# Patient Record
Sex: FEMALE | Race: WHITE | NOT HISPANIC OR LATINO | Employment: FULL TIME | URBAN - METROPOLITAN AREA
[De-identification: names, ages, dates, MRNs, and addresses within clinical notes are randomized per-mention and may not be internally consistent; named-entity substitution may affect disease eponyms.]

---

## 2017-06-28 ENCOUNTER — TRANSFERRED RECORDS (OUTPATIENT)
Dept: HEALTH INFORMATION MANAGEMENT | Facility: CLINIC | Age: 27
End: 2017-06-28

## 2018-02-12 ENCOUNTER — TRANSFERRED RECORDS (OUTPATIENT)
Dept: HEALTH INFORMATION MANAGEMENT | Facility: CLINIC | Age: 28
End: 2018-02-12

## 2018-02-14 ENCOUNTER — TRANSFERRED RECORDS (OUTPATIENT)
Dept: HEALTH INFORMATION MANAGEMENT | Facility: CLINIC | Age: 28
End: 2018-02-14

## 2018-08-14 ENCOUNTER — TRANSFERRED RECORDS (OUTPATIENT)
Dept: HEALTH INFORMATION MANAGEMENT | Facility: CLINIC | Age: 28
End: 2018-08-14

## 2018-10-31 ENCOUNTER — OFFICE VISIT (OUTPATIENT)
Dept: RHEUMATOLOGY | Facility: CLINIC | Age: 28
End: 2018-10-31
Attending: NURSE PRACTITIONER
Payer: COMMERCIAL

## 2018-10-31 VITALS
SYSTOLIC BLOOD PRESSURE: 126 MMHG | TEMPERATURE: 98.3 F | DIASTOLIC BLOOD PRESSURE: 86 MMHG | OXYGEN SATURATION: 97 % | WEIGHT: 216.1 LBS | HEART RATE: 87 BPM

## 2018-10-31 DIAGNOSIS — L41.1 PITYRIASIS LICHENOIDES CHRONICA: ICD-10-CM

## 2018-10-31 DIAGNOSIS — M06.09 RHEUMATOID ARTHRITIS OF MULTIPLE SITES WITH NEGATIVE RHEUMATOID FACTOR (H): Primary | ICD-10-CM

## 2018-10-31 DIAGNOSIS — Z79.1 NSAID LONG-TERM USE: ICD-10-CM

## 2018-10-31 DIAGNOSIS — R76.8 CYCLIC CITRULLINATED PEPTIDE (CCP) ANTIBODY POSITIVE: ICD-10-CM

## 2018-10-31 DIAGNOSIS — E55.9 VITAMIN D INSUFFICIENCY: ICD-10-CM

## 2018-10-31 DIAGNOSIS — Z23 NEED FOR PNEUMOCOCCAL VACCINATION: ICD-10-CM

## 2018-10-31 DIAGNOSIS — M06.09 RHEUMATOID ARTHRITIS OF MULTIPLE SITES WITH NEGATIVE RHEUMATOID FACTOR (H): ICD-10-CM

## 2018-10-31 DIAGNOSIS — Z79.899 LONG-TERM USE OF PLAQUENIL: ICD-10-CM

## 2018-10-31 LAB
ALBUMIN SERPL-MCNC: 3.5 G/DL (ref 3.4–5)
ALBUMIN UR-MCNC: NEGATIVE MG/DL
ALP SERPL-CCNC: 77 U/L (ref 40–150)
ALT SERPL W P-5'-P-CCNC: 22 U/L (ref 0–50)
ANION GAP SERPL CALCULATED.3IONS-SCNC: 9 MMOL/L (ref 3–14)
APPEARANCE UR: CLEAR
AST SERPL W P-5'-P-CCNC: 16 U/L (ref 0–45)
BACTERIA #/AREA URNS HPF: ABNORMAL /HPF
BASOPHILS # BLD AUTO: 0 10E9/L (ref 0–0.2)
BASOPHILS NFR BLD AUTO: 0.3 %
BILIRUB SERPL-MCNC: 0.2 MG/DL (ref 0.2–1.3)
BILIRUB UR QL STRIP: NEGATIVE
BUN SERPL-MCNC: 12 MG/DL (ref 7–30)
CALCIUM SERPL-MCNC: 8.5 MG/DL (ref 8.5–10.1)
CHLORIDE SERPL-SCNC: 109 MMOL/L (ref 94–109)
CO2 SERPL-SCNC: 21 MMOL/L (ref 20–32)
COLOR UR AUTO: YELLOW
CREAT SERPL-MCNC: 0.71 MG/DL (ref 0.52–1.04)
CRP SERPL-MCNC: 6.4 MG/L (ref 0–8)
DIFFERENTIAL METHOD BLD: NORMAL
EOSINOPHIL # BLD AUTO: 0.1 10E9/L (ref 0–0.7)
EOSINOPHIL NFR BLD AUTO: 0.9 %
ERYTHROCYTE [DISTWIDTH] IN BLOOD BY AUTOMATED COUNT: 12.4 % (ref 10–15)
GFR SERPL CREATININE-BSD FRML MDRD: >90 ML/MIN/1.7M2
GLUCOSE SERPL-MCNC: 81 MG/DL (ref 70–99)
GLUCOSE UR STRIP-MCNC: NEGATIVE MG/DL
HCT VFR BLD AUTO: 41.3 % (ref 35–47)
HGB BLD-MCNC: 13.6 G/DL (ref 11.7–15.7)
HGB UR QL STRIP: ABNORMAL
IMM GRANULOCYTES # BLD: 0 10E9/L (ref 0–0.4)
IMM GRANULOCYTES NFR BLD: 0.3 %
KETONES UR STRIP-MCNC: NEGATIVE MG/DL
LEUKOCYTE ESTERASE UR QL STRIP: NEGATIVE
LYMPHOCYTES # BLD AUTO: 3.4 10E9/L (ref 0.8–5.3)
LYMPHOCYTES NFR BLD AUTO: 36.7 %
MCH RBC QN AUTO: 29.1 PG (ref 26.5–33)
MCHC RBC AUTO-ENTMCNC: 32.9 G/DL (ref 31.5–36.5)
MCV RBC AUTO: 88 FL (ref 78–100)
MONOCYTES # BLD AUTO: 0.7 10E9/L (ref 0–1.3)
MONOCYTES NFR BLD AUTO: 7.3 %
MUCOUS THREADS #/AREA URNS LPF: PRESENT /LPF
NEUTROPHILS # BLD AUTO: 5 10E9/L (ref 1.6–8.3)
NEUTROPHILS NFR BLD AUTO: 54.5 %
NITRATE UR QL: NEGATIVE
NRBC # BLD AUTO: 0 10*3/UL
NRBC BLD AUTO-RTO: 0 /100
PH UR STRIP: 5 PH (ref 5–7)
PLATELET # BLD AUTO: 311 10E9/L (ref 150–450)
POTASSIUM SERPL-SCNC: 3.9 MMOL/L (ref 3.4–5.3)
PROT SERPL-MCNC: 7.7 G/DL (ref 6.8–8.8)
RBC # BLD AUTO: 4.67 10E12/L (ref 3.8–5.2)
RBC #/AREA URNS AUTO: <1 /HPF (ref 0–2)
SODIUM SERPL-SCNC: 139 MMOL/L (ref 133–144)
SOURCE: ABNORMAL
SP GR UR STRIP: 1.01 (ref 1–1.03)
SQUAMOUS #/AREA URNS AUTO: <1 /HPF (ref 0–1)
UROBILINOGEN UR STRIP-MCNC: 0 MG/DL (ref 0–2)
WBC # BLD AUTO: 9.2 10E9/L (ref 4–11)
WBC #/AREA URNS AUTO: 1 /HPF (ref 0–5)

## 2018-10-31 PROCEDURE — 86480 TB TEST CELL IMMUN MEASURE: CPT | Performed by: NURSE PRACTITIONER

## 2018-10-31 PROCEDURE — 36415 COLL VENOUS BLD VENIPUNCTURE: CPT | Performed by: NURSE PRACTITIONER

## 2018-10-31 PROCEDURE — 82306 VITAMIN D 25 HYDROXY: CPT | Performed by: NURSE PRACTITIONER

## 2018-10-31 PROCEDURE — G0009 ADMIN PNEUMOCOCCAL VACCINE: HCPCS

## 2018-10-31 PROCEDURE — 81001 URINALYSIS AUTO W/SCOPE: CPT | Performed by: NURSE PRACTITIONER

## 2018-10-31 PROCEDURE — 80053 COMPREHEN METABOLIC PANEL: CPT | Performed by: NURSE PRACTITIONER

## 2018-10-31 PROCEDURE — 86803 HEPATITIS C AB TEST: CPT | Performed by: NURSE PRACTITIONER

## 2018-10-31 PROCEDURE — 86431 RHEUMATOID FACTOR QUANT: CPT | Performed by: NURSE PRACTITIONER

## 2018-10-31 PROCEDURE — 86140 C-REACTIVE PROTEIN: CPT | Performed by: NURSE PRACTITIONER

## 2018-10-31 PROCEDURE — 90670 PCV13 VACCINE IM: CPT | Mod: ZF | Performed by: NURSE PRACTITIONER

## 2018-10-31 PROCEDURE — G0008 ADMIN INFLUENZA VIRUS VAC: HCPCS | Mod: ZF

## 2018-10-31 PROCEDURE — 87340 HEPATITIS B SURFACE AG IA: CPT | Performed by: NURSE PRACTITIONER

## 2018-10-31 PROCEDURE — 86704 HEP B CORE ANTIBODY TOTAL: CPT | Performed by: NURSE PRACTITIONER

## 2018-10-31 PROCEDURE — 25000128 H RX IP 250 OP 636: Mod: ZF | Performed by: NURSE PRACTITIONER

## 2018-10-31 PROCEDURE — 85025 COMPLETE CBC W/AUTO DIFF WBC: CPT | Performed by: NURSE PRACTITIONER

## 2018-10-31 PROCEDURE — G0463 HOSPITAL OUTPT CLINIC VISIT: HCPCS | Mod: 25,ZF

## 2018-10-31 RX ORDER — HYDROXYCHLOROQUINE SULFATE 200 MG/1
200 TABLET, FILM COATED ORAL 2 TIMES DAILY
Qty: 60 TABLET | Refills: 3 | Status: SHIPPED | OUTPATIENT
Start: 2018-10-31 | End: 2018-12-17

## 2018-10-31 RX ORDER — HYDROXYCHLOROQUINE SULFATE 200 MG/1
TABLET, FILM COATED ORAL
Refills: 0 | COMMUNITY
Start: 2018-03-21 | End: 2018-10-31

## 2018-10-31 RX ORDER — PREDNISONE 5 MG/1
TABLET ORAL
Qty: 84 TABLET | Refills: 0 | Status: SHIPPED | OUTPATIENT
Start: 2018-10-31 | End: 2018-10-31

## 2018-10-31 RX ORDER — FOLIC ACID 1 MG/1
1 TABLET ORAL DAILY
Qty: 90 TABLET | Refills: 3 | Status: SHIPPED | OUTPATIENT
Start: 2018-10-31 | End: 2018-12-17

## 2018-10-31 RX ORDER — DROSPIRENONE AND ETHINYL ESTRADIOL TABLETS 0.02-3(28)
KIT ORAL
Refills: 4 | COMMUNITY
Start: 2018-08-30 | End: 2018-12-04

## 2018-10-31 RX ORDER — CITALOPRAM HYDROBROMIDE 10 MG/1
TABLET ORAL
Refills: 1 | COMMUNITY
Start: 2018-06-05 | End: 2018-12-04

## 2018-10-31 RX ORDER — PREDNISONE 5 MG/1
TABLET ORAL
Qty: 84 TABLET | Refills: 0 | Status: SHIPPED | OUTPATIENT
Start: 2018-10-31 | End: 2019-05-01

## 2018-10-31 RX ADMIN — PNEUMOCOCCAL 13-VALENT CONJUGATE VACCINE 0.5 ML: 2.2; 2.2; 2.2; 2.2; 2.2; 4.4; 2.2; 2.2; 2.2; 2.2; 2.2; 2.2; 2.2 INJECTION, SUSPENSION INTRAMUSCULAR at 14:27

## 2018-10-31 ASSESSMENT — PAIN SCALES - GENERAL: PAINLEVEL: MODERATE PAIN (5)

## 2018-10-31 NOTE — LETTER
Date:November 1, 2018      Patient was self referred, no letter generated. Do not send.        [facility] Health Information

## 2018-10-31 NOTE — MR AVS SNAPSHOT
After Visit Summary   10/31/2018    Rachel Fried    MRN: [id number]           Patient Information     Date Of Birth          1990        Visit Information        Provider Department      10/31/2018 1:00 PM Zurdo Wesley APRN CNP [facility] Rheumatology        Today's Diagnoses     Rheumatoid arthritis of multiple sites with negative rheumatoid factor (H)    -  1    Cyclic citrullinated peptide (CCP) antibody positive        Long-term use of Plaquenil        Pityriasis lichenoides chronica        NSAID long-term use        Need for pneumococcal vaccination          Care Instructions    Take prednisone:   15 mg once day for 14 days  10 mg once day for 14 day   5 mg once day for 14 days     METHOTREXATE   7.5MG EVERY Friday FOR 2 WEEKS, THEN GO TO 10MG EVERY Friday.       LABS IN 1MONTH    START FOLIC ACID   Patient Education    Methotrexate Sodium Oral tablet    Methotrexate Sodium Solution for injection    Methotrexate Solution for injection  Methotrexate Sodium Oral tablet  What is this medicine?  METHOTREXATE (METH oh TREX ate) is a chemotherapy drug. This medicine affects cells that are rapidly growing, such as cancer cells and cells in your mouth and stomach. It is used to treat many cancers and other medical conditions. It is used for leukemias, lymphomas, breast cancer, lung cancer, head and neck cancers, and other cancers. This medicine also works on the immune system and is commonly used to treat psoriasis and rheumatoid arthritis. If used for arthritis or psoriasis, the drug is only given once a week.  This medicine may be used for other purposes; ask your health care provider or pharmacist if you have questions.  What should I tell my health care provider before I take this medicine?  They need to know if you have any of these conditions:    bleeding or blood disorders    HIV-positive or have acquired immunodeficiency syndrome (AIDS)    if you frequently drink alcohol-containing  drinks    infection or weak immune system    kidney disease    liver disease    lung disease    stomach ulcers    ulcerative colitis    an unusual or allergic reaction to methotrexate, other medicines, foods, dyes, or preservatives    pregnant or trying to get pregnant    breast-feeding  How should I use this medicine?  Take this medicine by mouth. Swallow it with a full glass of water. Follow the directions on the prescription label. Do not take your medicine more often than directed. Finish the full course prescribed by your doctor or health care professional. Do not stop taking except on your doctor's advice.  If you take methotrexate for rheumatoid arthritis or psoriasis, the dose is given only once a week. Do not take more frequently.  Talk to your pediatrician regarding the use of this medicine in children. Special care may be needed.  Overdosage: If you think you have taken too much of this medicine contact a poison control center or emergency room at once.  NOTE: This medicine is only for you. Do not share this medicine with others.  What if I miss a dose?  If you miss a dose, talk with your doctor or health care professional. Do not take double or extra doses. If you vomit after taking a dose, call your doctor or health care professional for advice.  What may interact with this medicine?    antibiotics and other medicines for infections    aspirin and aspirin-like medicines including bismuth subsalicylate (Pepto-Bismol)    NSAIDs, medicines for pain and inflammation, like ibuprofen or naproxen    probenecid    trimetrexate    vaccines  This list may not describe all possible interactions. Give your health care provider a list of all the medicines, herbs, non-prescription drugs, or dietary supplements you use. Also tell them if you smoke, drink alcohol, or use illegal drugs. Some items may interact with your medicine.  What should I watch for while using this medicine?  Visit your doctor or health care  professional for checks on your progress. You will need to have regular blood checks. You will also need a chest X-ray before starting the medicine.  If you take the medicine for rheumatoid arthritis or psoriasis, you may not see an improvement in your condition for several weeks.  Do not drink alcohol-containing drinks while taking this medicine. Both alcohol and the medicine may cause damage to your liver.  This medicine may increase your risk of getting an infection. Stay away from people who are sick.  To protect your kidneys, drink water or other fluids as directed while you are taking this medicine.  Both men and women must use effective birth control. Use 2 reliable forms of birth control together. Do not become pregnant while taking this medicine. Women should continue to use birth control until after their first normal menstrual cycle after stopping the medicine. Call your doctor right away if you think you or your partner might be pregnant. There is a potential for serious side effects to an unborn child. Talk to your health care professional or pharmacist for more information. Do not breast-feed an infant while taking this medicine. Men should continue to use birth control for at least 3 months after stopping the medicine.  If you are going to have surgery or dental work, tell your health care professional that you are taking this medicine.  This medicine can make you more sensitive to the sun. Keep out of the sun. If you cannot avoid being in the sun, wear protective clothing and use sunscreen. Do not use sun lamps or tanning beds/booths.  What side effects may I notice from receiving this medicine?  Side effects that you should report to your doctor or health care professional as soon as possible:    bruising, pinpoint red spots on the skin, black, tarry stools, blood in the urine    changes in vision    diarrhea    difficulty breathing or a dry cough    mouth and throat ulcers    redness, blistering,  peeling or loosening of the skin, including inside the mouth    skin rash, hives, or itching    symptoms of infection like fever or chills, cough, sore throat, pain or difficulty passing urine    unusually weak or tired, fainting spells    vomiting    yellow coloring of skin or eyes  Side effects that usually do not require medical attention (report to your doctor or health care professional if they continue or are bothersome):    dizziness    drowsiness    loss of appetite    nausea  This list may not describe all possible side effects. Call your doctor for medical advice about side effects. You may report side effects to FDA at 5-929-NTH-1431.  Where should I keep my medicine?  Keep out of the reach of children.  Store at room temperature between 20 and 25 degrees C (68 and 77 degrees F). Protect from light. Throw away any unused medicine after the expiration date.  NOTE:This sheet is a summary. It may not cover all possible information. If you have questions about this medicine, talk to your doctor, pharmacist, or health care provider. Copyright  2016 [employer] Standard                Follow-ups after your visit        Additional Services     DERMATOLOGY REFERRAL       Your provider has referred you to: [facility]: Dermatology Clinic [facility] [phone]   [email or web address]     Please be aware that coverage of these services is subject to the terms and limitations of your health insurance plan.  Call member services at your health plan with any benefit or coverage questions.      Please bring the following with you to your appointment:    (1) Any X-Rays, CTs or MRIs which have been performed.  Contact the facility where they were done to arrange for  prior to your scheduled appointment.    (2) List of current medications  (3) This referral request   (4) Any documents/labs given to you for this referral                  Follow-up notes from your care team     Return for  Labs Today, Labs every 8-12 weeks, Labs every 2 months, Annual Physical Exam with Primary Care Provider.      Your next 10 appointments already scheduled     Oct 31, 2018  2:45 PM CDT   Lab with  LAB   [facility] ([facility])    [address]  1st Floor  [facility] [address]   [phone]            Nov 06, 2018 12:25 PM CST   (Arrive by 12:10 PM)   New Patient Visit with Reginald Cohen MD   [facility] D[facility]at[facility] ([facility])    [address] [place] [address]  3rd Floor  [facility] [address]   [phone]            Nov 13, 2018  7:10 AM CST   (Arrive by 6:55 AM)   RE-ESTABLISH NEW with Brandy Lara MD   [facility] ([facility])    [address] [place] [address]  4th Floor  [facility] [address]   [phone]            Dec 17, 2018  7:30 AM CST   Lab with  LAB   [facility] ([facility])    [address] [place] [address]  1st Floor  [facility] [address]   [phone]            Dec 17, 2018  8:00 AM CST   (Arrive by 7:45 AM)   Return Visit with GIL Walker CNP   [facility] Rheumatology ([facility])    [address] [place] [address]  [address]  [facility] [address]   [phone]              Future tests that were ordered for you today     Open Standing Orders        Priority Remaining Interval Expires Ordered    CBC with platelets Routine 6/6 Every 10 Weeks 10/31/2019 10/31/2018    AST Routine 6/6 Every 10 Weeks 10/31/2019 10/31/2018    ALT Routine 6/6 Every 10 Weeks 10/31/2019 10/31/2018    Albumin level Routine 6/6 Every 10 Weeks 10/31/2019 10/31/2018    Creatinine Routine 6/6 Every 10 Weeks 10/31/2019 10/31/2018    CRP inflammation Routine 6/6 Every 10 Weeks 10/31/2019 10/31/2018          Open Future Orders        Priority Expected Expires Ordered    CBC with platelets Routine  12/30/2018 10/31/2018    AST Routine  12/30/2018 10/31/2018    ALT  Routine  12/30/2018 10/31/2018    Albumin level Routine  12/30/2018 10/31/2018    Creatinine Routine  12/30/2018 10/31/2018    CRP inflammation Routine  12/30/2018 10/31/2018    Vitamin D Deficiency Routine 10/31/2018 11/30/2018 10/31/2018    UA with Microscopic ([place] and [facility]) Routine 10/31/2018 11/30/2018 10/31/2018    Hepatitis B core antibody Routine 10/31/2018 11/30/2018 10/31/2018    Hepatitis B surface antigen Routine 10/31/2018 11/30/2018 10/31/2018    Hepatitis C antibody Routine 10/31/2018 11/30/2018 10/31/2018    Quantiferon TB Gold Plus Routine 10/31/2018 11/30/2018 10/31/2018    Comprehensive metabolic panel Routine 10/31/2018 11/30/2018 10/31/2018    CBC with platelets differential Routine 10/31/2018 11/30/2018 10/31/2018    CRP inflammation Routine 10/31/2018 11/30/2018 10/31/2018    Rheumatoid factor Routine 10/31/2018 11/30/2018 10/31/2018            Who to contact     If you have questions or need follow up information about today's clinic visit or your schedule please contact [facility] RHEUMATOLOGY directly at [phone].  Normal or non-critical lab and imaging results will be communicated to you by [employer], letter or phone within 4 business days after the clinic has received the results. If you do not hear from us within 7 days, please contact the clinic through [employer] or phone. If you have a critical or abnormal lab result, we will notify you by phone as soon as possible.  Submit refill requests through [employer] or call your pharmacy and they will forward the refill request to us. Please allow 3 business days for your refill to be completed.          Additional Information About Your Visit        [employer] Information     [employer] gives you secure access to your electronic health record. If you see a primary care provider, you can also send messages to your care team and make appointments. If you have questions, please call your primary care clinic.  If you do not have a primary care  provider, please call [phone] and they will assist you.        Care EveryWhere ID     This is your Care EveryWhere ID. This could be used by other organizations to access your [place] medical records  [id number]        Your Vitals Were     Pulse Temperature Pulse Oximetry             87 98.3  F (36.8  C) (Oral) 97%          Blood Pressure from Last 3 Encounters:   10/31/18 126/86    Weight from Last 3 Encounters:   10/31/18 98 kg (216 lb 1.6 oz)              We Performed the Following     DERMATOLOGY REFERRAL          Today's Medication Changes          These changes are accurate as of 10/31/18  2:30 PM.  If you have any questions, ask your nurse or doctor.               Start taking these medicines.        Dose/Directions    folic acid 1 MG tablet   Commonly known as:  FOLVITE   Used for:  Rheumatoid arthritis of multiple sites with negative rheumatoid factor (H), Cyclic citrullinated peptide (CCP) antibody positive, Long-term use of Plaquenil   Started by:  Zurdo Wesley APRN CNP        Dose:  1 mg   Take 1 tablet (1 mg) by mouth daily   Quantity:  90 tablet   Refills:  3       methotrexate 2.5 MG tablet CHEMO   Used for:  Rheumatoid arthritis of multiple sites with negative rheumatoid factor (H), Cyclic citrullinated peptide (CCP) antibody positive, Long-term use of Plaquenil   Started by:  Zurdo Wesley APRN CNP        Dose:  10 mg   Take 4 tablets (10 mg) by mouth once a week Labs due in 1 month.   Quantity:  16 tablet   Refills:  1       predniSONE 5 MG tablet   Commonly known as:  DELTASONE   Used for:  Rheumatoid arthritis of multiple sites with negative rheumatoid factor (H), Cyclic citrullinated peptide (CCP) antibody positive   Started by:  Zurdo Wesley APRN CNP        Take 15 mg day, then 10 mg day then 5 mg day each for 14 days then stop.   Quantity:  84 tablet   Refills:  0         These medicines have changed or have updated prescriptions.        Dose/Directions     hydroxychloroquine 200 MG tablet   Commonly known as:  PLAQUENIL   This may have changed:  See the new instructions.   Used for:  Rheumatoid arthritis of multiple sites with negative rheumatoid factor (H), Cyclic citrullinated peptide (CCP) antibody positive   Changed by:  Zurdo Wesley APRN CNP        Dose:  200 mg   Take 1 tablet (200 mg) by mouth 2 times daily Get annual eye exams for hydroxychloroquine (plaquenil) monitoring and fax to [phone].   Quantity:  60 tablet   Refills:  3            Where to get your medicines      These medications were sent to [facility] [address] [address] [address] [place] [facility] [address]  [address] [facility] [facility] [address]     Phone:  [phone]     folic acid 1 MG tablet    hydroxychloroquine 200 MG tablet    methotrexate 2.5 MG tablet CHEMO    predniSONE 5 MG tablet                Primary Care Provider Fax #    Physician No Ref-Primary [phone]       No address on file        Equal Access to Services     CAROLINE PEREZ : Hadii aad ku hadasho Sodudley, waaxda luqadaha, qaybta kaalmada adeegyada, waxay ja nunez . So [facility] [phone].    ATENCIÓN: Si habla español, tiene a escobar disposición servicios gratuitos de asistencia lingüística. Llame al [phone].    We comply with applicable federal civil rights laws and [place] laws. We do not discriminate on the basis of race, color, national origin, age, disability, sex, sexual orientation, or gender identity.            Thank you!     Thank you for choosing [facility] RHEUMATOLOG[facility]  for your care. Our goal is always to provide you with excellent care. Hearing back from our patients is one way we can continue to improve our services. Please take a few minutes to complete the written survey that you may receive in the mail after your visit with us. Thank you!             Your Updated Medication List - Protect others around you: Learn how to safely  use, store and throw away your medicines at www.disposemymeds.org.          This list is accurate as of 10/31/18  2:30 PM.  Always use your most recent med list.                   Brand Name Dispense Instructions for use Diagnosis    citalopram 10 MG tablet    celeXA     TK 1 T PO D        folic acid 1 MG tablet    FOLVITE    90 tablet    Take 1 tablet (1 mg) by mouth daily    Rheumatoid arthritis of multiple sites with negative rheumatoid factor (H), Cyclic citrullinated peptide (CCP) antibody positive, Long-term use of Plaquenil       hydroxychloroquine 200 MG tablet    PLAQUENIL    60 tablet    Take 1 tablet (200 mg) by mouth 2 times daily Get annual eye exams for hydroxychloroquine (plaquenil) monitoring and fax to [phone].    Rheumatoid arthritis of multiple sites with negative rheumatoid factor (H), Cyclic citrullinated peptide (CCP) antibody positive       LORYNA 3-0.02 MG per tablet   Generic drug:  drospirenone-ethinyl estradiol      TK 1 T PO D        methotrexate 2.5 MG tablet CHEMO     16 tablet    Take 4 tablets (10 mg) by mouth once a week Labs due in 1 month.    Rheumatoid arthritis of multiple sites with negative rheumatoid factor (H), Cyclic citrullinated peptide (CCP) antibody positive, Long-term use of Plaquenil       predniSONE 5 MG tablet    DELTASONE    84 tablet    Take 15 mg day, then 10 mg day then 5 mg day each for 14 days then stop.    Rheumatoid arthritis of multiple sites with negative rheumatoid factor (H), Cyclic citrullinated peptide (CCP) antibody positive       PROVENTIL HFA IN           ZYRTEC ALLERGY 10 MG Caps   Generic drug:  cetirizine HCl

## 2018-10-31 NOTE — NURSING NOTE
Chief Complaint   Patient presents with     Consult     Rheumatoid Arthritis     /86  Pulse 87  Temp 98.3  F (36.8  C) (Oral)  Wt 98 kg (216 lb 1.6 oz)  SpO2 97%   Cielo Meyer

## 2018-10-31 NOTE — PATIENT INSTRUCTIONS
Take prednisone:   15 mg once day for 14 days  10 mg once day for 14 day   5 mg once day for 14 days     METHOTREXATE   7.5MG EVERY Friday FOR 2 WEEKS, THEN GO TO 10MG EVERY Friday.       LABS IN 1MONTH    START FOLIC ACID   Patient Education    Methotrexate Sodium Oral tablet    Methotrexate Sodium Solution for injection    Methotrexate Solution for injection  Methotrexate Sodium Oral tablet  What is this medicine?  METHOTREXATE (METH oh TREX ate) is a chemotherapy drug. This medicine affects cells that are rapidly growing, such as cancer cells and cells in your mouth and stomach. It is used to treat many cancers and other medical conditions. It is used for leukemias, lymphomas, breast cancer, lung cancer, head and neck cancers, and other cancers. This medicine also works on the immune system and is commonly used to treat psoriasis and rheumatoid arthritis. If used for arthritis or psoriasis, the drug is only given once a week.  This medicine may be used for other purposes; ask your health care provider or pharmacist if you have questions.  What should I tell my health care provider before I take this medicine?  They need to know if you have any of these conditions:    bleeding or blood disorders    HIV-positive or have acquired immunodeficiency syndrome (AIDS)    if you frequently drink alcohol-containing drinks    infection or weak immune system    kidney disease    liver disease    lung disease    stomach ulcers    ulcerative colitis    an unusual or allergic reaction to methotrexate, other medicines, foods, dyes, or preservatives    pregnant or trying to get pregnant    breast-feeding  How should I use this medicine?  Take this medicine by mouth. Swallow it with a full glass of water. Follow the directions on the prescription label. Do not take your medicine more often than directed. Finish the full course prescribed by your doctor or health care professional. Do not stop taking except on your doctor's  advice.  If you take methotrexate for rheumatoid arthritis or psoriasis, the dose is given only once a week. Do not take more frequently.  Talk to your pediatrician regarding the use of this medicine in children. Special care may be needed.  Overdosage: If you think you have taken too much of this medicine contact a poison control center or emergency room at once.  NOTE: This medicine is only for you. Do not share this medicine with others.  What if I miss a dose?  If you miss a dose, talk with your doctor or health care professional. Do not take double or extra doses. If you vomit after taking a dose, call your doctor or health care professional for advice.  What may interact with this medicine?    antibiotics and other medicines for infections    aspirin and aspirin-like medicines including bismuth subsalicylate (Pepto-Bismol)    NSAIDs, medicines for pain and inflammation, like ibuprofen or naproxen    probenecid    trimetrexate    vaccines  This list may not describe all possible interactions. Give your health care provider a list of all the medicines, herbs, non-prescription drugs, or dietary supplements you use. Also tell them if you smoke, drink alcohol, or use illegal drugs. Some items may interact with your medicine.  What should I watch for while using this medicine?  Visit your doctor or health care professional for checks on your progress. You will need to have regular blood checks. You will also need a chest X-ray before starting the medicine.  If you take the medicine for rheumatoid arthritis or psoriasis, you may not see an improvement in your condition for several weeks.  Do not drink alcohol-containing drinks while taking this medicine. Both alcohol and the medicine may cause damage to your liver.  This medicine may increase your risk of getting an infection. Stay away from people who are sick.  To protect your kidneys, drink water or other fluids as directed while you are taking this  medicine.  Both men and women must use effective birth control. Use 2 reliable forms of birth control together. Do not become pregnant while taking this medicine. Women should continue to use birth control until after their first normal menstrual cycle after stopping the medicine. Call your doctor right away if you think you or your partner might be pregnant. There is a potential for serious side effects to an unborn child. Talk to your health care professional or pharmacist for more information. Do not breast-feed an infant while taking this medicine. Men should continue to use birth control for at least 3 months after stopping the medicine.  If you are going to have surgery or dental work, tell your health care professional that you are taking this medicine.  This medicine can make you more sensitive to the sun. Keep out of the sun. If you cannot avoid being in the sun, wear protective clothing and use sunscreen. Do not use sun lamps or tanning beds/booths.  What side effects may I notice from receiving this medicine?  Side effects that you should report to your doctor or health care professional as soon as possible:    bruising, pinpoint red spots on the skin, black, tarry stools, blood in the urine    changes in vision    diarrhea    difficulty breathing or a dry cough    mouth and throat ulcers    redness, blistering, peeling or loosening of the skin, including inside the mouth    skin rash, hives, or itching    symptoms of infection like fever or chills, cough, sore throat, pain or difficulty passing urine    unusually weak or tired, fainting spells    vomiting    yellow coloring of skin or eyes  Side effects that usually do not require medical attention (report to your doctor or health care professional if they continue or are bothersome):    dizziness    drowsiness    loss of appetite    nausea  This list may not describe all possible side effects. Call your doctor for medical advice about side effects. You  may report side effects to FDA at 9-206-UXZ-8363.  Where should I keep my medicine?  Keep out of the reach of children.  Store at room temperature between 20 and 25 degrees C (68 and 77 degrees F). Protect from light. Throw away any unused medicine after the expiration date.  NOTE:This sheet is a summary. It may not cover all possible information. If you have questions about this medicine, talk to your doctor, pharmacist, or health care provider. Copyright  2016 Gold Standard

## 2018-10-31 NOTE — PROGRESS NOTES
[facility] - Rheumatology Clinic Visit    Rachel Fried  is a 27 year old here for transfer of care for RHEUMATOID ARTHRITIS (RA) involving hands, wrists, ankles, shoulders feet [ -RF and low CCP 25, -factor V -KAVITHA by IFA and -NADEEN panel -HLA B27] and pityriasis lichenoides chronica (seen [facility]). XR  no erosions hands. Moved from [place] to be with family.       Past- hydroxychloroquine (plaquenil) since 1-2016 and methotrexate 10 mg week  to 4-2018 (mild fatigue day of), naproxsyn; allergy sulfa, prednisone, advil       Hydroxychloroquine (plaquenil) BID mild diarrhea; ocular eye exam 3-2016 baseline reports recent eye exam   EKG was recently Feb 2018 normal. Stopped methotrexate  In April as wanted to take a drug holiday and was moving here, also wanted to drink some alcohol, and discuss options. No infections. Diffuse joint pains in hands, wrists, shoulders, ankles and feet and the sides of her hips. Taking advil 600mg AM and 400 mg PM, tolerating but taking since April. Pain severe 5/10 or more. Stiffness all day. Hands are sore. No loss of function or ROM. Very sore and stiff all over. Knees are sore and hard to bend or squat. Mild diarrhea with hydroxychloroquine (plaquenil)       PMH:  Injury-no  Medical-dermatitis, (bx 2-2016 GA vs LP vs cutaneous lupus--was not cutenaous lupus --focal interstitial lymphocytic inflammation, scattered lymphocytes), anxiety and depression, exercise induced asthma, scoliosis, pityriasis lichenoides chronica, chronic back pain, hypertension, seasonal allergies, acne, motion sickness, high cholesterol, chicken pox    Surgical-None       FH:  No autoimmune disorders, psoriasis, UC, crohn's, RA, PsA, gout, autoimmune thyroid.  No MS, heart disease or cancer in family  Mother-factor V leiden carrier, rosea   Father-healthy  Siblings-younger bro healthy  Children-None   GF-lupus   Uncle cancer colon    SH:  Social Alcohol 5 week. Never  Smoking. No IVDU. No Children. Right handed. Single--not sexually active, never.        [facility] personally reviewed and updated by me.    ROS:  Negative raynaud s phenomena, hairloss, sun sensitivities, keratoconjunctivitis sicca, pleurisy, inflammatory joint symptoms, significant rashes like malar, oral/nasal or ulcerations, inflammatory eye disease, inflammatory bowel disease, dactylitis, tenosynovitis, or enthespathy. Negative for  blood clots, gout, psoriasis, UC, crohn's. No temporal headache, no jaw claudication, no scalp tenderness, vision changes, carotidynia, cough. No STD or pregnancy symptoms. No Parotid swelling.   +tips finger white at times and toes   +skin disease as above   CONSTITUTIONAL: No fevers, night sweats or unintentional weight change. No acute distress, swollen glands  EYES: No vision change, diplopia, pain in eyes or red eyes   EARS, NOSE, MOUTH, THROAT: No tinnitus or hearing change, no epistaxis or nasal discharge, no oral lesions, throat clear. Normal saliva pool.  No drymouth. No thyroid enlargement.   CARDIOVASCULAR: No chest pain, palpitations, or pain with walking, no orthopnea or PND   RESPIRATORY: No dyspnea, cough, shortness of breath or wheezing. No pleurisy.   GI: No nausea, vomiting, diarrhea or constipation, no abdominal pain, or blood in stools.   : No change in urine, no dysuria or hematuria   MUSCKL: No enthesitis, plantar fascitis or heel pain. See above   INTEGUMENTARY: No concerning lesions or moles   NEURO: No loss of strength or sensation, no numbness or tingling, no tremor, no dizziness, no headache. No falls   ENDO: No polyuria or polydipsia, no temperature intolerance   HEME/LYMPH:No concerning bumps, bleeding problems, or swollen lymph nodes. No recent infections, hospitalizations or new illnesses.   ALLERGY: No environmental allergies   Otherwise 14 point ROS obtained, reviewed and found negative.     Physical exam:  Vitals: Blood pressure 126/86, pulse 87,  temperature 98.3  F (36.8  C), temperature source Oral, weight 98 kg (216 lb 1.6 oz), SpO2 97 %.  Wt Readings from Last 4 Encounters:   No data found for Wt     Constitutional: WD-WN-WG cooperative  Eyes: nl EOM, PERRLA, vision, conjunctiva, sclera, No Unilateral or bilateral external ear inflammation   ENT: nl external ears, nose, hearing, lips, teeth, gums, throat. No saddle nose   Neck: no mass or thyroid enlargement  Resp: lungs clear to auscultation, nl to palpation, nl effort  CV: RRR, no murmurs, rubs or gallops, no edema  GI: no ABD mass or tenderness, no HSM  : not tested  Lymph: no cervical or epitrochlear nodes  MS: tender in ITB and shoulders. s/t in MCPs, PIPs, wrists, MTPs, knees, ankles. All TMJ, neck, shoulder, elbow, wrist, hand, spine, hip, knee, ankle, and foot joints were examined and otherwise found normal. Weak  strength. No deformity. Full ROM. Normal prayer sign. Negative Lhermitte's sign. No periuncle erythema  Skin: no nail pitting, alopecia, rash. Skin lesions.   Neuro: nl cranial nerves, strength, sensation, DTRs.   Psych: nl judgement, orientation, memory, affect.      Labs/Imaging:  No results found for this or any previous visit.   Normal G6PD  Neg MTB QG 2016 and hepatitis panel, negative HIV    XR hands and knees-negative 2016      Impression/Plan:  1. Seropositive RHEUMATOID ARTHRITIS (RA), non-erosive highly active in diffuse joints, poor prognosis. Prior control on low dose methotrexate and hydroxychloroquine (plaquenil). Stopped methotrexate  As wanted to drink alcohol. Discussed methotrexate  Vs adalimumab (humira) risks, side-effects, possible reactions, alcohol reduction and risks with methotrexate , infection risks, pregnancy (abstince and knows needs to be off for 3-6 month before trying to concieve) seek immediate medical attention if any signs or symptoms infections. She wishes to go back to methotrexate  And monitor for side-effects, will abstain from intercourse  (will see OBGYN if does for IUD or double protection) and will limit 0-2 week not day or after.     2. Longterm hydroxychloroquine (plaquenil) use. Recent opthalmologist  Exam, she will bring in records  3. Chronic NSAID use, informed her of risks and possible side-effects to this, and including CVD/kidney disease  4. Pityriasis lichenoids chronic-referral to derm to establish care    Prednisone 15-10-5 mg each for 2 weeks then stop  Methotrexate  10mg once every FRI, will do baseline labs today and start at 7.5 mg for 2 weeks then to 10 mg  Folic acid  1 mg day  Stop NSAID use  Annual opthalmologist  Exam for hydroxychloroquine (plaquenil) use.   Pneumonia 13 vaccine, in 8 weeks will get pneumonia 23 vaccine   Establish with PCP      The patient understood the rationale for the diagnosis and treatment plan. Patient shared in the decision making. All questions were answered to best of my ability and the patient's satisfaction  Zurdo CORDERO, CNP, MSN  [facility]  Department of Rheumatology & Autoimmune Disorders    1. Immunization: Reviewed CDC guidelines with patient updated, information provided to patient based on CDC guidelines for vaccination recommendations, patient will discuss with primary provider  2. Bone Health:Educated on adequate calcium and vitamin D intake, Educated on exercise, Glucocorticoid education discussed risks, benefits & potential long term side effects from use  3. Discussed routine annual physicals, cancer screening, cardiac risk monitoring, bone health and primary care with primary care provider.   4. Educated on diagnosis, prognosis, labs, imaging, treatment options (including risks, benefits, risk of no treatment), medications (use, dose, side-effects, risks of medications including infection/cancer/bone marrow suppression, lab monitoring), infections what to do, plan of cares, goals of treatment.

## 2018-10-31 NOTE — LETTER
10/31/2018      RE: Rachel Fried  [address]  [facility] [address]       [facility] - Rheumatology Clinic Visit    Rachel Fried  is a 27 year old here for transfer of care for RHEUMATOID ARTHRITIS (RA) involving hands, wrists, ankles, shoulders feet [ -RF and low CCP 25, -factor V -KAVITHA by IFA and -NADEEN panel -HLA B27] and pityriasis lichenoides chronica (seen [facility]). XR  no erosions hands. Moved from [place] to be with family.       Past- hydroxychloroquine (plaquenil) since 1-2016 and methotrexate 10 mg week  to 4-2018 (mild fatigue day of), naproxsyn; allergy sulfa, prednisone, advil       Hydroxychloroquine (plaquenil) BID mild diarrhea; ocular eye exam 3-2016 baseline reports recent eye exam   EKG was recently Feb 2018 normal. Stopped methotrexate  In April as wanted to take a drug holiday and was moving here, also wanted to drink some alcohol, and discuss options. No infections. Diffuse joint pains in hands, wrists, shoulders, ankles and feet and the sides of her hips. Taking advil 600mg AM and 400 mg PM, tolerating but taking since April. Pain severe 5/10 or more. Stiffness all day. Hands are sore. No loss of function or ROM. Very sore and stiff all over. Knees are sore and hard to bend or squat. Mild diarrhea with hydroxychloroquine (plaquenil)       PMH:  Injury-no  Medical-dermatitis, (bx 2-2016 [place] vs LP vs cutaneous lupus--was not cutenaous lupus --focal interstitial lymphocytic inflammation, scattered lymphocytes), anxiety and depression, exercise induced asthma, scoliosis, pityriasis lichenoides chronica, chronic back pain, hypertension, seasonal allergies, acne, motion sickness, high cholesterol, chicken pox    Surgical-None       FH:  No autoimmune disorders, psoriasis, UC, crohn's, RA, PsA, gout, autoimmune thyroid.  No MS, heart disease or cancer in family  Mother-factor V leiden carrier, rosea   Father-healthy  Siblings-younger bro  healthy  Children-None   GF-lupus   Uncle cancer colon    SH:  Social Alcohol 5 week. Never Smoking. No IVDU. No Children. Right handed. Single--not sexually active, never.        [facility] personally reviewed and updated by me.    ROS:  Negative raynaud s phenomena, hairloss, sun sensitivities, keratoconjunctivitis sicca, pleurisy, inflammatory joint symptoms, significant rashes like malar, oral/nasal or ulcerations, inflammatory eye disease, inflammatory bowel disease, dactylitis, tenosynovitis, or enthespathy. Negative for  blood clots, gout, psoriasis, UC, crohn's. No temporal headache, no jaw claudication, no scalp tenderness, vision changes, carotidynia, cough. No STD or pregnancy symptoms. No Parotid swelling.   +tips finger white at times and toes   +skin disease as above   CONSTITUTIONAL: No fevers, night sweats or unintentional weight change. No acute distress, swollen glands  EYES: No vision change, diplopia, pain in eyes or red eyes   EARS, NOSE, MOUTH, THROAT: No tinnitus or hearing change, no epistaxis or nasal discharge, no oral lesions, throat clear. Normal saliva pool.  No drymouth. No thyroid enlargement.   CARDIOVASCULAR: No chest pain, palpitations, or pain with walking, no orthopnea or PND   RESPIRATORY: No dyspnea, cough, shortness of breath or wheezing. No pleurisy.   GI: No nausea, vomiting, diarrhea or constipation, no abdominal pain, or blood in stools.   : No change in urine, no dysuria or hematuria   MUSCKL: No enthesitis, plantar fascitis or heel pain. See above   INTEGUMENTARY: No concerning lesions or moles   NEURO: No loss of strength or sensation, no numbness or tingling, no tremor, no dizziness, no headache. No falls   ENDO: No polyuria or polydipsia, no temperature intolerance   HEME/LYMPH:No concerning bumps, bleeding problems, or swollen lymph nodes. No recent infections, hospitalizations or new illnesses.   ALLERGY: No environmental allergies   Otherwise 14 point ROS  obtained, reviewed and found negative.     Physical exam:  Vitals: Blood pressure 126/86, pulse 87, temperature 98.3  F (36.8  C), temperature source Oral, weight 98 kg (216 lb 1.6 oz), SpO2 97 %.  Wt Readings from Last 4 Encounters:   No data found for Wt     Constitutional: WD-WN-WG cooperative  Eyes: nl EOM, PERRLA, vision, conjunctiva, sclera, No Unilateral or bilateral external ear inflammation   ENT: nl external ears, nose, hearing, lips, teeth, gums, throat. No saddle nose   Neck: no mass or thyroid enlargement  Resp: lungs clear to auscultation, nl to palpation, nl effort  CV: RRR, no murmurs, rubs or gallops, no edema  GI: no ABD mass or tenderness, no HSM  : not tested  Lymph: no cervical or epitrochlear nodes  MS: tender in ITB and shoulders. s/t in MCPs, PIPs, wrists, MTPs, knees, ankles. All TMJ, neck, shoulder, elbow, wrist, hand, spine, hip, knee, ankle, and foot joints were examined and otherwise found normal. Weak  strength. No deformity. Full ROM. Normal prayer sign. Negative Lhermitte's sign. No periuncle erythema  Skin: no nail pitting, alopecia, rash. Skin lesions.   Neuro: nl cranial nerves, strength, sensation, DTRs.   Psych: nl judgement, orientation, memory, affect.      Labs/Imaging:  No results found for this or any previous visit.   Normal G6PD  Neg MTB QG 2016 and hepatitis panel, negative HIV    XR hands and knees-negative 2016      Impression/Plan:  1. Seropositive RHEUMATOID ARTHRITIS (RA), non-erosive highly active in diffuse joints, poor prognosis. Prior control on low dose methotrexate and hydroxychloroquine (plaquenil). Stopped methotrexate  As wanted to drink alcohol. Discussed methotrexate  Vs adalimumab (humira) risks, side-effects, possible reactions, alcohol reduction and risks with methotrexate , infection risks, pregnancy (abstince and knows needs to be off for 3-6 month before trying to concieve) seek immediate medical attention if any signs or symptoms infections.  She wishes to go back to methotrexate  And monitor for side-effects, will abstain from intercourse (will see OBGYN if does for IUD or double protection) and will limit 0-2 week not day or after.     2. Longterm hydroxychloroquine (plaquenil) use. Recent opthalmologist  Exam, she will bring in records  3. Chronic NSAID use, informed her of risks and possible side-effects to this, and including CVD/kidney disease  4. Pityriasis lichenoids chronic-referral to derm to establish care    Prednisone 15-10-5 mg each for 2 weeks then stop  Methotrexate  10mg once every FRI, will do baseline labs today and start at 7.5 mg for 2 weeks then to 10 mg  Folic acid  1 mg day  Stop NSAID use  Annual opthalmologist  Exam for hydroxychloroquine (plaquenil) use.   Pneumonia 13 vaccine, in 8 weeks will get pneumonia 23 vaccine   Establish with PCP      The patient understood the rationale for the diagnosis and treatment plan. Patient shared in the decision making. All questions were answered to best of my ability and the patient's satisfaction  Zurdo CORDERO, CNP, MSN  [facility]  Department of Rheumatology & Autoimmune Disorders    1. Immunization: Reviewed CDC guidelines with patient updated, information provided to patient based on CDC guidelines for vaccination recommendations, patient will discuss with primary provider  2. Bone Health:Educated on adequate calcium and vitamin D intake, Educated on exercise, Glucocorticoid education discussed risks, benefits & potential long term side effects from use  3. Discussed routine annual physicals, cancer screening, cardiac risk monitoring, bone health and primary care with primary care provider.   4. Educated on diagnosis, prognosis, labs, imaging, treatment options (including risks, benefits, risk of no treatment), medications (use, dose, side-effects, risks of medications including infection/cancer/bone marrow suppression, lab monitoring), infections what to do,  plan of cares, goals of treatment.               GIL Kim CNP

## 2018-11-01 LAB
DEPRECATED CALCIDIOL+CALCIFEROL SERPL-MC: 20 UG/L (ref 20–75)
HBV CORE AB SERPL QL IA: NONREACTIVE
HBV SURFACE AG SERPL QL IA: NONREACTIVE
HCV AB SERPL QL IA: NONREACTIVE
RHEUMATOID FACT SER NEPH-ACNC: <20 IU/ML (ref 0–20)

## 2018-11-01 RX ORDER — CHOLECALCIFEROL (VITAMIN D3) 50 MCG
2000 TABLET ORAL DAILY
Qty: 1 TABLET | Refills: 0 | COMMUNITY
Start: 2018-11-01

## 2018-11-02 LAB
GAMMA INTERFERON BACKGROUND BLD IA-ACNC: 0.07 IU/ML
M TB IFN-G BLD-IMP: NEGATIVE
M TB IFN-G CD4+ BCKGRND COR BLD-ACNC: 6.65 IU/ML
MITOGEN IGNF BCKGRD COR BLD-ACNC: 0.06 IU/ML
MITOGEN IGNF BCKGRD COR BLD-ACNC: 0.06 IU/ML

## 2018-11-06 ENCOUNTER — OFFICE VISIT (OUTPATIENT)
Dept: DERMATOLOGY | Facility: CLINIC | Age: 28
End: 2018-11-06
Payer: COMMERCIAL

## 2018-11-06 DIAGNOSIS — L70.0 ACNE VULGARIS: ICD-10-CM

## 2018-11-06 DIAGNOSIS — L81.1 MELASMA: ICD-10-CM

## 2018-11-06 DIAGNOSIS — L41.1 PITYRIASIS LICHENOIDES CHRONICA: Primary | ICD-10-CM

## 2018-11-06 RX ORDER — TRETINOIN 0.25 MG/G
CREAM TOPICAL
Qty: 45 G | Refills: 1 | Status: CANCELLED | OUTPATIENT
Start: 2018-11-06

## 2018-11-06 RX ORDER — TRETINOIN 0.5 MG/G
CREAM TOPICAL AT BEDTIME
Qty: 45 G | Refills: 11 | Status: SHIPPED | OUTPATIENT
Start: 2018-11-06 | End: 2023-05-16

## 2018-11-06 RX ORDER — HYDROQUINONE 40 MG/G
CREAM TOPICAL DAILY
Qty: 56.8 G | Refills: 3 | Status: SHIPPED | OUTPATIENT
Start: 2018-11-06 | End: 2023-05-16

## 2018-11-06 RX ORDER — TRIAMCINOLONE ACETONIDE 1 MG/G
OINTMENT TOPICAL 2 TIMES DAILY
Qty: 30 G | Refills: 1 | Status: SHIPPED | OUTPATIENT
Start: 2018-11-06 | End: 2023-05-16

## 2018-11-06 ASSESSMENT — PAIN SCALES - GENERAL: PAINLEVEL: NO PAIN (0)

## 2018-11-06 NOTE — PATIENT INSTRUCTIONS
Pityriasis lichenoides chronica:  - Continue methotrexate 10mg.  - We are refilling your triamcinolone 0.1% ointment. Use this on the lesions twice a day. If things are starting to respond, feel free to decrease this to once a day.    Acne:  - Benzoyl peroxide wash (get one at 4 or 5%): use on your face and upper back. Make sure to wash it off thoroughly as it can stain your towels and sheets.   - Clindamycin (antibacterial) lotion. Use this twice a day.  - Tretinoin 0.025% cream  (Vitamin A derivative). Use this at night.    Melasma:  - Make sure to use sunscreen! Sun protection is the most important thing you can do for this.  - Could consider a medication (hydroquinone) if you would like additional treatment for this.

## 2018-11-06 NOTE — PROGRESS NOTES
"[facility] Dermatology Note      Dermatology Problem List:  1. Rheumatoid arthritis  - Hydroxychloroquine 200mg  - Methotrexate 10mg    2. Pityriasis lichenoides chronica  - bx done 2/17/16, showed perivascular and interface dermatitis  - Triamcinolone 0.1% ointment BID    3. Acne vulgaris, s/p Accutane tx 10-15 years ago  - Benzoyl peroxide wash  - Tretinoin 0.05% cream nightly    4. Melasma, s/p OCPs      Encounter Date: Nov 6, 2018    CC:  Chief Complaint   Patient presents with     Derm Problem     Rachel is here to discuss her skin issues starting in 2015.          History of Present Illness:  Ms. Rachel Fried is a 27 year old female who presents as a referral for pityriasis lichenoides chronica. She states that her symptoms began in the summer 2015, at which time she started to break out in \"hives.\" These hives would present as small red bumps on her feet and hands that almost appeared as acne or ingrown hairs. The bumps began to spread up her arms and legs and would flatten/become scaly with further progression. She had a biopsy done in 2016 that showed a perivascular and interface dermatitis. She was initially diagnosed with lupus erythematosus, however later visited the [facility] where she was diagnosed with pityriasis lichenoides chronica.    She states that the bumps are mostly localized to the arms, legs, and butt and tend to spare her chest, abdomen, upper back, face, and scalp. She notices two different types of lesions: one type (mainly on her arms) that is flattened and scaly and one type (more commonly on her legs) that looks more like acne. She states that the lesions on her legs will drain pus. She experiences occasional pain and itchiness of the lesions when her skin is dry. She uses a moisturizer at night which does seem to help with the dryness. She also states that her skin seems to improve in the heat/sunlight. She restarted methotrexate one week ago for her RA and " believes that this is improving her skin as well.    She would also like to discuss concerns of acne. She was on Accutane in high school, which significantly improved her skin. Her face has more recently started to flare again, most prominently located along her jawline and her upper back. She uses micellar cleansing wipes to remove her makeup and Clearasil face wash.     She also states that she has begun to notice melasma develop after starting OCPs. She is wondering whether there is anything that she can do to lessen the appearance of this.      Past Medical History:   Patient Active Problem List   Diagnosis     Rheumatoid arthritis of multiple sites with negative rheumatoid factor (H)     Cyclic citrullinated peptide (CCP) antibody positive     Long-term use of Plaquenil     NSAID long-term use     History reviewed. No pertinent past medical history.  History reviewed. No pertinent surgical history.    Social History:  Patient works as a . She  reports that she has never smoked. She has never used smokeless tobacco.    Family History:  - Mother with Factor V Leiden mutation and rosacea  - Grandfather with SLE  - Uncle with colon cancer    Medications:  Current Outpatient Prescriptions   Medication Sig Dispense Refill     Albuterol Sulfate (PROVENTIL HFA IN)        cetirizine HCl (ZYRTEC ALLERGY) 10 MG CAPS        Cholecalciferol (VITAMIN D) 2000 units tablet Take 2,000 Units by mouth daily 1 tablet 0     citalopram (CELEXA) 10 MG tablet TK 1 T PO D  1     folic acid (FOLVITE) 1 MG tablet Take 1 tablet (1 mg) by mouth daily 90 tablet 3     hydroquinone 4 % CREA Externally apply topically daily Do not use for more than 3 months consecutively without a 3 month break. 56.8 g 3     hydroxychloroquine (PLAQUENIL) 200 MG tablet Take 1 tablet (200 mg) by mouth 2 times daily Get annual eye exams for hydroxychloroquine (plaquenil) monitoring and fax to [phone]. 60 tablet 3     LORYNA 3-0.02 MG per tablet TK 1 T  PO D  4     methotrexate 2.5 MG tablet CHEMO Take 4 tablets (10 mg) by mouth once a week Labs due in 1 month. 16 tablet 1     predniSONE (DELTASONE) 5 MG tablet Take 15 mg day, then 10 mg day then 5 mg day each for 14 days then stop. 84 tablet 0     tretinoin (RETIN-A) 0.05 % cream Apply topically At Bedtime Apply a pea sized amount to the face every other night for 2 weeks, then nightly thereafter 45 g 11     triamcinolone (KENALOG) 0.1 % ointment Apply topically 2 times daily 30 g 1     Allergies   Allergen Reactions     Sulfa Drugs Hives and Itching         Review of Systems:  -As per HPI  -Constitutional: The patient denies fatigue, fevers, chills, unintended weight loss, and night sweats.  -HEENT: Patient denies nonhealing oral sores.  -Skin: As above in HPI. No additional skin concerns.    Physical exam:  Vitals: There were no vitals taken for this visit.  GEN: This is a well developed, well-nourished female in no acute distress, in a pleasant mood.    SKIN: Total skin excluding the undergarment areas was performed. The exam included the head/face, neck, both arms, chest, back, abdomen, both legs, digits and/or nails.   - Scattered dark purple/brown flat-topped papules on the bilateral lower legs.  - Flattened, hypopigmented macules with overlying whitish scale on the palms of the hand, bilateral lower legs, and over the left lateral malleolus.  - Symmetric, tan macules with irregular borders on the bilateral malar cheeks.  - Erythematous inflammatory papules with hyperpigmented macules along the jaw and on the upper back.  - No other lesions of concern on areas examined.     Impression/Plan:  1. Pityriasis lichenoides chronica    Continue methotrexate 10mg per rheumatology, should also help improve the PLC lesions.    Restart triamcinolone 0.1% ointment BID on affected areas, can decrease to daily once lesions improve.    2. Acne vulgaris, along the jaw and upper back, on OCPs    Start benzoyl peroxide wash  (4 or 5%) on the face and upper back 1-2x/day.     Start tretinoin 0.025% cream nightly.    3. Melasma, bilateral malar cheeks    Recommended limiting sun exposure.    Consider starting hydroquinone 4% cream to improve appearance. Discussed side effects including dyspigmentation with long-term use.       Follow-up in 3 months, earlier for new or changing lesions.     Staff Involved:  I, Sabra Velasquez MS3, saw and examined the patient in the presence of Dr. Reginald Cohen.    Staff Physician:  I was present with the medical student who participated in the service and in the documentation of the note. I have verified the history and personally performed the physical exam and medical decision making. I agree with the assessment and plan of care as documented in the note.     Reginald Cohen MD  Staff Dermatologist and Dermatopathologist  , Department of Dermatology

## 2018-11-06 NOTE — MR AVS SNAPSHOT
After Visit Summary   11/6/2018    Rachel Fried    MRN: [id number]           Patient Information     Date Of Birth          1990        Visit Information        Provider Department      11/6/2018 12:25 PM Reginald Cohen MD [facility]        Today's Diagnoses     Pityriasis lichenoides chronica    -  1    Acne vulgaris        Melasma          Care Instructions    Pityriasis lichenoides chronica:  - Continue methotrexate 10mg.  - We are refilling your triamcinolone 0.1% ointment. Use this on the lesions twice a day. If things are starting to respond, feel free to decrease this to once a day.    Acne:  - Benzoyl peroxide wash (get one at 4 or 5%): use on your face and upper back. Make sure to wash it off thoroughly as it can stain your towels and sheets.   - Clindamycin (antibacterial) lotion. Use this twice a day.  - Tretinoin 0.025% cream  (Vitamin A derivative). Use this at night.    Melasma:  - Make sure to use sunscreen! Sun protection is the most important thing you can do for this.  - Could consider a medication (hydroquinone) if you would like additional treatment for this.              Follow-ups after your visit        Follow-up notes from your care team     Return in about 3 months (around 2/6/2019).      Your next 10 appointments already scheduled     Nov 13, 2018  7:10 AM CST   (Arrive by 6:55 AM)   RE-ESTABLISH NEW with Brandy Lara MD   [facility] ([facility])    [address] [place] [address]  4th Floor  [facility] [id number] [phone]            Dec 17, 2018  7:30 AM CST   Lab with  LAB   [facility] ([facility])    [address] [place] [address]  [address] [facility] [id number]   [phone]            Dec 17, 2018  8:00 AM CST   (Arrive by 7:45 AM)   Return Visit with GIL Walker [facility] ([facility])    [address] [place] [address]  [address]  [address]  [facility] [id number] [phone]            Jan 29, 2019  8:10 AM CST   (Arrive by 7:55 AM)   Return Visit with Reginald Cohen MD   [facility] ([facility])    [address]  3rd Floor  [facility] [id number] [phone]              Who to contact     Please call your clinic at [phone] to:    Ask questions about your health    Make or cancel appointments    Discuss your medicines    Learn about your test results    Speak to your doctor            Additional Information About Your Visit        [employer] Information     [employer] gives you secure access to your electronic health record. If you see a primary care provider, you can also send messages to your care team and make appointments. If you have questions, please call your primary care clinic.  If you do not have a primary care provider, please call [phone] and they will assist you.      [employer] is an electronic gateway that provides easy, online access to your medical records. With [employer], you can request a clinic appointment, read your test results, renew a prescription or communicate with your care team.     To access your existing account, please contact your [facility] or call [phone] for assistance.        Care EveryWhere ID     This is your Care EveryWhere ID. This could be used by other organizations to access your [place] medical records  [id number]         Blood Pressure from Last 3 Encounters:   10/31/18 126/86    Weight from Last 3 Encounters:   10/31/18 98 kg (216 lb 1.6 oz)              Today, you had the following     No orders found for display       Primary Care Provider Fax #    Physician No Ref-Primary [phone]       No address on file        Equal Access to Services     CAROLINE PEREZ : Ana Diaz, dasha pacheco, jhonny corbin. So [facility]  [phone].    ATENCIÓN: Si adriana rodriguez, tiene a escobar disposición servicios gratuitos de asistencia lingüística. Amanda goldman [phone].    We comply with applicable federal civil rights laws and [place] laws. We do not discriminate on the basis of race, color, national origin, age, disability, sex, sexual orientation, or gender identity.            Thank you!     Thank you for choosing [facility] DERMATOLOG[facility]  for your care. Our goal is always to provide you with excellent care. Hearing back from our patients is one way we can continue to improve our services. Please take a few minutes to complete the written survey that you may receive in the mail after your visit with us. Thank you!             Your Updated Medication List - Protect others around you: Learn how to safely use, store and throw away your medicines at www.disposemymeds.org.          This list is accurate as of 11/6/18 12:59 PM.  Always use your most recent med list.                   Brand Name Dispense Instructions for use Diagnosis    citalopram 10 MG tablet    celeXA     TK 1 T PO D        folic acid 1 MG tablet    FOLVITE    90 tablet    Take 1 tablet (1 mg) by mouth daily    Rheumatoid arthritis of multiple sites with negative rheumatoid factor (H), Cyclic citrullinated peptide (CCP) antibody positive, Long-term use of Plaquenil       hydroxychloroquine 200 MG tablet    PLAQUENIL    60 tablet    Take 1 tablet (200 mg) by mouth 2 times daily Get annual eye exams for hydroxychloroquine (plaquenil) monitoring and fax to [phone].    Rheumatoid arthritis of multiple sites with negative rheumatoid factor (H), Cyclic citrullinated peptide (CCP) antibody positive       LORYNA 3-0.02 MG per tablet   Generic drug:  drospirenone-ethinyl estradiol      TK 1 T PO D        methotrexate 2.5 MG tablet CHEMO     16 tablet    Take 4 tablets (10 mg) by mouth once a week Labs due in 1 month.    Rheumatoid arthritis of multiple sites with negative rheumatoid  factor (H), Cyclic citrullinated peptide (CCP) antibody positive, Long-term use of Plaquenil       predniSONE 5 MG tablet    DELTASONE    84 tablet    Take 15 mg day, then 10 mg day then 5 mg day each for 14 days then stop.    Rheumatoid arthritis of multiple sites with negative rheumatoid factor (H), Cyclic citrullinated peptide (CCP) antibody positive       PROVENTIL HFA IN           vitamin D 2000 units tablet     1 tablet    Take 2,000 Units by mouth daily    Vitamin D insufficiency       ZYRTEC ALLERGY 10 MG Caps   Generic drug:  cetirizine HCl

## 2018-11-06 NOTE — NURSING NOTE
Dermatology Rooming Note    Rachel Fried's goals for this visit include:   Chief Complaint   Patient presents with     Derm Problem     Rachel is here to discuss her skin issues starting in 2015.      Terra Hargrove LPN

## 2018-11-06 NOTE — LETTER
"11/6/2018       RE: Rachel Fried  [address]  [facility] [address]     Dear Colleague,    Thank you for referring your patient, Rachel Fried, to the [facility] DERMATOLOGY at [facility]. Please see a copy of my visit note below.    [facility] Dermatology Note      Dermatology Problem List:  1. Rheumatoid arthritis  - Hydroxychloroquine 200mg  - Methotrexate 10mg    2. Pityriasis lichenoides chronica  - bx done 2/17/16, showed perivascular and interface dermatitis  - Triamcinolone 0.1% ointment BID    3. Acne vulgaris, s/p Accutane tx 10-15 years ago  - Benzoyl peroxide wash  - Tretinoin 0.05% cream nightly    4. Melasma, s/p OCPs      Encounter Date: Nov 6, 2018    CC:  Chief Complaint   Patient presents with     Derm Problem     Rachel is here to discuss her skin issues starting in 2015.          History of Present Illness:  Ms. Rachel Fried is a 27 year old female who presents as a referral for pityriasis lichenoides chronica. She states that her symptoms began in the summer 2015, at which time she started to break out in \"hives.\" These hives would present as small red bumps on her feet and hands that almost appeared as acne or ingrown hairs. The bumps began to spread up her arms and legs and would flatten/become scaly with further progression. She had a biopsy done in 2016 that showed a perivascular and interface dermatitis. She was initially diagnosed with lupus erythematosus, however later visited the [facility] where she was diagnosed with pityriasis lichenoides chronica.    She states that the bumps are mostly localized to the arms, legs, and butt and tend to spare her chest, abdomen, upper back, face, and scalp. She notices two different types of lesions: one type (mainly on her arms) that is flattened and scaly and one type (more commonly on her legs) that looks more like acne. She states that the lesions on her legs will drain " pus. She experiences occasional pain and itchiness of the lesions when her skin is dry. She uses a moisturizer at night which does seem to help with the dryness. She also states that her skin seems to improve in the heat/sunlight. She restarted methotrexate one week ago for her RA and believes that this is improving her skin as well.    She would also like to discuss concerns of acne. She was on Accutane in high school, which significantly improved her skin. Her face has more recently started to flare again, most prominently located along her jawline and her upper back. She uses micellar cleansing wipes to remove her makeup and Clearasil face wash.     She also states that she has begun to notice melasma develop after starting OCPs. She is wondering whether there is anything that she can do to lessen the appearance of this.      Past Medical History:   Patient Active Problem List   Diagnosis     Rheumatoid arthritis of multiple sites with negative rheumatoid factor (H)     Cyclic citrullinated peptide (CCP) antibody positive     Long-term use of Plaquenil     NSAID long-term use     History reviewed. No pertinent past medical history.  History reviewed. No pertinent surgical history.    Social History:  Patient works as a . She  reports that she has never smoked. She has never used smokeless tobacco.    Family History:  - Mother with Factor V Leiden mutation and rosacea  - Grandfather with SLE  - Uncle with colon cancer    Medications:  Current Outpatient Prescriptions   Medication Sig Dispense Refill     Albuterol Sulfate (PROVENTIL HFA IN)        cetirizine HCl (ZYRTEC ALLERGY) 10 MG CAPS        Cholecalciferol (VITAMIN D) 2000 units tablet Take 2,000 Units by mouth daily 1 tablet 0     citalopram (CELEXA) 10 MG tablet TK 1 T PO D  1     folic acid (FOLVITE) 1 MG tablet Take 1 tablet (1 mg) by mouth daily 90 tablet 3     hydroquinone 4 % CREA Externally apply topically daily Do not use for more than 3 months  consecutively without a 3 month break. 56.8 g 3     hydroxychloroquine (PLAQUENIL) 200 MG tablet Take 1 tablet (200 mg) by mouth 2 times daily Get annual eye exams for hydroxychloroquine (plaquenil) monitoring and fax to [phone]. 60 tablet 3     LORYNA 3-0.02 MG per tablet TK 1 T PO D  4     methotrexate 2.5 MG tablet CHEMO Take 4 tablets (10 mg) by mouth once a week Labs due in 1 month. 16 tablet 1     predniSONE (DELTASONE) 5 MG tablet Take 15 mg day, then 10 mg day then 5 mg day each for 14 days then stop. 84 tablet 0     tretinoin (RETIN-A) 0.05 % cream Apply topically At Bedtime Apply a pea sized amount to the face every other night for 2 weeks, then nightly thereafter 45 g 11     triamcinolone (KENALOG) 0.1 % ointment Apply topically 2 times daily 30 g 1     Allergies   Allergen Reactions     Sulfa Drugs Hives and Itching         Review of Systems:  -As per HPI  -Constitutional: The patient denies fatigue, fevers, chills, unintended weight loss, and night sweats.  -HEENT: Patient denies nonhealing oral sores.  -Skin: As above in HPI. No additional skin concerns.    Physical exam:  Vitals: There were no vitals taken for this visit.  GEN: This is a well developed, well-nourished female in no acute distress, in a pleasant mood.    SKIN: Total skin excluding the undergarment areas was performed. The exam included the head/face, neck, both arms, chest, back, abdomen, both legs, digits and/or nails.   - Scattered dark purple/brown flat-topped papules on the bilateral lower legs.  - Flattened, hypopigmented macules with overlying whitish scale on the palms of the hand, bilateral lower legs, and over the left lateral malleolus.  - Symmetric, tan macules with irregular borders on the bilateral malar cheeks.  - Erythematous inflammatory papules with hyperpigmented macules along the jaw and on the upper back.  - No other lesions of concern on areas examined.     Impression/Plan:  1. Pityriasis lichenoides  chronica    Continue methotrexate 10mg per rheumatology, should also help improve the PLC lesions.    Restart triamcinolone 0.1% ointment BID on affected areas, can decrease to daily once lesions improve.    2. Acne vulgaris, along the jaw and upper back, on OCPs    Start benzoyl peroxide wash (4 or 5%) on the face and upper back 1-2x/day.     Start tretinoin 0.025% cream nightly.    3. Melasma, bilateral malar cheeks    Recommended limiting sun exposure.    Consider starting hydroquinone 4% cream to improve appearance. Discussed side effects including dyspigmentation with long-term use.       Follow-up in 3 months, earlier for new or changing lesions.     Staff Involved:  I, Sabra Velasquez MS3, saw and examined the patient in the presence of Dr. Reginald Cohen.    Staff Physician:  I was present with the medical student who participated in the service and in the documentation of the note. I have verified the history and personally performed the physical exam and medical decision making. I agree with the assessment and plan of care as documented in the note.     Reginald Cohen MD  Staff Dermatologist and Dermatopathologist  , Department of Dermatology

## 2018-11-14 ENCOUNTER — HEALTH MAINTENANCE LETTER (OUTPATIENT)
Age: 28
End: 2018-11-14

## 2018-11-27 ENCOUNTER — DOCUMENTATION ONLY (OUTPATIENT)
Dept: RHEUMATOLOGY | Facility: CLINIC | Age: 28
End: 2018-11-27

## 2018-11-30 ENCOUNTER — TELEPHONE (OUTPATIENT)
Dept: RHEUMATOLOGY | Facility: CLINIC | Age: 28
End: 2018-11-30

## 2018-11-30 NOTE — TELEPHONE ENCOUNTER
Received eye exam results. However, the records were illegible and need the eye provider to fill out a plaquenil toxicity form. This has been faxed to Dr. Castaneda at [facility] at [phone].  Iva Arroyo CMA  11/30/2018 10:41 AM

## 2018-12-03 ASSESSMENT — ENCOUNTER SYMPTOMS
NECK PAIN: 0
DECREASED APPETITE: 0
INCREASED ENERGY: 0
FATIGUE: 1
MUSCLE WEAKNESS: 0
MYALGIAS: 0
ARTHRALGIAS: 1
NERVOUS/ANXIOUS: 1
FEVER: 0
NIGHT SWEATS: 0
BACK PAIN: 1
DECREASED CONCENTRATION: 0
WEIGHT GAIN: 0
ALTERED TEMPERATURE REGULATION: 0
SKIN CHANGES: 0
NAIL CHANGES: 0
CHILLS: 0
HALLUCINATIONS: 0
JOINT SWELLING: 1
POOR WOUND HEALING: 0
STIFFNESS: 1
DEPRESSION: 1
POLYPHAGIA: 0
PANIC: 0
MUSCLE CRAMPS: 0
INSOMNIA: 0
WEIGHT LOSS: 0
POLYDIPSIA: 1

## 2018-12-04 ENCOUNTER — MEDICAL CORRESPONDENCE (OUTPATIENT)
Dept: HEALTH INFORMATION MANAGEMENT | Facility: CLINIC | Age: 28
End: 2018-12-04

## 2018-12-04 ENCOUNTER — OFFICE VISIT (OUTPATIENT)
Dept: INTERNAL MEDICINE | Facility: CLINIC | Age: 28
End: 2018-12-04
Payer: COMMERCIAL

## 2018-12-04 VITALS — SYSTOLIC BLOOD PRESSURE: 122 MMHG | HEART RATE: 83 BPM | WEIGHT: 209.9 LBS | DIASTOLIC BLOOD PRESSURE: 78 MMHG

## 2018-12-04 DIAGNOSIS — Z30.41 ENCOUNTER FOR SURVEILLANCE OF CONTRACEPTIVE PILLS: ICD-10-CM

## 2018-12-04 DIAGNOSIS — Z79.899 LONG-TERM USE OF PLAQUENIL: ICD-10-CM

## 2018-12-04 DIAGNOSIS — R76.8 CYCLIC CITRULLINATED PEPTIDE (CCP) ANTIBODY POSITIVE: ICD-10-CM

## 2018-12-04 DIAGNOSIS — Z23 NEED FOR PROPHYLACTIC VACCINATION AGAINST STREPTOCOCCUS PNEUMONIAE (PNEUMOCOCCUS): Primary | ICD-10-CM

## 2018-12-04 DIAGNOSIS — M06.09 RHEUMATOID ARTHRITIS OF MULTIPLE SITES WITH NEGATIVE RHEUMATOID FACTOR (H): ICD-10-CM

## 2018-12-04 DIAGNOSIS — F41.9 ANXIETY: ICD-10-CM

## 2018-12-04 DIAGNOSIS — F32.A DEPRESSION, UNSPECIFIED DEPRESSION TYPE: ICD-10-CM

## 2018-12-04 LAB
ALBUMIN SERPL-MCNC: 3.3 G/DL (ref 3.4–5)
ALT SERPL W P-5'-P-CCNC: 37 U/L (ref 0–50)
AST SERPL W P-5'-P-CCNC: 19 U/L (ref 0–45)
CREAT SERPL-MCNC: 0.68 MG/DL (ref 0.52–1.04)
CRP SERPL-MCNC: 6.2 MG/L (ref 0–8)
ERYTHROCYTE [DISTWIDTH] IN BLOOD BY AUTOMATED COUNT: 12.8 % (ref 10–15)
GFR SERPL CREATININE-BSD FRML MDRD: >90 ML/MIN/1.7M2
HCT VFR BLD AUTO: 42.2 % (ref 35–47)
HGB BLD-MCNC: 13.9 G/DL (ref 11.7–15.7)
MCH RBC QN AUTO: 29.8 PG (ref 26.5–33)
MCHC RBC AUTO-ENTMCNC: 32.9 G/DL (ref 31.5–36.5)
MCV RBC AUTO: 90 FL (ref 78–100)
PLATELET # BLD AUTO: 295 10E9/L (ref 150–450)
RBC # BLD AUTO: 4.67 10E12/L (ref 3.8–5.2)
WBC # BLD AUTO: 9.3 10E9/L (ref 4–11)

## 2018-12-04 RX ORDER — DROSPIRENONE AND ETHINYL ESTRADIOL TABLETS 0.02-3(28)
1 KIT ORAL DAILY
Qty: 28 TABLET | Refills: 11 | Status: SHIPPED | OUTPATIENT
Start: 2018-12-04 | End: 2019-12-11

## 2018-12-04 RX ORDER — CITALOPRAM HYDROBROMIDE 10 MG/1
10 TABLET ORAL DAILY
Qty: 60 TABLET | Refills: 3 | Status: SHIPPED | OUTPATIENT
Start: 2018-12-04 | End: 2020-09-11

## 2018-12-04 ASSESSMENT — ENCOUNTER SYMPTOMS
WHEEZING: 0
RECTAL PAIN: 0
WEAKNESS: 0
TASTE DISTURBANCE: 0
COUGH DISTURBING SLEEP: 0
SINUS PAIN: 0
INCREASED ENERGY: 0
STIFFNESS: 1
POOR WOUND HEALING: 0
EXTREMITY NUMBNESS: 0
NECK MASS: 0
MUSCLE WEAKNESS: 0
HOARSE VOICE: 0
LIGHT-HEADEDNESS: 0
EYE REDNESS: 0
NAUSEA: 0
WEIGHT GAIN: 0
SPUTUM PRODUCTION: 0
DECREASED APPETITE: 0
MEMORY LOSS: 0
NECK PAIN: 0
SEIZURES: 0
EXERCISE INTOLERANCE: 0
NIGHT SWEATS: 0
EYE WATERING: 0
RESPIRATORY PAIN: 0
MUSCLE CRAMPS: 0
HYPOTENSION: 0
SYNCOPE: 0
DISTURBANCES IN COORDINATION: 0
BACK PAIN: 1
HYPERTENSION: 0
EYE IRRITATION: 0
DOUBLE VISION: 0
CHILLS: 0
DIARRHEA: 0
DYSPNEA ON EXERTION: 0
DIZZINESS: 0
ABDOMINAL PAIN: 0
BLOATING: 0
JOINT SWELLING: 1
HALLUCINATIONS: 0
HEADACHES: 0
RECTAL BLEEDING: 0
ARTHRALGIAS: 1
DYSURIA: 0
ALTERED TEMPERATURE REGULATION: 0
CONSTIPATION: 0
LOSS OF CONSCIOUSNESS: 0
POLYPHAGIA: 0
POSTURAL DYSPNEA: 0
HEMOPTYSIS: 0
HOT FLASHES: 0
SINUS CONGESTION: 0
EYE PAIN: 0
DECREASED LIBIDO: 0
BREAST MASS: 0
SLEEP DISTURBANCES DUE TO BREATHING: 0
POLYDIPSIA: 1
SNORES LOUDLY: 0
SORE THROAT: 0
BRUISES/BLEEDS EASILY: 0
SWOLLEN GLANDS: 0
INSOMNIA: 0
TINGLING: 0
NERVOUS/ANXIOUS: 1
SPEECH CHANGE: 0
BOWEL INCONTINENCE: 0
TACHYCARDIA: 0
VOMITING: 0
FLANK PAIN: 0
HEARTBURN: 0
COUGH: 0
PANIC: 0
LEG SWELLING: 0
CLAUDICATION: 0
FEVER: 0
TROUBLE SWALLOWING: 0
SKIN CHANGES: 0
NAIL CHANGES: 0
HEMATURIA: 0
BREAST PAIN: 0
WEIGHT LOSS: 0
FATIGUE: 1
ORTHOPNEA: 0
LEG PAIN: 0
PARALYSIS: 0
JAUNDICE: 0
SHORTNESS OF BREATH: 0
PALPITATIONS: 0
DEPRESSION: 1
DECREASED CONCENTRATION: 0
TREMORS: 0
DIFFICULTY URINATING: 0
BLOOD IN STOOL: 0
SMELL DISTURBANCE: 0
NUMBNESS: 0
MYALGIAS: 0

## 2018-12-04 NOTE — PATIENT INSTRUCTIONS
[facility]         Internal Medicine Resident                   Continuity Clinic    Who We Are    Resident Continuity Clinic is a part of the [facility].  Resident physicians see patients independently and establish a relationship with them over the course of their three-year residency program.  As with the Primary Care Clinic, our Resident Continuity Clinic models a group practice.  If your doctor is not available, you will be able to see another resident physician.  At the end of a resident s training, patients will be transitioned to a new resident physician for ongoing care.     We treat patients with a wide array of medical needs from routine physicals, to acute illnesses, to diabetes and blood pressure management, to complex medical illness.  What is a Resident Physician?    Resident physicians hold medical degrees and are doctors. They are training to become specialists in Internal Medicine. They work under the supervision of board-certified faculty physicians.  Expectations for Your Care    We strive to provide accessible, quality care at all times.    In order to provide this care, it is best to see your primary care resident doctor consistently rather switching between providers.  In the event you do see another physician, you should schedule a follow-up visit with your usual primary care doctor.    If you are transitioning your care from another clinic, it is helpful to have your records available for your doctor to review.    We do not prescribe controlled substances, such as ADD medications or narcotic pain medications, on your first visit.  We will review your health records and concerns prior to devising a treatment plan with you in order to provide the best care.      Clinic Services     Extended clinic hours; patient  to help navigate your visit;  parking; laboratory and imaging services with evening and weekend hours    Multiple medical and  surgical specialties in one building    Complementary services, including Nutrition, Integrative Medicine, Pharmacy consultations, Mental and Behavioral Health, Sports Medicine and Physical Therapy    Thank You    We would like to thank you for choosing the [facility] Resident Continuity Clinic for your primary care. You are making a priceless contribution to the training of the next generation of health care practitioners.     Contact us at [phone] for appointments or questions.    Resident Clinic Hours are Tuesdays and Thursdays, 7:30am-5:00pm    Residents   Jorge Charles MD   (Male)   Brandy Lara MD  (Female)  Veronica Ryder MD   (Female)   Ora Butts MD   (Female)   Juan Luis Gonzalez MD  (Male)   Moi Allen MD  (Male)   Mony Borden MD    (Female)   Kael Guzman MD  (Male)   Robbi Cohen MD  (Male)    Yanni Magaña MD  (Female)   Tayo Armstrong MD  (Male)   Eleonora Hernandez MD  (Female)   Dia Cruz MD    (Female)   Melquiades Gates MD  (Male)   Juan Ramon Kyle MD  (Male)   Moi Mcrae MD (Male)   Gilbert Foster MD  (Male)   Cristel Roque MD (Female)    Tory Ghosh MD (Female)   Jazmine Pizarro MD  (Male)   Deidre Paulino MD    (Female)   Martha Lantigua MD  (Female)    Supervising Physicians   MD Ursula Rdz MD Briar Duffy, MD James Langland, MD Mary Logeais, MD Tanya Melnik, MD Charles Moldow, MD Heather Thompson Buum, MD Kathleen Watson, MD

## 2018-12-04 NOTE — PROGRESS NOTES
PRIMARY CARE CENTER         HPI:       Rachel Fried is a 27 year old female who presents for the following  Patient presents with: hospitals Care and Physical    Patient is a 26 YO F with a PMHx of rheumatoid arthritis (on methotrexate, hydroxycholorquine, and prednisone taper), pityriasis lichenoides, depression, and acne vulgaris presenting to establish care.     Patient was seen by rheumatology 10/31/18 with continuation of management with hydroxycholoroquine   Prior to her move to the [facility] in March 2018, patient was treated with methotrexate, however, discontinued use around that time. Reports worsening joint pain, stiffness, and inflammation following discontinuation. Previously endorsed pain 5/10 or greater, however, now says pain was worse on average. Pain and inflammation have improved since initiation of triple therapy as above, with pain rated 3/10 on average. Currently on 5 mg prednisone with 9 days of therapy remaining in 6 week taper. Reports some appreciable moon facies, but otherwise denies side effects. She previously attempted to managed pain with ibuprofen while off immunosuppressive medications, however, denies current use.     Depression and anxiety managed with citalopram 10 mg PO, started in 2010. 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 Endorses depressed mood for a one to two days every two months. Endorses increased anxiety with move to the [place] and new job, now resolving. Denies active depression on current presentation.    Seen by dermatology on 11/6/18 for management of pityriasis lichenoides chronica, acne vulgaris, and melasma. Management of pityriasis lichenoides chronica with methotrexate per rheumatology and topical triamcinolone. Acne managed with benzoyl peroxide wash and tretinoin 0.025%. Melasma managed with avoidance of sun exposure.    Does report recent viral upper respiratory illness  that has now resolved. Otherwise no complaints.    She works as a . Is somewhat sedentary.    Routine health maintenance   Pneumococcal 13 vaccine administered on 10/31   Pap smear negative in 2017     Problem, Medication and Allergy Lists were reviewed and are current.  Patient is a new patient to this clinic and so  I reviewed/updated the Past Medical History, the Family History and the Social History.          Review of Systems:   Review of Systems     Constitutional:  Positive for fatigue. Negative for fever, chills, weight loss, weight gain, decreased appetite, night sweats, recent stressors, height loss, post-operative complications, incisional pain, hallucinations, increased energy, hyperactivity and confused.   HENT:  Negative for ear pain, hearing loss, tinnitus, nosebleeds, trouble swallowing, hoarse voice, mouth sores, sore throat, ear discharge, tooth pain, gum tenderness, taste disturbance, smell disturbance, hearing aid, bleeding gums, dry mouth, sinus pain, sinus congestion and neck mass.    Eyes:  Negative for double vision, pain, redness, eye pain, decreased vision, eye watering, eye bulging, eye dryness, flashing lights, spots, floaters, strabismus, tunnel vision, jaundice and eye irritation.   Respiratory:   Negative for cough, hemoptysis, sputum production, shortness of breath, wheezing, sleep disturbances due to breathing, snores loudly, respiratory pain, dyspnea on exertion, cough disturbing sleep and postural dyspnea.    Cardiovascular:  Negative for chest pain, dyspnea on exertion, palpitations, orthopnea, claudication, leg swelling, fingers/toes turn blue, hypertension, hypotension, syncope, history of heart murmur, chest pain on exertion, chest pain at rest, pacemaker, few scattered varicosities, leg pain, sleep disturbances due to breathing, tachycardia, light-headedness, exercise intolerance and edema.   Gastrointestinal:  Negative for heartburn, nausea, vomiting, abdominal pain,  diarrhea, constipation, blood in stool, melena, rectal pain, bloating, hemorrhoids, bowel incontinence, jaundice, rectal bleeding, coffee ground emesis and change in stool.   Genitourinary:  Negative for bladder incontinence, dysuria, urgency, hematuria, flank pain, vaginal discharge, difficulty urinating, genital sores, dyspareunia, decreased libido, nocturia, voiding less frequently, arousal difficulty, abnormal vaginal bleeding, excessive menstruation, menstrual changes, hot flashes, vaginal dryness and postmenopausal bleeding.   Musculoskeletal:  Positive for back pain, joint swelling, arthralgias and stiffness. Negative for myalgias, muscle cramps, neck pain, bone pain, muscle weakness and fracture.   Skin:  Positive for rash, acne and Raynaud's phenomenon. Negative for nail changes, itching, poor wound healing, hair changes, skin changes, warts, poor wound healing, scarring, flaky skin, sensitivity to sunlight and skin thickening.   Neurological:  Negative for dizziness, tingling, tremors, speech change, seizures, loss of consciousness, weakness, light-headedness, numbness, headaches, disturbances in coordination, extremity numbness, memory loss, difficulty walking and paralysis.   Endo/Heme:  Negative for anemia, swollen glands and bruises/bleeds easily.   Psychiatric/Behavioral:  Positive for depression. Negative for hallucinations, memory loss, decreased concentration, mood swings and panic attacks.    Breast:  Negative for breast discharge, breast mass, breast pain and nipple retraction.   Endocrine:  Positive for polydipsia.Negative for altered temperature regulation, polyphagia, unwanted hair growth and change in facial hair.    I have personally reviewed and updated the complete ROS on the day of the visit.           Physical Exam:   /78  Pulse 83  Wt 95.2 kg (209 lb 14.4 oz)  LMP 11/13/2018 (Approximate)  Breastfeeding? No  There is no height or weight on file to calculate BMI.  Vitals were  reviewed       GENERAL APPEARANCE: healthy, alert and no distress     EYES: EOMI, PERRL     HENT: Normocephalic, no obvious oral lesions     NECK: no asymmetry, masses, or scars     RESP: lungs clear to auscultation - no rales, rhonchi or wheezes     CV: regular rate and rhythm, normal S1 S2, no S3 or S4 and no murmur, click or rub     ABDOMEN:  soft, nontender, no HSM or masses and bowel sounds normal     MS: extremities normal- no gross deformities noted, no evidence of inflammation in joints, FROM in all extremities.     SKIN: mild facial hyperpigmentation consistent with acne vulgaris     NEURO: mentation intact and speech normal     PSYCH: mentation appears normal. affect normal      Results:      Results from the last 24 hoursNo results found for this or any previous visit (from the past 24 hour(s)).  Assessment and Plan     Rachel was seen today for establish care and physical.    Diagnoses and all orders for this visit:    # Rheumatoid Arthritis  # Diffuse (bilateral hands, wrists, ankles, shoulders, feet)  Managed by rheumatology. Previously on methotrexate and chronic hydroxychloroquine with discontinuation of therapy in 2018 followed by worsening joint pain and inflammation. Now on hydroxychloroquine, methotrexate, with prednisone taper October - December 2018.   - Medications managed by [facility]  - On chronic methotrexate and hydroxychloroquine  - On prednisone taper    # Depression, unspecified depression type  # Anxiety  Patient stable on current medication regimen. Does not endorse active symptoms of depression. No recent changes in medication dosage.   -     citalopram (CELEXA) 10 MG tablet; Take 1 tablet (10 mg) by mouth daily    # Encounter for surveillance of contraceptive pills  Not currently sexually active. Will reassess need for additional method of birth control should patient become sexually active given high risk for congenital fetal deformities on current medication regimen.  -     LORYNA  3-0.02 MG tablet; Take 1 tablet by mouth daily    # Need for prophylactic vaccination against Streptococcus pneumoniae (pneumococcus)  CDC immunization guidelines reviewed. Received PCV 13 in October. Agree with prior rheumatology recommendation for pneumococcal 23 vaccine 8 weeks following prior vaccination per CDC guidelines for immunization given immunosuppressed status.   -     Pneumococcal vaccine 23 valent PPSV23  (Pneumovax) [71660]    # Pityriasis lichenoides chronica  # Acne vulgaris  # Melasma  - Managed by dermatology.  - On methotrexate per rheumatology for PLC  - On triamcinolone topical for PLC  - On benzoyl peroxide wash for treatment of acne    Options for treatment and follow-up care were reviewed with the patient. Rachel Fried engaged in the decision making process and verbalized understanding of the options discussed and agreed with the final plan.    Brandy Lara MD  PGY-1, Internal Medicine  P: 2039  Dec 4, 2018    Pt was seen and plan of care discussed with Dr. Valadez.   Pt was seen and examined with Dr. Lara.  I agree with his and her documentation as noted above.    My additional comments: None    Magy Valadez MD

## 2018-12-04 NOTE — MR AVS SNAPSHOT
After Visit Summary   12/4/2018    Rachel Fried    MRN: [id number]           Patient Information     Date Of Birth          1990        Visit Information        Provider Department      12/4/2018 7:10 AM Brandy Lara MD [facility]        Today's Diagnoses     Need for prophylactic vaccination against Streptococcus pneumoniae (pneumococcus)    -  1    Depression, unspecified depression type        Anxiety        Encounter for surveillance of contraceptive pills           Follow-ups after your visit        Your next 10 appointments already scheduled     Dec 04, 2018  8:00 AM CST   LAB with [facility] ([facility])    [address] [place] [address] [id number] [phone]           Please do not eat 10-12 hours before your appointment if you are coming in fasting for labs on lipids, cholesterol, or glucose (sugar). This does not apply to pregnant women. Water, hot tea and black coffee (with nothing added) are okay. Do not drink other fluids, diet soda or chew gum.            Dec 17, 2018  7:30 AM CST   Lab with [facility] ([facility])    [address] [place] [address] [id number] [phone]            Dec 17, 2018  8:00 AM CST   (Arrive by 7:45 AM)   Return Visit with [facility] ([facility])    [address] [place] [address]  [address] [place] [address] [id number] [phone]            Jan 29, 2019  8:10 AM CST   (Arrive by 7:55 AM)   Return Visit with Reginald Cohen MD   [facility] ([facility])    [address] [place] [address] Floor  [facility] [id number] [phone]              Who to contact     Please call your clinic at [phone] to:    Ask questions about your health    Make or cancel appointments    Discuss your medicines    Learn about your test  results    Speak to your doctor            Additional Information About Your Visit        [employer] Information     [employer] gives you secure access to your electronic health record. If you see a primary care provider, you can also send messages to your care team and make appointments. If you have questions, please call your primary care clinic.  If you do not have a primary care provider, please call [phone] and they will assist you.      [employer] is an electronic gateway that provides easy, online access to your medical records. With [employer], you can request a clinic appointment, read your test results, renew a prescription or communicate with your care team.     To access your existing account, please contact your [facility] or call [phone] for assistance.        Care EveryWhere ID     This is your Care EveryWhere ID. This could be used by other organizations to access your [place] medical records  [id number]        Your Vitals Were     Pulse Last Period Breastfeeding?             83 11/13/2018 (Approximate) No          Blood Pressure from Last 3 Encounters:   12/04/18 122/78   10/31/18 126/86    Weight from Last 3 Encounters:   12/04/18 95.2 kg (209 lb 14.4 oz)   10/31/18 98 kg (216 lb 1.6 oz)              We Performed the Following     Pneumococcal vaccine 23 valent PPSV23  (Pneumovax) [65650]          Today's Medication Changes          These changes are accurate as of 12/4/18  7:55 AM.  If you have any questions, ask your nurse or doctor.               These medicines have changed or have updated prescriptions.        Dose/Directions    citalopram 10 MG tablet   Commonly known as:  celeXA   This may have changed:  See the new instructions.   Used for:  Depression, unspecified depression type   Changed by:  Brandy Lara MD        Dose:  10 mg   Take 1 tablet (10 mg) by mouth daily   Quantity:  60 tablet   Refills:  3       LORYNA 3-0.02 MG tablet   This  may have changed:  See the new instructions.   Used for:  Depression, unspecified depression type, Anxiety   Generic drug:  drospirenone-ethinyl estradiol   Changed by:  Brandy Lara MD        Dose:  1 tablet   Take 1 tablet by mouth daily   Quantity:  28 tablet   Refills:  11            Where to get your medicines      These medications were sent to [facility] [address] [place] [address] [email or web address] [address], [place] [address]     Phone:  [phone]     citalopram 10 MG tablet    LORYNA 3-0.02 MG tablet                Primary Care Provider Fax #    Physician No Ref-Primary [phone]       No address on file        Equal Access to Services     CAROLINE PEREZ : Hadii aad ku hadasho Sokusumali, waaxda luqadaha, qaybta kaalmada adeegyada, jhonny nunez . So [facility] [phone].    ATENCIÓN: Si habla español, tiene a escobar disposición servicios gratuitos de asistencia lingüística. [facility] [phone].    We comply with applicable federal civil rights laws and [place] laws. We do not discriminate on the basis of race, color, national origin, age, disability, sex, sexual orientation, or gender identity.            Thank you!     Thank you for choosing [facility]  for your care. Our goal is always to provide you with excellent care. Hearing back from our patients is one way we can continue to improve our services. Please take a few minutes to complete the written survey that you may receive in the mail after your visit with us. Thank you!             Your Updated Medication List - Protect others around you: Learn how to safely use, store and throw away your medicines at www.disposemymeds.org.          This list is accurate as of 12/4/18  7:55 AM.  Always use your most recent med list.                   Brand Name Dispense Instructions for use Diagnosis    citalopram 10 MG tablet    celeXA    60  tablet    Take 1 tablet (10 mg) by mouth daily    Depression, unspecified depression type       folic acid 1 MG tablet    FOLVITE    90 tablet    Take 1 tablet (1 mg) by mouth daily    Rheumatoid arthritis of multiple sites with negative rheumatoid factor (H), Cyclic citrullinated peptide (CCP) antibody positive, Long-term use of Plaquenil       hydroquinone 4 % external cream    ANDREY    56.8 g    Externally apply topically daily Do not use for more than 3 months consecutively without a 3 month break.    Melasma       hydroxychloroquine 200 MG tablet    PLAQUENIL    60 tablet    Take 1 tablet (200 mg) by mouth 2 times daily Get annual eye exams for hydroxychloroquine (plaquenil) monitoring and fax to [phone].    Rheumatoid arthritis of multiple sites with negative rheumatoid factor (H), Cyclic citrullinated peptide (CCP) antibody positive       LORYNA 3-0.02 MG tablet   Generic drug:  drospirenone-ethinyl estradiol     28 tablet    Take 1 tablet by mouth daily    Depression, unspecified depression type, Anxiety       methotrexate 2.5 MG tablet     16 tablet    Take 4 tablets (10 mg) by mouth once a week Labs due in 1 month.    Rheumatoid arthritis of multiple sites with negative rheumatoid factor (H), Cyclic citrullinated peptide (CCP) antibody positive, Long-term use of Plaquenil       predniSONE 5 MG tablet    DELTASONE    84 tablet    Take 15 mg day, then 10 mg day then 5 mg day each for 14 days then stop.    Rheumatoid arthritis of multiple sites with negative rheumatoid factor (H), Cyclic citrullinated peptide (CCP) antibody positive       PROVENTIL HFA IN           tretinoin 0.05 % external cream    RETIN-A    45 g    Apply topically At Bedtime Apply a pea sized amount to the face every other night for 2 weeks, then nightly thereafter    Acne vulgaris       triamcinolone 0.1 % external ointment    KENALOG    30 g    Apply topically 2 times daily    Pityriasis lichenoides chronica       vitamin D3 2000  units tablet    CHOLECALCIFEROL    1 tablet    Take 2,000 Units by mouth daily    Vitamin D insufficiency       ZYRTEC ALLERGY 10 MG Caps   Generic drug:  cetirizine HCl

## 2018-12-05 ENCOUNTER — DOCUMENTATION ONLY (OUTPATIENT)
Dept: RHEUMATOLOGY | Facility: CLINIC | Age: 28
End: 2018-12-05

## 2018-12-05 VITALS — WEIGHT: 209.88 LBS

## 2018-12-05 NOTE — PROGRESS NOTES
The blood counts, liver, kidney and CRP inflammation labs are normal.     Zurdo CORDERO, CNP, MSN  12/5/2018  4:33 PM

## 2018-12-17 ENCOUNTER — OFFICE VISIT (OUTPATIENT)
Dept: RHEUMATOLOGY | Facility: CLINIC | Age: 28
End: 2018-12-17
Attending: NURSE PRACTITIONER
Payer: COMMERCIAL

## 2018-12-17 VITALS
SYSTOLIC BLOOD PRESSURE: 138 MMHG | OXYGEN SATURATION: 97 % | TEMPERATURE: 98.1 F | DIASTOLIC BLOOD PRESSURE: 81 MMHG | WEIGHT: 213.5 LBS | BODY MASS INDEX: 31.62 KG/M2 | HEIGHT: 69 IN | HEART RATE: 98 BPM

## 2018-12-17 DIAGNOSIS — Z79.899 LONG-TERM USE OF PLAQUENIL: ICD-10-CM

## 2018-12-17 DIAGNOSIS — R76.8 CYCLIC CITRULLINATED PEPTIDE (CCP) ANTIBODY POSITIVE: ICD-10-CM

## 2018-12-17 DIAGNOSIS — R53.82 CHRONIC FATIGUE: Primary | ICD-10-CM

## 2018-12-17 DIAGNOSIS — M06.09 RHEUMATOID ARTHRITIS OF MULTIPLE SITES WITH NEGATIVE RHEUMATOID FACTOR (H): ICD-10-CM

## 2018-12-17 LAB
ALBUMIN SERPL-MCNC: 3.3 G/DL (ref 3.4–5)
ALT SERPL W P-5'-P-CCNC: 24 U/L (ref 0–50)
AST SERPL W P-5'-P-CCNC: 18 U/L (ref 0–45)
CREAT SERPL-MCNC: 0.72 MG/DL (ref 0.52–1.04)
CRP SERPL-MCNC: 8.2 MG/L (ref 0–8)
ERYTHROCYTE [DISTWIDTH] IN BLOOD BY AUTOMATED COUNT: 12.7 % (ref 10–15)
GFR SERPL CREATININE-BSD FRML MDRD: >90 ML/MIN/1.7M2
HCT VFR BLD AUTO: 43.6 % (ref 35–47)
HGB BLD-MCNC: 14.3 G/DL (ref 11.7–15.7)
MCH RBC QN AUTO: 29.5 PG (ref 26.5–33)
MCHC RBC AUTO-ENTMCNC: 32.8 G/DL (ref 31.5–36.5)
MCV RBC AUTO: 90 FL (ref 78–100)
PLATELET # BLD AUTO: 317 10E9/L (ref 150–450)
RBC # BLD AUTO: 4.84 10E12/L (ref 3.8–5.2)
T4 FREE SERPL-MCNC: 0.94 NG/DL (ref 0.76–1.46)
TSH SERPL DL<=0.005 MIU/L-ACNC: 5.84 MU/L (ref 0.4–4)
WBC # BLD AUTO: 7.8 10E9/L (ref 4–11)

## 2018-12-17 PROCEDURE — 82565 ASSAY OF CREATININE: CPT | Performed by: NURSE PRACTITIONER

## 2018-12-17 PROCEDURE — 82040 ASSAY OF SERUM ALBUMIN: CPT | Performed by: NURSE PRACTITIONER

## 2018-12-17 PROCEDURE — 86140 C-REACTIVE PROTEIN: CPT | Performed by: NURSE PRACTITIONER

## 2018-12-17 PROCEDURE — 84443 ASSAY THYROID STIM HORMONE: CPT | Performed by: NURSE PRACTITIONER

## 2018-12-17 PROCEDURE — 36415 COLL VENOUS BLD VENIPUNCTURE: CPT | Performed by: NURSE PRACTITIONER

## 2018-12-17 PROCEDURE — G0463 HOSPITAL OUTPT CLINIC VISIT: HCPCS | Mod: ZF

## 2018-12-17 PROCEDURE — 84450 TRANSFERASE (AST) (SGOT): CPT | Performed by: NURSE PRACTITIONER

## 2018-12-17 PROCEDURE — 85027 COMPLETE CBC AUTOMATED: CPT | Performed by: NURSE PRACTITIONER

## 2018-12-17 PROCEDURE — 84460 ALANINE AMINO (ALT) (SGPT): CPT | Performed by: NURSE PRACTITIONER

## 2018-12-17 PROCEDURE — 84439 ASSAY OF FREE THYROXINE: CPT | Performed by: NURSE PRACTITIONER

## 2018-12-17 RX ORDER — HYDROXYCHLOROQUINE SULFATE 200 MG/1
200 TABLET, FILM COATED ORAL DAILY
Qty: 30 TABLET | Refills: 3 | Status: SHIPPED | OUTPATIENT
Start: 2018-12-17 | End: 2019-03-06

## 2018-12-17 RX ORDER — FOLIC ACID 1 MG/1
2 TABLET ORAL DAILY
Qty: 180 TABLET | Refills: 3 | Status: SHIPPED | OUTPATIENT
Start: 2018-12-17 | End: 2019-11-04

## 2018-12-17 ASSESSMENT — JOINT PAIN: TOTAL NUMBER OF SWOLLEN JOINTS: 0

## 2018-12-17 ASSESSMENT — DISEASE ACTIVITY SCORE (DAS28)
CRP_MG_PER_LITER: 1.76
CRP_MG_PER_LITER: REMISSION <2.6
CRP_MG_PER_LITER: 1.76

## 2018-12-17 ASSESSMENT — CLINICAL DISEASE ACTIVITY INDEX (CDAI): TOTAL_SCORE: 0

## 2018-12-17 ASSESSMENT — PAIN SCALES - GENERAL: PAINLEVEL: NO PAIN (1)

## 2018-12-17 ASSESSMENT — MIFFLIN-ST. JEOR: SCORE: 1757.81

## 2018-12-17 NOTE — PROGRESS NOTES
[facility] - Rheumatology Clinic Visit    Rachel Fried  is a 27 year old here for follow-up  RHEUMATOID ARTHRITIS (RA) involving hands, wrists, ankles, shoulders feet [ -RF and low CCP 25, -factor V -KAVITHA by IFA and -NADEEN panel -HLA B27] and pityriasis lichenoides chronica (seen [facility]-established here). XR  no erosions hands. Moved from [place] to be with family.       Past- hydroxychloroquine (plaquenil) since 1-2016 and methotrexate 10 mg week  to 4-2018 (mild fatigue day of) then restarted 10-, naproxsyn; allergy sulfa, prednisone, advil       Initial visit copy forward  10-: Hydroxychloroquine (plaquenil) BID mild diarrhea; ocular eye exam 3-2016 baseline reports recent eye exam   EKG was recently Feb 2018 normal. Stopped methotrexate  In April as wanted to take a drug holiday and was moving here, also wanted to drink some alcohol, and discuss options. No infections. Diffuse joint pains in hands, wrists, shoulders, ankles and feet and the sides of her hips. Taking advil 600mg AM and 400 mg PM, tolerating but taking since April. Pain severe 5/10 or more. Stiffness all day. Hands are sore. No loss of function or ROM. Very sore and stiff all over. Knees are sore and hard to bend or squat. Mild diarrhea with hydroxychloroquine (plaquenil)     December 17, 2018    Taking methotrexate  10 mg every 7 days SAT, folic acid  1 mg day. General fatigue persists, some mouth sores before methotrexate  And now. No hairloss. Hydroxychloroquine (plaquenil) 200 mg BID, does feel she is getting diarrhea from this and hard to keep up with her fluid intake. Cold around early Nov. Healthy now. Dramatic improvement, no RHEUMATOID ARTHRITIS (RA) flaring no longer using NSAIDs and off the prednisone burst. No EAS. No vision issues. Is exercising and doing yogo now. Able to bend her knees now and make a full fist formations. Denies any fever, chills, SOB, CANDELARIA, night sweats, or chest  pain, or cough. Reports healthy      PMH:  Injury-no  Medical-dermatitis, (bx 2-2016 GA vs LP vs cutaneous lupus--was not cutenaous lupus --focal interstitial lymphocytic inflammation, scattered lymphocytes), anxiety and depression, exercise induced asthma, scoliosis, pityriasis lichenoides chronica, chronic back pain, hypertension, seasonal allergies, acne, motion sickness, high cholesterol, chicken pox    Surgical-None       FH:  No autoimmune disorders, psoriasis, UC, crohn's, RA, PsA, gout, autoimmune thyroid.  No MS, heart disease or cancer in family  Mother-factor V leiden carrier, rosea   Father-healthy  Siblings-younger bro healthy  Children-None   GF-lupus   Uncle cancer colon    SH:  Social Alcohol 5 week. Never Smoking. No IVDU. No Children. Right handed. Single--not sexually active, never.        PMSH personally reviewed and updated by me.    ROS:  Negative raynaud s phenomena, hairloss, sun sensitivities, keratoconjunctivitis sicca, pleurisy, inflammatory joint symptoms, significant rashes like malar, oral/nasal or ulcerations, inflammatory eye disease, inflammatory bowel disease, dactylitis, tenosynovitis, or enthespathy. Negative for  blood clots, gout, psoriasis, UC, crohn's. No temporal headache, no jaw claudication, no scalp tenderness, vision changes, carotidynia, cough. No STD or pregnancy symptoms. No Parotid swelling.   +tips finger white at times and toes   +skin disease as above   CONSTITUTIONAL: No fevers, night sweats or unintentional weight change. No acute distress, swollen glands  EYES: No vision change, diplopia, pain in eyes or red eyes   EARS, NOSE, MOUTH, THROAT: No tinnitus or hearing change, no epistaxis or nasal discharge, no oral lesions, throat clear. Normal saliva pool.  No drymouth. No thyroid enlargement.   CARDIOVASCULAR: No chest pain, palpitations, or pain with walking, no orthopnea or PND   RESPIRATORY: No dyspnea, cough, shortness of breath or wheezing. No  "pleurisy.   GI: No nausea, vomiting, diarrhea or constipation, no abdominal pain, or blood in stools.   : No change in urine, no dysuria or hematuria   MUSCKL: No enthesitis, plantar fascitis or heel pain. See above   INTEGUMENTARY: No concerning lesions or moles   NEURO: No loss of strength or sensation, no numbness or tingling, no tremor, no dizziness, no headache. No falls   ENDO: No polyuria or polydipsia, no temperature intolerance   HEME/LYMPH:No concerning bumps, bleeding problems, or swollen lymph nodes. No recent infections, hospitalizations or new illnesses.   ALLERGY: No environmental allergies   Otherwise 14 point ROS obtained, reviewed and found negative.     Physical exam:  Vitals: Blood pressure 138/81, pulse 98, temperature 98.1  F (36.7  C), temperature source Oral, height 1.753 m (5' 9\"), weight 96.8 kg (213 lb 8 oz), SpO2 97 %, not currently breastfeeding.  Wt Readings from Last 4 Encounters:   12/17/18 96.8 kg (213 lb 8 oz)   12/05/18 95.2 kg (209 lb 14.1 oz)   12/04/18 95.2 kg (209 lb 14.4 oz)   10/31/18 98 kg (216 lb 1.6 oz)     Constitutional: WD-WN-WG cooperative  Eyes: nl EOM, PERRLA, vision, conjunctiva, sclera, No Unilateral or bilateral external ear inflammation   ENT: nl external ears, nose, hearing, lips, teeth, gums, throat. No saddle nose   Neck: no mass or thyroid enlargement  Resp: lungs clear to auscultation, nl to palpation, nl effort  CV: RRR, no murmurs, rubs or gallops, no edema  GI: no ABD mass or tenderness, no HSM  : not tested  Lymph: no cervical or epitrochlear nodes  MS: All TMJ, neck, shoulder, elbow, wrist, hand, spine, hip, knee, ankle, and foot joints were examined and otherwise found normal. Weak  strength. No deformity. Full ROM. Normal prayer sign. Negative Lhermitte's sign. No periuncle erythema  Skin: no nail pitting, alopecia, rash. Skin lesions.   Neuro: nl cranial nerves, strength, sensation, DTRs.   Psych: nl judgement, orientation, memory, " affect.      Labs/Imaging:  Results for orders placed or performed in visit on 12/17/18   CRP inflammation   Result Value Ref Range    CRP Inflammation 8.2 (H) 0.0 - 8.0 mg/L   Creatinine   Result Value Ref Range    Creatinine 0.72 0.52 - 1.04 mg/dL    GFR Estimate >90 >60 mL/min/1.7m2    GFR Estimate If Black >90 >60 mL/min/1.7m2   Albumin level   Result Value Ref Range    Albumin 3.3 (L) 3.4 - 5.0 g/dL   ALT   Result Value Ref Range    ALT 24 0 - 50 U/L   AST   Result Value Ref Range    AST 18 0 - 45 U/L   CBC with platelets   Result Value Ref Range    WBC 7.8 4.0 - 11.0 10e9/L    RBC Count 4.84 3.8 - 5.2 10e12/L    Hemoglobin 14.3 11.7 - 15.7 g/dL    Hematocrit 43.6 35.0 - 47.0 %    MCV 90 78 - 100 fl    MCH 29.5 26.5 - 33.0 pg    MCHC 32.8 31.5 - 36.5 g/dL    RDW 12.7 10.0 - 15.0 %    Platelet Count 317 150 - 450 10e9/L      Normal G6PD  Neg MTB QG 2016 and hepatitis panel, negative HIV    XR hands and knees-negative 2016      Impression/Plan:  1. Seropositive RHEUMATOID ARTHRITIS (RA), non-erosive low activity. Poor prognosis. Prior control on low dose methotrexate and hydroxychloroquine (plaquenil). Discussed risks with methotrexate , infection risks, pregnancy (abstince and knows needs to be off for 3-6 month before trying to concieve) seek immediate medical attention if any signs or symptoms infections. And monitor for side-effects, will abstain from intercourse (will see OBGYN discussed IUD or double protection-she discussed with PCP) and will limit 0-2 week not day or after. RHEUMATOID ARTHRITIS (RA) dramatically improved now, controlled=SHELL 298 CRP remission     High risk medications, labs today showing mild reduced ALB and mild CPR. Monitor. With occasional oral sores and fatigue will increase folic acid  2 mg day     2. Longterm hydroxychloroquine (plaquenil) use. Recent opthalmologist  We did fax form to have this filled out. Diarrhea side-effects from this will reduce by 1 tablet.   3. Fatigue, check  TSH with reflex. IF this continues, she will follow-up  PCP    4. Vitamin D deficeincy- take 2000 international unit(s) day   5. Pityriasis lichenoids chronic-per derm   6. Past Chronic NSAID use, informed her of risks and possible side-effects to this, and including CVD/kidney disease. Off now     Prednisone 15-10-5 mg each for 2 weeks then stop-completed   Methotrexate  10mg once every FRI, folic acid  2 mg day  No NSAID use  Annual opthalmologist  Exam for hydroxychloroquine (plaquenil) use.  Hydroxychloroquine (plaquenil) 200 mg once day    RTC 2 month with labs, consider x-rays      The patient understood the rationale for the diagnosis and treatment plan. Patient shared in the decision making. All questions were answered to best of my ability and the patient's satisfaction  Zurdo CORDERO, CNP, MSN  [facility]  Department of Rheumatology & Autoimmune Disorders    1. Immunization: Reviewed CDC guidelines with patient updated, information provided to patient based on CDC guidelines for vaccination recommendations, patient will discuss with primary provider  2. Bone Health:Educated on adequate calcium and vitamin D intake, Educated on exercise, Glucocorticoid education discussed risks, benefits & potential long term side effects from use  3. Discussed routine annual physicals, cancer screening, cardiac risk monitoring, bone health and primary care with primary care provider.   4. Educated on diagnosis, prognosis, labs, imaging, treatment options (including risks, benefits, risk of no treatment), medications (use, dose, side-effects, risks of medications including infection/cancer/bone marrow suppression, lab monitoring), infections what to do, plan of cares, goals of treatment.

## 2018-12-17 NOTE — RESULT ENCOUNTER NOTE
All labs reviewed and discussed with patient at office visit. Instructed to call for any further questions.       Zurdo CORDERO CNP, MSN  12/17/2018  8:25 AM

## 2018-12-17 NOTE — LETTER
12/17/2018       RE: Rachel Fried  [address]  [place] [address]     Dear Colleague,    Thank you for referring your patient, Rachel Fried, to the [facility] RHEUMATOLOGY at [facility]. Please see a copy of my visit note below.    [facility] - Rheumatology Clinic Visit    Rachel Fried  is a 27 year old here for follow-up  RHEUMATOID ARTHRITIS (RA) involving hands, wrists, ankles, shoulders feet [ -RF and low CCP 25, -factor V -KAVITHA by IFA and -NADEEN panel -HLA B27] and pityriasis lichenoides chronica (seen [facility]-established here). XR  no erosions hands. Moved from [place] to be with family.       Past- hydroxychloroquine (plaquenil) since 1-2016 and methotrexate 10 mg week  to 4-2018 (mild fatigue day of) then restarted 10-, naproxsyn; allergy sulfa, prednisone, advil       Initial visit copy forward  10-: Hydroxychloroquine (plaquenil) BID mild diarrhea; ocular eye exam 3-2016 baseline reports recent eye exam   EKG was recently Feb 2018 normal. Stopped methotrexate  In April as wanted to take a drug holiday and was moving here, also wanted to drink some alcohol, and discuss options. No infections. Diffuse joint pains in hands, wrists, shoulders, ankles and feet and the sides of her hips. Taking advil 600mg AM and 400 mg PM, tolerating but taking since April. Pain severe 5/10 or more. Stiffness all day. Hands are sore. No loss of function or ROM. Very sore and stiff all over. Knees are sore and hard to bend or squat. Mild diarrhea with hydroxychloroquine (plaquenil)     December 17, 2018    Taking methotrexate  10 mg every 7 days SAT, folic acid  1 mg day. General fatigue persists, some mouth sores before methotrexate  And now. No hairloss. Hydroxychloroquine (plaquenil) 200 mg BID, does feel she is getting diarrhea from this and hard to keep up with her fluid intake. Cold around early Nov. Healthy now.  Dramatic improvement, no RHEUMATOID ARTHRITIS (RA) flaring no longer using NSAIDs and off the prednisone burst. No EAS. No vision issues. Is exercising and doing yogo now. Able to bend her knees now and make a full fist formations. Denies any fever, chills, SOB, CANDELARIA, night sweats, or chest pain, or cough. Reports healthy      PMH:  Injury-no  Medical-dermatitis, (bx 2-2016 GA vs LP vs cutaneous lupus--was not cutenaous lupus --focal interstitial lymphocytic inflammation, scattered lymphocytes), anxiety and depression, exercise induced asthma, scoliosis, pityriasis lichenoides chronica, chronic back pain, hypertension, seasonal allergies, acne, motion sickness, high cholesterol, chicken pox    Surgical-None       FH:  No autoimmune disorders, psoriasis, UC, crohn's, RA, PsA, gout, autoimmune thyroid.  No MS, heart disease or cancer in family  Mother-factor V leiden carrier, rosea   Father-healthy  Siblings-younger bro healthy  Children-None   GF-lupus   Uncle cancer colon    SH:  Social Alcohol 5 week. Never Smoking. No IVDU. No Children. Right handed. Single--not sexually active, never.        [facility] and [facility].    ROS:  Negative raynaud s phenomena, hairloss, sun sensitivities, keratoconjunctivitis sicca, pleurisy, inflammatory joint symptoms, significant rashes like malar, oral/nasal or ulcerations, inflammatory eye disease, inflammatory bowel disease, dactylitis, tenosynovitis, or enthespathy. Negative for  blood clots, gout, psoriasis, UC, crohn's. No temporal headache, no jaw claudication, no scalp tenderness, vision changes, carotidynia, cough. No STD or pregnancy symptoms. No Parotid swelling.   +tips finger white at times and toes   +skin disease as above   CONSTITUTIONAL: No fevers, night sweats or unintentional weight change. No acute distress, swollen glands  EYES: No vision change, diplopia, pain in eyes or red eyes   EARS, NOSE, MOUTH, THROAT: No tinnitus or hearing  "change, no epistaxis or nasal discharge, no oral lesions, throat clear. Normal saliva pool.  No drymouth. No thyroid enlargement.   CARDIOVASCULAR: No chest pain, palpitations, or pain with walking, no orthopnea or PND   RESPIRATORY: No dyspnea, cough, shortness of breath or wheezing. No pleurisy.   GI: No nausea, vomiting, diarrhea or constipation, no abdominal pain, or blood in stools.   : No change in urine, no dysuria or hematuria   MUSCKL: No enthesitis, plantar fascitis or heel pain. See above   INTEGUMENTARY: No concerning lesions or moles   NEURO: No loss of strength or sensation, no numbness or tingling, no tremor, no dizziness, no headache. No falls   ENDO: No polyuria or polydipsia, no temperature intolerance   HEME/LYMPH:No concerning bumps, bleeding problems, or swollen lymph nodes. No recent infections, hospitalizations or new illnesses.   ALLERGY: No environmental allergies   Otherwise 14 point ROS obtained, reviewed and found negative.     Physical exam:  Vitals: Blood pressure 138/81, pulse 98, temperature 98.1  F (36.7  C), temperature source Oral, height 1.753 m (5' 9\"), weight 96.8 kg (213 lb 8 oz), SpO2 97 %, not currently breastfeeding.  Wt Readings from Last 4 Encounters:   12/17/18 96.8 kg (213 lb 8 oz)   12/05/18 95.2 kg (209 lb 14.1 oz)   12/04/18 95.2 kg (209 lb 14.4 oz)   10/31/18 98 kg (216 lb 1.6 oz)     Constitutional: WD-WN-WG cooperative  Eyes: nl EOM, PERRLA, vision, conjunctiva, sclera, No Unilateral or bilateral external ear inflammation   ENT: nl external ears, nose, hearing, lips, teeth, gums, throat. No saddle nose   Neck: no mass or thyroid enlargement  Resp: lungs clear to auscultation, nl to palpation, nl effort  CV: RRR, no murmurs, rubs or gallops, no edema  GI: no ABD mass or tenderness, no HSM  : not tested  Lymph: no cervical or epitrochlear nodes  MS: All TMJ, neck, shoulder, elbow, wrist, hand, spine, hip, knee, ankle, and foot joints were examined and otherwise " found normal. Weak  strength. No deformity. Full ROM. Normal prayer sign. Negative Lhermitte's sign. No periuncle erythema  Skin: no nail pitting, alopecia, rash. Skin lesions.   Neuro: nl cranial nerves, strength, sensation, DTRs.   Psych: nl judgement, orientation, memory, affect.      Labs/Imaging:  Results for orders placed or performed in visit on 12/17/18   CRP inflammation   Result Value Ref Range    CRP Inflammation 8.2 (H) 0.0 - 8.0 mg/L   Creatinine   Result Value Ref Range    Creatinine 0.72 0.52 - 1.04 mg/dL    GFR Estimate >90 >60 mL/min/1.7m2    GFR Estimate If Black >90 >60 mL/min/1.7m2   Albumin level   Result Value Ref Range    Albumin 3.3 (L) 3.4 - 5.0 g/dL   ALT   Result Value Ref Range    ALT 24 0 - 50 U/L   AST   Result Value Ref Range    AST 18 0 - 45 U/L   CBC with platelets   Result Value Ref Range    WBC 7.8 4.0 - 11.0 10e9/L    RBC Count 4.84 3.8 - 5.2 10e12/L    Hemoglobin 14.3 11.7 - 15.7 g/dL    Hematocrit 43.6 35.0 - 47.0 %    MCV 90 78 - 100 fl    MCH 29.5 26.5 - 33.0 pg    MCHC 32.8 31.5 - 36.5 g/dL    RDW 12.7 10.0 - 15.0 %    Platelet Count 317 150 - 450 10e9/L      Normal G6PD  Neg MTB QG 2016 and hepatitis panel, negative HIV    XR hands and knees-negative 2016      Impression/Plan:  1. Seropositive RHEUMATOID ARTHRITIS (RA), non-erosive low activity. Poor prognosis. Prior control on low dose methotrexate and hydroxychloroquine (plaquenil). Discussed risks with methotrexate , infection risks, pregnancy (abstince and knows needs to be off for 3-6 month before trying to concieve) seek immediate medical attention if any signs or symptoms infections. And monitor for side-effects, will abstain from intercourse (will see OBGYN discussed IUD or double protection-she discussed with PCP) and will limit 0-2 week not day or after. RHEUMATOID ARTHRITIS (RA) dramatically improved now, controlled=SHELL 298 CRP remission     High risk medications, labs today showing mild reduced ALB and mild  CPR. Monitor. With occasional oral sores and fatigue will increase folic acid  2 mg day     2. Longterm hydroxychloroquine (plaquenil) use. Recent opthalmologist  We did fax form to have this filled out. Diarrhea side-effects from this will reduce by 1 tablet.   3. Fatigue, check TSH with reflex. IF this continues, she will follow-up  PCP    4. Vitamin D deficeincy- take 2000 international unit(s) day   5. Pityriasis lichenoids chronic-per derm   6. Past Chronic NSAID use, informed her of risks and possible side-effects to this, and including CVD/kidney disease. Off now     Prednisone 15-10-5 mg each for 2 weeks then stop-completed   Methotrexate  10mg once every FRI, folic acid  2 mg day  No NSAID use  Annual opthalmologist  Exam for hydroxychloroquine (plaquenil) use.  Hydroxychloroquine (plaquenil) 200 mg once day    RTC 2 month with labs, consider x-rays      The patient understood the rationale for the diagnosis and treatment plan. Patient shared in the decision making. All questions were answered to best of my ability and the patient's satisfaction  Zurdo CORDERO, CNP, MSN  [facility]  Department of Rheumatology & Autoimmune Disorders    1. Immunization: Reviewed CDC guidelines with patient updated, information provided to patient based on CDC guidelines for vaccination recommendations, patient will discuss with primary provider  2. Bone Health:Educated on adequate calcium and vitamin D intake, Educated on exercise, Glucocorticoid education discussed risks, benefits & potential long term side effects from use  3. Discussed routine annual physicals, cancer screening, cardiac risk monitoring, bone health and primary care with primary care provider.   4. Educated on diagnosis, prognosis, labs, imaging, treatment options (including risks, benefits, risk of no treatment), medications (use, dose, side-effects, risks of medications including infection/cancer/bone marrow suppression, lab  monitoring), infections what to do, plan of cares, goals of treatment.       Again, thank you for allowing me to participate in the care of your patient.      Sincerely,    GIL Kim CNP

## 2018-12-17 NOTE — LETTER
12/17/2018      RE: Rachel Fried  [address]  [facility] [address]       [facility] - Rheumatology Clinic Visit    Rachel Fried  is a 27 year old here for follow-up  RHEUMATOID ARTHRITIS (RA) involving hands, wrists, ankles, shoulders feet [ -RF and low CCP 25, -factor V -KAVITHA by IFA and -NADEEN panel -HLA B27] and pityriasis lichenoides chronica (seen [facility]-established here). XR  no erosions hands. Moved from [place] to be with family.       Past- hydroxychloroquine (plaquenil) since 1-2016 and methotrexate 10 mg week  to 4-2018 (mild fatigue day of) then restarted 10-, naproxsyn; allergy sulfa, prednisone, advil       Initial visit copy forward  10-: Hydroxychloroquine (plaquenil) BID mild diarrhea; ocular eye exam 3-2016 baseline reports recent eye exam   EKG was recently Feb 2018 normal. Stopped methotrexate  In April as wanted to take a drug holiday and was moving here, also wanted to drink some alcohol, and discuss options. No infections. Diffuse joint pains in hands, wrists, shoulders, ankles and feet and the sides of her hips. Taking advil 600mg AM and 400 mg PM, tolerating but taking since April. Pain severe 5/10 or more. Stiffness all day. Hands are sore. No loss of function or ROM. Very sore and stiff all over. Knees are sore and hard to bend or squat. Mild diarrhea with hydroxychloroquine (plaquenil)     December 17, 2018    Taking methotrexate  10 mg every 7 days SAT, folic acid  1 mg day. General fatigue persists, some mouth sores before methotrexate  And now. No hairloss. Hydroxychloroquine (plaquenil) 200 mg BID, does feel she is getting diarrhea from this and hard to keep up with her fluid intake. Cold around early Nov. Healthy now. Dramatic improvement, no RHEUMATOID ARTHRITIS (RA) flaring no longer using NSAIDs and off the prednisone burst. No EAS. No vision issues. Is exercising and doing yogo now. Able to bend her knees  now and make a full fist formations. Denies any fever, chills, SOB, CANDELARIA, night sweats, or chest pain, or cough. Reports healthy      PMH:  Injury-no  Medical-dermatitis, (bx 2-2016 GA vs LP vs cutaneous lupus--was not cutenaous lupus --focal interstitial lymphocytic inflammation, scattered lymphocytes), anxiety and depression, exercise induced asthma, scoliosis, pityriasis lichenoides chronica, chronic back pain, hypertension, seasonal allergies, acne, motion sickness, high cholesterol, chicken pox    Surgical-None       FH:  No autoimmune disorders, psoriasis, UC, crohn's, RA, PsA, gout, autoimmune thyroid.  No MS, heart disease or cancer in family  Mother-factor V leiden carrier, rosea   Father-healthy  Siblings-younger bro healthy  Children-None   GF-lupus   Uncle cancer colon    SH:  Social Alcohol 5 week. Never Smoking. No IVDU. No Children. Right handed. Single--not sexually active, never.        PMSH personally reviewed and updated by me.    ROS:  Negative raynaud s phenomena, hairloss, sun sensitivities, keratoconjunctivitis sicca, pleurisy, inflammatory joint symptoms, significant rashes like malar, oral/nasal or ulcerations, inflammatory eye disease, inflammatory bowel disease, dactylitis, tenosynovitis, or enthespathy. Negative for  blood clots, gout, psoriasis, UC, crohn's. No temporal headache, no jaw claudication, no scalp tenderness, vision changes, carotidynia, cough. No STD or pregnancy symptoms. No Parotid swelling.   +tips finger white at times and toes   +skin disease as above   CONSTITUTIONAL: No fevers, night sweats or unintentional weight change. No acute distress, swollen glands  EYES: No vision change, diplopia, pain in eyes or red eyes   EARS, NOSE, MOUTH, THROAT: No tinnitus or hearing change, no epistaxis or nasal discharge, no oral lesions, throat clear. Normal saliva pool.  No drymouth. No thyroid enlargement.   CARDIOVASCULAR: No chest pain, palpitations, or pain with walking, no  "orthopnea or PND   RESPIRATORY: No dyspnea, cough, shortness of breath or wheezing. No pleurisy.   GI: No nausea, vomiting, diarrhea or constipation, no abdominal pain, or blood in stools.   : No change in urine, no dysuria or hematuria   MUSCKL: No enthesitis, plantar fascitis or heel pain. See above   INTEGUMENTARY: No concerning lesions or moles   NEURO: No loss of strength or sensation, no numbness or tingling, no tremor, no dizziness, no headache. No falls   ENDO: No polyuria or polydipsia, no temperature intolerance   HEME/LYMPH:No concerning bumps, bleeding problems, or swollen lymph nodes. No recent infections, hospitalizations or new illnesses.   ALLERGY: No environmental allergies   Otherwise 14 point ROS obtained, reviewed and found negative.     Physical exam:  Vitals: Blood pressure 138/81, pulse 98, temperature 98.1  F (36.7  C), temperature source Oral, height 1.753 m (5' 9\"), weight 96.8 kg (213 lb 8 oz), SpO2 97 %, not currently breastfeeding.  Wt Readings from Last 4 Encounters:   12/17/18 96.8 kg (213 lb 8 oz)   12/05/18 95.2 kg (209 lb 14.1 oz)   12/04/18 95.2 kg (209 lb 14.4 oz)   10/31/18 98 kg (216 lb 1.6 oz)     Constitutional: WD-WN-WG cooperative  Eyes: nl EOM, PERRLA, vision, conjunctiva, sclera, No Unilateral or bilateral external ear inflammation   ENT: nl external ears, nose, hearing, lips, teeth, gums, throat. No saddle nose   Neck: no mass or thyroid enlargement  Resp: lungs clear to auscultation, nl to palpation, nl effort  CV: RRR, no murmurs, rubs or gallops, no edema  GI: no ABD mass or tenderness, no HSM  : not tested  Lymph: no cervical or epitrochlear nodes  MS: All TMJ, neck, shoulder, elbow, wrist, hand, spine, hip, knee, ankle, and foot joints were examined and otherwise found normal. Weak  strength. No deformity. Full ROM. Normal prayer sign. Negative Lhermitte's sign. No periuncle erythema  Skin: no nail pitting, alopecia, rash. Skin lesions.   Neuro: nl cranial " nerves, strength, sensation, DTRs.   Psych: nl judgement, orientation, memory, affect.      Labs/Imaging:  Results for orders placed or performed in visit on 12/17/18   CRP inflammation   Result Value Ref Range    CRP Inflammation 8.2 (H) 0.0 - 8.0 mg/L   Creatinine   Result Value Ref Range    Creatinine 0.72 0.52 - 1.04 mg/dL    GFR Estimate >90 >60 mL/min/1.7m2    GFR Estimate If Black >90 >60 mL/min/1.7m2   Albumin level   Result Value Ref Range    Albumin 3.3 (L) 3.4 - 5.0 g/dL   ALT   Result Value Ref Range    ALT 24 0 - 50 U/L   AST   Result Value Ref Range    AST 18 0 - 45 U/L   CBC with platelets   Result Value Ref Range    WBC 7.8 4.0 - 11.0 10e9/L    RBC Count 4.84 3.8 - 5.2 10e12/L    Hemoglobin 14.3 11.7 - 15.7 g/dL    Hematocrit 43.6 35.0 - 47.0 %    MCV 90 78 - 100 fl    MCH 29.5 26.5 - 33.0 pg    MCHC 32.8 31.5 - 36.5 g/dL    RDW 12.7 10.0 - 15.0 %    Platelet Count 317 150 - 450 10e9/L      Normal G6PD  Neg MTB QG 2016 and hepatitis panel, negative HIV    XR hands and knees-negative 2016      Impression/Plan:  1. Seropositive RHEUMATOID ARTHRITIS (RA), non-erosive low activity. Poor prognosis. Prior control on low dose methotrexate and hydroxychloroquine (plaquenil). Discussed risks with methotrexate , infection risks, pregnancy (abstince and knows needs to be off for 3-6 month before trying to concieve) seek immediate medical attention if any signs or symptoms infections. And monitor for side-effects, will abstain from intercourse (will see OBGYN discussed IUD or double protection-she discussed with PCP) and will limit 0-2 week not day or after. RHEUMATOID ARTHRITIS (RA) dramatically improved now, controlled=SHELL 298 CRP remission     High risk medications, labs today showing mild reduced ALB and mild CPR. Monitor. With occasional oral sores and fatigue will increase folic acid  2 mg day     2. Longterm hydroxychloroquine (plaquenil) use. Recent opthalmologist  We did fax form to have this filled  out. Diarrhea side-effects from this will reduce by 1 tablet.   3. Fatigue, check TSH with reflex. IF this continues, she will follow-up  PCP    4. Vitamin D deficeincy- take 2000 international unit(s) day   5. Pityriasis lichenoids chronic-per derm   6. Past Chronic NSAID use, informed her of risks and possible side-effects to this, and including CVD/kidney disease. Off now     Prednisone 15-10-5 mg each for 2 weeks then stop-completed   Methotrexate  10mg once every FRI, folic acid  2 mg day  No NSAID use  Annual opthalmologist  Exam for hydroxychloroquine (plaquenil) use.  Hydroxychloroquine (plaquenil) 200 mg once day    RTC 2 month with labs, consider x-rays      The patient understood the rationale for the diagnosis and treatment plan. Patient shared in the decision making. All questions were answered to best of my ability and the patient's satisfaction  Zurdo CORDERO, CNP, MSN  [facility]  Department of Rheumatology & Autoimmune Disorders    1. Immunization: Reviewed CDC guidelines with patient updated, information provided to patient based on CDC guidelines for vaccination recommendations, patient will discuss with primary provider  2. Bone Health:Educated on adequate calcium and vitamin D intake, Educated on exercise, Glucocorticoid education discussed risks, benefits & potential long term side effects from use  3. Discussed routine annual physicals, cancer screening, cardiac risk monitoring, bone health and primary care with primary care provider.   4. Educated on diagnosis, prognosis, labs, imaging, treatment options (including risks, benefits, risk of no treatment), medications (use, dose, side-effects, risks of medications including infection/cancer/bone marrow suppression, lab monitoring), infections what to do, plan of cares, goals of treatment.       Zurdo Wesley, GIL CNP

## 2019-01-22 ENCOUNTER — OFFICE VISIT (OUTPATIENT)
Dept: INTERNAL MEDICINE | Facility: CLINIC | Age: 29
End: 2019-01-22
Payer: COMMERCIAL

## 2019-01-22 VITALS
DIASTOLIC BLOOD PRESSURE: 81 MMHG | HEART RATE: 73 BPM | SYSTOLIC BLOOD PRESSURE: 132 MMHG | WEIGHT: 214 LBS | BODY MASS INDEX: 31.6 KG/M2

## 2019-01-22 DIAGNOSIS — R89.9 ABNORMAL LABORATORY TEST: ICD-10-CM

## 2019-01-22 DIAGNOSIS — R53.82 CHRONIC FATIGUE: ICD-10-CM

## 2019-01-22 DIAGNOSIS — R79.89 TSH ELEVATION: ICD-10-CM

## 2019-01-22 DIAGNOSIS — R53.83 OTHER FATIGUE: ICD-10-CM

## 2019-01-22 DIAGNOSIS — F32.A DEPRESSION, UNSPECIFIED DEPRESSION TYPE: Primary | ICD-10-CM

## 2019-01-22 LAB
T3 SERPL-MCNC: 171 NG/DL (ref 60–181)
THYROPEROXIDASE AB SERPL-ACNC: 27 IU/ML

## 2019-01-22 RX ORDER — CITALOPRAM HYDROBROMIDE 20 MG/1
20 TABLET ORAL DAILY
Qty: 90 TABLET | Refills: 3 | Status: SHIPPED | OUTPATIENT
Start: 2019-01-22 | End: 2020-09-11

## 2019-01-22 ASSESSMENT — PAIN SCALES - GENERAL: PAINLEVEL: NO PAIN (0)

## 2019-01-22 ASSESSMENT — PATIENT HEALTH QUESTIONNAIRE - PHQ9: SUM OF ALL RESPONSES TO PHQ QUESTIONS 1-9: 6

## 2019-01-22 NOTE — NURSING NOTE
Chief Complaint   Patient presents with     Results     Discuss recent abnormal lab results.     Fatigue       Gwendolyn Wilson LPN at 7:26 AM on January 22, 2019

## 2019-01-22 NOTE — PROGRESS NOTES
PRIMARY CARE CENTER       SUBJECTIVE:  Rachel Fried is a 28 year old female with a PMHx of rheumatoid arthritis, pityriasis lichenoides, depression, anxiety, and acne vulgaris who presents for follow-up of elevated TSH as demonstrated on workup obtained by rheumatology for fatigue.    Reports fatigue with onset with rheumatoid flare that has persisted despite improvements in rheumatoid symptoms. She reports making lifestyle modifications including dietary changes (decreased carbohydrate intake), increased exercise, reduction in caffeine intake late in the day, improvements in sleep patterns from prior (sleeping 7-8 hours per night) without improvements. Denies difficulty falling asleep. Fatigue is constant throughout the day.  No constipation, diarrhea. No clear heat or cold intolerance. No changes in weight, hair, or skin.   No family history of thyroid disease. Otherwise, does endorse persistent back pain and occasional knee pain.     Reportedly with good mood-- depression symptoms better controlled than previously. She notes that she is unsure if fatigue is contributing to worse interest in activities or if she feels more fatigued because of lack of interest/motivation.       Medications and allergies reviewed by me today.     ROS:   14 point ROS negative for any new symptoms except for those mentioned in HPI    (+) Headache    OBJECTIVE:    There were no vitals taken for this visit.   Wt Readings from Last 1 Encounters:   12/17/18 96.8 kg (213 lb 8 oz)       GENERAL APPEARANCE: healthy, alert and no distress     EYES: EOMI     HENT: mouth without ulcers or lesions     NECK: no adenopathy, no asymmetry, masses, or scars and thyroid normal to palpation     RESP: lungs clear to auscultation - no rales, rhonchi or wheezes     CV: regular rate and rhythm, normal S1 S2     ABDOMEN:  soft, nontender     MS: FROM in all extremities     SKIN: no suspicious lesions or rashes     NEURO: mentation  intact and speech normal     PSYCH: affect normal/bright     LYMPHATICS: No cervical adenopathy     ASSESSMENT/PLAN:    Rachel was seen today for results and fatigue.    Diagnoses and all orders for this visit:    # Depression, unspecified depression type  # Anxiety  # Fatigue  Chronic depression treated with citalopram. Denies new symptoms. Mood currently well-controlled. Unclear if changes in energy level/interest associated with depressive episode. Current management with citalopram has most beneficial side effect profile (minimal effect on fatigue, weight gain, use in treatment of comorbid anxiety). Will increase dose from 10 mg to 20 mg and follow up in 2 months. Patent interested in pursuing additional counseling if available-- will refer to health psychology for further assistance.  -     citalopram (CELEXA) 20 MG tablet; Take 1 tablet (20 mg) by mouth daily  -     PSYCHOLOGY ([facility]) REFERRAL    # Abnormal laboratory test  # TSH elevation  TSH elevation without abnormality in Free T4. No anti-TPO obtained, however, unlikely to alter management at this point. Will repeat TSH in 6 months.  -     TSH with free T4 reflex; Future     Chronic conditions not changed today  # Rheumatoid Arthritis  # Diffuse (bilateral hands, wrists, ankles, shoulders, feet)  Managed by rheumatology. Previously on methotrexate and chronic hydroxychloroquine with discontinuation of therapy in 2018 followed by worsening joint pain and inflammation. Now on hydroxychloroquine, methotrexate, with prednisone taper October - December 2018.   - Medications managed by [facility] rheumatology  - On chronic methotrexate and hydroxychloroquine  - On prednisone taper    # Encounter for surveillance of contraceptive pills  -     LORYNA 3-0.02 MG tablet; Take 1 tablet by mouth daily    # Pityriasis lichenoides chronica  # Acne vulgaris  # Melasma  - Managed by dermatology   - On methotrexate per rheumatology for PLC  - On triamcinolone  topical for PLC  - On benzoyl peroxide wash for treatment of acne      Pt should return to clinic for f/u with me in 2 months if neede for follow-up of medication changes or in 6 months.       Brandy Lara MD  Jan 22, 2019    Pt was seen and plan of care discussed with Dr. Mcmullen.     Teaching Physician Note:  I was present during the visit and the patient was seen and examined by me.   I discussed the case with the resident and agree with the note as documented by the resident with the following exceptions:  None.    Evelia Mcmullen M.D.  Internal Medicine   pager [phone]

## 2019-02-20 ENCOUNTER — TELEPHONE (OUTPATIENT)
Dept: INTERNAL MEDICINE | Facility: CLINIC | Age: 29
End: 2019-02-20

## 2019-02-20 NOTE — TELEPHONE ENCOUNTER
Health Call Center    Phone Message    May a detailed message be left on voicemail: yes    Reason for Call: Form or Letter   Type or form/letter needing completion: Mental Health Professional Form  Provider: Brandy Lara MD  Date form needed: asap  Once completed: Fax form to: the patient DONT KNOW IT SHE WILL FIND IT AND SEND IT IN WITH FAX ON THE FORM COVERSHEET PLEASE CALL PATIENT IF THERES ANY CONCERNS       Action Taken: Message routed to:  [facility] PCC

## 2019-02-22 DIAGNOSIS — M06.09 RHEUMATOID ARTHRITIS OF MULTIPLE SITES WITH NEGATIVE RHEUMATOID FACTOR (H): ICD-10-CM

## 2019-02-22 DIAGNOSIS — R76.8 CYCLIC CITRULLINATED PEPTIDE (CCP) ANTIBODY POSITIVE: ICD-10-CM

## 2019-02-22 DIAGNOSIS — Z79.899 LONG-TERM USE OF PLAQUENIL: ICD-10-CM

## 2019-02-23 NOTE — TELEPHONE ENCOUNTER
methotrexate 2.5 MG tablet  Last Written Prescription Date:  12/17/18  Last Fill Quantity: 16,   # refills: 1  Last Office Visit:12/17/18  Future Office visit:  3/6/19  CBC RESULTS:   Recent Labs   Lab Test 12/17/18  0744   WBC 7.8   RBC 4.84   HGB 14.3   HCT 43.6   MCV 90   MCH 29.5   MCHC 32.8   RDW 12.7          Creatinine   Date Value Ref Range Status   12/17/2018 0.72 0.52 - 1.04 mg/dL Final   ]    Liver Function Studies -   Recent Labs   Lab Test 12/17/18  0744  10/31/18  1505   PROTTOTAL  --   --  7.7   ALBUMIN 3.3*   < > 3.5   BILITOTAL  --   --  0.2   ALKPHOS  --   --  77   AST 18   < > 16   ALT 24   < > 22    < > = values in this interval not displayed.     Lab Results   Component Value Date    ALBUMIN 3.3 12/17/2018         Routing refill request to provider for review/approval because:  Provider review required.

## 2019-02-25 NOTE — TELEPHONE ENCOUNTER
Health Call Center    Phone Message    May a detailed message be left on voicemail: yes    Reason for Call: Form or Letter   Type or form/letter needing completion: Mental Health Professional form   Provider: Dr Lara  Date form needed: asap  Once completed: Fax form to: dont know it it will be on the original fax of the forms cover sheet from 2/20/19 the patient is still waiting a response on the form she did get a confirmation that's its been sent but she hasn't gotten feedback on the status of the completions of the form please call her asap her flight leaves 2/27/19      Action Taken: Message routed to:  [facility] angus

## 2019-03-06 ENCOUNTER — OFFICE VISIT (OUTPATIENT)
Dept: RHEUMATOLOGY | Facility: CLINIC | Age: 29
End: 2019-03-06
Attending: NURSE PRACTITIONER
Payer: COMMERCIAL

## 2019-03-06 VITALS
HEIGHT: 69 IN | SYSTOLIC BLOOD PRESSURE: 130 MMHG | HEART RATE: 83 BPM | DIASTOLIC BLOOD PRESSURE: 78 MMHG | BODY MASS INDEX: 31.03 KG/M2 | OXYGEN SATURATION: 98 % | WEIGHT: 209.5 LBS

## 2019-03-06 DIAGNOSIS — Z79.899 LONG-TERM USE OF PLAQUENIL: ICD-10-CM

## 2019-03-06 DIAGNOSIS — M06.09 RHEUMATOID ARTHRITIS OF MULTIPLE SITES WITH NEGATIVE RHEUMATOID FACTOR (H): ICD-10-CM

## 2019-03-06 DIAGNOSIS — R76.8 CYCLIC CITRULLINATED PEPTIDE (CCP) ANTIBODY POSITIVE: ICD-10-CM

## 2019-03-06 DIAGNOSIS — E55.9 VITAMIN D INSUFFICIENCY: ICD-10-CM

## 2019-03-06 DIAGNOSIS — M06.09 RHEUMATOID ARTHRITIS OF MULTIPLE SITES WITH NEGATIVE RHEUMATOID FACTOR (H): Primary | ICD-10-CM

## 2019-03-06 LAB
ALBUMIN SERPL-MCNC: 3.7 G/DL (ref 3.4–5)
ALT SERPL W P-5'-P-CCNC: 23 U/L (ref 0–50)
AST SERPL W P-5'-P-CCNC: 16 U/L (ref 0–45)
CREAT SERPL-MCNC: 0.69 MG/DL (ref 0.52–1.04)
CRP SERPL-MCNC: 7.7 MG/L (ref 0–8)
DEPRECATED CALCIDIOL+CALCIFEROL SERPL-MC: 41 UG/L (ref 20–75)
ERYTHROCYTE [DISTWIDTH] IN BLOOD BY AUTOMATED COUNT: 12.4 % (ref 10–15)
GFR SERPL CREATININE-BSD FRML MDRD: >90 ML/MIN/{1.73_M2}
HCT VFR BLD AUTO: 43.1 % (ref 35–47)
HGB BLD-MCNC: 14.4 G/DL (ref 11.7–15.7)
MCH RBC QN AUTO: 29.6 PG (ref 26.5–33)
MCHC RBC AUTO-ENTMCNC: 33.4 G/DL (ref 31.5–36.5)
MCV RBC AUTO: 89 FL (ref 78–100)
PLATELET # BLD AUTO: 293 10E9/L (ref 150–450)
RBC # BLD AUTO: 4.86 10E12/L (ref 3.8–5.2)
WBC # BLD AUTO: 6.9 10E9/L (ref 4–11)

## 2019-03-06 PROCEDURE — G0463 HOSPITAL OUTPT CLINIC VISIT: HCPCS | Mod: ZF

## 2019-03-06 PROCEDURE — 82306 VITAMIN D 25 HYDROXY: CPT | Performed by: NURSE PRACTITIONER

## 2019-03-06 PROCEDURE — 36415 COLL VENOUS BLD VENIPUNCTURE: CPT | Performed by: NURSE PRACTITIONER

## 2019-03-06 PROCEDURE — 85027 COMPLETE CBC AUTOMATED: CPT | Performed by: NURSE PRACTITIONER

## 2019-03-06 PROCEDURE — 82040 ASSAY OF SERUM ALBUMIN: CPT | Performed by: NURSE PRACTITIONER

## 2019-03-06 PROCEDURE — 84450 TRANSFERASE (AST) (SGOT): CPT | Performed by: NURSE PRACTITIONER

## 2019-03-06 PROCEDURE — 82565 ASSAY OF CREATININE: CPT | Performed by: NURSE PRACTITIONER

## 2019-03-06 PROCEDURE — 86140 C-REACTIVE PROTEIN: CPT | Performed by: NURSE PRACTITIONER

## 2019-03-06 PROCEDURE — 84460 ALANINE AMINO (ALT) (SGPT): CPT | Performed by: NURSE PRACTITIONER

## 2019-03-06 RX ORDER — HYDROXYCHLOROQUINE SULFATE 200 MG/1
200 TABLET, FILM COATED ORAL DAILY
Qty: 30 TABLET | Refills: 3 | Status: SHIPPED | OUTPATIENT
Start: 2019-03-06 | End: 2019-07-08

## 2019-03-06 ASSESSMENT — DISEASE ACTIVITY SCORE (DAS28): CRP_MG_PER_LITER: 1.74

## 2019-03-06 ASSESSMENT — MIFFLIN-ST. JEOR: SCORE: 1739.67

## 2019-03-06 ASSESSMENT — PAIN SCALES - GENERAL: PAINLEVEL: MODERATE PAIN (4)

## 2019-03-06 NOTE — PROGRESS NOTES
[facility] - Rheumatology Clinic Visit    Rachel Fried  is a 27 year old here for follow-up  RHEUMATOID ARTHRITIS (RA) involving hands, wrists, ankles, shoulders feet [ -RF and low CCP 25, -factor V -KAVITHA by IFA and -NADEEN panel -HLA B27] and pityriasis lichenoides chronica (seen [facility]-established here). XR  no erosions hands. Moved from [place] to be with family.       Past- hydroxychloroquine (plaquenil) since 1-2016 and methotrexate 10 mg week  to 4-2018 (mild fatigue day of) then restarted 10-, naproxsyn; allergy sulfa, prednisone, advil       Initial visit copy forward  10-: Hydroxychloroquine (plaquenil) BID mild diarrhea; ocular eye exam 3-2016 baseline reports recent eye exam   EKG was recently Feb 2018 normal. Stopped methotrexate  In April as wanted to take a drug holiday and was moving here, also wanted to drink some alcohol, and discuss options. No infections. Diffuse joint pains in hands, wrists, shoulders, ankles and feet and the sides of her hips. Taking advil 600mg AM and 400 mg PM, tolerating but taking since April. Pain severe 5/10 or more. Stiffness all day. Hands are sore. No loss of function or ROM. Very sore and stiff all over. Knees are sore and hard to bend or squat. Mild diarrhea with hydroxychloroquine (plaquenil)     Copy forward  December 17, 2018  Taking methotrexate  10 mg every 7 days SAT, folic acid  1 mg day. General fatigue persists, some mouth sores before methotrexate  And now. No hairloss. Hydroxychloroquine (plaquenil) 200 mg BID, does feel she is getting diarrhea from this and hard to keep up with her fluid intake. Cold around early Nov. Healthy now. Dramatic improvement, no RHEUMATOID ARTHRITIS (RA) flaring no longer using NSAIDs and off the prednisone burst. No EAS. No vision issues. Is exercising and doing yogo now. Able to bend her knees now and make a full fist formations. Denies any fever, chills, SOB, CANDELARIA, night  sweats, or chest pain, or cough. Reports healthy    March 6, 2019  Taking methotrexate  10 mg every 7 days SAT, folic acid 2 mg day (resolved fatigue and improved mouth sores). Tolerating. Did go in 3 planes last week so upper back is sore. Hydroxychloroquine (plaquenil) 200 mg every day resolved diarrhea on lower dose. Dramatic improvement, no RHEUMATOID ARTHRITIS (RA) flaring no longer using NSAIDs only rarely from recent flying 200 mg and off the prednisone burst. Taking vitamin D 2000 international unit(s) day.  No EAS. No vision issues. Is exercising and doing yogo now. Able to bend her knees now and make a full fist formations. Denies any fever, chills, SOB, CANDELARIA, night sweats, or chest pain, or cough. Reports healthy. Some soreness in upper back from office and flying. No redflags.     PMH:  Injury-no  Medical-dermatitis, (bx 2-2016 GA vs LP vs cutaneous lupus--was not cutenaous lupus --focal interstitial lymphocytic inflammation, scattered lymphocytes), anxiety and depression, exercise induced asthma, scoliosis, pityriasis lichenoides chronica, chronic back pain, hypertension, seasonal allergies, acne, motion sickness, high cholesterol, chicken pox    Surgical-None       FH:  No autoimmune disorders, psoriasis, UC, crohn's, RA, PsA, gout, autoimmune thyroid.  No MS, heart disease or cancer in family  Mother-factor V leiden carrier, rosea   Father-healthy  Siblings-younger bro healthy  Children-None   GF-lupus   Uncle cancer colon    SH:  Social Alcohol occasional week. Never Smoking. No IVDU. No Children. Right handed. Single--not sexually active, never.        PMS personally reviewed and updated by me.    ROS:  Negative raynaud s phenomena, hairloss, sun sensitivities, keratoconjunctivitis sicca, pleurisy, inflammatory joint symptoms, significant rashes like malar, oral/nasal or ulcerations, inflammatory eye disease, inflammatory bowel disease, dactylitis, tenosynovitis, or enthespathy. Negative for   "blood clots, gout, psoriasis, UC, crohn's. No temporal headache, no jaw claudication, no scalp tenderness, vision changes, carotidynia, cough. No STD or pregnancy symptoms. No Parotid swelling.   +tips finger white at times and toes resolved   +skin disease as above   CONSTITUTIONAL: No fevers, night sweats or unintentional weight change. No acute distress, swollen glands  EYES: No vision change, diplopia, pain in eyes or red eyes   EARS, NOSE, MOUTH, THROAT: No tinnitus or hearing change, no epistaxis or nasal discharge, no oral lesions, throat clear. Normal saliva pool.  No drymouth. No thyroid enlargement.   CARDIOVASCULAR: No chest pain, palpitations, or pain with walking, no orthopnea or PND   RESPIRATORY: No dyspnea, cough, shortness of breath or wheezing. No pleurisy.   GI: No nausea, vomiting, diarrhea or constipation, no abdominal pain, or blood in stools.   : No change in urine, no dysuria or hematuria   MUSCKL: No enthesitis, plantar fascitis or heel pain. See above   INTEGUMENTARY: No concerning lesions or moles   NEURO: No loss of strength or sensation, no numbness or tingling, no tremor, no dizziness, no headache. No falls   ENDO: No polyuria or polydipsia, no temperature intolerance   HEME/LYMPH:No concerning bumps, bleeding problems, or swollen lymph nodes. No recent infections, hospitalizations or new illnesses.   ALLERGY: No environmental allergies   Otherwise 14 point ROS obtained, reviewed and found negative.     Physical exam:  Vitals: Blood pressure 130/78, pulse 83, height 1.753 m (5' 9\"), weight 95 kg (209 lb 8 oz), SpO2 98 %, not currently breastfeeding.  Wt Readings from Last 4 Encounters:   03/06/19 95 kg (209 lb 8 oz)   01/22/19 97.1 kg (214 lb)   12/17/18 96.8 kg (213 lb 8 oz)   12/05/18 95.2 kg (209 lb 14.1 oz)     Constitutional: WD-WN-WG cooperative  Eyes: nl EOM, PERRLA, vision, conjunctiva, sclera, No Unilateral or bilateral external ear inflammation   ENT: nl external ears, nose, " hearing, lips, teeth, gums, throat. No saddle nose   Neck: no mass or thyroid enlargement  Resp: lungs clear to auscultation, nl to palpation, nl effort  CV: RRR, no murmurs, rubs or gallops, no edema  GI: no ABD mass or tenderness, no HSM  : not tested  Lymph: no cervical or epitrochlear nodes  MS: All TMJ, neck, shoulder, elbow, wrist, hand, spine, hip, knee, ankle, and foot joints were examined and otherwise found normal. Weak  strength. No deformity. Full ROM. Normal prayer sign. Negative Lhermitte's sign. No periuncle erythema. Tender upper spine   Skin: no nail pitting, alopecia, rash. Skin lesions.   Neuro: nl cranial nerves, strength, sensation, DTRs.   Psych: nl judgement, orientation, memory, affect.      Labs/Imaging:  Results for orders placed or performed in visit on 03/06/19   CRP inflammation   Result Value Ref Range    CRP Inflammation 7.7 0.0 - 8.0 mg/L   Creatinine   Result Value Ref Range    Creatinine 0.69 0.52 - 1.04 mg/dL    GFR Estimate >90 >60 mL/min/[1.73_m2]    GFR Estimate If Black >90 >60 mL/min/[1.73_m2]   Albumin level   Result Value Ref Range    Albumin 3.7 3.4 - 5.0 g/dL   ALT   Result Value Ref Range    ALT 23 0 - 50 U/L   AST   Result Value Ref Range    AST 16 0 - 45 U/L   CBC with platelets   Result Value Ref Range    WBC 6.9 4.0 - 11.0 10e9/L    RBC Count 4.86 3.8 - 5.2 10e12/L    Hemoglobin 14.4 11.7 - 15.7 g/dL    Hematocrit 43.1 35.0 - 47.0 %    MCV 89 78 - 100 fl    MCH 29.6 26.5 - 33.0 pg    MCHC 33.4 31.5 - 36.5 g/dL    RDW 12.4 10.0 - 15.0 %    Platelet Count 293 150 - 450 10e9/L       Results for orders placed or performed in visit on 01/22/19   T3, total   Result Value Ref Range    Triiodothyronine (T3) 171 60 - 181 ng/dL   Thyroid peroxidase antibody   Result Value Ref Range    Thyroid Peroxidase Antibody 27 <35 IU/mL      Normal G6PD  Neg MTB QG 2016 and hepatitis panel, negative HIV    XR hands and knees-negative 2016      Impression/Plan:  1. Seropositive  RHEUMATOID ARTHRITIS (RA), non-erosive remission activity. Poor prognosis. Prior control on low dose methotrexate and hydroxychloroquine (plaquenil). Now controlled on methotrexate low dose and low dose hydroxychloroquine (plaquenil). Again Discussed risks with methotrexate , infection risks, pregnancy (abstince and knows needs to be off for 3-6 month before trying to concieve) seek immediate medical attention if any signs or symptoms infections. And monitor for side-effects, will abstain from intercourse (will see OBGYN discussed IUD or double protection-she discussed with PCP) and will limit 0-2 week not day or after. RHEUMATOID ARTHRITIS (RA) dramatically improved now, controlled=SHELL 28 CRP remission     High risk medications, labs today normal.      2. Longterm hydroxychloroquine (plaquenil) use. Recent opthalmologist  We did fax form to have this filled out. Diarrhea side-effects from this will reduce by 1 tablet resolved on lower dose. Labs every 8-12 week  3. Vitamin D deficeincy- take 2000 international unit(s) day. Recheck today    4. Pityriasis lichenoids chronic-per derm   5. Past Chronic NSAID use, informed her of risks and possible side-effects to this, and including CVD/kidney disease. Off now     Prednisone 15-10-5 mg each for 2 weeks then stop-completed   Methotrexate  10mg once every FRI, folic acid  2 mg day  No NSAID use  Annual opthalmologist  Exam for hydroxychloroquine (plaquenil) use.  Hydroxychloroquine (plaquenil) 200 mg once day    RTC 4 month with labs.   Discussed if she flares to consider new study with Dr. Webb if agrees, they may call her      The patient understood the rationale for the diagnosis and treatment plan. Patient shared in the decision making. All questions were answered to best of my ability and the patient's satisfaction  Zurdo Wesley APRN, CNP, MSN  [facility]  Department of Rheumatology & Autoimmune Disorders    1. Immunization: Reviewed CDC  guidelines with patient updated, information provided to patient based on CDC guidelines for vaccination recommendations, patient will discuss with primary provider  2. Bone Health:Educated on adequate calcium and vitamin D intake, Educated on exercise, Glucocorticoid education discussed risks, benefits & potential long term side effects from use  3. Discussed routine annual physicals, cancer screening, cardiac risk monitoring, bone health and primary care with primary care provider.   4. Educated on diagnosis, prognosis, labs, imaging, treatment options (including risks, benefits, risk of no treatment), medications (use, dose, side-effects, risks of medications including infection/cancer/bone marrow suppression, lab monitoring), infections what to do, plan of cares, goals of treatment.

## 2019-03-06 NOTE — RESULT ENCOUNTER NOTE
All labs reviewed and discussed with patient at office visit. Instructed to call for any further questions.       Zurdo CORDERO CNP, MSN  3/6/2019  9:44 AM

## 2019-03-06 NOTE — LETTER
3/6/2019      RE: Rachel Fried  [address]  [facility] [place] [facility] [address]       [facility] - Rheumatology Clinic Visit    Rachel Fried  is a 27 year old here for follow-up  RHEUMATOID ARTHRITIS (RA) involving hands, wrists, ankles, shoulders feet [ -RF and low CCP 25, -factor V -KAVITHA by IFA and -NADEEN panel -HLA B27] and pityriasis lichenoides chronica (seen [facility]-established here). XR  no erosions hands. Moved from [place] to be with family.       Past- hydroxychloroquine (plaquenil) since 1-2016 and methotrexate 10 mg week  to 4-2018 (mild fatigue day of) then restarted 10-, naproxsyn; allergy sulfa, prednisone, advil       Initial visit copy forward  10-: Hydroxychloroquine (plaquenil) BID mild diarrhea; ocular eye exam 3-2016 baseline reports recent eye exam   EKG was recently Feb 2018 normal. Stopped methotrexate  In April as wanted to take a drug holiday and was moving here, also wanted to drink some alcohol, and discuss options. No infections. Diffuse joint pains in hands, wrists, shoulders, ankles and feet and the sides of her hips. Taking advil 600mg AM and 400 mg PM, tolerating but taking since April. Pain severe 5/10 or more. Stiffness all day. Hands are sore. No loss of function or ROM. Very sore and stiff all over. Knees are sore and hard to bend or squat. Mild diarrhea with hydroxychloroquine (plaquenil)     Copy forward  December 17, 2018  Taking methotrexate  10 mg every 7 days SAT, folic acid  1 mg day. General fatigue persists, some mouth sores before methotrexate  And now. No hairloss. Hydroxychloroquine (plaquenil) 200 mg BID, does feel she is getting diarrhea from this and hard to keep up with her fluid intake. Cold around early Nov. Healthy now. Dramatic improvement, no RHEUMATOID ARTHRITIS (RA) flaring no longer using NSAIDs and off the prednisone burst. No EAS. No vision issues. Is exercising and doing yogo now. Able to bend  her knees now and make a full fist formations. Denies any fever, chills, SOB, CANDELARIA, night sweats, or chest pain, or cough. Reports healthy    March 6, 2019  Taking methotrexate  10 mg every 7 days SAT, folic acid 2 mg day (resolved fatigue and improved mouth sores). Tolerating. Did go in 3 planes last week so upper back is sore. Hydroxychloroquine (plaquenil) 200 mg every day resolved diarrhea on lower dose. Dramatic improvement, no RHEUMATOID ARTHRITIS (RA) flaring no longer using NSAIDs only rarely from recent flying 200 mg and off the prednisone burst. Taking vitamin D 2000 international unit(s) day.  No EAS. No vision issues. Is exercising and doing yogo now. Able to bend her knees now and make a full fist formations. Denies any fever, chills, SOB, CANDELARIA, night sweats, or chest pain, or cough. Reports healthy. Some soreness in upper back from office and flying. No redflags.     PMH:  Injury-no  Medical-dermatitis, (bx 2-2016 GA vs LP vs cutaneous lupus--was not cutenaous lupus --focal interstitial lymphocytic inflammation, scattered lymphocytes), anxiety and depression, exercise induced asthma, scoliosis, pityriasis lichenoides chronica, chronic back pain, hypertension, seasonal allergies, acne, motion sickness, high cholesterol, chicken pox    Surgical-None       FH:  No autoimmune disorders, psoriasis, UC, crohn's, RA, PsA, gout, autoimmune thyroid.  No MS, heart disease or cancer in family  Mother-factor V leiden carrier, rosea   Father-healthy  Siblings-younger bro healthy  Children-None   GF-lupus   Uncle cancer colon    SH:  Social Alcohol occasional week. Never Smoking. No IVDU. No Children. Right handed. Single--not sexually active, never.        PMSH personally reviewed and updated by me.    ROS:  Negative raynaud s phenomena, hairloss, sun sensitivities, keratoconjunctivitis sicca, pleurisy, inflammatory joint symptoms, significant rashes like malar, oral/nasal or ulcerations, inflammatory eye  "disease, inflammatory bowel disease, dactylitis, tenosynovitis, or enthespathy. Negative for  blood clots, gout, psoriasis, UC, crohn's. No temporal headache, no jaw claudication, no scalp tenderness, vision changes, carotidynia, cough. No STD or pregnancy symptoms. No Parotid swelling.   +tips finger white at times and toes resolved   +skin disease as above   CONSTITUTIONAL: No fevers, night sweats or unintentional weight change. No acute distress, swollen glands  EYES: No vision change, diplopia, pain in eyes or red eyes   EARS, NOSE, MOUTH, THROAT: No tinnitus or hearing change, no epistaxis or nasal discharge, no oral lesions, throat clear. Normal saliva pool.  No drymouth. No thyroid enlargement.   CARDIOVASCULAR: No chest pain, palpitations, or pain with walking, no orthopnea or PND   RESPIRATORY: No dyspnea, cough, shortness of breath or wheezing. No pleurisy.   GI: No nausea, vomiting, diarrhea or constipation, no abdominal pain, or blood in stools.   : No change in urine, no dysuria or hematuria   MUSCKL: No enthesitis, plantar fascitis or heel pain. See above   INTEGUMENTARY: No concerning lesions or moles   NEURO: No loss of strength or sensation, no numbness or tingling, no tremor, no dizziness, no headache. No falls   ENDO: No polyuria or polydipsia, no temperature intolerance   HEME/LYMPH:No concerning bumps, bleeding problems, or swollen lymph nodes. No recent infections, hospitalizations or new illnesses.   ALLERGY: No environmental allergies   Otherwise 14 point ROS obtained, reviewed and found negative.     Physical exam:  Vitals: Blood pressure 130/78, pulse 83, height 1.753 m (5' 9\"), weight 95 kg (209 lb 8 oz), SpO2 98 %, not currently breastfeeding.  Wt Readings from Last 4 Encounters:   03/06/19 95 kg (209 lb 8 oz)   01/22/19 97.1 kg (214 lb)   12/17/18 96.8 kg (213 lb 8 oz)   12/05/18 95.2 kg (209 lb 14.1 oz)     Constitutional: WD-WN-WG cooperative  Eyes: nl EOM, PERRLA, vision, " conjunctiva, sclera, No Unilateral or bilateral external ear inflammation   ENT: nl external ears, nose, hearing, lips, teeth, gums, throat. No saddle nose   Neck: no mass or thyroid enlargement  Resp: lungs clear to auscultation, nl to palpation, nl effort  CV: RRR, no murmurs, rubs or gallops, no edema  GI: no ABD mass or tenderness, no HSM  : not tested  Lymph: no cervical or epitrochlear nodes  MS: All TMJ, neck, shoulder, elbow, wrist, hand, spine, hip, knee, ankle, and foot joints were examined and otherwise found normal. Weak  strength. No deformity. Full ROM. Normal prayer sign. Negative Lhermitte's sign. No periuncle erythema. Tender upper spine   Skin: no nail pitting, alopecia, rash. Skin lesions.   Neuro: nl cranial nerves, strength, sensation, DTRs.   Psych: nl judgement, orientation, memory, affect.      Labs/Imaging:  Results for orders placed or performed in visit on 03/06/19   CRP inflammation   Result Value Ref Range    CRP Inflammation 7.7 0.0 - 8.0 mg/L   Creatinine   Result Value Ref Range    Creatinine 0.69 0.52 - 1.04 mg/dL    GFR Estimate >90 >60 mL/min/[1.73_m2]    GFR Estimate If Black >90 >60 mL/min/[1.73_m2]   Albumin level   Result Value Ref Range    Albumin 3.7 3.4 - 5.0 g/dL   ALT   Result Value Ref Range    ALT 23 0 - 50 U/L   AST   Result Value Ref Range    AST 16 0 - 45 U/L   CBC with platelets   Result Value Ref Range    WBC 6.9 4.0 - 11.0 10e9/L    RBC Count 4.86 3.8 - 5.2 10e12/L    Hemoglobin 14.4 11.7 - 15.7 g/dL    Hematocrit 43.1 35.0 - 47.0 %    MCV 89 78 - 100 fl    MCH 29.6 26.5 - 33.0 pg    MCHC 33.4 31.5 - 36.5 g/dL    RDW 12.4 10.0 - 15.0 %    Platelet Count 293 150 - 450 10e9/L       Results for orders placed or performed in visit on 01/22/19   T3, total   Result Value Ref Range    Triiodothyronine (T3) 171 60 - 181 ng/dL   Thyroid peroxidase antibody   Result Value Ref Range    Thyroid Peroxidase Antibody 27 <35 IU/mL      Normal G6PD  Neg MTB QG 2016 and  hepatitis panel, negative HIV    XR hands and knees-negative 2016      Impression/Plan:  1. Seropositive RHEUMATOID ARTHRITIS (RA), non-erosive remission activity. Poor prognosis. Prior control on low dose methotrexate and hydroxychloroquine (plaquenil). Now controlled on methotrexate low dose and low dose hydroxychloroquine (plaquenil). Again Discussed risks with methotrexate , infection risks, pregnancy (abstince and knows needs to be off for 3-6 month before trying to concieve) seek immediate medical attention if any signs or symptoms infections. And monitor for side-effects, will abstain from intercourse (will see OBGYN discussed IUD or double protection-she discussed with PCP) and will limit 0-2 week not day or after. RHEUMATOID ARTHRITIS (RA) dramatically improved now, controlled=SHELL 28 CRP remission     High risk medications, labs today normal.      2. Longterm hydroxychloroquine (plaquenil) use. Recent opthalmologist  We did fax form to have this filled out. Diarrhea side-effects from this will reduce by 1 tablet resolved on lower dose. Labs every 8-12 week  3. Vitamin D deficeincy- take 2000 international unit(s) day. Recheck today    4. Pityriasis lichenoids chronic-per derm   5. Past Chronic NSAID use, informed her of risks and possible side-effects to this, and including CVD/kidney disease. Off now     Prednisone 15-10-5 mg each for 2 weeks then stop-completed   Methotrexate  10mg once every FRI, folic acid  2 mg day  No NSAID use  Annual opthalmologist  Exam for hydroxychloroquine (plaquenil) use.  Hydroxychloroquine (plaquenil) 200 mg once day    RTC 4 month with labs.   Discussed if she flares to consider new study with Dr. Webb if agrees, they may call her      The patient understood the rationale for the diagnosis and treatment plan. Patient shared in the decision making. All questions were answered to best of my ability and the patient's satisfaction  Zurdo CORDERO, CNP, MSN  [facility]  [place] [facility]  Department of Rheumatology & Autoimmune Disorders    1. Immunization: Reviewed CDC guidelines with patient updated, information provided to patient based on CDC guidelines for vaccination recommendations, patient will discuss with primary provider  2. Bone Health:Educated on adequate calcium and vitamin D intake, Educated on exercise, Glucocorticoid education discussed risks, benefits & potential long term side effects from use  3. Discussed routine annual physicals, cancer screening, cardiac risk monitoring, bone health and primary care with primary care provider.   4. Educated on diagnosis, prognosis, labs, imaging, treatment options (including risks, benefits, risk of no treatment), medications (use, dose, side-effects, risks of medications including infection/cancer/bone marrow suppression, lab monitoring), infections what to do, plan of cares, goals of treatment.       Zurdo Wesley, APRN CNP

## 2019-04-08 ASSESSMENT — ANXIETY QUESTIONNAIRES
GAD7 TOTAL SCORE: 10
GAD7 TOTAL SCORE: 10
4. TROUBLE RELAXING: MORE THAN HALF THE DAYS
GAD7 TOTAL SCORE: 10
1. FEELING NERVOUS, ANXIOUS, OR ON EDGE: MORE THAN HALF THE DAYS
7. FEELING AFRAID AS IF SOMETHING AWFUL MIGHT HAPPEN: SEVERAL DAYS
6. BECOMING EASILY ANNOYED OR IRRITABLE: SEVERAL DAYS
3. WORRYING TOO MUCH ABOUT DIFFERENT THINGS: MORE THAN HALF THE DAYS
2. NOT BEING ABLE TO STOP OR CONTROL WORRYING: MORE THAN HALF THE DAYS
7. FEELING AFRAID AS IF SOMETHING AWFUL MIGHT HAPPEN: SEVERAL DAYS
5. BEING SO RESTLESS THAT IT IS HARD TO SIT STILL: NOT AT ALL

## 2019-04-08 ASSESSMENT — PATIENT HEALTH QUESTIONNAIRE - PHQ9
SUM OF ALL RESPONSES TO PHQ QUESTIONS 1-9: 9
SUM OF ALL RESPONSES TO PHQ QUESTIONS 1-9: 9
10. IF YOU CHECKED OFF ANY PROBLEMS, HOW DIFFICULT HAVE THESE PROBLEMS MADE IT FOR YOU TO DO YOUR WORK, TAKE CARE OF THINGS AT HOME, OR GET ALONG WITH OTHER PEOPLE: SOMEWHAT DIFFICULT

## 2019-04-09 ASSESSMENT — ANXIETY QUESTIONNAIRES: GAD7 TOTAL SCORE: 10

## 2019-04-09 ASSESSMENT — PATIENT HEALTH QUESTIONNAIRE - PHQ9: SUM OF ALL RESPONSES TO PHQ QUESTIONS 1-9: 9

## 2019-04-11 ENCOUNTER — OFFICE VISIT (OUTPATIENT)
Dept: PSYCHOLOGY | Facility: CLINIC | Age: 29
End: 2019-04-11
Attending: INTERNAL MEDICINE
Payer: COMMERCIAL

## 2019-04-11 DIAGNOSIS — F33.0 MILD EPISODE OF RECURRENT MAJOR DEPRESSIVE DISORDER (H): Primary | ICD-10-CM

## 2019-04-11 DIAGNOSIS — F41.9 ANXIETY: ICD-10-CM

## 2019-04-11 NOTE — PROGRESS NOTES
Health Psychology                  Clinic    Department of Medicine  Nati Pompa, PhD, LP [phone]                          Clinics and Surgery Center  [facility] Shruthi Diaz, PhD, LP [phone]                  3rd Floor  [place] Mail Code 745   Lee Reese, PhD, ABPP, LP [phone]     [address], [address] Key Cunha,  PhD, LP [phone]            [place] [address] Mayra Parker, PhD, LP [phone]     Confidential Summary of Standard Psychodiagnostic Evaluation*    Referral Source:  Brandy Lara MD    Reason for Referral:  Depression and anxiety    Sources of Information:  Information was obtained from a clinical interview with the patient, review of available medical records, and administration of psychological assessments.     History of Presenting Concerns:  Rachel Fried is a 28 year old female with past medical history significant for depression, anxiety, and rheumatoid arthritis presenting for evaluation and management of mental health symptoms.  She reported that her history of anxiety and depression started approximately 10 years ago.  She reported that anxiety tends to be something that she manages on a day-to-day basis where she has episodes of depressed mood that are interspersed with normal mood.  She stated that anxiety manifests and chest tightness, sense of adrenaline in her body, difficulty focusing, racing thoughts, and a sensation of panic.  She reported she shanice with anxiety through moderating caffeine intake and trying to take active steps to cope with problems that elicit anxiety.  She stated that depression manifests in periods of down and depressed mood that are associated with significant fatigue, disinterest in activities, and decreased engagement in self-care activities.  She reported that she is currently experiencing mild depression.  She reported she shanice with  depression through exercise, taking care of her dog, and working.  She also stated she previously had 1-2 glasses of wine to take the edge off anxiety or cope with low mood, and since starting methotrexate for rheumatoid arthritis she has largely discontinued alcohol use.  As a result of discontinuing alcohol use, she stated that she has noticed social relationships with her mother and cousin have changed.  She stated that she has difficulty being around these family members even though she has a close and positive relationship with them when they are drinking.  Rachel states that she has had trouble communicating this to both of these family members and is seeking support and guidance on how to start this conversation.  She reported that ongoing chronic pain from rheumatoid arthritis as well as feeling different and fearing as if she is a burden to family and friends are triggers for anxiety and depression at times.    Medical History:    Past Medical History:   Diagnosis Date     Allergic dermatitis      Anxiety      Depression      Pityriasis lichenoides      Rheumatoid arthritis (H)        No past surgical history on file.    Current Outpatient Medications   Medication     Albuterol Sulfate (PROVENTIL HFA IN)     cetirizine HCl (ZYRTEC ALLERGY) 10 MG CAPS     Cholecalciferol (VITAMIN D) 2000 units tablet     citalopram (CELEXA) 10 MG tablet     citalopram (CELEXA) 20 MG tablet     folic acid (FOLVITE) 1 MG tablet     hydroquinone 4 % CREA     hydroxychloroquine (PLAQUENIL) 200 MG tablet     LORYNA 3-0.02 MG tablet     methotrexate 2.5 MG tablet     predniSONE (DELTASONE) 5 MG tablet     tretinoin (RETIN-A) 0.05 % cream     triamcinolone (KENALOG) 0.1 % ointment     No current facility-administered medications for this visit.        Psychiatric History:  Rachel reported a history of anxiety and depression that originated approximately 10 years ago.  She stated that she participated in psychotherapy several years  "ago during a stressful work environment when she was living in [facility].  Prior to this, she stated she managed anxiety and depression without treatment.  She is currently taking Celexa 20 mg which appears per medical record to be well-tolerated.  She stated that family psychiatric history significant for anxiety in her mother and depression in her brother.    Substance Use History:  Rachel reported that she currently consumes a very minimal amount of alcohol due to managing her rheumatoid arthritis with methotrexate.  Currently, she stated she has 1-2 alcoholic beverages per week. Prior to this, she reported she would drink 1-2 glasses of wine or alcoholic beverages in the evening to manage stress and socially.  She reported that a family substance use history is significant for alcohol use disorder in her maternal aunt and grandfather.  She reported believing that her mother self medicates anxiety with alcohol at times.  Rachel stated that she does not use recreational drugs or tobacco products.     Social History:  Rachel reported that she grew up in [place] with her mother, father, and younger brother.  She reported that her upbringing was \"great\" and states that she has close and supportive relationships with her family.  She moved to the [facility] approximately 1 year ago from [facility].  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  She is single.  She reported having several supportive friendships in the [facility].    Psychological Assessment:  The patient completed the following battery of assessments during this psychological evaluation: World Health Organization Disability Assessment Schedule 2.0 12-item (WHODAS), Patient Health Questionnaire-9 (PHQ-9), Generalized Anxiety Disorder-7 screener (NAWAF-7), and the CAGE Questionnaire Adapted to Include Drugs (CAGE-AID).    The WHODAS measures disability " and functional impairment due to health conditions including diseases, illnesses, injuries, mental or emotional problems, and problems with alcohol or drugs. The possible range of scores is 12-60 and higher scores indicate higher levels of disability.   WHODAS 2.0 Total Score 4/8/2019   Total Score 24   Total Score MyChart 24       The PHQ-9 is an instrument for screening, diagnosing, monitoring and measuring the severity of depression. Scores of 5, 10, 15, and 20 represent cutpoints for mild, moderate, moderately severe and severe depression, respectively.   PHQ-9 SCORE 1/22/2019 4/8/2019   PHQ-9 Total Score MyChart - 9 (Mild depression)   PHQ-9 Total Score 6 9       The NAWAF-7 is an instrument for screening, diagnosing, monitoring and measuring the severity of anxiety. Scores of 5, 10, and 15 represent cutpoints for mild, moderate, and severe anxiety, respectively.  NAWAF-7 SCORE 4/8/2019   Total Score 10 (moderate anxiety)   Total Score 10       The CAGE-AID questionnaire is used to screen for alcohol or drug abuse and dependence in adults. A CAGE-AID score  > 1 is a positive screen, suggesting further discussion is needed to determine if evaluation for alcohol or substance abuse is appropriate. A score > 2 is considered clinically significant, suggesting further evaluation of alcohol or substance-related problems is indicated.  CAGE-AID Total Score 4/8/2019   Total Score 0   Total Score MyChart 0 (A total score of 2 or greater is considered clinically significant)       Mental Status Examination:  Appearance/Behavior/Orientation: Patient was on time, appropriately groomed and dressed, and demonstrated good eye contact. Alert and oriented to person, place, time, and situation. No evidence of psychomotor agitation.     Cooperation/Reliability: Patient was open and cooperative throughout the session.    Speech/Language: Speech was clear, coherent, and of normal rate, rhythm and volume.   Thought Form: Overall logical  and organized.   Thought Content: Appropriate to interview and situation.  Cognition/Memory: Not formally assessed, but no difficulties apparent upon interview.   Attention/Concentration: Good throughout interview.    Fund of knowledge: Consistent with age and level of education.    Abstract reasoning: Not assessed.   Judgment: Intact.    Mood/Affect: Mood euthymic; appropriate range of affect.    Insight/Motivation: Good, good  Suicide/Assault: Patient denies suicidal or assaultive ideation, plan, or intent.    Impression:  Rachel Fried is a 28 year old female with a pre-existing history of anxiety and depression who is experiencing difficulty navigating social situations in the context of a new medication for rheumatoid arthritis.  Symptoms of anxiety and depression are mild and moderate, respectively.  She appears to be a good candidate for cognitive behavioral interventions for management of ongoing mental health conditions.    Diagnosis:  Major depressive disorder, recurrent, mild  Anxiety, unspecified    Recommendation/Plan:  Provided recommendation for her to participate in cognitive behavioral therapy focused on strategies to navigate challenging social dynamics, manage symptoms of anxiety and depression, and improve communication with family members.  Also will discuss ways to increase satisfaction with social networks given impact of rheumatoid arthritis on social functioning.     Key Cunha, PhD,   Clinical Health Psychologist    *In accordance with the Rules of the [place] Board of Psychology, it is noted that psychological descriptions and scientific procedures underlying psychological evaluations have limitations.  Absolute predictions cannot be made based on information in this report.

## 2019-04-16 ENCOUNTER — OFFICE VISIT (OUTPATIENT)
Dept: GASTROENTEROLOGY | Facility: CLINIC | Age: 29
End: 2019-04-16
Payer: COMMERCIAL

## 2019-04-16 DIAGNOSIS — F33.0 MILD EPISODE OF RECURRENT MAJOR DEPRESSIVE DISORDER (H): Primary | ICD-10-CM

## 2019-04-16 DIAGNOSIS — F41.9 ANXIETY: ICD-10-CM

## 2019-04-16 NOTE — LETTER
4/16/2019       RE: Rachel Fried  [address]  [facility] [address]     Dear Colleague,    Thank you for referring your patient, Rachel Fried, to the [facility] GASTROENTEROLOGY AND IBD CLINIC at [facility]. Please see a copy of my visit note below.      Health Psychology                  Clinic    Department of Medicine  Nati Pompa, PhD, LP [phone]                          Clinics and Surgery Center  [facility] Shruthi Diaz, PhD, LP [phone]                  3rd Floor  [place] Mail Code 741   Lee Reese, PhD, ABPP, LP [phone]     [address] [facility],   [address] [facility],  Key Cunha,  PhD, LP [phone]            [place] [address]  [place] [address] Mayra Parker, PhD, LP [phone]    Health Psychology Follow-Up Note    SUBJECTIVE:  Rachel Fried was seen for individual psychotherapy. Reviewed emotional and physical health since our last session. The patient reported no major changes since the last session. A treatment plan was completed in collaboration with the patient in this session. Goals include the following:     Provided a rationale for cognitive behavioral interventions and psychoeducation about the course and duration of treatment.  The patient agreed with recommendations. Oriented patient to structure of therapy sessions, including beginning with a brief review of mood, health, and relevant stressors since the last session; working to set an agenda in order to prioritize how we spend time in treatment; taking an active, approach focused on addressing agenda items in session; creating an action plan together in session to facilitate the patient's ability to practice coping strategies discussed in this session in order to help the patient reach their goals in treatment as quickly as possible; and checking in about the helpfulness of that session.      She reported wanting to focus  and feelings of discomfort in social settings and how different she feels in different groups.  She reported feeling included in one group of friends and excluded in another group of friends, and we explored factors that led to feelings of inclusion and exclusion.  Provided information related to ways to increase awareness of emotional responses and effectively manage emotions at in situations.  She reported feeling it was helpful to cope with the situation by learning that she does not enjoy who presents with certain friends as interested in her and it is hard to connect in group settings.  Discussed ways to cope with feelings of disconnection through vulnerability and sharing these emotional reactions to create a stronger sense of connection.  Also encouraged her review of characteristics that she is proud of and how she approaches friendships which can help remind her of any more broad set of characteristics than the expected role she plays with her friends from college.    OBJECTIVE:  Appearance/Behavior/Orientation: Patient was on time, appropriately groomed and dressed, and demonstrated good eye contact. Alert and oriented to person, place, time, and situation. No evidence of psychomotor agitation.     Cooperation/Reliability: Patient was open and cooperative throughout the session.    Speech/Language: Speech was clear, coherent, and of normal rate, rhythm and volume.   Thought Form: Overall logical and organized.   Thought Content: Appropriate to interview and situation.  Cognition/Memory: Not formally assessed, but no difficulties apparent upon interview.   Attention/Concentration: Good throughout interview.    Fund of knowledge: Consistent with age and level of education.    Abstract reasoning: Not assessed.   Judgment: Intact.    Mood/Affect: Mood euthymic; appropriate range of affect.    Insight/Motivation: Good, good  Suicide/Assault: Patient denies suicidal or assaultive ideation, plan, or  intent.    ASSESSMENT:  Rachel Fried is a 28 year old female with a pre-existing history of anxiety and depression who is experiencing difficulty navigating social situations in the context of a new medication for rheumatoid arthritis.  Symptoms of anxiety and depression are mild and moderate, respectively.  She appears to be a good candidate for cognitive behavioral interventions for management of ongoing mental health conditions.    DIAGNOSIS:  Major depressive disorder, recurrent, mild  Anxiety, unspecified    PLAN:  RTC for continued psychotherapy.     Time in: 4:03  Time out: 4:06  Extended session due to complexity of case and length of interval.    Key Cunha, PhD,   Clinical Health Psychologist    Tx plan completed: 04/16/19  Tx plan due:  4/6/20    *no letter      Again, thank you for allowing me to participate in the care of your patient.      Sincerely,    Key Cunha, PhD

## 2019-04-16 NOTE — PROGRESS NOTES
Health Psychology                  Clinic    Department of Medicine  Nati Pompa, PhD, LP [phone]                          Clinics and Surgery Center  [facility] Shruthi Diaz, PhD, LP [phone]                  3rd Floor  [place] Mail Code 746   Lee Reese, PhD, ABPP, LP [phone]     [address] [facility], [address] Key Cunah,  PhD, LP [phone]            [place] [address] Mayra Parker, PhD, LP [phone]    Health Psychology Follow-Up Note    SUBJECTIVE:  Rachel Fried was seen for individual psychotherapy. Reviewed emotional and physical health since our last session. The patient reported no major changes since the last session. A treatment plan was completed in collaboration with the patient in this session. Goals include the following:     Provided a rationale for cognitive behavioral interventions and psychoeducation about the course and duration of treatment.  The patient agreed with recommendations. Oriented patient to structure of therapy sessions, including beginning with a brief review of mood, health, and relevant stressors since the last session; working to set an agenda in order to prioritize how we spend time in treatment; taking an active, approach focused on addressing agenda items in session; creating an action plan together in session to facilitate the patient's ability to practice coping strategies discussed in this session in order to help the patient reach their goals in treatment as quickly as possible; and checking in about the helpfulness of that session.      She reported wanting to focus and feelings of discomfort in social settings and how different she feels in different groups.  She reported feeling included in one group of friends and excluded in another group of friends, and we explored factors that led to feelings of inclusion and exclusion.  Provided information related to ways to  increase awareness of emotional responses and effectively manage emotions at in situations.  She reported feeling it was helpful to cope with the situation by learning that she does not enjoy who presents with certain friends as interested in her and it is hard to connect in group settings.  Discussed ways to cope with feelings of disconnection through vulnerability and sharing these emotional reactions to create a stronger sense of connection.  Also encouraged her review of characteristics that she is proud of and how she approaches friendships which can help remind her of any more broad set of characteristics than the expected role she plays with her friends from college.    OBJECTIVE:  Appearance/Behavior/Orientation: Patient was on time, appropriately groomed and dressed, and demonstrated good eye contact. Alert and oriented to person, place, time, and situation. No evidence of psychomotor agitation.     Cooperation/Reliability: Patient was open and cooperative throughout the session.    Speech/Language: Speech was clear, coherent, and of normal rate, rhythm and volume.   Thought Form: Overall logical and organized.   Thought Content: Appropriate to interview and situation.  Cognition/Memory: Not formally assessed, but no difficulties apparent upon interview.   Attention/Concentration: Good throughout interview.    Fund of knowledge: Consistent with age and level of education.    Abstract reasoning: Not assessed.   Judgment: Intact.    Mood/Affect: Mood euthymic; appropriate range of affect.    Insight/Motivation: Good, good  Suicide/Assault: Patient denies suicidal or assaultive ideation, plan, or intent.    ASSESSMENT:  Rachel Fried is a 28 year old female with a pre-existing history of anxiety and depression who is experiencing difficulty navigating social situations in the context of a new medication for rheumatoid arthritis.  Symptoms of anxiety and depression are mild and moderate, respectively.  She  appears to be a good candidate for cognitive behavioral interventions for management of ongoing mental health conditions.    DIAGNOSIS:  Major depressive disorder, recurrent, mild  Anxiety, unspecified    PLAN:  RTC for continued psychotherapy.     Time in: 4:03  Time out: 4:06  Extended session due to complexity of case and length of interval.    Key Cunha, PhD,   Clinical Health Psychologist    Tx plan completed: 04/16/19  Tx plan due:  4/6/20    *no letter

## 2019-04-16 NOTE — LETTER
Date:April 17, 2019      Provider requested that no letter be sent. Do not send.       [facility] Health Information

## 2019-05-02 ENCOUNTER — OFFICE VISIT (OUTPATIENT)
Dept: GASTROENTEROLOGY | Facility: CLINIC | Age: 29
End: 2019-05-02
Payer: COMMERCIAL

## 2019-05-02 DIAGNOSIS — F41.9 ANXIETY: ICD-10-CM

## 2019-05-02 DIAGNOSIS — F33.0 MILD EPISODE OF RECURRENT MAJOR DEPRESSIVE DISORDER (H): Primary | ICD-10-CM

## 2019-05-02 NOTE — LETTER
Date:May 4, 2019      Provider requested that no letter be sent. Do not send.       [facility] Health Information

## 2019-05-02 NOTE — LETTER
5/2/2019       RE: Rachel Fried  [address]  [facility] [address]     Dear Colleague,    Thank you for referring your patient, Rachel Fried, to the [facility] GASTROENTEROLOGY AND IBD CLINIC at [facility]. Please see a copy of my visit note below.      Health Psychology                  Clinic    Department of Medicine  Nati Pompa, PhD, LP [phone]                          Clinics and Surgery Center  [facility] Shruthi Diaz, PhD, LP [phone]                  3rd Floor  [place] Mail Code 741   Lee Reese, PhD, ABPP, LP [phone]     [address] [facility],   [address] [facility],  Key Cunha,  PhD, LP [phone]            [place] [address]  [place] [address] Mayra Parker, PhD, LP [phone]    Health Psychology Follow-Up Note    SUBJECTIVE:  Rachel Fried was seen for individual psychotherapy. Reviewed emotional and physical health since our last session. The patient reported improvement in mood since the last session, which she attributed to increased workload which helps her stay busy at work, meaningful social interactions, and engagement in meaningful activities.  She reported that she reached out to rheumatology about an RA flare, which she started a short-term course of prednisone to help pain.  [place] for today focused on continuing to discuss social activities and their impact on mental health.  She reported recent enjoyment in social connections and provided various examples about how she has been proactively seeking meaningful activities with friendships.  Continue discussion of cognitive interventions in the way in which self talk influences feelings and actions.  She reported awareness of previous patterns and thinking that have negatively impacted her emotions or actions and these have primarily included self-deprecating statements.  Discussed ways to increase awareness and reflect on the  impact of thinking patterns.    OBJECTIVE:  Appearance/Behavior/Orientation: Patient was on time, appropriately groomed and dressed, and demonstrated good eye contact. Alert and oriented to person, place, time, and situation. No evidence of psychomotor agitation.     Cooperation/Reliability: Patient was open and cooperative throughout the session.    Abstract reasoning: Not assessed.   Judgment: Intact.    Mood/Affect: Mood euthymic; appropriate range of affect.    Insight/Motivation: Good, good  Suicide/Assault: Patient denies suicidal or assaultive ideation, plan, or intent.    ASSESSMENT:  Rachel Fried is a 28 year old female with a pre-existing history of anxiety and depression who is experiencing difficulty navigating social situations in the context of a new medication for rheumatoid arthritis.  Symptoms of anxiety and depression are mild and moderate, respectively.  She appears to be a good candidate for cognitive behavioral interventions for management of ongoing mental health conditions.    DIAGNOSIS:  Major depressive disorder, recurrent, mild  Anxiety, unspecified    PLAN:  RTC for continued psychotherapy.     Time in: 4:08  Time out: 5:02  Extended session due to complexity of case and length of interval.    Key Cunha, PhD,   Clinical Health Psychologist    Tx plan completed: 4/16/19  Tx plan due:  4/16/20    *no letter      Again, thank you for allowing me to participate in the care of your patient.      Sincerely,    Key Cunha, PhD

## 2019-05-02 NOTE — PROGRESS NOTES
Health Psychology                  Clinic    Department of Medicine  Nati Pompa, PhD, LP [phone]                          [facility]  [facility] Shruthi Diaz, PhD, LP [phone]                  3rd Floor  [place] Mail [address]   Lee Reese, PhD, ABPP, LP [phone]     [address] [facility],   [address] [facility],  Key Cunha,  PhD, LP [phone]            [place] [address] Mayra Parker, PhD, LP [phone]    Health Psychology Follow-Up Note    SUBJECTIVE:  Rachel Fried was seen for individual psychotherapy. Reviewed emotional and physical health since our last session. The patient reported improvement in mood since the last session, which she attributed to increased workload which helps her stay busy at work, meaningful social interactions, and engagement in meaningful activities.  She reported that she reached out to rheumatology about an RA flare, which she started a short-term course of prednisone to help pain.  [place] for today focused on continuing to discuss social activities and their impact on mental health.  She reported recent enjoyment in social connections and provided various examples about how she has been proactively seeking meaningful activities with friendships.  Continue discussion of cognitive interventions in the way in which self talk influences feelings and actions.  She reported awareness of previous patterns and thinking that have negatively impacted her emotions or actions and these have primarily included self-deprecating statements.  Discussed ways to increase awareness and reflect on the impact of thinking patterns.    OBJECTIVE:  Appearance/Behavior/Orientation: Patient was on time, appropriately groomed and dressed, and demonstrated good eye contact. Alert and oriented to person, place, time, and situation. No evidence of psychomotor agitation.     Cooperation/Reliability: Patient was open  and cooperative throughout the session.    Abstract reasoning: Not assessed.   Judgment: Intact.    Mood/Affect: Mood euthymic; appropriate range of affect.    Insight/Motivation: Good, good  Suicide/Assault: Patient denies suicidal or assaultive ideation, plan, or intent.    ASSESSMENT:  Rachel Fried is a 28 year old female with a pre-existing history of anxiety and depression who is experiencing difficulty navigating social situations in the context of a new medication for rheumatoid arthritis.  Symptoms of anxiety and depression are mild and moderate, respectively.  She appears to be a good candidate for cognitive behavioral interventions for management of ongoing mental health conditions.    DIAGNOSIS:  Major depressive disorder, recurrent, mild  Anxiety, unspecified    PLAN:  RTC for continued psychotherapy.     Time in: 4:08  Time out: 5:02  Extended session due to complexity of case and length of interval.    Key Cunha, PhD,   Clinical Health Psychologist    Tx plan completed: 4/16/19  Tx plan due:  4/16/20    *no letter

## 2019-05-31 DIAGNOSIS — Z30.41 ENCOUNTER FOR SURVEILLANCE OF CONTRACEPTIVE PILLS: ICD-10-CM

## 2019-06-03 RX ORDER — DROSPIRENONE AND ETHINYL ESTRADIOL 0.02-3(28)
KIT ORAL
Qty: 84 TABLET | Refills: 7 | OUTPATIENT
Start: 2019-06-03

## 2019-06-03 NOTE — TELEPHONE ENCOUNTER
Duplicate.     LORYNA 3-0.02 MG tablet 28 tablet 11 12/4/2018  Yes   Sig - Route: Take 1 tablet by mouth daily - Oral   Sent to pharmacy as: LORYNA 3-0.02 MG tablet   Class: E-Prescribe   Order: [id number]   E-Prescribing Status: Receipt confirmed by pharmacy (12/4/2018  7:52 AM CST)     Printed original order above and faxed to pharmacy.

## 2019-06-25 ENCOUNTER — OFFICE VISIT (OUTPATIENT)
Dept: PSYCHOLOGY | Facility: CLINIC | Age: 29
End: 2019-06-25
Payer: COMMERCIAL

## 2019-06-25 DIAGNOSIS — F33.0 MILD EPISODE OF RECURRENT MAJOR DEPRESSIVE DISORDER (H): Primary | ICD-10-CM

## 2019-06-25 DIAGNOSIS — F41.9 ANXIETY: ICD-10-CM

## 2019-06-25 NOTE — PROGRESS NOTES
Health Psychology                  Clinic    Department of Medicine  Nati Pompa, PhD, LP [phone]                          Cl[facility] and Surgery Center  [facility] Shruthi Diaz, PhD, LP [phone]                  3rd Floor  [place] Mail Code 741   Lee Reese, PhD, ABPP, LP [phone]     [address] [facility],   [address] [facility],  Key Cunha,  PhD, LP [phone]            [place] [address] Mayra Parker, PhD, LP [phone]    Health Psychology Follow-Up Note    SUBJECTIVE:  Rachel Fried was seen for individual psychotherapy. Reviewed emotional and physical health since our last session. Rachel reported that she has primarily been busy with work yet has ensured time with friends to balance a demanding works schedule. Endorsed some anger regarding her family's difficulty understanding lifestyle/behaviors she is incorporating to manage RA. Plans to talk with rheumatologist about dose changes in medication as she continues to experience pain with activity; hopes conversation with her provider will help her set realistic expectations for activity. [place] focused on discussing anxiety about dating. Plans to initiate dating but is experiencing a lot of worry and fear about judgement from others in starting this as she has postponed dating until her late 20's and feels behind her peers. Discussed cognitive interventions such as recognizing worry and checking reality of fears. Encouraged consideration of things within and out of her control and frame other's negative feedback as information that helps determine capability for a relationship. Framed discomfort as a signal for an opportunity for growth. She wishes to set a goal for initiating one date per month.     OBJECTIVE:  Appearance/Behavior/Orientation: Patient was on time, appropriately groomed and dressed, and demonstrated good eye contact. Alert and oriented to person, place, time, and  "situation. No evidence of psychomotor agitation.     Cooperation/Reliability: Patient was open and cooperative throughout the session.    Abstract reasoning: Not assessed.   Judgment: Intact.    Mood/Affect: Mood euthymic; appropriate range of affect.    Insight/Motivation: Good, good    ASSESSMENT:  Rachel Fried is a 28 year old female with a pre-existing history of anxiety and depression who is experiencing difficulty navigating social situations in the context of a new medication for rheumatoid arthritis.  Symptoms of anxiety and depression are mild and moderate, respectively.  She appears to be a good candidate for cognitive behavioral interventions for management of ongoing mental health conditions.    DIAGNOSIS:  Major depressive disorder, recurrent, mild  Anxiety, unspecified    PLAN:  RTC for continued psychotherapy.     Time in: 9:06  Time out: 10\"00  Extended session due to complexity of case and length of interval.    Key Cunha, PhD,   Clinical Health Psychologist    Tx plan completed: 4/16/19  Tx plan due:  4/16/20    *no letter    "

## 2019-07-02 ENCOUNTER — TELEPHONE (OUTPATIENT)
Dept: RHEUMATOLOGY | Facility: CLINIC | Age: 29
End: 2019-07-02

## 2019-07-02 NOTE — TELEPHONE ENCOUNTER
Patient contacted and reminded of upcoming appointment.  Patient confirmed they will be attending.  Patient instructed to bring updated medications list to appointment.    Zurdo Acosta  EMT

## 2019-07-08 ENCOUNTER — OFFICE VISIT (OUTPATIENT)
Dept: RHEUMATOLOGY | Facility: CLINIC | Age: 29
End: 2019-07-08
Attending: NURSE PRACTITIONER
Payer: COMMERCIAL

## 2019-07-08 VITALS
WEIGHT: 212.2 LBS | TEMPERATURE: 97.9 F | HEART RATE: 86 BPM | SYSTOLIC BLOOD PRESSURE: 119 MMHG | DIASTOLIC BLOOD PRESSURE: 78 MMHG | OXYGEN SATURATION: 98 % | BODY MASS INDEX: 31.34 KG/M2

## 2019-07-08 DIAGNOSIS — R76.8 CYCLIC CITRULLINATED PEPTIDE (CCP) ANTIBODY POSITIVE: ICD-10-CM

## 2019-07-08 DIAGNOSIS — R79.89 TSH ELEVATION: ICD-10-CM

## 2019-07-08 DIAGNOSIS — M06.09 RHEUMATOID ARTHRITIS OF MULTIPLE SITES WITH NEGATIVE RHEUMATOID FACTOR (H): Primary | ICD-10-CM

## 2019-07-08 DIAGNOSIS — M06.09 RHEUMATOID ARTHRITIS OF MULTIPLE SITES WITH NEGATIVE RHEUMATOID FACTOR (H): ICD-10-CM

## 2019-07-08 DIAGNOSIS — Z79.899 LONG-TERM USE OF PLAQUENIL: ICD-10-CM

## 2019-07-08 DIAGNOSIS — Z79.899 LONG-TERM CURRENT USE OF HIGH RISK MEDICATION OTHER THAN ANTICOAGULANT: ICD-10-CM

## 2019-07-08 LAB
ALBUMIN SERPL-MCNC: 3.5 G/DL (ref 3.4–5)
ALT SERPL W P-5'-P-CCNC: 18 U/L (ref 0–50)
AST SERPL W P-5'-P-CCNC: 19 U/L (ref 0–45)
CREAT SERPL-MCNC: 0.7 MG/DL (ref 0.52–1.04)
CRP SERPL-MCNC: 8.2 MG/L (ref 0–8)
ERYTHROCYTE [DISTWIDTH] IN BLOOD BY AUTOMATED COUNT: 13.2 % (ref 10–15)
GFR SERPL CREATININE-BSD FRML MDRD: >90 ML/MIN/{1.73_M2}
HCT VFR BLD AUTO: 41.4 % (ref 35–47)
HGB BLD-MCNC: 13.4 G/DL (ref 11.7–15.7)
MCH RBC QN AUTO: 28.9 PG (ref 26.5–33)
MCHC RBC AUTO-ENTMCNC: 32.4 G/DL (ref 31.5–36.5)
MCV RBC AUTO: 89 FL (ref 78–100)
PLATELET # BLD AUTO: 298 10E9/L (ref 150–450)
RBC # BLD AUTO: 4.63 10E12/L (ref 3.8–5.2)
T4 FREE SERPL-MCNC: 0.85 NG/DL (ref 0.76–1.46)
TSH SERPL DL<=0.005 MIU/L-ACNC: 5 MU/L (ref 0.4–4)
WBC # BLD AUTO: 8 10E9/L (ref 4–11)

## 2019-07-08 PROCEDURE — 84450 TRANSFERASE (AST) (SGOT): CPT | Performed by: INTERNAL MEDICINE

## 2019-07-08 PROCEDURE — G0463 HOSPITAL OUTPT CLINIC VISIT: HCPCS | Mod: ZF

## 2019-07-08 PROCEDURE — 84439 ASSAY OF FREE THYROXINE: CPT | Performed by: INTERNAL MEDICINE

## 2019-07-08 PROCEDURE — 82565 ASSAY OF CREATININE: CPT | Performed by: INTERNAL MEDICINE

## 2019-07-08 PROCEDURE — 36415 COLL VENOUS BLD VENIPUNCTURE: CPT | Performed by: INTERNAL MEDICINE

## 2019-07-08 PROCEDURE — 85027 COMPLETE CBC AUTOMATED: CPT | Performed by: INTERNAL MEDICINE

## 2019-07-08 PROCEDURE — 82040 ASSAY OF SERUM ALBUMIN: CPT | Performed by: INTERNAL MEDICINE

## 2019-07-08 PROCEDURE — 86140 C-REACTIVE PROTEIN: CPT | Performed by: INTERNAL MEDICINE

## 2019-07-08 PROCEDURE — 84460 ALANINE AMINO (ALT) (SGPT): CPT | Performed by: INTERNAL MEDICINE

## 2019-07-08 RX ORDER — HYDROXYCHLOROQUINE SULFATE 200 MG/1
200 TABLET, FILM COATED ORAL DAILY
Qty: 90 TABLET | Refills: 3 | Status: SHIPPED | OUTPATIENT
Start: 2019-07-08 | End: 2020-12-09

## 2019-07-08 SDOH — HEALTH STABILITY: MENTAL HEALTH: HOW OFTEN DO YOU HAVE A DRINK CONTAINING ALCOHOL?: 2-4 TIMES A MONTH

## 2019-07-08 ASSESSMENT — JOINT PAIN
TOTAL NUMBER OF SWOLLEN JOINTS: 4
TOTAL NUMBER OF TENDER JOINTS: 4

## 2019-07-08 ASSESSMENT — PAIN SCALES - GENERAL: PAINLEVEL: NO PAIN (0)

## 2019-07-08 ASSESSMENT — DISEASE ACTIVITY SCORE (DAS28): CRP_MG_PER_LITER: 3.44

## 2019-07-08 NOTE — PROGRESS NOTES
[facility] - Rheumatology Clinic Visit    Rachel Fried  is a 28 year old here for follow-up  RHEUMATOID ARTHRITIS (RA) involving hands, wrists, ankles, shoulders feet [ -RF and low CCP 25, -factor V -KAVITHA by IFA and -NADEEN panel -HLA B27] and pityriasis lichenoides chronica (seen [facility]-established here). XR  no erosions hands. Moved from [place] to be with family.       Past- hydroxychloroquine (plaquenil) since 1-2016 (2 tablet a day caused diarrhea, ok on 1 tablet) and methotrexate 10 mg week  to 4-2018 (mild fatigue day of) then restarted 10- (higher folic acid improved side-effects), naproxsyn; allergy sulfa, prednisone, advil       Initial visit copy forward  10-: Hydroxychloroquine (plaquenil) BID mild diarrhea; ocular eye exam 3-2016 baseline reports recent eye exam   EKG was recently Feb 2018 normal. Stopped methotrexate  In April as wanted to take a drug holiday and was moving here, also wanted to drink some alcohol, and discuss options. No infections. Diffuse joint pains in hands, wrists, shoulders, ankles and feet and the sides of her hips. Taking advil 600mg AM and 400 mg PM, tolerating but taking since April. Pain severe 5/10 or more. Stiffness all day. Hands are sore. No loss of function or ROM. Very sore and stiff all over. Knees are sore and hard to bend or squat. Mild diarrhea with hydroxychloroquine (plaquenil)     July 8, 2019  Last seen March 2019.     Continues on methotrexate  10 mg every 7 days SAT, folic acid 2 mg day (mild fatigue and no other side-effects like mouth sores or hairloss). Tolerating. Hydroxychloroquine (plaquenil) 200 mg every day resolved diarrhea on lower dose.     Able to bend knees and make fist formation. Exercising and doing yoga. Dramatic improvement, some stiffness in knees no swelling the past 6 weeks. Full ROM of knees. Right hand swelling pain mostly in her fingers and PIPs joints, will use compression gloves to  help, is able to function still but discomfort is the mornings and PMs. Takes tylenol the mornings, prn. Would like to adjust her methotrexate other joints are great. Not pregnant and abstaining from intercourse and aware of the risks with methotrexate      No longer using NSAIDs only rarely. Taking vitamin D 2000 international unit(s) day.  No EAS. No vision issues. Is exercising and doing yogo now. Able to bend her knees now and make a full fist formations. Denies any fever, chills, SOB, CANDELARIA, night sweats, or chest pain, or cough. Reports healthy.      PMH:  Injury-no  Medical-dermatitis, (bx 2-2016 GA vs LP vs cutaneous lupus--was not cutenaous lupus --focal interstitial lymphocytic inflammation, scattered lymphocytes), anxiety and depression, exercise induced asthma, scoliosis, pityriasis lichenoides chronica, chronic back pain, hypertension, seasonal allergies, acne, motion sickness, high cholesterol, chicken pox    Surgical-None       FH:  No autoimmune disorders, psoriasis, UC, crohn's, RA, PsA, gout, autoimmune thyroid.  No MS, heart disease or cancer in family  Mother-factor V leiden carrier, rosea   Father-healthy  Siblings-younger bro healthy  Children-None   GF-lupus   Uncle cancer colon    SH:  Social Alcohol occasional week. Never Smoking. No IVDU. No Children. Right handed. Single--not sexually active, never.        PMSH personally reviewed and updated by me.    ROS:  Negative raynaud s phenomena, hairloss, sun sensitivities, keratoconjunctivitis sicca, pleurisy, inflammatory joint symptoms, significant rashes like malar, oral/nasal or ulcerations, inflammatory eye disease, inflammatory bowel disease, dactylitis, tenosynovitis, or enthespathy. Negative for  blood clots, gout, psoriasis, UC, crohn's. No temporal headache, no jaw claudication, no scalp tenderness, vision changes, carotidynia, cough. No STD or pregnancy symptoms. No Parotid swelling.   +tips finger white at times and toes resolved    CONSTITUTIONAL: No fevers, night sweats or unintentional weight change. No acute distress, swollen glands  EYES: No vision change, diplopia, pain in eyes or red eyes   EARS, NOSE, MOUTH, THROAT: No tinnitus or hearing change, no epistaxis or nasal discharge, no oral lesions, throat clear. Normal saliva pool.  No drymouth. No thyroid enlargement.   CARDIOVASCULAR: No chest pain, palpitations, or pain with walking, no orthopnea or PND   RESPIRATORY: No dyspnea, cough, shortness of breath or wheezing. No pleurisy.   GI: No nausea, vomiting, diarrhea or constipation, no abdominal pain, or blood in stools.   : No change in urine, no dysuria or hematuria   MUSCKL: No enthesitis, plantar fascitis or heel pain. See above   INTEGUMENTARY: No concerning lesions or moles   NEURO: No loss of strength or sensation, no numbness or tingling, no tremor, no dizziness, no headache. No falls   ENDO: No polyuria or polydipsia, no temperature intolerance   HEME/LYMPH:No concerning bumps, bleeding problems, or swollen lymph nodes. No recent infections, hospitalizations or new illnesses.   ALLERGY: No environmental allergies   Otherwise 14 point ROS obtained, reviewed and found negative.     Physical exam:  Vitals: Blood pressure 119/78, pulse 86, temperature 97.9  F (36.6  C), weight 96.3 kg (212 lb 3.2 oz), SpO2 98 %, not currently breastfeeding.  Wt Readings from Last 4 Encounters:   07/08/19 96.3 kg (212 lb 3.2 oz)   03/06/19 95 kg (209 lb 8 oz)   01/22/19 97.1 kg (214 lb)   12/17/18 96.8 kg (213 lb 8 oz)     Constitutional: WD-WN-WG cooperative  Eyes: nl EOM, PERRLA, vision, conjunctiva, sclera, No Unilateral or bilateral external ear inflammation   ENT: nl external ears, nose, hearing, lips, teeth, gums, throat. No saddle nose   Neck: no mass or thyroid enlargement  Resp: lungs clear to auscultation, nl to palpation, nl effort  CV: RRR, no murmurs, rubs or gallops, no edema  GI: no ABD mass or tenderness, no HSM  : not  tested  Lymph: no cervical or epitrochlear nodes  MS: right 2-5th trace swelling in fingers, tender in PIPs Weak  strength right.  All TMJ, neck, shoulder, elbow, wrist, hand, spine, hip, knee, ankle, and foot joints were examined and otherwise found normal. No deformity. Full ROM. Normal prayer sign. Negative Lhermitte's sign. No periuncle erythema.    Skin: no nail pitting, alopecia, rash.   Neuro: nl cranial nerves, strength, sensation, DTRs.   Psych: nl judgement, orientation, memory, affect.      Labs/Imaging:    Results for orders placed or performed in visit on 07/08/19   CRP inflammation   Result Value Ref Range    CRP Inflammation 8.2 (H) 0.0 - 8.0 mg/L   Creatinine   Result Value Ref Range    Creatinine 0.70 0.52 - 1.04 mg/dL    GFR Estimate >90 >60 mL/min/[1.73_m2]    GFR Estimate If Black >90 >60 mL/min/[1.73_m2]   Albumin level   Result Value Ref Range    Albumin 3.5 3.4 - 5.0 g/dL   ALT   Result Value Ref Range    ALT 18 0 - 50 U/L   AST   Result Value Ref Range    AST 19 0 - 45 U/L   CBC with platelets   Result Value Ref Range    WBC 8.0 4.0 - 11.0 10e9/L    RBC Count 4.63 3.8 - 5.2 10e12/L    Hemoglobin 13.4 11.7 - 15.7 g/dL    Hematocrit 41.4 35.0 - 47.0 %    MCV 89 78 - 100 fl    MCH 28.9 26.5 - 33.0 pg    MCHC 32.4 31.5 - 36.5 g/dL    RDW 13.2 10.0 - 15.0 %    Platelet Count 298 150 - 450 10e9/L      Normal G6PD  Neg MTB QG 2016 and hepatitis panel, negative HIV    XR hands and knees-negative 2016      Impression/Plan:  1. Seropositive RHEUMATOID ARTHRITIS (RA), non-erosive remission activity. Poor prognosis. Prior control on low dose methotrexate and hydroxychloroquine (plaquenil), and now controlled again on this therapy combination but low disease activity thus will adjust. Discussed risks with methotrexate , infection risks, pregnancy (abstince and knows needs to be off for 3-6 month before trying to concieve) seek immediate medical attention if any signs or symptoms infections. And  monitor for side-effects, will abstain from intercourse (will see OBGYN discussed IUD or double protection-she discussed with PCP) and will limit 0-2 week not day or after. Rheumatoid arthritis (RA) moderate=SHELL 28 with mild elevated CRP as well.     High risk medications, labs today normal.      2. Longterm hydroxychloroquine (plaquenil) use. Recent opthalmologist  no toxicity. Diarrhea side-effects from this will reduce by 1 tablet resolved on lower dose. Labs every 8-12 week  3. Vitamin D deficeincy- take 2000 international unit(s) day. 41 on 3-2019     4. Pityriasis lichenoids chronic-per derm   5. Past Chronic NSAID use, informed her of risks and possible side-effects to this, and including CVD/kidney disease. Off now     Past prednisone 15-10-5 mg each for 2 weeks then stop   Methotrexate  12.5 mg (2.5 mg increase) once every SAT, folic acid  2 mg day  No NSAID use  Annual opthalmologist  Exam for hydroxychloroquine (plaquenil) use.  Hydroxychloroquine (plaquenil) 200 mg once day    RTC 4 month with labs.   Discussed if she flares to consider new study with Dr. Webb if agrees, they may call her      The patient understood the rationale for the diagnosis and treatment plan. Patient shared in the decision making. All questions were answered to best of my ability and the patient's satisfaction  Zurdo CORDERO, CNP, MSN  [facility]  Department of Rheumatology & Autoimmune Disorders    1. Immunization: Reviewed CDC guidelines with patient updated, information provided to patient based on CDC guidelines for vaccination recommendations, patient will discuss with primary provider  2. Bone Health:Educated on adequate calcium and vitamin D intake, Educated on exercise, Glucocorticoid education discussed risks, benefits & potential long term side effects from use  3. Discussed routine annual physicals, cancer screening, cardiac risk monitoring, bone health and primary care with  primary care provider.   4. Educated on diagnosis, prognosis, labs, imaging, treatment options (including risks, benefits, risk of no treatment), medications (use, dose, side-effects, risks of medications including infection/cancer/bone marrow suppression, lab monitoring), infections what to do, plan of cares, goals of treatment.

## 2019-07-08 NOTE — LETTER
7/8/2019       RE: Rachel Fried  [address]  [facility] [address]     Dear Colleague,    Thank you for referring your patient, Rachel Fried, to the [facility] RHEUMATOLOGY at [facility]. Please see a copy of my visit note below.    [facility] - Rheumatology Clinic Visit    Rachel Fried  is a 28 year old here for follow-up  RHEUMATOID ARTHRITIS (RA) involving hands, wrists, ankles, shoulders feet [ -RF and low CCP 25, -factor V -KAVITHA by IFA and -NADEEN panel -HLA B27] and pityriasis lichenoides chronica (seen [facility]-established here). XR  no erosions hands. Moved from [place] to be with family.       Past- hydroxychloroquine (plaquenil) since 1-2016 (2 tablet a day caused diarrhea, ok on 1 tablet) and methotrexate 10 mg week  to 4-2018 (mild fatigue day of) then restarted 10- (higher folic acid improved side-effects), naproxsyn; allergy sulfa, prednisone, advil       Initial visit copy forward  10-: Hydroxychloroquine (plaquenil) BID mild diarrhea; ocular eye exam 3-2016 baseline reports recent eye exam   EKG was recently Feb 2018 normal. Stopped methotrexate  In April as wanted to take a drug holiday and was moving here, also wanted to drink some alcohol, and discuss options. No infections. Diffuse joint pains in hands, wrists, shoulders, ankles and feet and the sides of her hips. Taking advil 600mg AM and 400 mg PM, tolerating but taking since April. Pain severe 5/10 or more. Stiffness all day. Hands are sore. No loss of function or ROM. Very sore and stiff all over. Knees are sore and hard to bend or squat. Mild diarrhea with hydroxychloroquine (plaquenil)     July 8, 2019  Last seen March 2019.     Continues on methotrexate  10 mg every 7 days SAT, folic acid 2 mg day (mild fatigue and no other side-effects like mouth sores or hairloss). Tolerating. Hydroxychloroquine (plaquenil) 200 mg every day resolved  diarrhea on lower dose.     Able to bend knees and make fist formation. Exercising and doing yoga. Dramatic improvement, some stiffness in knees no swelling the past 6 weeks. Full ROM of knees. Right hand swelling pain mostly in her fingers and PIPs joints, will use compression gloves to help, is able to function still but discomfort is the mornings and PMs. Takes tylenol the mornings, prn. Would like to adjust her methotrexate other joints are great. Not pregnant and abstaining from intercourse and aware of the risks with methotrexate      No longer using NSAIDs only rarely. Taking vitamin D 2000 international unit(s) day.  No EAS. No vision issues. Is exercising and doing yogo now. Able to bend her knees now and make a full fist formations. Denies any fever, chills, SOB, CANDELARIA, night sweats, or chest pain, or cough. Reports healthy.      PMH:  Injury-no  Medical-dermatitis, (bx 2-2016 GA vs LP vs cutaneous lupus--was not cutenaous lupus --focal interstitial lymphocytic inflammation, scattered lymphocytes), anxiety and depression, exercise induced asthma, scoliosis, pityriasis lichenoides chronica, chronic back pain, hypertension, seasonal allergies, acne, motion sickness, high cholesterol, chicken pox    Surgical-None       FH:  No autoimmune disorders, psoriasis, UC, crohn's, RA, PsA, gout, autoimmune thyroid.  No MS, heart disease or cancer in family  Mother-factor V leiden carrier, rosea   Father-healthy  Siblings-younger bro healthy  Children-None   GF-lupus   Uncle cancer colon    SH:  Social Alcohol occasional week. Never Smoking. No IVDU. No Children. Right handed. Single--not sexually active, never.        PMS personally reviewed and updated by me.    ROS:  Negative raynaud s phenomena, hairloss, sun sensitivities, keratoconjunctivitis sicca, pleurisy, inflammatory joint symptoms, significant rashes like malar, oral/nasal or ulcerations, inflammatory eye disease, inflammatory bowel disease,  dactylitis, tenosynovitis, or enthespathy. Negative for  blood clots, gout, psoriasis, UC, crohn's. No temporal headache, no jaw claudication, no scalp tenderness, vision changes, carotidynia, cough. No STD or pregnancy symptoms. No Parotid swelling.   +tips finger white at times and toes resolved   CONSTITUTIONAL: No fevers, night sweats or unintentional weight change. No acute distress, swollen glands  EYES: No vision change, diplopia, pain in eyes or red eyes   EARS, NOSE, MOUTH, THROAT: No tinnitus or hearing change, no epistaxis or nasal discharge, no oral lesions, throat clear. Normal saliva pool.  No drymouth. No thyroid enlargement.   CARDIOVASCULAR: No chest pain, palpitations, or pain with walking, no orthopnea or PND   RESPIRATORY: No dyspnea, cough, shortness of breath or wheezing. No pleurisy.   GI: No nausea, vomiting, diarrhea or constipation, no abdominal pain, or blood in stools.   : No change in urine, no dysuria or hematuria   MUSCKL: No enthesitis, plantar fascitis or heel pain. See above   INTEGUMENTARY: No concerning lesions or moles   NEURO: No loss of strength or sensation, no numbness or tingling, no tremor, no dizziness, no headache. No falls   ENDO: No polyuria or polydipsia, no temperature intolerance   HEME/LYMPH:No concerning bumps, bleeding problems, or swollen lymph nodes. No recent infections, hospitalizations or new illnesses.   ALLERGY: No environmental allergies   Otherwise 14 point ROS obtained, reviewed and found negative.     Physical exam:  Vitals: Blood pressure 119/78, pulse 86, temperature 97.9  F (36.6  C), weight 96.3 kg (212 lb 3.2 oz), SpO2 98 %, not currently breastfeeding.  Wt Readings from Last 4 Encounters:   07/08/19 96.3 kg (212 lb 3.2 oz)   03/06/19 95 kg (209 lb 8 oz)   01/22/19 97.1 kg (214 lb)   12/17/18 96.8 kg (213 lb 8 oz)     Constitutional: WD-WN-WG cooperative  Eyes: nl EOM, PERRLA, vision, conjunctiva, sclera, No Unilateral or bilateral external ear  inflammation   ENT: nl external ears, nose, hearing, lips, teeth, gums, throat. No saddle nose   Neck: no mass or thyroid enlargement  Resp: lungs clear to auscultation, nl to palpation, nl effort  CV: RRR, no murmurs, rubs or gallops, no edema  GI: no ABD mass or tenderness, no HSM  : not tested  Lymph: no cervical or epitrochlear nodes  MS: right 2-5th trace swelling in fingers, tender in PIPs Weak  strength right.  All TMJ, neck, shoulder, elbow, wrist, hand, spine, hip, knee, ankle, and foot joints were examined and otherwise found normal. No deformity. Full ROM. Normal prayer sign. Negative Lhermitte's sign. No periuncle erythema.    Skin: no nail pitting, alopecia, rash.   Neuro: nl cranial nerves, strength, sensation, DTRs.   Psych: nl judgement, orientation, memory, affect.      Labs/Imaging:    Results for orders placed or performed in visit on 07/08/19   CRP inflammation   Result Value Ref Range    CRP Inflammation 8.2 (H) 0.0 - 8.0 mg/L   Creatinine   Result Value Ref Range    Creatinine 0.70 0.52 - 1.04 mg/dL    GFR Estimate >90 >60 mL/min/[1.73_m2]    GFR Estimate If Black >90 >60 mL/min/[1.73_m2]   Albumin level   Result Value Ref Range    Albumin 3.5 3.4 - 5.0 g/dL   ALT   Result Value Ref Range    ALT 18 0 - 50 U/L   AST   Result Value Ref Range    AST 19 0 - 45 U/L   CBC with platelets   Result Value Ref Range    WBC 8.0 4.0 - 11.0 10e9/L    RBC Count 4.63 3.8 - 5.2 10e12/L    Hemoglobin 13.4 11.7 - 15.7 g/dL    Hematocrit 41.4 35.0 - 47.0 %    MCV 89 78 - 100 fl    MCH 28.9 26.5 - 33.0 pg    MCHC 32.4 31.5 - 36.5 g/dL    RDW 13.2 10.0 - 15.0 %    Platelet Count 298 150 - 450 10e9/L      Normal G6PD  Neg MTB QG 2016 and hepatitis panel, negative HIV    XR hands and knees-negative 2016      Impression/Plan:  1. Seropositive RHEUMATOID ARTHRITIS (RA), non-erosive remission activity. Poor prognosis. Prior control on low dose methotrexate and hydroxychloroquine (plaquenil), and now controlled  again on this therapy combination but low disease activity thus will adjust. Discussed risks with methotrexate , infection risks, pregnancy (abstince and knows needs to be off for 3-6 month before trying to concieve) seek immediate medical attention if any signs or symptoms infections. And monitor for side-effects, will abstain from intercourse (will see OBGYN discussed IUD or double protection-she discussed with PCP) and will limit 0-2 week not day or after. Rheumatoid arthritis (RA) moderate=SHELL 28 with mild elevated CRP as well.     High risk medications, labs today normal.      2. Longterm hydroxychloroquine (plaquenil) use. Recent opthalmologist  no toxicity. Diarrhea side-effects from this will reduce by 1 tablet resolved on lower dose. Labs every 8-12 week  3. Vitamin D deficeincy- take 2000 international unit(s) day. 41 on 3-2019     4. Pityriasis lichenoids chronic-per derm   5. Past Chronic NSAID use, informed her of risks and possible side-effects to this, and including CVD/kidney disease. Off now     Past prednisone 15-10-5 mg each for 2 weeks then stop   Methotrexate  12.5 mg (2.5 mg increase) once every SAT, folic acid  2 mg day  No NSAID use  Annual opthalmologist  Exam for hydroxychloroquine (plaquenil) use.  Hydroxychloroquine (plaquenil) 200 mg once day    RTC 4 month with labs.   Discussed if she flares to consider new study with Dr. Webb if agrees, they may call her      The patient understood the rationale for the diagnosis and treatment plan. Patient shared in the decision making. All questions were answered to best of my ability and the patient's satisfaction  Zurdo Wesley APRN, CNP, MSN  [facility]  Department of Rheumatology & Autoimmune Disorders    1. Immunization: Reviewed CDC guidelines with patient updated, information provided to patient based on CDC guidelines for vaccination recommendations, patient will discuss with primary provider  2. Bone  Health:Educated on adequate calcium and vitamin D intake, Educated on exercise, Glucocorticoid education discussed risks, benefits & potential long term side effects from use  3. Discussed routine annual physicals, cancer screening, cardiac risk monitoring, bone health and primary care with primary care provider.   4. Educated on diagnosis, prognosis, labs, imaging, treatment options (including risks, benefits, risk of no treatment), medications (use, dose, side-effects, risks of medications including infection/cancer/bone marrow suppression, lab monitoring), infections what to do, plan of cares, goals of treatment.     Again, thank you for allowing me to participate in the care of your patient.      Sincerely,    GIL Kim CNP

## 2019-07-08 NOTE — RESULT ENCOUNTER NOTE
All labs reviewed and discussed with patient at office visit. Instructed to call for any further questions.       Zurdo CORDERO CNP, MSN  7/8/2019  8:28 AM

## 2019-07-08 NOTE — LETTER
7/8/2019      RE: Rachel Fried  [address]  [facility] [id number]       [facility] - Rheumatology Clinic Visit    Rachel Fried  is a 28 year old here for follow-up  RHEUMATOID ARTHRITIS (RA) involving hands, wrists, ankles, shoulders feet [ -RF and low CCP 25, -factor V -KAVITHA by IFA and -NADEEN panel -HLA B27] and pityriasis lichenoides chronica (seen [facility]-established here). XR  no erosions hands. Moved from [place] to be with family.       Past- hydroxychloroquine (plaquenil) since 1-2016 (2 tablet a day caused diarrhea, ok on 1 tablet) and methotrexate 10 mg week  to 4-2018 (mild fatigue day of) then restarted 10- (higher folic acid improved side-effects), naproxsyn; allergy sulfa, prednisone, advil       Initial visit copy forward  10-: Hydroxychloroquine (plaquenil) BID mild diarrhea; ocular eye exam 3-2016 baseline reports recent eye exam   EKG was recently Feb 2018 normal. Stopped methotrexate  In April as wanted to take a drug holiday and was moving here, also wanted to drink some alcohol, and discuss options. No infections. Diffuse joint pains in hands, wrists, shoulders, ankles and feet and the sides of her hips. Taking advil 600mg AM and 400 mg PM, tolerating but taking since April. Pain severe 5/10 or more. Stiffness all day. Hands are sore. No loss of function or ROM. Very sore and stiff all over. Knees are sore and hard to bend or squat. Mild diarrhea with hydroxychloroquine (plaquenil)     July 8, 2019  Last seen March 2019.     Continues on methotrexate  10 mg every 7 days SAT, folic acid 2 mg day (mild fatigue and no other side-effects like mouth sores or hairloss). Tolerating. Hydroxychloroquine (plaquenil) 200 mg every day resolved diarrhea on lower dose.     Able to bend knees and make fist formation. Exercising and doing yoga. Dramatic improvement, some stiffness in knees no swelling the past 6 weeks. Full ROM of knees.  Right hand swelling pain mostly in her fingers and PIPs joints, will use compression gloves to help, is able to function still but discomfort is the mornings and PMs. Takes tylenol the mornings, prn. Would like to adjust her methotrexate other joints are great. Not pregnant and abstaining from intercourse and aware of the risks with methotrexate      No longer using NSAIDs only rarely. Taking vitamin D 2000 international unit(s) day.  No EAS. No vision issues. Is exercising and doing yogo now. Able to bend her knees now and make a full fist formations. Denies any fever, chills, SOB, CANDELARIA, night sweats, or chest pain, or cough. Reports healthy.      PMH:  Injury-no  Medical-dermatitis, (bx 2-2016 GA vs LP vs cutaneous lupus--was not cutenaous lupus --focal interstitial lymphocytic inflammation, scattered lymphocytes), anxiety and depression, exercise induced asthma, scoliosis, pityriasis lichenoides chronica, chronic back pain, hypertension, seasonal allergies, acne, motion sickness, high cholesterol, chicken pox    Surgical-None       FH:  No autoimmune disorders, psoriasis, UC, crohn's, RA, PsA, gout, autoimmune thyroid.  No MS, heart disease or cancer in family  Mother-factor V leiden carrier, rosea   Father-healthy  Siblings-younger bro healthy  Children-None   GF-lupus   Uncle cancer colon    SH:  Social Alcohol occasional week. Never Smoking. No IVDU. No Children. Right handed. Single--not sexually active, never.        PMSH personally reviewed and updated by me.    ROS:  Negative raynaud s phenomena, hairloss, sun sensitivities, keratoconjunctivitis sicca, pleurisy, inflammatory joint symptoms, significant rashes like malar, oral/nasal or ulcerations, inflammatory eye disease, inflammatory bowel disease, dactylitis, tenosynovitis, or enthespathy. Negative for  blood clots, gout, psoriasis, UC, crohn's. No temporal headache, no jaw claudication, no scalp tenderness, vision changes, carotidynia, cough. No  STD or pregnancy symptoms. No Parotid swelling.   +tips finger white at times and toes resolved   CONSTITUTIONAL: No fevers, night sweats or unintentional weight change. No acute distress, swollen glands  EYES: No vision change, diplopia, pain in eyes or red eyes   EARS, NOSE, MOUTH, THROAT: No tinnitus or hearing change, no epistaxis or nasal discharge, no oral lesions, throat clear. Normal saliva pool.  No drymouth. No thyroid enlargement.   CARDIOVASCULAR: No chest pain, palpitations, or pain with walking, no orthopnea or PND   RESPIRATORY: No dyspnea, cough, shortness of breath or wheezing. No pleurisy.   GI: No nausea, vomiting, diarrhea or constipation, no abdominal pain, or blood in stools.   : No change in urine, no dysuria or hematuria   MUSCKL: No enthesitis, plantar fascitis or heel pain. See above   INTEGUMENTARY: No concerning lesions or moles   NEURO: No loss of strength or sensation, no numbness or tingling, no tremor, no dizziness, no headache. No falls   ENDO: No polyuria or polydipsia, no temperature intolerance   HEME/LYMPH:No concerning bumps, bleeding problems, or swollen lymph nodes. No recent infections, hospitalizations or new illnesses.   ALLERGY: No environmental allergies   Otherwise 14 point ROS obtained, reviewed and found negative.     Physical exam:  Vitals: Blood pressure 119/78, pulse 86, temperature 97.9  F (36.6  C), weight 96.3 kg (212 lb 3.2 oz), SpO2 98 %, not currently breastfeeding.  Wt Readings from Last 4 Encounters:   07/08/19 96.3 kg (212 lb 3.2 oz)   03/06/19 95 kg (209 lb 8 oz)   01/22/19 97.1 kg (214 lb)   12/17/18 96.8 kg (213 lb 8 oz)     Constitutional: WD-WN-WG cooperative  Eyes: nl EOM, PERRLA, vision, conjunctiva, sclera, No Unilateral or bilateral external ear inflammation   ENT: nl external ears, nose, hearing, lips, teeth, gums, throat. No saddle nose   Neck: no mass or thyroid enlargement  Resp: lungs clear to auscultation, nl to palpation, nl effort  CV:  RRR, no murmurs, rubs or gallops, no edema  GI: no ABD mass or tenderness, no HSM  : not tested  Lymph: no cervical or epitrochlear nodes  MS: right 2-5th trace swelling in fingers, tender in PIPs Weak  strength right.  All TMJ, neck, shoulder, elbow, wrist, hand, spine, hip, knee, ankle, and foot joints were examined and otherwise found normal. No deformity. Full ROM. Normal prayer sign. Negative Lhermitte's sign. No periuncle erythema.    Skin: no nail pitting, alopecia, rash.   Neuro: nl cranial nerves, strength, sensation, DTRs.   Psych: nl judgement, orientation, memory, affect.      Labs/Imaging:    Results for orders placed or performed in visit on 07/08/19   CRP inflammation   Result Value Ref Range    CRP Inflammation 8.2 (H) 0.0 - 8.0 mg/L   Creatinine   Result Value Ref Range    Creatinine 0.70 0.52 - 1.04 mg/dL    GFR Estimate >90 >60 mL/min/[1.73_m2]    GFR Estimate If Black >90 >60 mL/min/[1.73_m2]   Albumin level   Result Value Ref Range    Albumin 3.5 3.4 - 5.0 g/dL   ALT   Result Value Ref Range    ALT 18 0 - 50 U/L   AST   Result Value Ref Range    AST 19 0 - 45 U/L   CBC with platelets   Result Value Ref Range    WBC 8.0 4.0 - 11.0 10e9/L    RBC Count 4.63 3.8 - 5.2 10e12/L    Hemoglobin 13.4 11.7 - 15.7 g/dL    Hematocrit 41.4 35.0 - 47.0 %    MCV 89 78 - 100 fl    MCH 28.9 26.5 - 33.0 pg    MCHC 32.4 31.5 - 36.5 g/dL    RDW 13.2 10.0 - 15.0 %    Platelet Count 298 150 - 450 10e9/L      Normal G6PD  Neg MTB QG 2016 and hepatitis panel, negative HIV    XR hands and knees-negative 2016      Impression/Plan:  1. Seropositive RHEUMATOID ARTHRITIS (RA), non-erosive remission activity. Poor prognosis. Prior control on low dose methotrexate and hydroxychloroquine (plaquenil), and now controlled again on this therapy combination but low disease activity thus will adjust. Discussed risks with methotrexate , infection risks, pregnancy (abstince and knows needs to be off for 3-6 month before trying to  concieve) seek immediate medical attention if any signs or symptoms infections. And monitor for side-effects, will abstain from intercourse (will see OBGYN discussed IUD or double protection-she discussed with PCP) and will limit 0-2 week not day or after. Rheumatoid arthritis (RA) moderate=SHELL 28 with mild elevated CRP as well.     High risk medications, labs today normal.      2. Longterm hydroxychloroquine (plaquenil) use. Recent opthalmologist  no toxicity. Diarrhea side-effects from this will reduce by 1 tablet resolved on lower dose. Labs every 8-12 week  3. Vitamin D deficeincy- take 2000 international unit(s) day. 41 on 3-2019     4. Pityriasis lichenoids chronic-per derm   5. Past Chronic NSAID use, informed her of risks and possible side-effects to this, and including CVD/kidney disease. Off now     Past prednisone 15-10-5 mg each for 2 weeks then stop   Methotrexate  12.5 mg (2.5 mg increase) once every SAT, folic acid  2 mg day  No NSAID use  Annual opthalmologist  Exam for hydroxychloroquine (plaquenil) use.  Hydroxychloroquine (plaquenil) 200 mg once day    RTC 4 month with labs.   Discussed if she flares to consider new study with Dr. Webb if agrees, they may call her      The patient understood the rationale for the diagnosis and treatment plan. Patient shared in the decision making. All questions were answered to best of my ability and the patient's satisfaction  Zurdo CORDERO, CNP, MSN  [facility]  Department of Rheumatology & Autoimmune Disorders    1. Immunization: Reviewed CDC guidelines with patient updated, information provided to patient based on CDC guidelines for vaccination recommendations, patient will discuss with primary provider  2. Bone Health:Educated on adequate calcium and vitamin D intake, Educated on exercise, Glucocorticoid education discussed risks, benefits & potential long term side effects from use  3. Discussed routine annual physicals,  cancer screening, cardiac risk monitoring, bone health and primary care with primary care provider.   4. Educated on diagnosis, prognosis, labs, imaging, treatment options (including risks, benefits, risk of no treatment), medications (use, dose, side-effects, risks of medications including infection/cancer/bone marrow suppression, lab monitoring), infections what to do, plan of cares, goals of treatment.       Zurdo Wesley, APRN CNP

## 2019-08-13 ENCOUNTER — OFFICE VISIT (OUTPATIENT)
Dept: PSYCHOLOGY | Facility: CLINIC | Age: 29
End: 2019-08-13
Payer: COMMERCIAL

## 2019-08-13 DIAGNOSIS — F41.9 ANXIETY: ICD-10-CM

## 2019-08-13 DIAGNOSIS — F33.0 MILD EPISODE OF RECURRENT MAJOR DEPRESSIVE DISORDER (H): Primary | ICD-10-CM

## 2019-08-13 NOTE — PROGRESS NOTES
Health Psychology                  Clinic    Department of Medicine  Nati Pompa, PhD, LP [phone]                          Clinics and Surgery Center  [facility] Shruthi Diaz, PhD, LP [phone]                  3rd [facility] Mail Code 749   Lee Reese, PhD, ABPP, LP [phone]     [address] [facility], [address],  Key Cunha,  PhD, LP [phone]            [place] [address] Mayra Parker, PhD, LP [phone]    Health Psychology Follow-Up Note    SUBJECTIVE:  Rachel Fried was seen for individual psychotherapy.  She reported that since last session she has continued to expand her social connections and friendships, as well as going on one date since the last session.  However, she reported significant stress due to high demands at work.  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  Discussed what drives these unrealistic expectations, which stems back to a desire to please others and a tendency to not take care of herself.  She also reported that in the past she would overworked to avoid dealing with dating and her social life.  However, now she is more committed to having greater balance with her social life and is angry at the negative impact that work and plan this at times.  Discussed ways to  respond internally to work dynamics that have her challenge her tendency to set high expectations for herself.  Discussed ways to find compassion to her challenging work environments, and during this part of the conversation she became tearful.  Encouraged continued connection with providing herself a sense of kindness and compassion in these difficult moments at work as well as a focus on self-care.    Informed the patient about my plan for maternity leave January-March 2020. Discussed options of how to approach  psychological services during this time, which includes waiting I return from maternity leave to resume therapy, being given the name(s) of another provider to schedule with if needed while on leave, or to proactively schedule with another provider while I am gone on maternity leave.  Agreed to discuss what the patient feels would be best closer to these leave dates.    OBJECTIVE:  Appearance/Behavior/Orientation: Patient was on time, appropriately groomed and dressed, and demonstrated good eye contact. Alert and oriented to person, place, time, and situation. No evidence of psychomotor agitation.     Cooperation/Reliability: Patient was open and cooperative throughout the session.    Abstract reasoning: Not assessed.   Judgment: Intact.    Mood/Affect: Mood euthymic; appropriate range of affect.    Insight/Motivation: Good, good    ASSESSMENT:  She appears to be a good candidate for cognitive behavioral interventions for management of ongoing mental health conditions.    DIAGNOSIS:  Major depressive disorder, recurrent, mild  Anxiety, unspecified    PLAN:  RTC for continued psychotherapy.     Time in: 4:07  Time out: 5:00  Extended session due to complexity of case and length of interval.    Key Cunha, PhD, LP  Clinical Health Psychologist    Tx plan completed: 4/16/19  Tx plan due:  4/16/20    *no letter

## 2019-09-07 DIAGNOSIS — Z79.899 LONG-TERM USE OF PLAQUENIL: ICD-10-CM

## 2019-09-07 DIAGNOSIS — M06.09 RHEUMATOID ARTHRITIS OF MULTIPLE SITES WITH NEGATIVE RHEUMATOID FACTOR (H): ICD-10-CM

## 2019-09-07 DIAGNOSIS — R76.8 CYCLIC CITRULLINATED PEPTIDE (CCP) ANTIBODY POSITIVE: ICD-10-CM

## 2019-09-07 LAB
ALBUMIN SERPL-MCNC: 3.4 G/DL (ref 3.4–5)
ALT SERPL W P-5'-P-CCNC: 20 U/L (ref 0–50)
AST SERPL W P-5'-P-CCNC: 19 U/L (ref 0–45)
CREAT SERPL-MCNC: 0.59 MG/DL (ref 0.52–1.04)
CRP SERPL-MCNC: 7.9 MG/L (ref 0–8)
ERYTHROCYTE [DISTWIDTH] IN BLOOD BY AUTOMATED COUNT: 13 % (ref 10–15)
GFR SERPL CREATININE-BSD FRML MDRD: >90 ML/MIN/{1.73_M2}
HCT VFR BLD AUTO: 42 % (ref 35–47)
HGB BLD-MCNC: 13.5 G/DL (ref 11.7–15.7)
MCH RBC QN AUTO: 29.1 PG (ref 26.5–33)
MCHC RBC AUTO-ENTMCNC: 32.1 G/DL (ref 31.5–36.5)
MCV RBC AUTO: 91 FL (ref 78–100)
PLATELET # BLD AUTO: 288 10E9/L (ref 150–450)
RBC # BLD AUTO: 4.64 10E12/L (ref 3.8–5.2)
WBC # BLD AUTO: 6 10E9/L (ref 4–11)

## 2019-09-09 NOTE — RESULT ENCOUNTER NOTE
The blood counts, liver, kidney and CRP inflammation labs are normal.     Zurdo CORDERO, CNP, MSN  9/9/2019  11:52 AM

## 2019-09-17 ENCOUNTER — OFFICE VISIT (OUTPATIENT)
Dept: PSYCHOLOGY | Facility: CLINIC | Age: 29
End: 2019-09-17
Payer: COMMERCIAL

## 2019-09-17 DIAGNOSIS — F41.9 ANXIETY: ICD-10-CM

## 2019-09-17 DIAGNOSIS — F33.0 MILD EPISODE OF RECURRENT MAJOR DEPRESSIVE DISORDER (H): Primary | ICD-10-CM

## 2019-09-17 NOTE — PROGRESS NOTES
Health Psychology                  Clinic    Department of Medicine  Nati Pompa, PhD, LP [phone]                          Clinics and Surgery Center  [facility] Shruthi Diaz, PhD, LP [phone]                  3rd Floor  [place] Mail Code 74   Lee Reese, PhD, ABPP, LP [phone]     [facility],   [address] [facility],  Key Cunha,  PhD, LP [phone]            [place] [address] Mayra Parker, PhD, LP [phone]    Health Psychology Follow-Up Note    SUBJECTIVE:  Rachel Fried was seen for individual psychotherapy.  She reported that since last session she has continued to expand her social connections and friendships, and detailed how she went on several dates and had several positive connections with friends and family.  She reported higher levels of acceptance in fluctuations at work, with slower seasons at work allowing her to focus more with her personal life.  Discussed ways to foster connections while recognizing limits and boundaries, specifically feedback to friends when they are behaviors in relationships that she finds unacceptable.  Discussed how to continue fostering positive regard for herself ways to communicate that she values herself to others through setting boundaries and providing feedback with behaviors that interfere with relationships.  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  Discussed ways to recognize anger as the tip of the iceberg and her emotional reactions and understand deeper emotions such as fear of negative evaluation from others due to health issues as well as rejection.  Discussed ways to continue to recognize and process emotional reactions with responses that help her have resilience with these challenging  emotions.    OBJECTIVE:  Appearance/Behavior/Orientation: Patient was on time, appropriately groomed and dressed, and demonstrated good eye contact. Alert and oriented to person, place, time, and situation. No evidence of psychomotor agitation.     Cooperation/Reliability: Patient was open and cooperative throughout the session.    Abstract reasoning: Not assessed.   Judgment: Intact.    Mood/Affect: Mood euthymic; appropriate range of affect.    Insight/Motivation: Good, good    ASSESSMENT:  She appears to be a good candidate for cognitive behavioral interventions for management of ongoing mental health conditions.    DIAGNOSIS:  Major depressive disorder, recurrent, mild  Anxiety, unspecified    PLAN:  RTC for continued psychotherapy.     Time in: 4:06  Time out: 5:00  Extended session due to complexity of case and length of interval.    Key Cunha, PhD,   Clinical Health Psychologist    Tx plan completed: 4/16/19  Tx plan due:  4/16/20    *no letter

## 2019-10-12 DIAGNOSIS — Z79.899 LONG-TERM USE OF PLAQUENIL: ICD-10-CM

## 2019-10-12 DIAGNOSIS — M06.09 RHEUMATOID ARTHRITIS OF MULTIPLE SITES WITH NEGATIVE RHEUMATOID FACTOR (H): ICD-10-CM

## 2019-10-12 DIAGNOSIS — R76.8 CYCLIC CITRULLINATED PEPTIDE (CCP) ANTIBODY POSITIVE: ICD-10-CM

## 2019-10-13 NOTE — TELEPHONE ENCOUNTER
methotrexate 2.5 MG tablet  Last Written Prescription Date:  7/8/19  Last Fill Quantity: 20,   # refills: 2  Last Office Visit: 7/8/19  Future Office visit:  11/4/19    CBC RESULTS:   Recent Labs   Lab Test 09/07/19  1013   WBC 6.0   RBC 4.64   HGB 13.5   HCT 42.0   MCV 91   MCH 29.1   MCHC 32.1   RDW 13.0          Creatinine   Date Value Ref Range Status   09/07/2019 0.59 0.52 - 1.04 mg/dL Final   ]    Liver Function Studies -   Recent Labs   Lab Test 09/07/19  1013  10/31/18  1505   PROTTOTAL  --   --  7.7   ALBUMIN 3.4   < > 3.5   BILITOTAL  --   --  0.2   ALKPHOS  --   --  77   AST 19   < > 16   ALT 20   < > 22    < > = values in this interval not displayed.       Routing refill request to provider for review/approval because: provider review

## 2019-10-17 ENCOUNTER — OFFICE VISIT (OUTPATIENT)
Dept: PSYCHOLOGY | Facility: CLINIC | Age: 29
End: 2019-10-17
Payer: COMMERCIAL

## 2019-10-17 DIAGNOSIS — F41.9 ANXIETY: ICD-10-CM

## 2019-10-17 DIAGNOSIS — F33.0 MILD EPISODE OF RECURRENT MAJOR DEPRESSIVE DISORDER (H): Primary | ICD-10-CM

## 2019-10-17 NOTE — PROGRESS NOTES
"  Health Psychology                  Clinic    Department of Medicine  Nati Pompa, PhD, LP [phone]                          Clinics and Surgery Center  [facility] Shruthi Diaz, PhD, LP [phone]                  3rd Floor  [place] Mail Code 741   Lee Reese, PhD, ABPP, LP [phone] [address] [facility], [address],  Key Cunha,  PhD, LP [phone]            [place] [address] Mayra Parker, PhD, LP [phone]    Health Psychology Follow-Up Note    SUBJECTIVE:  Rachel Fried is a 28 year old female with PMH significant for rheumatoid arthritis, depression, and anxiety who was seen for individual psychotherapy.  Endorsed feeling as if she had multiple \"wins\" since the last session including ability to be more open about her sexual orientation with family, coworkers, and friends.  Reported feeling increasingly positive about social connections in the community as well as at work.  Feels as if the ability to have success in making progress towards feeling more connected in her social life, has helped her feel as if she can be have more space to improve health.  Reported desire today to work towards improving her relationship with food.  Discussed previous patterns of using food as a way to reward herself, relax, take a break from work, and cope with loneliness/boredom.  Discussed ways to find alternative behaviors to the afternoon craving for sugar at work.  Discussed psychological factors that influence habit change such as being aware of cues for the habit as well as the reward she gets from the behavior in order to understand ways to shift her relationship with food.  Discussed how this interfaces with her long-standing habit of holding herself to high expectations, self-criticism, and perfectionism.  Discussed ways to find a compassionate reaction to herself with food choices and speak to herself like she would speak to a " close friend when struggling with negative self talk pertaining to food choices.    OBJECTIVE:  Appearance/Behavior/Orientation: Patient was on time, appropriately groomed and dressed, and demonstrated good eye contact. Alert and oriented to person, place, time, and situation. No evidence of psychomotor agitation.     Cooperation/Reliability: Patient was open and cooperative throughout the session.    Abstract reasoning: Not assessed.   Judgment: Intact.    Mood/Affect: Mood euthymic; appropriate range of affect.    Insight/Motivation: Good, good    ASSESSMENT:  Engaged and motivated for therapy. Appears to be deriving benefit from treatment.     DIAGNOSIS:  Major depressive disorder, recurrent, mild  Anxiety, unspecified    PLAN:  RTC for continued psychotherapy.     Time in: 4:06  Time out: 5:00  Extended session due to complexity of case and length of interval.    Key Cunha, PhD, LP  Clinical Health Psychologist    Tx plan completed: 4/16/19  Tx plan due:  4/16/20    *no letter

## 2019-10-28 ENCOUNTER — TELEPHONE (OUTPATIENT)
Dept: RHEUMATOLOGY | Facility: CLINIC | Age: 29
End: 2019-10-28

## 2019-11-04 ENCOUNTER — OFFICE VISIT (OUTPATIENT)
Dept: RHEUMATOLOGY | Facility: CLINIC | Age: 29
End: 2019-11-04
Attending: NURSE PRACTITIONER
Payer: COMMERCIAL

## 2019-11-04 VITALS
SYSTOLIC BLOOD PRESSURE: 114 MMHG | HEART RATE: 75 BPM | HEIGHT: 69 IN | DIASTOLIC BLOOD PRESSURE: 71 MMHG | TEMPERATURE: 98.2 F | WEIGHT: 210.8 LBS | BODY MASS INDEX: 31.22 KG/M2 | OXYGEN SATURATION: 99 %

## 2019-11-04 DIAGNOSIS — M06.09 RHEUMATOID ARTHRITIS OF MULTIPLE SITES WITH NEGATIVE RHEUMATOID FACTOR (H): ICD-10-CM

## 2019-11-04 DIAGNOSIS — Z79.899 LONG-TERM USE OF PLAQUENIL: ICD-10-CM

## 2019-11-04 DIAGNOSIS — Z79.899 LONG-TERM CURRENT USE OF HIGH RISK MEDICATION OTHER THAN ANTICOAGULANT: Primary | ICD-10-CM

## 2019-11-04 DIAGNOSIS — R76.8 CYCLIC CITRULLINATED PEPTIDE (CCP) ANTIBODY POSITIVE: ICD-10-CM

## 2019-11-04 LAB
ALBUMIN SERPL-MCNC: 3.2 G/DL (ref 3.4–5)
ALT SERPL W P-5'-P-CCNC: 21 U/L (ref 0–50)
AST SERPL W P-5'-P-CCNC: 17 U/L (ref 0–45)
CREAT SERPL-MCNC: 0.63 MG/DL (ref 0.52–1.04)
CRP SERPL-MCNC: 6.4 MG/L (ref 0–8)
ERYTHROCYTE [DISTWIDTH] IN BLOOD BY AUTOMATED COUNT: 13.2 % (ref 10–15)
GFR SERPL CREATININE-BSD FRML MDRD: >90 ML/MIN/{1.73_M2}
HCT VFR BLD AUTO: 40.1 % (ref 35–47)
HGB BLD-MCNC: 13 G/DL (ref 11.7–15.7)
MCH RBC QN AUTO: 29.1 PG (ref 26.5–33)
MCHC RBC AUTO-ENTMCNC: 32.4 G/DL (ref 31.5–36.5)
MCV RBC AUTO: 90 FL (ref 78–100)
PLATELET # BLD AUTO: 296 10E9/L (ref 150–450)
RBC # BLD AUTO: 4.46 10E12/L (ref 3.8–5.2)
WBC # BLD AUTO: 6.8 10E9/L (ref 4–11)

## 2019-11-04 PROCEDURE — 86140 C-REACTIVE PROTEIN: CPT | Performed by: NURSE PRACTITIONER

## 2019-11-04 PROCEDURE — G0463 HOSPITAL OUTPT CLINIC VISIT: HCPCS | Mod: ZF

## 2019-11-04 PROCEDURE — 85027 COMPLETE CBC AUTOMATED: CPT | Performed by: NURSE PRACTITIONER

## 2019-11-04 PROCEDURE — 36415 COLL VENOUS BLD VENIPUNCTURE: CPT | Performed by: NURSE PRACTITIONER

## 2019-11-04 PROCEDURE — 82565 ASSAY OF CREATININE: CPT | Performed by: NURSE PRACTITIONER

## 2019-11-04 PROCEDURE — 84460 ALANINE AMINO (ALT) (SGPT): CPT | Performed by: NURSE PRACTITIONER

## 2019-11-04 PROCEDURE — 82040 ASSAY OF SERUM ALBUMIN: CPT | Performed by: NURSE PRACTITIONER

## 2019-11-04 PROCEDURE — 84450 TRANSFERASE (AST) (SGOT): CPT | Performed by: NURSE PRACTITIONER

## 2019-11-04 RX ORDER — FOLIC ACID 1 MG/1
3 TABLET ORAL DAILY
Qty: 240 TABLET | Refills: 0 | Status: SHIPPED | OUTPATIENT
Start: 2019-11-04 | End: 2020-02-03

## 2019-11-04 ASSESSMENT — DISEASE ACTIVITY SCORE (DAS28)
CRP_MG_PER_LITER: 1.68
CRP_MG_PER_LITER: REMISSION <2.6

## 2019-11-04 ASSESSMENT — CLINICAL DISEASE ACTIVITY INDEX (CDAI): TOTAL_SCORE: 0

## 2019-11-04 ASSESSMENT — MIFFLIN-ST. JEOR: SCORE: 1745.56

## 2019-11-04 ASSESSMENT — PAIN SCALES - GENERAL: PAINLEVEL: NO PAIN (0)

## 2019-11-04 ASSESSMENT — JOINT PAIN: TOTAL NUMBER OF SWOLLEN JOINTS: 0

## 2019-11-04 NOTE — PROGRESS NOTES
[facility] - Rheumatology Clinic Visit    Rachel Fried  is a 28 year old here for follow-up  RHEUMATOID ARTHRITIS (RA) involving hands, wrists, ankles, shoulders feet [ -RF and low CCP 25, -factor V -KAVITHA by IFA and -NADEEN panel -HLA B27] and pityriasis lichenoides chronica (seen [facility]-established here). XR  no erosions hands. Moved from [place] to be with family.       Past- hydroxychloroquine (plaquenil) since 1-2016 (2 tablet a day caused diarrhea, ok on 1 tablet) and methotrexate 10 mg week  to 4-2018 (mild fatigue day of) then restarted 10- (higher folic acid improved side-effects), naproxsyn; allergy sulfa, prednisone, advil       Initial visit copy forward  10-: Hydroxychloroquine (plaquenil) BID mild diarrhea; ocular eye exam 3-2016 baseline reports recent eye exam   EKG was recently Feb 2018 normal. Stopped methotrexate  In April as wanted to take a drug holiday and was moving here, also wanted to drink some alcohol, and discuss options. No infections. Diffuse joint pains in hands, wrists, shoulders, ankles and feet and the sides of her hips. Taking advil 600mg AM and 400 mg PM, tolerating but taking since April. Pain severe 5/10 or more. Stiffness all day. Hands are sore. No loss of function or ROM. Very sore and stiff all over. Knees are sore and hard to bend or squat. Mild diarrhea with hydroxychloroquine (plaquenil)     November 4, 2019  Last seen July 8, 2019- no changes except adjust methotrexate  By 2.5 mg      Continues on methotrexate  12.5 mg every 7 days THURS, folic acid 2 mg day (mild fatigue and no other side-effects like mouth sores or hairloss). Tolerating. Hydroxychloroquine (plaquenil) 200 mg every day resolved diarrhea on lower dose but misses most days so takes <3 times a week. No GI side-effects, no hairloss or oral sores. Headaches she notices for many months, like she does not wear a pony tail. She is not sure why this is, does  have poor vision, and some upper back spine issue --no neuro symptoms. Missed her methotrexate last week, but did not feel fatigue or headache improve with missing this.      Rheumatoid arthritis (RA) is good and controlled. She does not wish to change her medication doses at this time. Able to bend knees and make fist formation. Exercising and doing yoga. Dramatic improvement, mild morning stiffness in knees no swelling and is good when she walks to work. Full ROM of knees. No longer having right hand swelling or pain (it was mostly in her fingers and PIPs joints, will use compression gloves to help, is able to function still but discomfort is the mornings and PMs--this is all resolved). Takes tylenol the mornings, prn. Not pregnant and abstaining from intercourse and aware of the risks with methotrexate. Denies any fever, chills, SOB, CANDELARIA, night sweats, or chest pain, or cough. Reports healthy      No longer using NSAIDs only rarely. Taking vitamin D 2000 international unit(s) day.  No EAS. No vision issues. Is exercising and doing yogo now. Denies any fever, chills, SOB, CANDELARIA, night sweats, or chest pain, or cough. Reports healthy.      PMH:  Injury-no  Medical-dermatitis, (bx 2-2016 GA vs LP vs cutaneous lupus--was not cutenaous lupus --focal interstitial lymphocytic inflammation, scattered lymphocytes), anxiety and depression, exercise induced asthma, scoliosis, pityriasis lichenoides chronica, chronic back pain, hypertension, seasonal allergies, acne, motion sickness, high cholesterol, chicken pox, headaches before    Surgical-None     FH:  No autoimmune disorders, psoriasis, UC, crohn's, RA, PsA, gout, autoimmune thyroid.  No MS, heart disease or cancer in family  Mother-factor V leiden carrier, rosea   Father-healthy  Siblings-younger bro healthy  Children-None   GF-lupus   Uncle cancer colon    SH:  Social Alcohol occasional week. Never Smoking. No IVDU. No Children. Right handed. Single--not sexually  "active, never.        [facility] personally reviewed and updated by me.    ROS:  Negative raynaud s phenomena, hairloss, sun sensitivities, keratoconjunctivitis sicca, pleurisy, inflammatory joint symptoms, significant rashes like malar, oral/nasal or ulcerations, inflammatory eye disease, inflammatory bowel disease, dactylitis, tenosynovitis, or enthespathy. Negative for  blood clots, gout, psoriasis, UC, crohn's. No temporal headache, no jaw claudication, no scalp tenderness, vision changes, carotidynia, cough. No STD or pregnancy symptoms. No Parotid swelling.   +tips finger white at times and toes resolved   CONSTITUTIONAL: No fevers, night sweats or unintentional weight change. No acute distress, swollen glands  EYES: No vision change, diplopia, pain in eyes or red eyes   EARS, NOSE, MOUTH, THROAT: No tinnitus or hearing change, no epistaxis or nasal discharge, no oral lesions, throat clear. Normal saliva pool.  No drymouth. No thyroid enlargement.   CARDIOVASCULAR: No chest pain, palpitations, or pain with walking, no orthopnea or PND   RESPIRATORY: No dyspnea, cough, shortness of breath or wheezing. No pleurisy.   GI: No nausea, vomiting, diarrhea or constipation, no abdominal pain, or blood in stools.   : No change in urine, no dysuria or hematuria   MUSCKL: No enthesitis, plantar fascitis or heel pain. See above   INTEGUMENTARY: No concerning lesions or moles   NEURO: No loss of strength or sensation, no numbness or tingling, no tremor, no dizziness, no headache. No falls   ENDO: No polyuria or polydipsia, no temperature intolerance   HEME/LYMPH:No concerning bumps, bleeding problems, or swollen lymph nodes. No recent infections, hospitalizations or new illnesses.   ALLERGY: No environmental allergies   Otherwise 14 point ROS obtained, reviewed and found negative.     Physical exam:  Vitals: Blood pressure 114/71, pulse 75, temperature 98.2  F (36.8  C), temperature source Oral, height 1.753 m (5' 9\"), " weight 95.6 kg (210 lb 12.8 oz), SpO2 99 %, not currently breastfeeding.  Wt Readings from Last 4 Encounters:   11/04/19 95.6 kg (210 lb 12.8 oz)   07/08/19 96.3 kg (212 lb 3.2 oz)   03/06/19 95 kg (209 lb 8 oz)   01/22/19 97.1 kg (214 lb)     Constitutional: WD-WN-WG cooperative  Eyes: nl EOM, PERRLA, vision, conjunctiva, sclera, No Unilateral or bilateral external ear inflammation   ENT: nl external ears, nose, hearing, lips, teeth, gums, throat. No saddle nose   Neck: no mass or thyroid enlargement  Resp: lungs clear to auscultation, nl to palpation, nl effort  CV: RRR, no murmurs, rubs or gallops, no edema  GI: no ABD mass or tenderness, no HSM  : not tested  Lymph: no cervical or epitrochlear nodes  MS: All TMJ, neck, shoulder, elbow, wrist, hand, spine, hip, knee, ankle, and foot joints were examined and otherwise found normal. No deformity. Full ROM. Normal prayer sign. Negative Lhermitte's sign. No periuncle erythema.  Tender upper back   Skin: no nail pitting, alopecia, rash.   Neuro: nl cranial nerves, strength, sensation, DTRs.   Psych: nl judgement, orientation, memory, affect.      Labs/Imaging:    Results for orders placed or performed in visit on 11/04/19   CRP inflammation     Status: None   Result Value Ref Range    CRP Inflammation 6.4 0.0 - 8.0 mg/L   Creatinine     Status: None   Result Value Ref Range    Creatinine 0.63 0.52 - 1.04 mg/dL    GFR Estimate >90 >60 mL/min/[1.73_m2]    GFR Estimate If Black >90 >60 mL/min/[1.73_m2]   Albumin level     Status: Abnormal   Result Value Ref Range    Albumin 3.2 (L) 3.4 - 5.0 g/dL   ALT     Status: None   Result Value Ref Range    ALT 21 0 - 50 U/L   AST     Status: None   Result Value Ref Range    AST 17 0 - 45 U/L   CBC with platelets     Status: None   Result Value Ref Range    WBC 6.8 4.0 - 11.0 10e9/L    RBC Count 4.46 3.8 - 5.2 10e12/L    Hemoglobin 13.0 11.7 - 15.7 g/dL    Hematocrit 40.1 35.0 - 47.0 %    MCV 90 78 - 100 fl    MCH 29.1 26.5 -  33.0 pg    MCHC 32.4 31.5 - 36.5 g/dL    RDW 13.2 10.0 - 15.0 %    Platelet Count 296 150 - 450 10e9/L      Normal G6PD  Neg MTB QG 2016 and hepatitis panel, negative HIV    XR hands and knees-negative 2016      Impression/Plan:  1. Seropositive RHEUMATOID ARTHRITIS (RA), non-erosive remission activity. Poor prognosis. Prior control on low dose methotrexate and hydroxychloroquine (plaquenil), and now controlled again on this therapy. Discussed risks with methotrexate, infection risks, pregnancy (abstince and knows needs to be off for 3-6 month before trying to concieve) seek immediate medical attention if any signs or symptoms infections. And monitor for side-effects, will abstain from intercourse (will see OBGYN discussed IUD or double protection-she discussed with PCP) and will limit 0-2 week not day or after. Rheumatoid arthritis (RA) moderate=SHELL 28 with normal CRP as well.     High risk medications, labs today normal. Labs every 8 week     2. Longterm hydroxychloroquine (plaquenil) use. Recent opthalmologist  no toxicity. Diarrhea side-effects at BID dosing, will continue 1 tablet 4 times a week, daily if flares.   3. Headaches, upper back pain, fatigue-follow-up with PCP. She does not wish to stop methotrexate for a trial to see if this may be contributing.   4. Vitamin D deficeincy- take 2000 international unit(s) day. 41 on 3-2019     4. Pityriasis lichenoids chronic-per derm   5. Past Chronic NSAID use, informed her of risks and possible side-effects to this, and including CVD/kidney disease. Off now     Past prednisone 15-10-5 mg each for 2 weeks then stop   Methotrexate  12.5 mg once every THUR, folic acid  3 mg day-adjust   No NSAID use  Annual opthalmologist  Exam for hydroxychloroquine (plaquenil) use.  Hydroxychloroquine (plaquenil) 200 mg once day or 4 time week if doing well    RTC 3 month with labs.   Discussed if she flares to consider new study with Dr. Webb if agrees, they may call  her      The patient understood the rationale for the diagnosis and treatment plan. Patient shared in the decision making. All questions were answered to best of my ability and the patient's satisfaction  Zurdo CORDERO, CNP, MSN  [facility]  Department of Rheumatology & Autoimmune Disorders    1. Immunization: Reviewed CDC guidelines with patient updated, information provided to patient based on CDC guidelines for vaccination recommendations, patient will discuss with primary provider  2. Bone Health:Educated on adequate calcium and vitamin D intake, Educated on exercise, Glucocorticoid education discussed risks, benefits & potential long term side effects from use  3. Discussed routine annual physicals, cancer screening, cardiac risk monitoring, bone health and primary care with primary care provider.   4. Educated on diagnosis, prognosis, labs, imaging, treatment options (including risks, benefits, risk of no treatment), medications (use, dose, side-effects, risks of medications including infection/cancer/bone marrow suppression, lab monitoring), infections what to do, plan of cares, goals of treatment.

## 2019-11-04 NOTE — RESULT ENCOUNTER NOTE
The blood counts, liver, kidney and CRP inflammation labs are normal.     Zurdo CORDERO, CNP, MSN  11/4/2019  11:04 AM

## 2019-11-04 NOTE — LETTER
11/4/2019      RE: Rachel Fried  [address]  [facility] [address]       [facility] - Rheumatology Clinic Visit    Rachel Fried  is a 28 year old here for follow-up  RHEUMATOID ARTHRITIS (RA) involving hands, wrists, ankles, shoulders feet [ -RF and low CCP 25, -factor V -KAVITHA by IFA and -NADEEN panel -HLA B27] and pityriasis lichenoides chronica (seen [facility]-established here). XR  no erosions hands. Moved from [place] to be with family.       Past- hydroxychloroquine (plaquenil) since 1-2016 (2 tablet a day caused diarrhea, ok on 1 tablet) and methotrexate 10 mg week  to 4-2018 (mild fatigue day of) then restarted 10- (higher folic acid improved side-effects), naproxsyn; allergy sulfa, prednisone, advil       Initial visit copy forward  10-: Hydroxychloroquine (plaquenil) BID mild diarrhea; ocular eye exam 3-2016 baseline reports recent eye exam   EKG was recently Feb 2018 normal. Stopped methotrexate  In April as wanted to take a drug holiday and was moving here, also wanted to drink some alcohol, and discuss options. No infections. Diffuse joint pains in hands, wrists, shoulders, ankles and feet and the sides of her hips. Taking advil 600mg AM and 400 mg PM, tolerating but taking since April. Pain severe 5/10 or more. Stiffness all day. Hands are sore. No loss of function or ROM. Very sore and stiff all over. Knees are sore and hard to bend or squat. Mild diarrhea with hydroxychloroquine (plaquenil)     November 4, 2019  Last seen July 8, 2019- no changes except adjust methotrexate  By 2.5 mg      Continues on methotrexate  12.5 mg every 7 days THURS, folic acid 2 mg day (mild fatigue and no other side-effects like mouth sores or hairloss). Tolerating. Hydroxychloroquine (plaquenil) 200 mg every day resolved diarrhea on lower dose but misses most days so takes <3 times a week. No GI side-effects, no hairloss or oral sores. Headaches she  notices for many months, like she does not wear a pony tail. She is not sure why this is, does have poor vision, and some upper back spine issue --no neuro symptoms. Missed her methotrexate last week, but did not feel fatigue or headache improve with missing this.      Rheumatoid arthritis (RA) is good and controlled. She does not wish to change her medication doses at this time. Able to bend knees and make fist formation. Exercising and doing yoga. Dramatic improvement, mild morning stiffness in knees no swelling and is good when she walks to work. Full ROM of knees. No longer having right hand swelling or pain (it was mostly in her fingers and PIPs joints, will use compression gloves to help, is able to function still but discomfort is the mornings and PMs--this is all resolved). Takes tylenol the mornings, prn. Not pregnant and abstaining from intercourse and aware of the risks with methotrexate. Denies any fever, chills, SOB, CANDELARIA, night sweats, or chest pain, or cough. Reports healthy      No longer using NSAIDs only rarely. Taking vitamin D 2000 international unit(s) day.  No EAS. No vision issues. Is exercising and doing yogo now. Denies any fever, chills, SOB, CANDELARIA, night sweats, or chest pain, or cough. Reports healthy.      PMH:  Injury-no  Medical-dermatitis, (bx 2-2016 GA vs LP vs cutaneous lupus--was not cutenaous lupus --focal interstitial lymphocytic inflammation, scattered lymphocytes), anxiety and depression, exercise induced asthma, scoliosis, pityriasis lichenoides chronica, chronic back pain, hypertension, seasonal allergies, acne, motion sickness, high cholesterol, chicken pox, headaches before    Surgical-None     FH:  No autoimmune disorders, psoriasis, UC, crohn's, RA, PsA, gout, autoimmune thyroid.  No MS, heart disease or cancer in family  Mother-factor V leiden carrier, rosea   Father-healthy  Siblings-younger bro healthy  Children-None   GF-lupus   Uncle cancer colon    SH:  Social Alcohol  occasional week. Never Smoking. No IVDU. No Children. Right handed. Single--not sexually active, never.        [facility] personally reviewed and updated by me.    ROS:  Negative raynaud s phenomena, hairloss, sun sensitivities, keratoconjunctivitis sicca, pleurisy, inflammatory joint symptoms, significant rashes like malar, oral/nasal or ulcerations, inflammatory eye disease, inflammatory bowel disease, dactylitis, tenosynovitis, or enthespathy. Negative for  blood clots, gout, psoriasis, UC, crohn's. No temporal headache, no jaw claudication, no scalp tenderness, vision changes, carotidynia, cough. No STD or pregnancy symptoms. No Parotid swelling.   +tips finger white at times and toes resolved   CONSTITUTIONAL: No fevers, night sweats or unintentional weight change. No acute distress, swollen glands  EYES: No vision change, diplopia, pain in eyes or red eyes   EARS, NOSE, MOUTH, THROAT: No tinnitus or hearing change, no epistaxis or nasal discharge, no oral lesions, throat clear. Normal saliva pool.  No drymouth. No thyroid enlargement.   CARDIOVASCULAR: No chest pain, palpitations, or pain with walking, no orthopnea or PND   RESPIRATORY: No dyspnea, cough, shortness of breath or wheezing. No pleurisy.   GI: No nausea, vomiting, diarrhea or constipation, no abdominal pain, or blood in stools.   : No change in urine, no dysuria or hematuria   MUSCKL: No enthesitis, plantar fascitis or heel pain. See above   INTEGUMENTARY: No concerning lesions or moles   NEURO: No loss of strength or sensation, no numbness or tingling, no tremor, no dizziness, no headache. No falls   ENDO: No polyuria or polydipsia, no temperature intolerance   HEME/LYMPH:No concerning bumps, bleeding problems, or swollen lymph nodes. No recent infections, hospitalizations or new illnesses.   ALLERGY: No environmental allergies   Otherwise 14 point ROS obtained, reviewed and found negative.     Physical exam:  Vitals: Blood pressure 114/71,  "pulse 75, temperature 98.2  F (36.8  C), temperature source Oral, height 1.753 m (5' 9\"), weight 95.6 kg (210 lb 12.8 oz), SpO2 99 %, not currently breastfeeding.  Wt Readings from Last 4 Encounters:   11/04/19 95.6 kg (210 lb 12.8 oz)   07/08/19 96.3 kg (212 lb 3.2 oz)   03/06/19 95 kg (209 lb 8 oz)   01/22/19 97.1 kg (214 lb)     Constitutional: WD-WN-WG cooperative  Eyes: nl EOM, PERRLA, vision, conjunctiva, sclera, No Unilateral or bilateral external ear inflammation   ENT: nl external ears, nose, hearing, lips, teeth, gums, throat. No saddle nose   Neck: no mass or thyroid enlargement  Resp: lungs clear to auscultation, nl to palpation, nl effort  CV: RRR, no murmurs, rubs or gallops, no edema  GI: no ABD mass or tenderness, no HSM  : not tested  Lymph: no cervical or epitrochlear nodes  MS: All TMJ, neck, shoulder, elbow, wrist, hand, spine, hip, knee, ankle, and foot joints were examined and otherwise found normal. No deformity. Full ROM. Normal prayer sign. Negative Lhermitte's sign. No periuncle erythema.  Tender upper back   Skin: no nail pitting, alopecia, rash.   Neuro: nl cranial nerves, strength, sensation, DTRs.   Psych: nl judgement, orientation, memory, affect.      Labs/Imaging:    Results for orders placed or performed in visit on 11/04/19   CRP inflammation     Status: None   Result Value Ref Range    CRP Inflammation 6.4 0.0 - 8.0 mg/L   Creatinine     Status: None   Result Value Ref Range    Creatinine 0.63 0.52 - 1.04 mg/dL    GFR Estimate >90 >60 mL/min/[1.73_m2]    GFR Estimate If Black >90 >60 mL/min/[1.73_m2]   Albumin level     Status: Abnormal   Result Value Ref Range    Albumin 3.2 (L) 3.4 - 5.0 g/dL   ALT     Status: None   Result Value Ref Range    ALT 21 0 - 50 U/L   AST     Status: None   Result Value Ref Range    AST 17 0 - 45 U/L   CBC with platelets     Status: None   Result Value Ref Range    WBC 6.8 4.0 - 11.0 10e9/L    RBC Count 4.46 3.8 - 5.2 10e12/L    Hemoglobin 13.0 11.7 " - 15.7 g/dL    Hematocrit 40.1 35.0 - 47.0 %    MCV 90 78 - 100 fl    MCH 29.1 26.5 - 33.0 pg    MCHC 32.4 31.5 - 36.5 g/dL    RDW 13.2 10.0 - 15.0 %    Platelet Count 296 150 - 450 10e9/L      Normal G6PD  Neg MTB QG 2016 and hepatitis panel, negative HIV    XR hands and knees-negative 2016      Impression/Plan:  1. Seropositive RHEUMATOID ARTHRITIS (RA), non-erosive remission activity. Poor prognosis. Prior control on low dose methotrexate and hydroxychloroquine (plaquenil), and now controlled again on this therapy. Discussed risks with methotrexate, infection risks, pregnancy (abstince and knows needs to be off for 3-6 month before trying to concieve) seek immediate medical attention if any signs or symptoms infections. And monitor for side-effects, will abstain from intercourse (will see OBGYN discussed IUD or double protection-she discussed with PCP) and will limit 0-2 week not day or after. Rheumatoid arthritis (RA) moderate=SHELL 28 with normal CRP as well.     High risk medications, labs today normal. Labs every 8 week     2. Longterm hydroxychloroquine (plaquenil) use. Recent opthalmologist  no toxicity. Diarrhea side-effects at BID dosing, will continue 1 tablet 4 times a week, daily if flares.   3. Headaches, upper back pain, fatigue-follow-up with PCP. She does not wish to stop methotrexate for a trial to see if this may be contributing.   4. Vitamin D deficeincy- take 2000 international unit(s) day. 41 on 3-2019     4. Pityriasis lichenoids chronic-per derm   5. Past Chronic NSAID use, informed her of risks and possible side-effects to this, and including CVD/kidney disease. Off now     Past prednisone 15-10-5 mg each for 2 weeks then stop   Methotrexate  12.5 mg once every THUR, folic acid  3 mg day-adjust   No NSAID use  Annual opthalmologist  Exam for hydroxychloroquine (plaquenil) use.  Hydroxychloroquine (plaquenil) 200 mg once day or 4 time week if doing well    RTC 3 month with labs.    Discussed if she flares to consider new study with Dr. Webb if agrees, they may call her      The patient understood the rationale for the diagnosis and treatment plan. Patient shared in the decision making. All questions were answered to best of my ability and the patient's satisfaction  Zurdo CORDERO, CNP, MSN  [facility]  Department of Rheumatology & Autoimmune Disorders    1. Immunization: Reviewed CDC guidelines with patient updated, information provided to patient based on CDC guidelines for vaccination recommendations, patient will discuss with primary provider  2. Bone Health:Educated on adequate calcium and vitamin D intake, Educated on exercise, Glucocorticoid education discussed risks, benefits & potential long term side effects from use  3. Discussed routine annual physicals, cancer screening, cardiac risk monitoring, bone health and primary care with primary care provider.   4. Educated on diagnosis, prognosis, labs, imaging, treatment options (including risks, benefits, risk of no treatment), medications (use, dose, side-effects, risks of medications including infection/cancer/bone marrow suppression, lab monitoring), infections what to do, plan of cares, goals of treatment.         GIL Kim CNP

## 2019-11-04 NOTE — LETTER
11/4/2019       RE: Rachel Fried  [address]  [facility] [address]     Dear Colleague,    Thank you for referring your patient, Rachel Fried, to the [facility] RHEUMATOLOGY at [facility]. Please see a copy of my visit note below.    [facility] - Rheumatology Clinic Visit    Rachel Fried  is a 28 year old here for follow-up  RHEUMATOID ARTHRITIS (RA) involving hands, wrists, ankles, shoulders feet [ -RF and low CCP 25, -factor V -KAVITHA by IFA and -NADEEN panel -HLA B27] and pityriasis lichenoides chronica (seen [facility]-established here). XR  no erosions hands. Moved from [place] to be with family.       Past- hydroxychloroquine (plaquenil) since 1-2016 (2 tablet a day caused diarrhea, ok on 1 tablet) and methotrexate 10 mg week  to 4-2018 (mild fatigue day of) then restarted 10- (higher folic acid improved side-effects), naproxsyn; allergy sulfa, prednisone, advil       Initial visit copy forward  10-: Hydroxychloroquine (plaquenil) BID mild diarrhea; ocular eye exam 3-2016 baseline reports recent eye exam   EKG was recently Feb 2018 normal. Stopped methotrexate  In April as wanted to take a drug holiday and was moving here, also wanted to drink some alcohol, and discuss options. No infections. Diffuse joint pains in hands, wrists, shoulders, ankles and feet and the sides of her hips. Taking advil 600mg AM and 400 mg PM, tolerating but taking since April. Pain severe 5/10 or more. Stiffness all day. Hands are sore. No loss of function or ROM. Very sore and stiff all over. Knees are sore and hard to bend or squat. Mild diarrhea with hydroxychloroquine (plaquenil)     November 4, 2019  Last seen July 8, 2019- no changes except adjust methotrexate  By 2.5 mg      Continues on methotrexate  12.5 mg every 7 days THURS, folic acid 2 mg day (mild fatigue and no other side-effects like mouth sores or hairloss).  Tolerating. Hydroxychloroquine (plaquenil) 200 mg every day resolved diarrhea on lower dose but misses most days so takes <3 times a week. No GI side-effects, no hairloss or oral sores. Headaches she notices for many months, like she does not wear a pony tail. She is not sure why this is, does have poor vision, and some upper back spine issue --no neuro symptoms. Missed her methotrexate last week, but did not feel fatigue or headache improve with missing this.      Rheumatoid arthritis (RA) is good and controlled. She does not wish to change her medication doses at this time. Able to bend knees and make fist formation. Exercising and doing yoga. Dramatic improvement, mild morning stiffness in knees no swelling and is good when she walks to work. Full ROM of knees. No longer having right hand swelling or pain (it was mostly in her fingers and PIPs joints, will use compression gloves to help, is able to function still but discomfort is the mornings and PMs--this is all resolved). Takes tylenol the mornings, prn. Not pregnant and abstaining from intercourse and aware of the risks with methotrexate. Denies any fever, chills, SOB, CANDELARIA, night sweats, or chest pain, or cough. Reports healthy      No longer using NSAIDs only rarely. Taking vitamin D 2000 international unit(s) day.  No EAS. No vision issues. Is exercising and doing yogo now. Denies any fever, chills, SOB, CANDELARIA, night sweats, or chest pain, or cough. Reports healthy.      PMH:  Injury-no  Medical-dermatitis, (bx 2-2016 [place] vs LP vs cutaneous lupus--was not cutenaous lupus --focal interstitial lymphocytic inflammation, scattered lymphocytes), anxiety and depression, exercise induced asthma, scoliosis, pityriasis lichenoides chronica, chronic back pain, hypertension, seasonal allergies, acne, motion sickness, high cholesterol, chicken pox, headaches before    Surgical-None     FH:  No autoimmune disorders, psoriasis, UC, crohn's, RA, PsA, gout, autoimmune  thyroid.  No MS, heart disease or cancer in family  Mother-factor V leiden carrier, rosea   Father-healthy  Siblings-younger bro healthy  Children-None   GF-lupus   Uncle cancer colon    SH:  Social Alcohol occasional week. Never Smoking. No IVDU. No Children. Right handed. Single--not sexually active, never.        [facility] personally reviewed and updated by me.    ROS:  Negative raynaud s phenomena, hairloss, sun sensitivities, keratoconjunctivitis sicca, pleurisy, inflammatory joint symptoms, significant rashes like malar, oral/nasal or ulcerations, inflammatory eye disease, inflammatory bowel disease, dactylitis, tenosynovitis, or enthespathy. Negative for  blood clots, gout, psoriasis, UC, crohn's. No temporal headache, no jaw claudication, no scalp tenderness, vision changes, carotidynia, cough. No STD or pregnancy symptoms. No Parotid swelling.   +tips finger white at times and toes resolved   CONSTITUTIONAL: No fevers, night sweats or unintentional weight change. No acute distress, swollen glands  EYES: No vision change, diplopia, pain in eyes or red eyes   EARS, NOSE, MOUTH, THROAT: No tinnitus or hearing change, no epistaxis or nasal discharge, no oral lesions, throat clear. Normal saliva pool.  No drymouth. No thyroid enlargement.   CARDIOVASCULAR: No chest pain, palpitations, or pain with walking, no orthopnea or PND   RESPIRATORY: No dyspnea, cough, shortness of breath or wheezing. No pleurisy.   GI: No nausea, vomiting, diarrhea or constipation, no abdominal pain, or blood in stools.   : No change in urine, no dysuria or hematuria   MUSCKL: No enthesitis, plantar fascitis or heel pain. See above   INTEGUMENTARY: No concerning lesions or moles   NEURO: No loss of strength or sensation, no numbness or tingling, no tremor, no dizziness, no headache. No falls   ENDO: No polyuria or polydipsia, no temperature intolerance   HEME/LYMPH:No concerning bumps, bleeding problems, or swollen lymph nodes. No  "recent infections, hospitalizations or new illnesses.   ALLERGY: No environmental allergies   Otherwise 14 point ROS obtained, reviewed and found negative.     Physical exam:  Vitals: Blood pressure 114/71, pulse 75, temperature 98.2  F (36.8  C), temperature source Oral, height 1.753 m (5' 9\"), weight 95.6 kg (210 lb 12.8 oz), SpO2 99 %, not currently breastfeeding.  Wt Readings from Last 4 Encounters:   11/04/19 95.6 kg (210 lb 12.8 oz)   07/08/19 96.3 kg (212 lb 3.2 oz)   03/06/19 95 kg (209 lb 8 oz)   01/22/19 97.1 kg (214 lb)     Constitutional: WD-WN-WG cooperative  Eyes: nl EOM, PERRLA, vision, conjunctiva, sclera, No Unilateral or bilateral external ear inflammation   ENT: nl external ears, nose, hearing, lips, teeth, gums, throat. No saddle nose   Neck: no mass or thyroid enlargement  Resp: lungs clear to auscultation, nl to palpation, nl effort  CV: RRR, no murmurs, rubs or gallops, no edema  GI: no ABD mass or tenderness, no HSM  : not tested  Lymph: no cervical or epitrochlear nodes  MS: All TMJ, neck, shoulder, elbow, wrist, hand, spine, hip, knee, ankle, and foot joints were examined and otherwise found normal. No deformity. Full ROM. Normal prayer sign. Negative Lhermitte's sign. No periuncle erythema.  Tender upper back   Skin: no nail pitting, alopecia, rash.   Neuro: nl cranial nerves, strength, sensation, DTRs.   Psych: nl judgement, orientation, memory, affect.      Labs/Imaging:    Results for orders placed or performed in visit on 11/04/19   CRP inflammation     Status: None   Result Value Ref Range    CRP Inflammation 6.4 0.0 - 8.0 mg/L   Creatinine     Status: None   Result Value Ref Range    Creatinine 0.63 0.52 - 1.04 mg/dL    GFR Estimate >90 >60 mL/min/[1.73_m2]    GFR Estimate If Black >90 >60 mL/min/[1.73_m2]   Albumin level     Status: Abnormal   Result Value Ref Range    Albumin 3.2 (L) 3.4 - 5.0 g/dL   ALT     Status: None   Result Value Ref Range    ALT 21 0 - 50 U/L   AST     " Status: None   Result Value Ref Range    AST 17 0 - 45 U/L   CBC with platelets     Status: None   Result Value Ref Range    WBC 6.8 4.0 - 11.0 10e9/L    RBC Count 4.46 3.8 - 5.2 10e12/L    Hemoglobin 13.0 11.7 - 15.7 g/dL    Hematocrit 40.1 35.0 - 47.0 %    MCV 90 78 - 100 fl    MCH 29.1 26.5 - 33.0 pg    MCHC 32.4 31.5 - 36.5 g/dL    RDW 13.2 10.0 - 15.0 %    Platelet Count 296 150 - 450 10e9/L      Normal G6PD  Neg MTB QG 2016 and hepatitis panel, negative HIV    XR hands and knees-negative 2016      Impression/Plan:  1. Seropositive RHEUMATOID ARTHRITIS (RA), non-erosive remission activity. Poor prognosis. Prior control on low dose methotrexate and hydroxychloroquine (plaquenil), and now controlled again on this therapy. Discussed risks with methotrexate, infection risks, pregnancy (abstince and knows needs to be off for 3-6 month before trying to concieve) seek immediate medical attention if any signs or symptoms infections. And monitor for side-effects, will abstain from intercourse (will see OBGYN discussed IUD or double protection-she discussed with PCP) and will limit 0-2 week not day or after. Rheumatoid arthritis (RA) moderate=SHELL 28 with normal CRP as well.     High risk medications, labs today normal. Labs every 8 week     2. Longterm hydroxychloroquine (plaquenil) use. Recent opthalmologist  no toxicity. Diarrhea side-effects at BID dosing, will continue 1 tablet 4 times a week, daily if flares.   3. Headaches, upper back pain, fatigue-follow-up with PCP. She does not wish to stop methotrexate for a trial to see if this may be contributing.   4. Vitamin D deficeincy- take 2000 international unit(s) day. 41 on 3-2019     4. Pityriasis lichenoids chronic-per derm   5. Past Chronic NSAID use, informed her of risks and possible side-effects to this, and including CVD/kidney disease. Off now     Past prednisone 15-10-5 mg each for 2 weeks then stop   Methotrexate  12.5 mg once every THUR, folic acid   3 mg day-adjust   No NSAID use  Annual opthalmologist  Exam for hydroxychloroquine (plaquenil) use.  Hydroxychloroquine (plaquenil) 200 mg once day or 4 time week if doing well    RTC 3 month with labs.   Discussed if she flares to consider new study with Dr. Webb if agrees, they may call her      The patient understood the rationale for the diagnosis and treatment plan. Patient shared in the decision making. All questions were answered to best of my ability and the patient's satisfaction  Zurdo CORDERO, CNP, MSN  [facility]  Department of Rheumatology & Autoimmune Disorders    1. Immunization: Reviewed CDC guidelines with patient updated, information provided to patient based on CDC guidelines for vaccination recommendations, patient will discuss with primary provider  2. Bone Health:Educated on adequate calcium and vitamin D intake, Educated on exercise, Glucocorticoid education discussed risks, benefits & potential long term side effects from use  3. Discussed routine annual physicals, cancer screening, cardiac risk monitoring, bone health and primary care with primary care provider.   4. Educated on diagnosis, prognosis, labs, imaging, treatment options (including risks, benefits, risk of no treatment), medications (use, dose, side-effects, risks of medications including infection/cancer/bone marrow suppression, lab monitoring), infections what to do, plan of cares, goals of treatment.         Again, thank you for allowing me to participate in the care of your patient.      Sincerely,    GIL Kim CNP

## 2019-11-12 DIAGNOSIS — F32.A DEPRESSION, UNSPECIFIED DEPRESSION TYPE: ICD-10-CM

## 2019-11-12 DIAGNOSIS — R53.83 OTHER FATIGUE: ICD-10-CM

## 2019-11-13 RX ORDER — CITALOPRAM HYDROBROMIDE 20 MG/1
20 TABLET ORAL DAILY
Qty: 90 TABLET | Refills: 3 | OUTPATIENT
Start: 2019-11-13

## 2019-12-09 ASSESSMENT — ENCOUNTER SYMPTOMS
POLYDIPSIA: 1
HALLUCINATIONS: 0
STIFFNESS: 1
POLYPHAGIA: 0
MYALGIAS: 0
CHILLS: 0
INSOMNIA: 0
DECREASED CONCENTRATION: 0
FEVER: 0
ARTHRALGIAS: 1
ALTERED TEMPERATURE REGULATION: 0
NIGHT SWEATS: 0
JOINT SWELLING: 0
FATIGUE: 1
WEIGHT GAIN: 0
MUSCLE WEAKNESS: 0
NECK PAIN: 1
PANIC: 0
DECREASED APPETITE: 0
POOR WOUND HEALING: 0
SKIN CHANGES: 0
NAIL CHANGES: 0
WEIGHT LOSS: 0
DEPRESSION: 1
INCREASED ENERGY: 0
BACK PAIN: 1
NERVOUS/ANXIOUS: 1
MUSCLE CRAMPS: 0

## 2019-12-10 ENCOUNTER — OFFICE VISIT (OUTPATIENT)
Dept: INTERNAL MEDICINE | Facility: CLINIC | Age: 29
End: 2019-12-10
Payer: COMMERCIAL

## 2019-12-10 VITALS
BODY MASS INDEX: 31.01 KG/M2 | WEIGHT: 210 LBS | SYSTOLIC BLOOD PRESSURE: 125 MMHG | TEMPERATURE: 98.2 F | HEART RATE: 74 BPM | OXYGEN SATURATION: 97 % | DIASTOLIC BLOOD PRESSURE: 82 MMHG

## 2019-12-10 DIAGNOSIS — M54.2 NECK PAIN: ICD-10-CM

## 2019-12-10 DIAGNOSIS — M06.09 RHEUMATOID ARTHRITIS OF MULTIPLE SITES WITH NEGATIVE RHEUMATOID FACTOR (H): ICD-10-CM

## 2019-12-10 DIAGNOSIS — Z00.00 ROUTINE HISTORY AND PHYSICAL EXAMINATION OF ADULT: Primary | ICD-10-CM

## 2019-12-10 ASSESSMENT — PAIN SCALES - GENERAL: PAINLEVEL: NO PAIN (1)

## 2019-12-10 NOTE — NURSING NOTE
Chief Complaint   Patient presents with     Physical     Kimberly Nissen, EMT at 8:19 AM on 12/10/2019

## 2019-12-10 NOTE — PROGRESS NOTES
PRIMARY CARE CENTER      SUBJECTIVE:     Rachel Fried is a 28 year old female with a PMHx of rheumatoid arthritis, pityriasis lichenoides, depression, anxiety, and acne vulgaris who presents for routine physical,  evaluation of pain and completion of forms.    Endorses chronic pain/tightness beginning as a teenager with pain in the paraspinal thoracic muscles with radiation to the neck, now with increasing severity and frequency. Pain increases with standing for limited periods (2-3 hours), causes HA, and affects sleep. Relieved somewhat by regular massages and heating pad. Inconsistent with RA symptoms and has not had recent flares otherwise.      Chronic Medical Problems  Has history of rheumatoid arthritis controlled on current medication regimen of methotrexate and HCQ. No longer taking NSAIDs.    Depression/anxiety somewhat controlled on celexa, although primarily attributes improvements in depression symptoms to having a pet. Not interested in medication changes currently with upcoming job stressors.    Health care maintenance reviewed  Last pap smear normal in 2017. Immunizations up to date.     MEDICATIONS/ALLERGIES:     Medications and allergies reviewed by me today.      ROS:     Constitutional, neuro, ENT, endocrine, pulmonary, cardiac, gastrointestinal, genitourinary, musculoskeletal, integument and psychiatric systems are negative, except as otherwise noted.     OBJECTIVE:     /82   Pulse 74   Temp 98.2  F (36.8  C) (Oral)   Wt 95.3 kg (210 lb)   LMP 12/08/2019 (Exact Date)   SpO2 97%   BMI 31.01 kg/m     Wt Readings from Last 1 Encounters:   12/10/19 95.3 kg (210 lb)       GENERAL APPEARANCE: healthy, alert and no distress     HENT: NCAT     NECK: no asymmetry, no palpable masses or muscle tightness    RESP: lungs clear to auscultation - no rales, rhonchi or wheezes     CV: regular rates and rhythm, normal S1 S2     ABDOMEN:  soft, nontender, bowel sounds normal      MS: extremities normal- no gross deformities noted, no evidence of inflammation in joints     SKIN: no suspicious lesions or rashes     NEURO:  mentation intact and speech normal     PSYCH: affect appropriate, does not appear to respond to internal stimuli      ASSESSMENT/PLAN:     Rachel was seen today for physical.    Diagnoses and all orders for this visit:    Routine physical    Neck pain  Mild kyphosis on exam. Provided with neck and back exercises. Discussed potential for transition of current anxiety/depression regimen to duloxetine for additional benefit of pain control, however, will defer until psychosocial stressors are reduced.        Pt should return to clinic for f/u with me in 6 weeks.     Options for treatment and follow-up care were reviewed with the patient. Rachel Fried engaged in the decision making process and verbalized understanding of the options discussed and agreed with the final plan.    Brandy Lara MD  PGY-2, Internal Medicine  Dec 10, 2019    Pt was seen and plan of care discussed with Dr. Mcmullen.       Answers for HPI/ROS submitted by the patient on 12/9/2019   General Symptoms: Yes  Skin Symptoms: Yes  HENT Symptoms: No  EYE SYMPTOMS: No  HEART SYMPTOMS: No  LUNG SYMPTOMS: No  INTESTINAL SYMPTOMS: No  URINARY SYMPTOMS: No  GYNECOLOGIC SYMPTOMS: No  BREAST SYMPTOMS: No  SKELETAL SYMPTOMS: Yes  BLOOD SYMPTOMS: No  NERVOUS SYSTEM SYMPTOMS: No  MENTAL HEALTH SYMPTOMS: Yes  Fever: No  Loss of appetite: No  Weight loss: No  Weight gain: No  Fatigue: Yes  Night sweats: No  Chills: No  Increased stress: No  Excessive hunger: No  Excessive thirst: Yes  Feeling hot or cold when others believe the temperature is normal: No  Loss of height: No  Post-operative complications: No  Surgical site pain: No  Hallucinations: No  Change in or Loss of Energy: No  Hyperactivity: No  Confusion: No  Changes in hair: No  Changes in moles/birth marks: No  Itching: No  Rashes: Yes  Changes in  nails: No  Acne: No  Hair in places you don't want it: No  Change in facial hair: No  Warts: No  Non-healing sores: No  Scarring: No  Flaking of skin: Yes  Color changes of hands/feet in cold : No  Sun sensitivity: No  Skin thickening: No  Back pain: Yes  Muscle aches: No  Neck pain: Yes  Swollen joints: No  Joint pain: Yes  Bone pain: No  Muscle cramps: No  Muscle weakness: No  Joint stiffness: Yes  Bone fracture: No  Nervous or Anxious: Yes  Depression: Yes  Trouble sleeping: No  Trouble thinking or concentrating: No  Mood changes: No  Panic attacks: No

## 2019-12-10 NOTE — PATIENT INSTRUCTIONS
Patient Education     Shoulder Exercises    To start, sit in a chair with your feet flat on the floor. Your weight should be slightly forward so that you re balanced evenly on your buttocks. Relax your shoulders and keep your head level. Avoid arching your back or rounding your shoulders. Using a chair with arms may help you keep your balance.    Raise your arms, elbows bent, to shoulder height.    Slowly move your forearms together. Hold for 5 seconds.    Return to starting position. Repeat 5 times.  Date Last Reviewed: 3/1/2018    9232-2264 [employer]. [address] [place] [address]. All rights reserved. This information is not intended as a substitute for professional medical care. Always follow your healthcare professional's instructions.    Patient Education     Exercises: Neck Isometrics  To start, sit in a chair with your feet flat on the floor. Your weight should be slightly forward so that you re balanced evenly on your buttocks. Relax your shoulders and keep your head level. Using a chair with arms may help you keep your balance.       1. Press your palm against your forehead. Resist with your neck muscles. Hold for 10 seconds. Relax. Repeat 5 times.  2. Do the exercise again, pressing on the side of your head. Repeat 5 times. Switch sides.  3. Do the exercise again, pressing on the back of your head. Repeat 5 times.  For your safety, check with your healthcare provider before starting an exercise program.   Date Last Reviewed: 11/1/2017 2000-2018 [employer]. [address] [place] [address]. All rights reserved. This information is not intended as a substitute for professional medical care. Always follow your healthcare professional's instructions.    Patient Education     Neck Exercises: Active Neck Rotation    To start, lie on your back, knees bent and feet flat on the floor. Keep your ears, shoulders, and hips aligned, but don t press your lower back  to the floor. Rest your hands on your pelvis. Breathe deeply and relax.  Here are the steps for the active neck rotation:    Use your neck muscles to turn your head to one side until you feel a stretch in the muscles.    Hold for 5 seconds. Then turn to the other side.    Repeat 5 times on each side.  Note: Keep your shoulders on the floor. Don t lift or tuck your chin as you turn your head.  Date Last Reviewed: 11/1/2017 2000-2018 [employer]. [address] [place] [address]. All rights reserved. This information is not intended as a substitute for professional medical care. Always follow your healthcare professional's instructions.    Patient Education     Shoulder and Upper Back Stretch  To start, stand tall with your ears, shoulders, and hips in line. Your feet should be slightly apart, positioned just under your hips. Focus your eyes directly in front of you.  this position for a few seconds before starting your exercise. This helps increase your awareness of proper posture.          Reach overhead and slightly back with both arms. Keep your shoulders and neck aligned and your elbows behind your shoulders:    With your palms facing the ceiling, turn your fingers inward.    Take a deep breath. Breathe out, and lower your elbows toward your buttocks. Hold for 5 seconds, then return to starting position.    Repeat 3 times.  Date Last Reviewed: 11/1/2017 2000-2018 [employer]. [address] [place] [address]. All rights reserved. This information is not intended as a substitute for professional medical care. Always follow your healthcare professional's instructions.    Patient Education     Shoulder Squeeze Exercise    To start, sit in a chair with your feet flat on the floor. Shift your weight slightly forward to avoid rounding your back. Relax. Keep your ears, shoulders, and hips aligned:    Raise your arms to shoulder height, elbows bent and palms  forward.    Move your arms back, squeezing your shoulder blades together.    Hold for 10 seconds. Return to starting position.     Repeat 5 times.   For your safety, check with your healthcare provider before starting an exercise program.   Date Last Reviewed: 11/1/2017 2000-2018 The "[employer] [address] [place] [address]. All rights reserved. This information is not intended as a substitute for professional medical care. Always follow your healthcare professional's instructions.

## 2019-12-11 ENCOUNTER — MYC MEDICAL ADVICE (OUTPATIENT)
Dept: INTERNAL MEDICINE | Facility: CLINIC | Age: 29
End: 2019-12-11

## 2019-12-11 DIAGNOSIS — Z30.41 ENCOUNTER FOR SURVEILLANCE OF CONTRACEPTIVE PILLS: ICD-10-CM

## 2019-12-11 RX ORDER — DROSPIRENONE AND ETHINYL ESTRADIOL TABLETS 0.02-3(28)
1 KIT ORAL DAILY
Qty: 84 TABLET | Refills: 11 | Status: SHIPPED | OUTPATIENT
Start: 2019-12-11 | End: 2019-12-11

## 2019-12-11 RX ORDER — DROSPIRENONE AND ETHINYL ESTRADIOL TABLETS 0.02-3(28)
1 KIT ORAL DAILY
Qty: 84 TABLET | Refills: 11 | Status: SHIPPED | OUTPATIENT
Start: 2019-12-11 | End: 2021-03-03

## 2019-12-11 NOTE — TELEPHONE ENCOUNTER
Last Clinic Visit: 12/10/2019  [facility]    Note not yet completed/signed  One time refill sent at this time.

## 2020-01-14 ENCOUNTER — OFFICE VISIT (OUTPATIENT)
Dept: INTERNAL MEDICINE | Facility: CLINIC | Age: 30
End: 2020-01-14
Payer: COMMERCIAL

## 2020-01-14 VITALS
WEIGHT: 208.4 LBS | OXYGEN SATURATION: 99 % | BODY MASS INDEX: 30.78 KG/M2 | SYSTOLIC BLOOD PRESSURE: 114 MMHG | HEART RATE: 85 BPM | DIASTOLIC BLOOD PRESSURE: 76 MMHG

## 2020-01-14 DIAGNOSIS — M54.2 NECK PAIN: ICD-10-CM

## 2020-01-14 DIAGNOSIS — Z00.00 ROUTINE HISTORY AND PHYSICAL EXAMINATION OF ADULT: Primary | ICD-10-CM

## 2020-01-14 DIAGNOSIS — G62.9 PERIPHERAL POLYNEUROPATHY: ICD-10-CM

## 2020-01-14 RX ORDER — DULOXETIN HYDROCHLORIDE 30 MG/1
30 CAPSULE, DELAYED RELEASE ORAL 2 TIMES DAILY
Status: CANCELLED | OUTPATIENT
Start: 2020-01-14

## 2020-01-14 RX ORDER — GABAPENTIN 100 MG/1
300 CAPSULE ORAL 3 TIMES DAILY
Qty: 270 CAPSULE | Refills: 0 | Status: SHIPPED | OUTPATIENT
Start: 2020-01-14 | End: 2023-05-16

## 2020-01-14 ASSESSMENT — PAIN SCALES - GENERAL: PAINLEVEL: MILD PAIN (2)

## 2020-01-14 NOTE — PATIENT INSTRUCTIONS
[facility] Medication Refill Request Information:  * Please contact your pharmacy regarding ANY request for medication refills.  ** [facility] Prescription Fax = [phone]  * Please allow 3 business days for routine medication refills.  * Please allow 5 business days for controlled substance medication refills.     [facility] Test Result notification information:  *You will be notified with in 7-10 days of your appointment day regarding the results of your test.  If you are on MyChart you will be notified as soon as the provider has reviewed the results and signed off on them.    [facility]: [phone]          [facility]         Internal Medicine Resident                   Continuity Clinic    Who We Are    Resident Continuity Clinic is a part of the [facility] Primary Care [facility].  Resident physicians see patients independently and establish a relationship with them over the course of their three-year residency program.  As with the Primary Care Clinic, our Resident Continuity Clinic models a group practice.  If your doctor is not available, you will be able to see another resident physician.  At the end of a resident s training, patients will be transitioned to a new resident physician for ongoing care.     We treat patients with a wide array of medical needs from routine physicals, to acute illnesses, to diabetes and blood pressure management, to complex medical illness.  What is a Resident Physician?    Resident physicians hold medical degrees and are doctors. They are training to become specialists in Internal Medicine. They work under the supervision of board-certified faculty physicians.  Expectations for Your Care    We strive to provide accessible, quality care at all times.    In order to provide this care, it is best to see your primary care resident doctor consistently rather switching between providers.  In the event you do see another physician, you should schedule  a follow-up visit with your usual primary care doctor.    If you are transitioning your care from another clinic, it is helpful to have your records available for your doctor to review.    We do not prescribe controlled substances, such as ADD medications or narcotic pain medications, on your first visit.  We will review your health records and concerns prior to devising a treatment plan with you in order to provide the best care.      Clinic Services     Extended clinic hours; patient  to help navigate your visit;  parking; laboratory and imaging services with evening and weekend hours    Multiple medical and surgical specialties in one building    Complementary services, including Nutrition, Integrative Medicine, Pharmacy consultations, Mental and Behavioral Health, Sports Medicine and Physical Therapy    Thank You    We would like to thank you for choosing the [facility] Resident Continuity Clinic for your primary care. You are making a priceless contribution to the training of the next generation of health care practitioners.     Contact us at [phone] for appointments or questions.    Resident Clinic Hours are Tuesdays and Thursdays, 7:30am-5:00pm    Residents   Rogelio Parkinson MD  (Male)   Jorge Charles MD   (Male)   Brandy Lara MD  (Female)  Veronica Ryder MD   (Female)   Ora Butts MD   (Female)    Mony Borden MD    (Female)   Kael Guzman MD  (Male)   Robbi Cohen MD  (Male)    Yanni Magaña MD  (Female)   Eleonora Hernandez MD  (Female)   Dia Cruz MD    (Female)   Melquiades Gates MD  (Male)   Juan Ramon Kyle MD  (Male)   Elaido Fam MD  (Male)   EL Monique MD   (Male)   Gilbert Foster MD  (Male)    Tory Ghosh MD (Female)   Milton Lopez MD  (Male)   Lenin Serrano MD  (Male)   Jazmine Pizarro MD  (Male)   Deidre Paulino MD    (Female)   Austin Quinn MD  (Female)     Supervising Physicians   MD Dragan Rdz,  MD Ursula Webb, MD Roberto Donahue, MD Jesse Young, MD Lanette Merrill, MD Hannah Mcdowell, MD Antoni Barreto, MD Bell Street MD

## 2020-01-14 NOTE — NURSING NOTE
Chief Complaint   Patient presents with     Physical     Pt is here for an annual physical      INGE Lucia at 8:12 AM sign on 1/14/2020

## 2020-01-14 NOTE — PROGRESS NOTES
"                     PRIMARY CARE CENTER      SUBJECTIVE:     Rachel Fried is a 29 year old female with a PMHx rheumatoid arthritis, pityriasis lichenoides, depression, anxiety, and acne vulgaris who presents for reevaluation of chronic neck pain, and routine physical    Neck pain discussed on prior visit and described as chronic with onset more than a decade ago that has worsened over the past several years and is exacerbated by standing for prolonged periods. It has particularly become more noticeable since weaning high dose NSAIDs following improvements in RA symptoms. She endorses paraesthesias 1-2 times monthly in the bilateral pinky fingers, but does not have any clear association with the pain and seems to come on when seated or in a plane rather than while standing. She denies any known trauma or injury. She has never had advanced imaging of the neck. Discussed conservative management with home PT, however, was not able to complete exercises as prescribed 2/2 increased workload at her job.    In terms of mental health, she says she is \"in a good place.\"     Health care maintenance reviewed:  Pap 2017, will return for pap/pelvic  Immunizations up to date  Health risk behaviors none  Blood pressure normal  BMI 31    Patient Active Problem List   Diagnosis     Rheumatoid arthritis of multiple sites with negative rheumatoid factor (H)     Cyclic citrullinated peptide (CCP) antibody positive     Long-term use of Plaquenil     NSAID long-term use     No past surgical history on file.  Family History   Problem Relation Age of Onset     Lung Cancer Paternal Grandfather      Colon Cancer Maternal Uncle      Social History     Tobacco Use     Smoking status: Never Smoker     Smokeless tobacco: Never Used   Substance Use Topics     Alcohol use: Yes     Alcohol/week: 1.0 standard drinks     Types: 1 Glasses of wine per week     Frequency: 2-4 times a month     Drug use: No        MEDICATIONS/ALLERGIES:     Medications " and allergies reviewed by me today.      ROS:     Constitutional, neuro, ENT, endocrine, pulmonary, cardiac, gastrointestinal, genitourinary, musculoskeletal, integument and psychiatric systems are negative, except as otherwise noted.     OBJECTIVE:     /76 (BP Location: Right arm, Patient Position: Sitting, Cuff Size: Adult Regular)   Pulse 85   Wt 94.5 kg (208 lb 6.4 oz)   LMP 12/09/2019 (Approximate)   SpO2 99%   Breastfeeding No   BMI 30.78 kg/m     Wt Readings from Last 1 Encounters:   01/14/20 94.5 kg (208 lb 6.4 oz)       GENERAL APPEARANCE: healthy, alert and no distress     EYES: EOMI     HENT: NCAT     NECK: no asymmetry, masses, or scars     RESP: lungs clear to auscultation - no rales, rhonchi or wheezes     CV: regular rates and rhythm, normal S1 S2, no S3 or S4 and no murmur, click or rub     ABDOMEN:  soft, nontender, no HSM or masses and bowel sounds normal     MS: extremities normal- no gross deformities noted, no evidence of inflammation in joints, FROM in all extremities.     SKIN: no suspicious lesions or rashes     NEURO: Normal strength and tone in bilateral upper extremities, spurling's negative, sensory exam normal to temperature and pinprick in bilateral UEs, mentation intact and speech normal     PSYCH: mentation appears normal. and affect normal/bright      ASSESSMENT/PLAN:     Rachel was seen today for physical.    Diagnoses and all orders for this visit:    Neck pain  Peripheral polyneuropathy, suspected radiculopathy  Suspect some element of weakness with exaggerated anterior flexion/cervical kyphosis, however, with bilateral paraesthesias there is some concern for spinal nerve compression (though neuro exam intact and spurling negative). No known trauma, but cannot rule out compression fracture in setting of chronic steroid use. Per rheumatology, unlikely associated with RA and symptoms/labs less suggestive of RA (no active joint disease, CRP normal). Will obtain MRI rather  than initial screen with plain films with concern for nerve involvement and plan for PT referral following completion of MRI. Start gabapentin for sx management. Patient may ramp dose from starting dose 100 mg TID to max 300 mg TID if lower dose ineffective.  -     gabapentin (NEURONTIN) 100 MG capsule; Take 3 capsules (300 mg) by mouth 3 times daily  -     MRI Cervical Spine w/o Contrast; Future  -     MRI Thoracic Spine w/o contrast; Future  -     Plan for PT referral following completion of MRI   -     Follow up for reevaluation of neck pain  -     Will consider eval of vitamin B12, folate     Routine health maintenance  PAP-- last pap in 2017.  - Follow up for PAP    Chronic conditions not changed today  Rheumatoid Arthritis  Diffuse (bilateral hands, wrists, ankles, shoulders, feet)  Managed by rheumatology. Previously on methotrexate and chronic hydroxychloroquine with discontinuation of therapy in 2018 followed by worsening joint pain and inflammation. Now on hydroxychloroquine, methotrexate, prednisone.   - Medications managed by [facility] rheumatology  - On chronic methotrexate and hydroxychloroquine  - On prednisone taper    Encounter for surveillance of contraceptive pills  -     LORYNA 3-0.02 MG tablet; Take 1 tablet by mouth daily    Pityriasis lichenoides chronica  Acne vulgaris  Melasma  - Managed by dermatology   - On methotrexate per rheumatology for PLC  - On triamcinolone topical for PLC  - On benzoyl peroxide wash for treatment of acne    Pt should return to clinic for f/u with me in 2 months for reevaluation of neck pain and pap/pelvic.     Options for treatment and follow-up care were reviewed with the patient. Rachel Fried engaged in the decision making process and verbalized understanding of the options discussed and agreed with the final plan.    Brandy Lara MD  PGY-2, Internal Medicine  Jan 14, 2020    Pt was seen and plan of care discussed with Dr. Mcmullen.     Teaching  Physician Note:  I was present during the visit and the patient was seen and examined by me.   I discussed the case with the resident and agree with the note as documented by the resident with the following exceptions:  None.    Evelia Mcmullen M.D.  Internal Medicine   pager [phone]

## 2020-01-16 ENCOUNTER — TELEPHONE (OUTPATIENT)
Dept: INTERNAL MEDICINE | Facility: CLINIC | Age: 30
End: 2020-01-16

## 2020-01-16 ENCOUNTER — MYC MEDICAL ADVICE (OUTPATIENT)
Dept: INTERNAL MEDICINE | Facility: CLINIC | Age: 30
End: 2020-01-16

## 2020-01-16 DIAGNOSIS — F41.9 ANXIETY: ICD-10-CM

## 2020-01-16 DIAGNOSIS — G62.9 PERIPHERAL POLYNEUROPATHY: ICD-10-CM

## 2020-01-16 DIAGNOSIS — M54.2 NECK PAIN: ICD-10-CM

## 2020-01-16 DIAGNOSIS — F32.A DEPRESSION, UNSPECIFIED DEPRESSION TYPE: Primary | ICD-10-CM

## 2020-01-16 NOTE — TELEPHONE ENCOUNTER
Evelia Mcmullen MD [place], Jeanne K; Brandy Lara MD; Mattie Hannah, RN             There is no urgency for the scans.  Can wait 6 weeks unless symptoms progress.  Please make sure she follows up with rheumatology, scheduled currently for 2/3/19.  They can decide whether or not to proceed with scans at that time.  Mattie, please let her know.  Thank you.  SB    Previous Messages      ----- Message -----   From: Randi Raymond   Sent: 1/16/2020   8:19 AM CST   To: Evelia Mcmullen MD, *   Subject: ACTION needed Pre-authorization has been den*     Doctor,     I was unable to get the MRI Cervical Spine w/o Contrast, CPT 49480 &  MRI Thoracic Spine w/o contrast,CPT  39898 scheduled on 1/17/2020 at 3:45 PM.     The reason insurance gave that these are being denied is no 6 weeks of provider directed conservative treatment or symptoms of significant motor weakness, malignancy, infection, cauda equina syndrome.     They are offering an opportunity to do a peer to peer. You need to schedule this peer to peer in advance by calling [phone]. Option 4. They will then ask what insurance, say Blue [employer], then they will ask what [facility]. Then choose option 1 for fully insured. You will need the case # [id number] and the patient's name and date of birth. This peer to peer needs to be completed prior to the patient's appointment on 1/17/20 at 3:45 pm.     If you choose not to complete the peer to peer please notify the patient and cancel the appointments for the MRI's. If you do the peer to peer and are able to get this approved, please give me the authorization number and effective dates.     Thank You,   Randi Raymond   Pre-authorizations for MRI's & CT's             Pt called and plan of care left on pt's phone Left message and clinic numbers  for pt to call back for questions and or concerns.  Mattie Hannah RN 11:02 AM on 1/16/2020.  Pt called back and will cancel scans  and be sure she will be at her appt 2/3/20. Clinic numbers given for questions or concerns.  Mattie Hannah RN 11:19 AM on 1/16/2020.

## 2020-01-18 RX ORDER — DULOXETIN HYDROCHLORIDE 60 MG/1
60 CAPSULE, DELAYED RELEASE ORAL DAILY
Qty: 30 CAPSULE | Refills: 2 | Status: SHIPPED | OUTPATIENT
Start: 2020-01-24 | End: 2020-04-07

## 2020-01-18 RX ORDER — DULOXETIN HYDROCHLORIDE 30 MG/1
30 CAPSULE, DELAYED RELEASE ORAL DAILY
Qty: 7 CAPSULE | Refills: 0 | Status: SHIPPED | OUTPATIENT
Start: 2020-01-18 | End: 2020-09-11

## 2020-01-18 NOTE — TELEPHONE ENCOUNTER
Contacted patient to discuss risks/benefits of previously discussed treatment options (addition of gabapentin vs transition from citalopram to duloxetine). She elects to transition to duloxetine rather than start trial of gabapentin. Order placed to pharmacy of choice.    Brandy Lara MD  Internal Medicine Resident

## 2020-01-27 ENCOUNTER — TELEPHONE (OUTPATIENT)
Dept: RHEUMATOLOGY | Facility: CLINIC | Age: 30
End: 2020-01-27

## 2020-02-03 ENCOUNTER — OFFICE VISIT (OUTPATIENT)
Dept: RHEUMATOLOGY | Facility: CLINIC | Age: 30
End: 2020-02-03
Attending: NURSE PRACTITIONER
Payer: COMMERCIAL

## 2020-02-03 VITALS
SYSTOLIC BLOOD PRESSURE: 127 MMHG | DIASTOLIC BLOOD PRESSURE: 87 MMHG | OXYGEN SATURATION: 98 % | TEMPERATURE: 98 F | HEART RATE: 86 BPM | WEIGHT: 206.5 LBS | BODY MASS INDEX: 30.49 KG/M2

## 2020-02-03 DIAGNOSIS — Z79.899 LONG-TERM CURRENT USE OF HIGH RISK MEDICATION OTHER THAN ANTICOAGULANT: ICD-10-CM

## 2020-02-03 DIAGNOSIS — R76.8 CYCLIC CITRULLINATED PEPTIDE (CCP) ANTIBODY POSITIVE: ICD-10-CM

## 2020-02-03 DIAGNOSIS — Z79.899 LONG-TERM USE OF PLAQUENIL: ICD-10-CM

## 2020-02-03 DIAGNOSIS — M06.09 RHEUMATOID ARTHRITIS OF MULTIPLE SITES WITH NEGATIVE RHEUMATOID FACTOR (H): Primary | ICD-10-CM

## 2020-02-03 DIAGNOSIS — M06.09 RHEUMATOID ARTHRITIS OF MULTIPLE SITES WITH NEGATIVE RHEUMATOID FACTOR (H): ICD-10-CM

## 2020-02-03 LAB
ALBUMIN SERPL-MCNC: 3.5 G/DL (ref 3.4–5)
ALT SERPL W P-5'-P-CCNC: 24 U/L (ref 0–50)
AST SERPL W P-5'-P-CCNC: 16 U/L (ref 0–45)
CREAT SERPL-MCNC: 0.69 MG/DL (ref 0.52–1.04)
CRP SERPL-MCNC: 6.1 MG/L (ref 0–8)
ERYTHROCYTE [DISTWIDTH] IN BLOOD BY AUTOMATED COUNT: 12.6 % (ref 10–15)
GFR SERPL CREATININE-BSD FRML MDRD: >90 ML/MIN/{1.73_M2}
HCT VFR BLD AUTO: 42.2 % (ref 35–47)
HGB BLD-MCNC: 13.7 G/DL (ref 11.7–15.7)
MCH RBC QN AUTO: 29 PG (ref 26.5–33)
MCHC RBC AUTO-ENTMCNC: 32.5 G/DL (ref 31.5–36.5)
MCV RBC AUTO: 89 FL (ref 78–100)
PLATELET # BLD AUTO: 297 10E9/L (ref 150–450)
RBC # BLD AUTO: 4.72 10E12/L (ref 3.8–5.2)
WBC # BLD AUTO: 6.2 10E9/L (ref 4–11)

## 2020-02-03 PROCEDURE — 84450 TRANSFERASE (AST) (SGOT): CPT | Performed by: NURSE PRACTITIONER

## 2020-02-03 PROCEDURE — 84460 ALANINE AMINO (ALT) (SGPT): CPT | Performed by: NURSE PRACTITIONER

## 2020-02-03 PROCEDURE — G0463 HOSPITAL OUTPT CLINIC VISIT: HCPCS | Mod: ZF

## 2020-02-03 PROCEDURE — 85027 COMPLETE CBC AUTOMATED: CPT | Performed by: NURSE PRACTITIONER

## 2020-02-03 PROCEDURE — 82040 ASSAY OF SERUM ALBUMIN: CPT | Performed by: NURSE PRACTITIONER

## 2020-02-03 PROCEDURE — 86140 C-REACTIVE PROTEIN: CPT | Performed by: NURSE PRACTITIONER

## 2020-02-03 PROCEDURE — 36415 COLL VENOUS BLD VENIPUNCTURE: CPT | Performed by: NURSE PRACTITIONER

## 2020-02-03 PROCEDURE — 82565 ASSAY OF CREATININE: CPT | Performed by: NURSE PRACTITIONER

## 2020-02-03 RX ORDER — FOLIC ACID 1 MG/1
2 TABLET ORAL DAILY
Qty: 180 TABLET | Refills: 1 | Status: SHIPPED | OUTPATIENT
Start: 2020-02-03 | End: 2020-11-03

## 2020-02-03 ASSESSMENT — JOINT PAIN
TOTAL NUMBER OF TENDER JOINTS: 0
TOTAL NUMBER OF SWOLLEN JOINTS: 0

## 2020-02-03 ASSESSMENT — DISEASE ACTIVITY SCORE (DAS28): CRP_MG_PER_LITER: 1.95

## 2020-02-03 ASSESSMENT — PAIN SCALES - GENERAL: PAINLEVEL: MILD PAIN (2)

## 2020-02-03 NOTE — NURSING NOTE
Chief Complaint   Patient presents with     RECHECK     3 mos f/u for RA     /87 (BP Location: Left arm, Patient Position: Sitting, Cuff Size: Adult Regular)   Pulse 86   Temp 98  F (36.7  C)   Wt 93.7 kg (206 lb 8 oz)   SpO2 98%   BMI 30.49 kg/m        Zurdo Acosta, EMT

## 2020-02-03 NOTE — PROGRESS NOTES
[facility] - Rheumatology Clinic Visit    Rachel Fried  is a 28 year old here for follow-up  RHEUMATOID ARTHRITIS (RA) involving hands, wrists, ankles, shoulders feet [ -RF and low CCP 25, -factor V -KAVITHA by IFA and -NADEEN panel -HLA B27] and pityriasis lichenoides chronica (seen [facility]-established here). XR  no erosions hands. Moved from [place] to be with family.       Past- hydroxychloroquine (plaquenil) since 1-2016 (2 tablet a day caused diarrhea, ok on 1 tablet) and methotrexate 10 mg week  to 4-2018 (mild fatigue day of) then restarted 10- (higher folic acid improved side-effects), naproxsyn; allergy sulfa, prednisone, advil       Initial visit copy forward  10-: Hydroxychloroquine (plaquenil) BID mild diarrhea; ocular eye exam 3-2016 baseline reports recent eye exam   EKG was recently Feb 2018 normal. Stopped methotrexate  In April as wanted to take a drug holiday and was moving here, also wanted to drink some alcohol, and discuss options. No infections. Diffuse joint pains in hands, wrists, shoulders, ankles and feet and the sides of her hips. Taking advil 600mg AM and 400 mg PM, tolerating but taking since April. Pain severe 5/10 or more. Stiffness all day. Hands are sore. No loss of function or ROM. Very sore and stiff all over. Knees are sore and hard to bend or squat. Mild diarrhea with hydroxychloroquine (plaquenil)     February 3, 2020  July 8, 2019- no changes except adjust methotrexate up by 2.5 mg      Continues on methotrexate  12.5 mg every 7 days THURS, folic acid 2 mg day. Tolerating. Hydroxychloroquine (plaquenil) 200 mg every day -improved diarrhea on lower dose tolerable misses most days so takes <3 times a week. No GI side-effects, no hairloss or oral sores.     Rheumatoid arthritis (RA) is good and controlled. Function is good. Able to bend knees and make fist formation. Exercising and doing yoga. Dramatic improvement. No longer having  right hand swelling or pain (it was mostly in her fingers and PIPs joints, will use compression gloves to help, is able to function still but discomfort is the mornings and PMs--this is all resolved) and no knee issues. Takes tylenol prn rare. Not pregnant and abstaining from intercourse and aware of the risks with methotrexate. Denies any fever, chills, SOB, CANDELARIA, night sweats, or chest pain, or cough. Reports healthy      Off celexa now not longer hungry all the time, lost some weight, less pain and more energy was on this for 10 years and now on cymbalta. Upper neck is good now     No longer using NSAIDs. Taking vitamin D 2000 international unit(s) day.  No EAS. No vision issues. Is exercising and doing yogo now. Denies any fever, chills, SOB, CANDELARIA, night sweats, or chest pain, or cough. Reports healthy.      PMH:  Injury-no  Medical-dermatitis, (bx 2-2016 GA vs LP vs cutaneous lupus--was not cutenaous lupus --focal interstitial lymphocytic inflammation, scattered lymphocytes), anxiety and depression, exercise induced asthma, scoliosis, pityriasis lichenoides chronica, chronic back pain, hypertension, seasonal allergies, acne, motion sickness, high cholesterol, chicken pox, headaches before    Surgical-None     FH:  No autoimmune disorders, psoriasis, UC, crohn's, RA, PsA, gout, autoimmune thyroid.  No MS, heart disease or cancer in family  Mother-factor V leiden carrier, rosea   Father-healthy  Siblings-younger bro healthy  Children-None   GF-lupus   Uncle cancer colon    SH:  Social Alcohol occasional week. Never Smoking. No IVDU. No Children. Right handed. Single--not sexually active, never.        PMSH personally reviewed and updated by me.    ROS:  Negative raynaud s phenomena, hairloss, sun sensitivities, keratoconjunctivitis sicca, pleurisy, inflammatory joint symptoms, significant rashes like malar, oral/nasal or ulcerations, inflammatory eye disease, inflammatory bowel disease, dactylitis,  tenosynovitis, or enthespathy. Negative for  blood clots, gout, psoriasis, UC, crohn's. No temporal headache, no jaw claudication, no scalp tenderness, vision changes, carotidynia, cough. No STD or pregnancy symptoms. No Parotid swelling.   +tips finger white at times and toes resolved   CONSTITUTIONAL: No fevers, night sweats or unintentional weight change. No acute distress, swollen glands  EYES: No vision change, diplopia, pain in eyes or red eyes   EARS, NOSE, MOUTH, THROAT: No tinnitus or hearing change, no epistaxis or nasal discharge, no oral lesions, throat clear. Normal saliva pool.  No drymouth. No thyroid enlargement.   CARDIOVASCULAR: No chest pain, palpitations, or pain with walking, no orthopnea or PND   RESPIRATORY: No dyspnea, cough, shortness of breath or wheezing. No pleurisy.   GI: No nausea, vomiting, diarrhea or constipation, no abdominal pain, or blood in stools.   : No change in urine, no dysuria or hematuria   MUSCKL: No enthesitis, plantar fascitis or heel pain. See above   INTEGUMENTARY: No concerning lesions or moles   NEURO: No loss of strength or sensation, no numbness or tingling, no tremor, no dizziness, no headache. No falls   ENDO: No polyuria or polydipsia, no temperature intolerance   HEME/LYMPH:No concerning bumps, bleeding problems, or swollen lymph nodes. No recent infections, hospitalizations or new illnesses.   ALLERGY: No environmental allergies   Otherwise 14 point ROS obtained, reviewed and found negative.     Physical exam:  Vitals: Blood pressure 127/87, pulse 86, temperature 98  F (36.7  C), weight 93.7 kg (206 lb 8 oz), SpO2 98 %, not currently breastfeeding.  Wt Readings from Last 4 Encounters:   02/03/20 93.7 kg (206 lb 8 oz)   01/14/20 94.5 kg (208 lb 6.4 oz)   12/10/19 95.3 kg (210 lb)   11/04/19 95.6 kg (210 lb 12.8 oz)     Constitutional: WD-WN-WG cooperative  Eyes: nl EOM, PERRLA, vision, conjunctiva, sclera, No Unilateral or bilateral external ear inflammation    ENT: nl external ears, nose, hearing, lips, teeth, gums, throat. No saddle nose   Neck: no mass or thyroid enlargement  Resp: lungs clear to auscultation, nl to palpation, nl effort  CV: RRR, no murmurs, rubs or gallops, no edema  GI: no ABD mass or tenderness, no HSM  : not tested  Lymph: no cervical or epitrochlear nodes  MS: All TMJ, neck, shoulder, elbow, wrist, hand, spine, hip, knee, ankle, and foot joints were examined and otherwise found normal. No deformity. Full ROM. Normal prayer sign. Negative Lhermitte's sign. No periuncle erythema.    Skin: no nail pitting, alopecia, rash.   Neuro: nl cranial nerves, strength, sensation, DTRs.   Psych: nl judgement, orientation, memory, affect.      Labs/Imaging:  Normal G6PD  Neg MTB QG 2016 and hepatitis panel, negative HIV    XR hands and knees-negative 2016   Results for orders placed or performed in visit on 02/03/20   CRP inflammation     Status: None   Result Value Ref Range    CRP Inflammation 6.1 0.0 - 8.0 mg/L   Creatinine     Status: None   Result Value Ref Range    Creatinine 0.69 0.52 - 1.04 mg/dL    GFR Estimate >90 >60 mL/min/[1.73_m2]    GFR Estimate If Black >90 >60 mL/min/[1.73_m2]   Albumin level     Status: None   Result Value Ref Range    Albumin 3.5 3.4 - 5.0 g/dL   ALT     Status: None   Result Value Ref Range    ALT 24 0 - 50 U/L   AST     Status: None   Result Value Ref Range    AST 16 0 - 45 U/L   CBC with platelets     Status: None   Result Value Ref Range    WBC 6.2 4.0 - 11.0 10e9/L    RBC Count 4.72 3.8 - 5.2 10e12/L    Hemoglobin 13.7 11.7 - 15.7 g/dL    Hematocrit 42.2 35.0 - 47.0 %    MCV 89 78 - 100 fl    MCH 29.0 26.5 - 33.0 pg    MCHC 32.5 31.5 - 36.5 g/dL    RDW 12.6 10.0 - 15.0 %    Platelet Count 297 150 - 450 10e9/L          Component      Latest Ref Rng & Units 9/7/2019 11/4/2019   WBC      4.0 - 11.0 10e9/L 6.0 6.8   RBC Count      3.8 - 5.2 10e12/L 4.64 4.46   Hemoglobin      11.7 - 15.7 g/dL 13.5 13.0   Hematocrit       35.0 - 47.0 % 42.0 40.1   MCV      78 - 100 fl 91 90   MCH      26.5 - 33.0 pg 29.1 29.1   MCHC      31.5 - 36.5 g/dL 32.1 32.4   RDW      10.0 - 15.0 % 13.0 13.2   Platelet Count      150 - 450 10e9/L 288 296   Creatinine      0.52 - 1.04 mg/dL 0.59 0.63   GFR Estimate      >60 mL/min/1.73:m2 >90 >90   GFR Estimate If Black      >60 mL/min/1.73:m2 >90 >90   CRP Inflammation      0.0 - 8.0 mg/L 7.9 6.4   Albumin      3.4 - 5.0 g/dL 3.4 3.2 (L)   ALT      0 - 50 U/L 20 21   AST      0 - 45 U/L 19 17       Impression/Plan:  1. Seropositive rheumatoid arthritis (RA) non-erosive remission activity. Poor prognosis. Rheumatoid arthritis (RA) remission on low dose methotrexate and hydroxychloroquine (plaquenil). Discussed risks with methotrexate, infection risks, pregnancy (abstince and knows needs to be off for 3-6 month before trying to concieve) seek immediate medical attention if any signs or symptoms infections. And monitor for side-effects, will abstain from intercourse (will see OBGYN discussed IUD or double protection-she discussed with PCP) and will limit 0-2 week not day or after. Rheumatoid arthritis (RA) remission SHELL 28 with normal CRP as well.     High risk medications, labs today normal. Labs every 12 week     2. Longterm hydroxychloroquine (plaquenil) use. Recent opthalmologist  no toxicity. Diarrhea side-effects at BID dosing, will continue 1 tablet 4 times a week, daily if flares.   3. Vitamin D deficeincy- take 2000 international unit(s) day. 41 on 3-2019     4. Pityriasis lichenoids chronic-per derm   5. Past Chronic NSAID use, informed her of risks and possible side-effects to this, and including CVD/kidney disease. Off now     Past prednisone 15-10-5 mg each for 2 weeks then stop   Methotrexate  12.5 mg once every THUR, folic acid  3 mg day-adjust   No NSAID use  Annual opthalmologist  Exam for hydroxychloroquine (plaquenil) use.  Hydroxychloroquine (plaquenil) 200 mg once day or 4 time week if  doing well    RTC 4 month     The patient understood the rationale for the diagnosis and treatment plan. Patient shared in the decision making. All questions were answered to best of my ability and the patient's satisfaction  Zurdo CORDERO, AVILA, MSN  [facility]  Department of Rheumatology & Autoimmune Disorders    1. Immunization: Reviewed CDC guidelines with patient updated, information provided to patient based on CDC guidelines for vaccination recommendations, patient will discuss with primary provider  2. Bone Health:Educated on adequate calcium and vitamin D intake, Educated on exercise, Glucocorticoid education discussed risks, benefits & potential long term side effects from use  3. Discussed routine annual physicals, cancer screening, cardiac risk monitoring, bone health and primary care with primary care provider.   4. Educated on diagnosis, prognosis, labs, imaging, treatment options (including risks, benefits, risk of no treatment), medications (use, dose, side-effects, risks of medications including infection/cancer/bone marrow suppression, lab monitoring), infections what to do, plan of cares, goals of treatment.

## 2020-02-03 NOTE — LETTER
2/3/2020      RE: Rachel Fried  [address]  [facility] [address]       [facility] - Rheumatology Clinic Visit    Rachel Fried  is a 28 year old here for follow-up  RHEUMATOID ARTHRITIS (RA) involving hands, wrists, ankles, shoulders feet [ -RF and low CCP 25, -factor V -KAVITHA by IFA and -NADEEN panel -HLA B27] and pityriasis lichenoides chronica (seen [facility]-established here). XR  no erosions hands. Moved from [place] to be with family.       Past- hydroxychloroquine (plaquenil) since 1-2016 (2 tablet a day caused diarrhea, ok on 1 tablet) and methotrexate 10 mg week  to 4-2018 (mild fatigue day of) then restarted 10- (higher folic acid improved side-effects), naproxsyn; allergy sulfa, prednisone, advil       Initial visit copy forward  10-: Hydroxychloroquine (plaquenil) BID mild diarrhea; ocular eye exam 3-2016 baseline reports recent eye exam   EKG was recently Feb 2018 normal. Stopped methotrexate  In April as wanted to take a drug holiday and was moving here, also wanted to drink some alcohol, and discuss options. No infections. Diffuse joint pains in hands, wrists, shoulders, ankles and feet and the sides of her hips. Taking advil 600mg AM and 400 mg PM, tolerating but taking since April. Pain severe 5/10 or more. Stiffness all day. Hands are sore. No loss of function or ROM. Very sore and stiff all over. Knees are sore and hard to bend or squat. Mild diarrhea with hydroxychloroquine (plaquenil)     February 3, 2020  July 8, 2019- no changes except adjust methotrexate up by 2.5 mg      Continues on methotrexate  12.5 mg every 7 days THURS, folic acid 2 mg day. Tolerating. Hydroxychloroquine (plaquenil) 200 mg every day -improved diarrhea on lower dose tolerable misses most days so takes <3 times a week. No GI side-effects, no hairloss or oral sores.     Rheumatoid arthritis (RA) is good and controlled. Function is good. Able to bend knees  and make fist formation. Exercising and doing yoga. Dramatic improvement. No longer having right hand swelling or pain (it was mostly in her fingers and PIPs joints, will use compression gloves to help, is able to function still but discomfort is the mornings and PMs--this is all resolved) and no knee issues. Takes tylenol prn rare. Not pregnant and abstaining from intercourse and aware of the risks with methotrexate. Denies any fever, chills, SOB, CANDELARIA, night sweats, or chest pain, or cough. Reports healthy      Off celexa now not longer hungry all the time, lost some weight, less pain and more energy was on this for 10 years and now on cymbalta. Upper neck is good now     No longer using NSAIDs. Taking vitamin D 2000 international unit(s) day.  No EAS. No vision issues. Is exercising and doing yogo now. Denies any fever, chills, SOB, CANDELARIA, night sweats, or chest pain, or cough. Reports healthy.      PMH:  Injury-no  Medical-dermatitis, (bx 2-2016 [place] vs LP vs cutaneous lupus--was not cutenaous lupus --focal interstitial lymphocytic inflammation, scattered lymphocytes), anxiety and depression, exercise induced asthma, scoliosis, pityriasis lichenoides chronica, chronic back pain, hypertension, seasonal allergies, acne, motion sickness, high cholesterol, chicken pox, headaches before    Surgical-None     FH:  No autoimmune disorders, psoriasis, UC, crohn's, RA, PsA, gout, autoimmune thyroid.  No MS, heart disease or cancer in family  Mother-factor V leiden carrier, rosea   Father-healthy  Siblings-younger bro healthy  Children-None   GF-lupus   Uncle cancer colon    SH:  Social Alcohol occasional week. Never Smoking. No IVDU. No Children. Right handed. Single--not sexually active, never.        PMSH personally reviewed and updated by me.    ROS:  Negative raynaud s phenomena, hairloss, sun sensitivities, keratoconjunctivitis sicca, pleurisy, inflammatory joint symptoms, significant rashes like malar, oral/nasal  or ulcerations, inflammatory eye disease, inflammatory bowel disease, dactylitis, tenosynovitis, or enthespathy. Negative for  blood clots, gout, psoriasis, UC, crohn's. No temporal headache, no jaw claudication, no scalp tenderness, vision changes, carotidynia, cough. No STD or pregnancy symptoms. No Parotid swelling.   +tips finger white at times and toes resolved   CONSTITUTIONAL: No fevers, night sweats or unintentional weight change. No acute distress, swollen glands  EYES: No vision change, diplopia, pain in eyes or red eyes   EARS, NOSE, MOUTH, THROAT: No tinnitus or hearing change, no epistaxis or nasal discharge, no oral lesions, throat clear. Normal saliva pool.  No drymouth. No thyroid enlargement.   CARDIOVASCULAR: No chest pain, palpitations, or pain with walking, no orthopnea or PND   RESPIRATORY: No dyspnea, cough, shortness of breath or wheezing. No pleurisy.   GI: No nausea, vomiting, diarrhea or constipation, no abdominal pain, or blood in stools.   : No change in urine, no dysuria or hematuria   MUSCKL: No enthesitis, plantar fascitis or heel pain. See above   INTEGUMENTARY: No concerning lesions or moles   NEURO: No loss of strength or sensation, no numbness or tingling, no tremor, no dizziness, no headache. No falls   ENDO: No polyuria or polydipsia, no temperature intolerance   HEME/LYMPH:No concerning bumps, bleeding problems, or swollen lymph nodes. No recent infections, hospitalizations or new illnesses.   ALLERGY: No environmental allergies   Otherwise 14 point ROS obtained, reviewed and found negative.     Physical exam:  Vitals: Blood pressure 127/87, pulse 86, temperature 98  F (36.7  C), weight 93.7 kg (206 lb 8 oz), SpO2 98 %, not currently breastfeeding.  Wt Readings from Last 4 Encounters:   02/03/20 93.7 kg (206 lb 8 oz)   01/14/20 94.5 kg (208 lb 6.4 oz)   12/10/19 95.3 kg (210 lb)   11/04/19 95.6 kg (210 lb 12.8 oz)     Constitutional: WD-WN-WG cooperative  Eyes: nl EOM, PERRLA,  vision, conjunctiva, sclera, No Unilateral or bilateral external ear inflammation   ENT: nl external ears, nose, hearing, lips, teeth, gums, throat. No saddle nose   Neck: no mass or thyroid enlargement  Resp: lungs clear to auscultation, nl to palpation, nl effort  CV: RRR, no murmurs, rubs or gallops, no edema  GI: no ABD mass or tenderness, no HSM  : not tested  Lymph: no cervical or epitrochlear nodes  MS: All TMJ, neck, shoulder, elbow, wrist, hand, spine, hip, knee, ankle, and foot joints were examined and otherwise found normal. No deformity. Full ROM. Normal prayer sign. Negative Lhermitte's sign. No periuncle erythema.    Skin: no nail pitting, alopecia, rash.   Neuro: nl cranial nerves, strength, sensation, DTRs.   Psych: nl judgement, orientation, memory, affect.      Labs/Imaging:  Normal G6PD  Neg MTB QG 2016 and hepatitis panel, negative HIV    XR hands and knees-negative 2016   Results for orders placed or performed in visit on 02/03/20   CRP inflammation     Status: None   Result Value Ref Range    CRP Inflammation 6.1 0.0 - 8.0 mg/L   Creatinine     Status: None   Result Value Ref Range    Creatinine 0.69 0.52 - 1.04 mg/dL    GFR Estimate >90 >60 mL/min/[1.73_m2]    GFR Estimate If Black >90 >60 mL/min/[1.73_m2]   Albumin level     Status: None   Result Value Ref Range    Albumin 3.5 3.4 - 5.0 g/dL   ALT     Status: None   Result Value Ref Range    ALT 24 0 - 50 U/L   AST     Status: None   Result Value Ref Range    AST 16 0 - 45 U/L   CBC with platelets     Status: None   Result Value Ref Range    WBC 6.2 4.0 - 11.0 10e9/L    RBC Count 4.72 3.8 - 5.2 10e12/L    Hemoglobin 13.7 11.7 - 15.7 g/dL    Hematocrit 42.2 35.0 - 47.0 %    MCV 89 78 - 100 fl    MCH 29.0 26.5 - 33.0 pg    MCHC 32.5 31.5 - 36.5 g/dL    RDW 12.6 10.0 - 15.0 %    Platelet Count 297 150 - 450 10e9/L          Component      Latest Ref Rng & Units 9/7/2019 11/4/2019   WBC      4.0 - 11.0 10e9/L 6.0 6.8   RBC Count      3.8 - 5.2  10e12/L 4.64 4.46   Hemoglobin      11.7 - 15.7 g/dL 13.5 13.0   Hematocrit      35.0 - 47.0 % 42.0 40.1   MCV      78 - 100 fl 91 90   MCH      26.5 - 33.0 pg 29.1 29.1   MCHC      31.5 - 36.5 g/dL 32.1 32.4   RDW      10.0 - 15.0 % 13.0 13.2   Platelet Count      150 - 450 10e9/L 288 296   Creatinine      0.52 - 1.04 mg/dL 0.59 0.63   GFR Estimate      >60 mL/min/1.73:m2 >90 >90   GFR Estimate If Black      >60 mL/min/1.73:m2 >90 >90   CRP Inflammation      0.0 - 8.0 mg/L 7.9 6.4   Albumin      3.4 - 5.0 g/dL 3.4 3.2 (L)   ALT      0 - 50 U/L 20 21   AST      0 - 45 U/L 19 17       Impression/Plan:  1. Seropositive rheumatoid arthritis (RA) non-erosive remission activity. Poor prognosis. Rheumatoid arthritis (RA) remission on low dose methotrexate and hydroxychloroquine (plaquenil). Discussed risks with methotrexate, infection risks, pregnancy (abstince and knows needs to be off for 3-6 month before trying to concieve) seek immediate medical attention if any signs or symptoms infections. And monitor for side-effects, will abstain from intercourse (will see OBGYN discussed IUD or double protection-she discussed with PCP) and will limit 0-2 week not day or after. Rheumatoid arthritis (RA) remission SHELL 28 with normal CRP as well.     High risk medications, labs today normal. Labs every 12 week     2. Longterm hydroxychloroquine (plaquenil) use. Recent opthalmologist  no toxicity. Diarrhea side-effects at BID dosing, will continue 1 tablet 4 times a week, daily if flares.   3. Vitamin D deficeincy- take 2000 international unit(s) day. 41 on 3-2019     4. Pityriasis lichenoids chronic-per derm   5. Past Chronic NSAID use, informed her of risks and possible side-effects to this, and including CVD/kidney disease. Off now     Past prednisone 15-10-5 mg each for 2 weeks then stop   Methotrexate  12.5 mg once every THUR, folic acid  3 mg day-adjust   No NSAID use  Annual opthalmologist  Exam for hydroxychloroquine  (plaquenil) use.  Hydroxychloroquine (plaquenil) 200 mg once day or 4 time week if doing well    RTC 4 month     The patient understood the rationale for the diagnosis and treatment plan. Patient shared in the decision making. All questions were answered to best of my ability and the patient's satisfaction  Zurdo CORDERO, CNP, MSN  [facility]  Department of Rheumatology & Autoimmune Disorders    1. Immunization: Reviewed CDC guidelines with patient updated, information provided to patient based on CDC guidelines for vaccination recommendations, patient will discuss with primary provider  2. Bone Health:Educated on adequate calcium and vitamin D intake, Educated on exercise, Glucocorticoid education discussed risks, benefits & potential long term side effects from use  3. Discussed routine annual physicals, cancer screening, cardiac risk monitoring, bone health and primary care with primary care provider.   4. Educated on diagnosis, prognosis, labs, imaging, treatment options (including risks, benefits, risk of no treatment), medications (use, dose, side-effects, risks of medications including infection/cancer/bone marrow suppression, lab monitoring), infections what to do, plan of cares, goals of treatment.       GIL Kim CNP

## 2020-02-03 NOTE — LETTER
2/3/2020       RE: Rachel Fried  [address]  [facility] [address]     Dear Colleague,    Thank you for referring your patient, Rachel Fried, to the [facility] RHEUMATOLOGY at [facility] Please see a copy of my visit note below.    [facility] - Rheumatology Clinic Visit    Rachel Fried  is a 28 year old here for follow-up  RHEUMATOID ARTHRITIS (RA) involving hands, wrists, ankles, shoulders feet [ -RF and low CCP 25, -factor V -KAVITHA by IFA and -NADEEN panel -HLA B27] and pityriasis lichenoides chronica (seen [facility]-established here). XR  no erosions hands. Moved from [place] to be with family.       Past- hydroxychloroquine (plaquenil) since 1-2016 (2 tablet a day caused diarrhea, ok on 1 tablet) and methotrexate 10 mg week  to 4-2018 (mild fatigue day of) then restarted 10- (higher folic acid improved side-effects), naproxsyn; allergy sulfa, prednisone, advil       Initial visit copy forward  10-: Hydroxychloroquine (plaquenil) BID mild diarrhea; ocular eye exam 3-2016 baseline reports recent eye exam   EKG was recently Feb 2018 normal. Stopped methotrexate  In April as wanted to take a drug holiday and was moving here, also wanted to drink some alcohol, and discuss options. No infections. Diffuse joint pains in hands, wrists, shoulders, ankles and feet and the sides of her hips. Taking advil 600mg AM and 400 mg PM, tolerating but taking since April. Pain severe 5/10 or more. Stiffness all day. Hands are sore. No loss of function or ROM. Very sore and stiff all over. Knees are sore and hard to bend or squat. Mild diarrhea with hydroxychloroquine (plaquenil)     February 3, 2020  July 8, 2019- no changes except adjust methotrexate up by 2.5 mg      Continues on methotrexate  12.5 mg every 7 days THURS, folic acid 2 mg day. Tolerating. Hydroxychloroquine (plaquenil) 200 mg every day -improved diarrhea on  lower dose tolerable misses most days so takes <3 times a week. No GI side-effects, no hairloss or oral sores.     Rheumatoid arthritis (RA) is good and controlled. Function is good. Able to bend knees and make fist formation. Exercising and doing yoga. Dramatic improvement. No longer having right hand swelling or pain (it was mostly in her fingers and PIPs joints, will use compression gloves to help, is able to function still but discomfort is the mornings and PMs--this is all resolved) and no knee issues. Takes tylenol prn rare. Not pregnant and abstaining from intercourse and aware of the risks with methotrexate. Denies any fever, chills, SOB, CANDELARIA, night sweats, or chest pain, or cough. Reports healthy      Off celexa now not longer hungry all the time, lost some weight, less pain and more energy was on this for 10 years and now on cymbalta. Upper neck is good now     No longer using NSAIDs. Taking vitamin D 2000 international unit(s) day.  No EAS. No vision issues. Is exercising and doing yogo now. Denies any fever, chills, SOB, CANDELARIA, night sweats, or chest pain, or cough. Reports healthy.      PMH:  Injury-no  Medical-dermatitis, (bx 2-2016 GA vs LP vs cutaneous lupus--was not cutenaous lupus --focal interstitial lymphocytic inflammation, scattered lymphocytes), anxiety and depression, exercise induced asthma, scoliosis, pityriasis lichenoides chronica, chronic back pain, hypertension, seasonal allergies, acne, motion sickness, high cholesterol, chicken pox, headaches before    Surgical-None     FH:  No autoimmune disorders, psoriasis, UC, crohn's, RA, PsA, gout, autoimmune thyroid.  No MS, heart disease or cancer in family  Mother-factor V leiden carrier, rosea   Father-healthy  Siblings-younger bro healthy  Children-None   GF-lupus   Uncle cancer colon    SH:  Social Alcohol occasional week. Never Smoking. No IVDU. No Children. Right handed. Single--not sexually active, never.        PMSH personally  reviewed and updated by me.    ROS:  Negative raynaud s phenomena, hairloss, sun sensitivities, keratoconjunctivitis sicca, pleurisy, inflammatory joint symptoms, significant rashes like malar, oral/nasal or ulcerations, inflammatory eye disease, inflammatory bowel disease, dactylitis, tenosynovitis, or enthespathy. Negative for  blood clots, gout, psoriasis, UC, crohn's. No temporal headache, no jaw claudication, no scalp tenderness, vision changes, carotidynia, cough. No STD or pregnancy symptoms. No Parotid swelling.   +tips finger white at times and toes resolved   CONSTITUTIONAL: No fevers, night sweats or unintentional weight change. No acute distress, swollen glands  EYES: No vision change, diplopia, pain in eyes or red eyes   EARS, NOSE, MOUTH, THROAT: No tinnitus or hearing change, no epistaxis or nasal discharge, no oral lesions, throat clear. Normal saliva pool.  No drymouth. No thyroid enlargement.   CARDIOVASCULAR: No chest pain, palpitations, or pain with walking, no orthopnea or PND   RESPIRATORY: No dyspnea, cough, shortness of breath or wheezing. No pleurisy.   GI: No nausea, vomiting, diarrhea or constipation, no abdominal pain, or blood in stools.   : No change in urine, no dysuria or hematuria   MUSCKL: No enthesitis, plantar fascitis or heel pain. See above   INTEGUMENTARY: No concerning lesions or moles   NEURO: No loss of strength or sensation, no numbness or tingling, no tremor, no dizziness, no headache. No falls   ENDO: No polyuria or polydipsia, no temperature intolerance   HEME/LYMPH:No concerning bumps, bleeding problems, or swollen lymph nodes. No recent infections, hospitalizations or new illnesses.   ALLERGY: No environmental allergies   Otherwise 14 point ROS obtained, reviewed and found negative.     Physical exam:  Vitals: Blood pressure 127/87, pulse 86, temperature 98  F (36.7  C), weight 93.7 kg (206 lb 8 oz), SpO2 98 %, not currently breastfeeding.  Wt Readings from Last 4  Encounters:   02/03/20 93.7 kg (206 lb 8 oz)   01/14/20 94.5 kg (208 lb 6.4 oz)   12/10/19 95.3 kg (210 lb)   11/04/19 95.6 kg (210 lb 12.8 oz)     Constitutional: WD-WN-WG cooperative  Eyes: nl EOM, PERRLA, vision, conjunctiva, sclera, No Unilateral or bilateral external ear inflammation   ENT: nl external ears, nose, hearing, lips, teeth, gums, throat. No saddle nose   Neck: no mass or thyroid enlargement  Resp: lungs clear to auscultation, nl to palpation, nl effort  CV: RRR, no murmurs, rubs or gallops, no edema  GI: no ABD mass or tenderness, no HSM  : not tested  Lymph: no cervical or epitrochlear nodes  MS: All TMJ, neck, shoulder, elbow, wrist, hand, spine, hip, knee, ankle, and foot joints were examined and otherwise found normal. No deformity. Full ROM. Normal prayer sign. Negative Lhermitte's sign. No periuncle erythema.    Skin: no nail pitting, alopecia, rash.   Neuro: nl cranial nerves, strength, sensation, DTRs.   Psych: nl judgement, orientation, memory, affect.      Labs/Imaging:  Normal G6PD  Neg MTB QG 2016 and hepatitis panel, negative HIV    XR hands and knees-negative 2016   Results for orders placed or performed in visit on 02/03/20   CRP inflammation     Status: None   Result Value Ref Range    CRP Inflammation 6.1 0.0 - 8.0 mg/L   Creatinine     Status: None   Result Value Ref Range    Creatinine 0.69 0.52 - 1.04 mg/dL    GFR Estimate >90 >60 mL/min/[1.73_m2]    GFR Estimate If Black >90 >60 mL/min/[1.73_m2]   Albumin level     Status: None   Result Value Ref Range    Albumin 3.5 3.4 - 5.0 g/dL   ALT     Status: None   Result Value Ref Range    ALT 24 0 - 50 U/L   AST     Status: None   Result Value Ref Range    AST 16 0 - 45 U/L   CBC with platelets     Status: None   Result Value Ref Range    WBC 6.2 4.0 - 11.0 10e9/L    RBC Count 4.72 3.8 - 5.2 10e12/L    Hemoglobin 13.7 11.7 - 15.7 g/dL    Hematocrit 42.2 35.0 - 47.0 %    MCV 89 78 - 100 fl    MCH 29.0 26.5 - 33.0 pg    MCHC 32.5 31.5  - 36.5 g/dL    RDW 12.6 10.0 - 15.0 %    Platelet Count 297 150 - 450 10e9/L          Component      Latest Ref Rng & Units 9/7/2019 11/4/2019   WBC      4.0 - 11.0 10e9/L 6.0 6.8   RBC Count      3.8 - 5.2 10e12/L 4.64 4.46   Hemoglobin      11.7 - 15.7 g/dL 13.5 13.0   Hematocrit      35.0 - 47.0 % 42.0 40.1   MCV      78 - 100 fl 91 90   MCH      26.5 - 33.0 pg 29.1 29.1   MCHC      31.5 - 36.5 g/dL 32.1 32.4   RDW      10.0 - 15.0 % 13.0 13.2   Platelet Count      150 - 450 10e9/L 288 296   Creatinine      0.52 - 1.04 mg/dL 0.59 0.63   GFR Estimate      >60 mL/min/1.73:m2 >90 >90   GFR Estimate If Black      >60 mL/min/1.73:m2 >90 >90   CRP Inflammation      0.0 - 8.0 mg/L 7.9 6.4   Albumin      3.4 - 5.0 g/dL 3.4 3.2 (L)   ALT      0 - 50 U/L 20 21   AST      0 - 45 U/L 19 17       Impression/Plan:  1. Seropositive rheumatoid arthritis (RA) non-erosive remission activity. Poor prognosis. Rheumatoid arthritis (RA) remission on low dose methotrexate and hydroxychloroquine (plaquenil). Discussed risks with methotrexate, infection risks, pregnancy (abstince and knows needs to be off for 3-6 month before trying to concieve) seek immediate medical attention if any signs or symptoms infections. And monitor for side-effects, will abstain from intercourse (will see OBGYN discussed IUD or double protection-she discussed with PCP) and will limit 0-2 week not day or after. Rheumatoid arthritis (RA) remission SHELL 28 with normal CRP as well.     High risk medications, labs today normal. Labs every 12 week     2. Longterm hydroxychloroquine (plaquenil) use. Recent opthalmologist  no toxicity. Diarrhea side-effects at BID dosing, will continue 1 tablet 4 times a week, daily if flares.   3. Vitamin D deficeincy- take 2000 international unit(s) day. 41 on 3-2019     4. Pityriasis lichenoids chronic-per derm   5. Past Chronic NSAID use, informed her of risks and possible side-effects to this, and including CVD/kidney  disease. Off now     Past prednisone 15-10-5 mg each for 2 weeks then stop   Methotrexate  12.5 mg once every THUR, folic acid  3 mg day-adjust   No NSAID use  Annual opthalmologist  Exam for hydroxychloroquine (plaquenil) use.  Hydroxychloroquine (plaquenil) 200 mg once day or 4 time week if doing well    RTC 4 month     The patient understood the rationale for the diagnosis and treatment plan. Patient shared in the decision making. All questions were answered to best of my ability and the patient's satisfaction  Zurdo CORDERO, CNP, MSN  [facility]  Department of Rheumatology & Autoimmune Disorders    1. Immunization: Reviewed CDC guidelines with patient updated, information provided to patient based on CDC guidelines for vaccination recommendations, patient will discuss with primary provider  2. Bone Health:Educated on adequate calcium and vitamin D intake, Educated on exercise, Glucocorticoid education discussed risks, benefits & potential long term side effects from use  3. Discussed routine annual physicals, cancer screening, cardiac risk monitoring, bone health and primary care with primary care provider.   4. Educated on diagnosis, prognosis, labs, imaging, treatment options (including risks, benefits, risk of no treatment), medications (use, dose, side-effects, risks of medications including infection/cancer/bone marrow suppression, lab monitoring), infections what to do, plan of cares, goals of treatment.

## 2020-02-03 NOTE — RESULT ENCOUNTER NOTE
The blood counts, liver, kidney and CRP inflammation labs are normal.     Zurdo CORDERO, CNP, MSN  2/3/2020  8:16 AM

## 2020-02-12 DIAGNOSIS — L41.1 PITYRIASIS LICHENOIDES CHRONICA: ICD-10-CM

## 2020-02-13 RX ORDER — TRIAMCINOLONE ACETONIDE 1 MG/G
OINTMENT TOPICAL
Qty: 30 G | Refills: 1 | OUTPATIENT
Start: 2020-02-13

## 2020-02-28 ENCOUNTER — MYC MEDICAL ADVICE (OUTPATIENT)
Dept: INTERNAL MEDICINE | Facility: CLINIC | Age: 30
End: 2020-02-28

## 2020-02-28 DIAGNOSIS — J45.909 ASTHMA, UNSPECIFIED ASTHMA SEVERITY, UNSPECIFIED WHETHER COMPLICATED, UNSPECIFIED WHETHER PERSISTENT: Primary | ICD-10-CM

## 2020-03-10 RX ORDER — ALBUTEROL SULFATE 90 UG/1
2 AEROSOL, METERED RESPIRATORY (INHALATION) PRN
Qty: 1 INHALER | Refills: 1 | Status: SHIPPED | OUTPATIENT
Start: 2020-03-10 | End: 2020-03-10

## 2020-03-10 RX ORDER — ALBUTEROL SULFATE 90 UG/1
2 AEROSOL, METERED RESPIRATORY (INHALATION) PRN
Qty: 1 INHALER | Refills: 1 | Status: SHIPPED | OUTPATIENT
Start: 2020-03-10 | End: 2022-08-20

## 2020-04-03 DIAGNOSIS — M06.09 RHEUMATOID ARTHRITIS OF MULTIPLE SITES WITH NEGATIVE RHEUMATOID FACTOR (H): ICD-10-CM

## 2020-04-03 DIAGNOSIS — M54.2 NECK PAIN: ICD-10-CM

## 2020-04-03 DIAGNOSIS — R76.8 CYCLIC CITRULLINATED PEPTIDE (CCP) ANTIBODY POSITIVE: ICD-10-CM

## 2020-04-03 DIAGNOSIS — F41.9 ANXIETY: ICD-10-CM

## 2020-04-03 DIAGNOSIS — F32.A DEPRESSION, UNSPECIFIED DEPRESSION TYPE: ICD-10-CM

## 2020-04-03 DIAGNOSIS — Z79.899 LONG-TERM USE OF PLAQUENIL: ICD-10-CM

## 2020-04-03 DIAGNOSIS — G62.9 PERIPHERAL POLYNEUROPATHY: ICD-10-CM

## 2020-04-03 NOTE — TELEPHONE ENCOUNTER
methotrexate 2.5 MG tablet    Last Written Prescription Date:  2/3/2020  Last Fill Quantity: 20,   # refills: 2  Last Office Visit: 2/3/2020  Future Office visit:  6/1/2020    CBC RESULTS:   Recent Labs   Lab Test 02/03/20  0744   WBC 6.2   RBC 4.72   HGB 13.7   HCT 42.2   MCV 89   MCH 29.0   MCHC 32.5   RDW 12.6          Creatinine   Date Value Ref Range Status   02/03/2020 0.69 0.52 - 1.04 mg/dL Final   ]    Liver Function Studies -   Recent Labs   Lab Test 02/03/20  0744  10/31/18  1505   PROTTOTAL  --   --  7.7   ALBUMIN 3.5   < > 3.5   BILITOTAL  --   --  0.2   ALKPHOS  --   --  77   AST 16   < > 16   ALT 24   < > 22    < > = values in this interval not displayed.       Routing refill request to provider for review/approval because:  Drug not on the [facility] or [facility] refill protocol or controlled substance    Viri Krause RN  Central Triage Red Flags/Med Refills

## 2020-04-07 NOTE — TELEPHONE ENCOUNTER
DULoxetine (CYMBALTA) 60 MG capsule       Last Written Prescription Date:  1/24/20 - 4/23/20  Last Fill Quantity: 30,   # refills: 2  Last Office Visit : 1/14/20  Future Office visit: none    90 day pended.    Routing to RN because:  Overdue PHQ9 on 4/8/19 = 9 and see Mychart encounter 1/18/20    RX sent to pt's pharmacy. Left message for pt to call clinic.   Mattie Hannah RN 7:31 AM on 4/8/2020.

## 2020-04-08 RX ORDER — DULOXETIN HYDROCHLORIDE 60 MG/1
60 CAPSULE, DELAYED RELEASE ORAL DAILY
Qty: 30 CAPSULE | Refills: 2 | Status: SHIPPED | OUTPATIENT
Start: 2020-04-08 | End: 2020-08-03

## 2020-04-17 ENCOUNTER — VIRTUAL VISIT (OUTPATIENT)
Dept: PSYCHOLOGY | Facility: CLINIC | Age: 30
End: 2020-04-17
Payer: COMMERCIAL

## 2020-04-17 DIAGNOSIS — F33.0 MILD EPISODE OF RECURRENT MAJOR DEPRESSIVE DISORDER (H): Primary | ICD-10-CM

## 2020-04-17 DIAGNOSIS — F41.9 ANXIETY: ICD-10-CM

## 2020-04-17 NOTE — PROGRESS NOTES
Health Psychology                      Department of Medicine                     [facility] Mail [address] [place] [address]              Jessica Ram, Ph.D., L.P [phone]  Yashira العلي, Ph.D.  [phone]  Shruthi Diaz, Ph.D.,  L.P. [phone]  Nati Pompa, Ph.D., L.P. [phone]  Mekhi May, Ph.D. [phone]  Key Cunha, Ph.D., L.P. [phone]  Lee Reese, Ph.D., A.B.P.P., L.P. [phone]         Mayra Parker, Ph.D., L.P. [phone]     [facility]  3rd Floor  [address]   [address]    Telemental health (video) visit due to mitigation of COVID-19 pandemic, confirmed with patient.    Reason that telemedicine is appropriate: COVID-19 pandemic mitigation requiring social distancing.  Mode of transmission: Secure real time interactive audio and visual telecommunication system via [email or web address]  Location of originating and distant sites:    Originating site (patient location): patient's home; In event of emergency, patient verbally consented to having emergency response sent to their location    Distant site (provider site): home office of provider    Health Psychology Follow-Up Note     SUBJECTIVE:  Rachel Fried is a 29 year old female with PMH significant for rheumatoid arthritis, depression, and anxiety who was seen for individual psychotherapy. Reviewed functioning since last session.  She reported that mood and health were well managed until the pandemic began. Switched from citalopram to duloxetine for management of depression and pain and reported improvement in pain as a result. Also has lost weight in med shift as she found previous med led to increased hunger. Was also swimming regularly which helped pain.  Session focused on managing increasing frustration, stress, and anxiety in the context of  coronavirus pandemic.  Discussed ways to navigate family dynamics since stated home orders have resulted in many family dynamic role changes and she feels as if she and her mom do more work than other family members, particularly with her father.  Discussed cognitive behavioral strategies including assertive communication about how to voice concerns and feelings directly to family as well as rethinking should statements pertaining to other people's work in completing demands at home.     OBJECTIVE:  Appearance/Behavior/Orientation: Alert and oriented to person, place, time, and situation.    Cooperation/Reliability: Patient was open and cooperative throughout the session.    Abstract reasoning: Not assessed.   Judgment: Intact.    Mood/Affect: Mood euthymic; appropriate range of affect.    Insight/Motivation: Good, good     ASSESSMENT:  Engaged and motivated for therapy. Appears to be deriving benefit from treatment.      DIAGNOSIS:  Major depressive disorder, recurrent, mild  Anxiety, unspecified     PLAN:  RTC for continued psychotherapy.      Time in: 10:01  Time out: 11:00  Extended session due to complexity of case and length of interval.     Key Cunha, PhD, LP  Clinical Health Psychologist     Tx plan completed:             4/16/19  Tx plan due:                        4/16/20    *no letter

## 2020-05-29 DIAGNOSIS — M06.09 RHEUMATOID ARTHRITIS OF MULTIPLE SITES WITH NEGATIVE RHEUMATOID FACTOR (H): ICD-10-CM

## 2020-05-29 DIAGNOSIS — R76.8 CYCLIC CITRULLINATED PEPTIDE (CCP) ANTIBODY POSITIVE: ICD-10-CM

## 2020-05-29 DIAGNOSIS — Z79.899 LONG-TERM USE OF PLAQUENIL: ICD-10-CM

## 2020-05-29 DIAGNOSIS — Z79.899 LONG-TERM CURRENT USE OF HIGH RISK MEDICATION OTHER THAN ANTICOAGULANT: ICD-10-CM

## 2020-05-29 LAB
ALBUMIN SERPL-MCNC: 3.4 G/DL (ref 3.4–5)
ALT SERPL W P-5'-P-CCNC: 25 U/L (ref 0–50)
AST SERPL W P-5'-P-CCNC: 17 U/L (ref 0–45)
CREAT SERPL-MCNC: 0.64 MG/DL (ref 0.52–1.04)
CRP SERPL-MCNC: 7.8 MG/L (ref 0–8)
ERYTHROCYTE [DISTWIDTH] IN BLOOD BY AUTOMATED COUNT: 12.5 % (ref 10–15)
GFR SERPL CREATININE-BSD FRML MDRD: >90 ML/MIN/{1.73_M2}
HCT VFR BLD AUTO: 42.1 % (ref 35–47)
HGB BLD-MCNC: 14.1 G/DL (ref 11.7–15.7)
MCH RBC QN AUTO: 29.9 PG (ref 26.5–33)
MCHC RBC AUTO-ENTMCNC: 33.5 G/DL (ref 31.5–36.5)
MCV RBC AUTO: 89 FL (ref 78–100)
PLATELET # BLD AUTO: 339 10E9/L (ref 150–450)
RBC # BLD AUTO: 4.72 10E12/L (ref 3.8–5.2)
WBC # BLD AUTO: 6.8 10E9/L (ref 4–11)

## 2020-05-29 NOTE — RESULT ENCOUNTER NOTE
The blood counts, liver, kidney and CRP inflammation labs are normal.     Zurdo CORDERO, CNP, MSN  5/29/2020  10:49 AM

## 2020-06-03 ENCOUNTER — VIRTUAL VISIT (OUTPATIENT)
Dept: RHEUMATOLOGY | Facility: CLINIC | Age: 30
End: 2020-06-03
Attending: NURSE PRACTITIONER
Payer: COMMERCIAL

## 2020-06-03 VITALS — BODY MASS INDEX: 29.09 KG/M2 | WEIGHT: 197 LBS

## 2020-06-03 DIAGNOSIS — Z79.899 LONG-TERM CURRENT USE OF HIGH RISK MEDICATION OTHER THAN ANTICOAGULANT: ICD-10-CM

## 2020-06-03 DIAGNOSIS — M05.79 RHEUMATOID ARTHRITIS, SEROPOSITIVE, MULTIPLE SITES (H): Primary | ICD-10-CM

## 2020-06-03 DIAGNOSIS — Z79.899 LONG-TERM USE OF PLAQUENIL: ICD-10-CM

## 2020-06-03 ASSESSMENT — PAIN SCALES - GENERAL: PAINLEVEL: MILD PAIN (2)

## 2020-06-03 NOTE — LETTER
"6/3/2020       RE: Rachel Fried  [address]  [facility] [address]     Dear Colleague,    Thank you for referring your patient, Rachel Fried, to the [facility] RHEUMATOLOGY at [facility]. Please see a copy of my visit note below.    Rachel Fried is a 29 year old female who is being evaluated via a billable video visit.      The patient has been notified of following:     \"This video visit will be conducted via a call between you and your physician/provider. We have found that certain health care needs can be provided without the need for an in-person physical exam.  This service lets us provide the care you need with a video conversation.  If a prescription is necessary we can send it directly to your pharmacy.  If lab work is needed we can place an order for that and you can then stop by our lab to have the test done at a later time.    Video visits are billed at different rates depending on your insurance coverage.  Please reach out to your insurance provider with any questions.    If during the course of the call the physician/provider feels a video visit is not appropriate, you will not be charged for this service.\"    Patient has given verbal consent for Video visit? Yes    How would you like to obtain your AVS? [facility]    Patient would like the video invitation sent by: [phone]    Will anyone else be joining your video visit? No        Video-Visit Details    Type of service:  Video Visit    Video Start Time: 1035 AM  Video End Time: 1047 AM    Originating Location (pt. Location): Home    Distant Location (provider location):  [facility] RHEUMATOLOGY     Platform used for Video Visit: GIL Mcdonald [facility] - Rheumatology Clinic Visit    Rachel Fried  is a 29 year old here for follow-up  RHEUMATOID ARTHRITIS (RA) involving hands, wrists, ankles, shoulders feet [ -RF and low CCP 25, -factor V -KAVITHA " by IFA and -NADEEN panel -HLA B27] and pityriasis lichenoides chronica (seen [facility]-established here). XR  no erosions hands. Moved from [place] to be with family.       Past- hydroxychloroquine (plaquenil) since 1-2016 (2 tablet a day caused diarrhea, ok on 1 tablet) and methotrexate 10 mg week  to 4-2018 (mild fatigue day of) then restarted 10- (higher folic acid improved side-effects), naproxsyn; allergy sulfa, prednisone, advil       Initial visit copy forward  10-: Hydroxychloroquine (plaquenil) BID mild diarrhea; ocular eye exam 3-2016 baseline reports recent eye exam   EKG was recently Feb 2018 normal. Stopped methotrexate  In April as wanted to take a drug holiday and was moving here, also wanted to drink some alcohol, and discuss options. No infections. Diffuse joint pains in hands, wrists, shoulders, ankles and feet and the sides of her hips. Taking advil 600mg AM and 400 mg PM, tolerating but taking since April. Pain severe 5/10 or more. Stiffness all day. Hands are sore. No loss of function or ROM. Very sore and stiff all over. Knees are sore and hard to bend or squat. Mild diarrhea with hydroxychloroquine (plaquenil)     Kayleigh 3, 2020  July 8, 2019- adjust methotrexate up by 2.5 mg      Continues on methotrexate  12.5 mg every 7 days THURS, folic acid 2 mg day. Tolerating. Hydroxychloroquine (plaquenil) 200 mg every day  misses most days so takes <3 times a week. No GI side-effects, no hairloss or oral sores.     Rheumatoid arthritis (RA) is good and controlled. No flares. No changes in joints. No EAS. Function is good. Able to bend knees and make fist formation.  Not pregnant and abstaining from intercourse and aware of the risks with methotrexate. Denies any fever, chills, SOB, CANDELARIA, night sweats, or chest pain, or cough. Reports healthy. Working from home.      Off celexa -lost 12 lbs. 197 lb     No longer using NSAIDs. Taking vitamin D 2000 international unit(s) day.  No  EAS. No vision issues. Is exercising and doing yogo now.     PMH:  Injury-no  Medical-dermatitis, (bx 2-2016 GA vs LP vs cutaneous lupus--was not cutenaous lupus --focal interstitial lymphocytic inflammation, scattered lymphocytes), anxiety and depression, exercise induced asthma, scoliosis, pityriasis lichenoides chronica, chronic back pain, hypertension, seasonal allergies, acne, motion sickness, high cholesterol, chicken pox, headaches before    Surgical-None     FH:  No autoimmune disorders, psoriasis, UC, crohn's, RA, PsA, gout, autoimmune thyroid.  No MS, heart disease or cancer in family  Mother-factor V leiden carrier, rosea   Father-healthy  Siblings-younger bro healthy  Children-None   GF-lupus   Uncle cancer colon    SH:  Social Alcohol occasional week. Never Smoking. No IVDU. No Children. Right handed. Single--not sexually active, never.        [facility] personally reviewed and updated by me.    ROS:  Negative raynaud s phenomena, hairloss, sun sensitivities, keratoconjunctivitis sicca, pleurisy, inflammatory joint symptoms, significant rashes like malar, oral/nasal or ulcerations, inflammatory eye disease, inflammatory bowel disease, dactylitis, tenosynovitis, or enthespathy. Negative for  blood clots, gout, psoriasis, UC, crohn's. No temporal headache, no jaw claudication, no scalp tenderness, vision changes, carotidynia, cough. No STD or pregnancy symptoms. No Parotid swelling.   +tips finger white at times and toes resolved   CONSTITUTIONAL: No fevers, night sweats or unintentional weight change. No acute distress, swollen glands  EYES: No vision change, diplopia, pain in eyes or red eyes   EARS, NOSE, MOUTH, THROAT: No tinnitus or hearing change, no epistaxis or nasal discharge, no oral lesions, throat clear. Normal saliva pool.  No drymouth. No thyroid enlargement.   CARDIOVASCULAR: No chest pain, palpitations, or pain with walking, no orthopnea or PND   RESPIRATORY: No dyspnea, cough, shortness  of breath or wheezing. No pleurisy.   GI: No nausea, vomiting, diarrhea or constipation, no abdominal pain, or blood in stools.   : No change in urine, no dysuria or hematuria   MUSCKL: No enthesitis, plantar fascitis or heel pain. See above   INTEGUMENTARY: No concerning lesions or moles   NEURO: No loss of strength or sensation, no numbness or tingling, no tremor, no dizziness, no headache. No falls   ENDO: No polyuria or polydipsia, no temperature intolerance   HEME/LYMPH:No concerning bumps, bleeding problems, or swollen lymph nodes. No recent infections, hospitalizations or new illnesses.   ALLERGY: No environmental allergies   Otherwise 14 point ROS obtained, reviewed and found negative.     Assessment via video:    Constitutional: WD-WN-WG cooperative  Eyes: nl EOM, PERRLA, vision, conjunctiva, sclera  ENT: nl external ears, nose, hearing, lips, teeth, gums, throat  Neck: no mass or thyroid enlargement  RESP: No cough, no audible wheezing, able to talk in full sentences  Skin: no nail pitting, alopecia, rash  Neuro: nl cranial nerves, strength, sensation  Psych: nl judgement, orientation, memory, affect.  PSYCH: Alert and oriented times 3; coherent speech, normal rate and volume, able to articulate logical thoughts, able   to abstract reason, no tangential thoughts, no hallucinations or delusions  His affect is normal and pleasant  MSK: All other TMJ, neck, shoulder, elbow, wrist, hand, knee, ankle, and foot joints found normal. No deformity or nodule. Full ROM.Normal prayer sign. Negative Lhermitte's sign. No periuncle erythema  Remainder of exam unable to be completed due to video visits    Due to efforts to reduce the spread of COVID-19 in the clinic, state, nation, telephone or video visits are encouraged currently. Patient understands that diagnose and advice is limited by the inability to exam him/her/them face-to-face.    Labs/Imaging:  Normal G6PD  Neg MTB QG 2016 and hepatitis panel, negative HIV     XR hands and knees-negative 2016   Component      Latest Ref Rng & Units 5/29/2020   WBC      4.0 - 11.0 10e9/L 6.8   RBC Count      3.8 - 5.2 10e12/L 4.72   Hemoglobin      11.7 - 15.7 g/dL 14.1   Hematocrit      35.0 - 47.0 % 42.1   MCV      78 - 100 fl 89   MCH      26.5 - 33.0 pg 29.9   MCHC      31.5 - 36.5 g/dL 33.5   RDW      10.0 - 15.0 % 12.5   Platelet Count      150 - 450 10e9/L 339   Creatinine      0.52 - 1.04 mg/dL 0.64   GFR Estimate      >60 mL/min/1.73:m2 >90   GFR Estimate If Black      >60 mL/min/1.73:m2 >90   CRP Inflammation      0.0 - 8.0 mg/L 7.8   Albumin      3.4 - 5.0 g/dL 3.4   ALT      0 - 50 U/L 25   AST      0 - 45 U/L 17       Impression/Plan:  1. Seropositive rheumatoid arthritis (RA) non-erosive remission activity. Poor prognosis. Rheumatoid arthritis (RA) remission on low dose methotrexate and hydroxychloroquine (plaquenil). Discussed risks with methotrexate, infection risks, pregnancy (abstince and knows needs to be off for 3-6 month before trying to concieve) seek immediate medical attention if any signs or symptoms infections. And monitor for side-effects, will abstain from intercourse (will see OBGYN discussed IUD or double protection-she discussed with PCP) and will limit 0-2 week not day or after. Rheumatoid arthritis (RA) remission. Discussed Covid-19 (coronavirus) risks, follow CDC guideline.   High risk medications, labs today normal. Labs every 12 week     2. Longterm hydroxychloroquine (plaquenil) use. Annual ophthalmologist. Diarrhea side-effects at BID dosing, will continue 1 tablet 4 times a week, daily if flares.   3. Vitamin D deficeincy- take 2000 international unit(s) day. 41 on 3-2019     4. Pityriasis lichenoids chronic-per derm   5. Past Chronic NSAID use, informed her of risks and possible side-effects to this, and including CVD/kidney disease. Off now     Past prednisone 15-10-5 mg each for 2 weeks then stop   Methotrexate  12.5 mg once every THUR, folic  acid  3 mg day  No NSAID use  Annual opthalmologist  Exam for hydroxychloroquine (plaquenil) use.  Hydroxychloroquine (plaquenil) 200 mg once day or 4 time week if doing well    RTC 6 month     The patient understood the rationale for the diagnosis and treatment plan. Patient shared in the decision making. All questions were answered to best of my ability and the patient's satisfaction  Zurdo CORDERO, CNP, MSN  [facility]  Department of Rheumatology & Autoimmune Disorders    1. Immunization: Reviewed CDC guidelines with patient updated, information provided to patient based on CDC guidelines for vaccination recommendations, patient will discuss with primary provider  2. Bone Health:Educated on adequate calcium and vitamin D intake, Educated on exercise, Glucocorticoid education discussed risks, benefits & potential long term side effects from use  3. Discussed routine annual physicals, cancer screening, cardiac risk monitoring, bone health and primary care with primary care provider.   4. Educated on diagnosis, prognosis, labs, imaging, treatment options (including risks, benefits, risk of no treatment), medications (use, dose, side-effects, risks of medications including infection/cancer/bone marrow suppression, lab monitoring), infections what to do, plan of cares, goals of treatment.       Again, thank you for allowing me to participate in the care of your patient.      Sincerely,    Zurdo Wesley, GIL CNP

## 2020-06-03 NOTE — PROGRESS NOTES
"Rachel Fried is a 29 year old female who is being evaluated via a billable video visit.      The patient has been notified of following:     \"This video visit will be conducted via a call between you and your physician/provider. We have found that certain health care needs can be provided without the need for an in-person physical exam.  This service lets us provide the care you need with a video conversation.  If a prescription is necessary we can send it directly to your pharmacy.  If lab work is needed we can place an order for that and you can then stop by our lab to have the test done at a later time.    Video visits are billed at different rates depending on your insurance coverage.  Please reach out to your insurance provider with any questions.    If during the course of the call the physician/provider feels a video visit is not appropriate, you will not be charged for this service.\"    Patient has given verbal consent for Video visit? Yes    How would you like to obtain your AVS? [facility]    Patient would like the video invitation sent by: [phone]    Will anyone else be joining your video visit? No        Video-Visit Details    Type of service:  Video Visit    Video Start Time: 1035 AM  Video End Time: 1047 AM    Originating Location (pt. Location): Home    Distant Location (provider location):  [facility] RHEUMATOLOGY     Platform used for Video Visit: GIL Mcdonald CNP        "

## 2020-06-03 NOTE — PROGRESS NOTES
[facility] - Rheumatology Clinic Visit    Rachel Fried  is a 29 year old here for follow-up  RHEUMATOID ARTHRITIS (RA) involving hands, wrists, ankles, shoulders feet [ -RF and low CCP 25, -factor V -KAVITHA by IFA and -NADEEN panel -HLA B27] and pityriasis lichenoides chronica (seen [facility]-established here). XR  no erosions hands. Moved from [place] to be with family.       Past- hydroxychloroquine (plaquenil) since 1-2016 (2 tablet a day caused diarrhea, ok on 1 tablet) and methotrexate 10 mg week  to 4-2018 (mild fatigue day of) then restarted 10- (higher folic acid improved side-effects), naproxsyn; allergy sulfa, prednisone, advil       Initial visit copy forward  10-: Hydroxychloroquine (plaquenil) BID mild diarrhea; ocular eye exam 3-2016 baseline reports recent eye exam   EKG was recently Feb 2018 normal. Stopped methotrexate  In April as wanted to take a drug holiday and was moving here, also wanted to drink some alcohol, and discuss options. No infections. Diffuse joint pains in hands, wrists, shoulders, ankles and feet and the sides of her hips. Taking advil 600mg AM and 400 mg PM, tolerating but taking since April. Pain severe 5/10 or more. Stiffness all day. Hands are sore. No loss of function or ROM. Very sore and stiff all over. Knees are sore and hard to bend or squat. Mild diarrhea with hydroxychloroquine (plaquenil)     Kayleigh 3, 2020  July 8, 2019- adjust methotrexate up by 2.5 mg      Continues on methotrexate  12.5 mg every 7 days THURS, folic acid 2 mg day. Tolerating. Hydroxychloroquine (plaquenil) 200 mg every day  misses most days so takes <3 times a week. No GI side-effects, no hairloss or oral sores.     Rheumatoid arthritis (RA) is good and controlled. No flares. No changes in joints. No EAS. Function is good. Able to bend knees and make fist formation.  Not pregnant and abstaining from intercourse and aware of the risks with methotrexate.  Denies any fever, chills, SOB, CANDELARIA, night sweats, or chest pain, or cough. Reports healthy. Working from home.      Off celexa -lost 12 lbs. 197 lb     No longer using NSAIDs. Taking vitamin D 2000 international unit(s) day.  No EAS. No vision issues. Is exercising and doing yogo now.     PMH:  Injury-no  Medical-dermatitis, (bx 2-2016 [place] vs LP vs cutaneous lupus--was not cutenaous lupus --focal interstitial lymphocytic inflammation, scattered lymphocytes), anxiety and depression, exercise induced asthma, scoliosis, pityriasis lichenoides chronica, chronic back pain, hypertension, seasonal allergies, acne, motion sickness, high cholesterol, chicken pox, headaches before    Surgical-None     FH:  No autoimmune disorders, psoriasis, UC, crohn's, RA, PsA, gout, autoimmune thyroid.  No MS, heart disease or cancer in family  Mother-factor V leiden carrier, rosea   Father-healthy  Siblings-younger bro healthy  Children-None   GF-lupus   Uncle cancer colon    SH:  Social Alcohol occasional week. Never Smoking. No IVDU. No Children. Right handed. Single--not sexually active, never.        [facility] personally reviewed and updated by me.    ROS:  Negative raynaud s phenomena, hairloss, sun sensitivities, keratoconjunctivitis sicca, pleurisy, inflammatory joint symptoms, significant rashes like malar, oral/nasal or ulcerations, inflammatory eye disease, inflammatory bowel disease, dactylitis, tenosynovitis, or enthespathy. Negative for  blood clots, gout, psoriasis, UC, crohn's. No temporal headache, no jaw claudication, no scalp tenderness, vision changes, carotidynia, cough. No STD or pregnancy symptoms. No Parotid swelling.   +tips finger white at times and toes resolved   CONSTITUTIONAL: No fevers, night sweats or unintentional weight change. No acute distress, swollen glands  EYES: No vision change, diplopia, pain in eyes or red eyes   EARS, NOSE, MOUTH, THROAT: No tinnitus or hearing change, no epistaxis or nasal  discharge, no oral lesions, throat clear. Normal saliva pool.  No drymouth. No thyroid enlargement.   CARDIOVASCULAR: No chest pain, palpitations, or pain with walking, no orthopnea or PND   RESPIRATORY: No dyspnea, cough, shortness of breath or wheezing. No pleurisy.   GI: No nausea, vomiting, diarrhea or constipation, no abdominal pain, or blood in stools.   : No change in urine, no dysuria or hematuria   MUSCKL: No enthesitis, plantar fascitis or heel pain. See above   INTEGUMENTARY: No concerning lesions or moles   NEURO: No loss of strength or sensation, no numbness or tingling, no tremor, no dizziness, no headache. No falls   ENDO: No polyuria or polydipsia, no temperature intolerance   HEME/LYMPH:No concerning bumps, bleeding problems, or swollen lymph nodes. No recent infections, hospitalizations or new illnesses.   ALLERGY: No environmental allergies   Otherwise 14 point ROS obtained, reviewed and found negative.     Assessment via video:    Constitutional: WD-WN-WG cooperative  Eyes: nl EOM, PERRLA, vision, conjunctiva, sclera  ENT: nl external ears, nose, hearing, lips, teeth, gums, throat  Neck: no mass or thyroid enlargement  RESP: No cough, no audible wheezing, able to talk in full sentences  Skin: no nail pitting, alopecia, rash  Neuro: nl cranial nerves, strength, sensation  Psych: nl judgement, orientation, memory, affect.  PSYCH: Alert and oriented times 3; coherent speech, normal rate and volume, able to articulate logical thoughts, able   to abstract reason, no tangential thoughts, no hallucinations or delusions  His affect is normal and pleasant  MSK: All other TMJ, neck, shoulder, elbow, wrist, hand, knee, ankle, and foot joints found normal. No deformity or nodule. Full ROM.Normal prayer sign. Negative Lhermitte's sign. No periuncle erythema  Remainder of exam unable to be completed due to video visits    Due to efforts to reduce the spread of COVID-19 in the clinic, state, nation, telephone  or video visits are encouraged currently. Patient understands that diagnose and advice is limited by the inability to exam him/her/them face-to-face.    Labs/Imaging:  Normal G6PD  Neg MTB QG 2016 and hepatitis panel, negative HIV    XR hands and knees-negative 2016   Component      Latest Ref Rng & Units 5/29/2020   WBC      4.0 - 11.0 10e9/L 6.8   RBC Count      3.8 - 5.2 10e12/L 4.72   Hemoglobin      11.7 - 15.7 g/dL 14.1   Hematocrit      35.0 - 47.0 % 42.1   MCV      78 - 100 fl 89   MCH      26.5 - 33.0 pg 29.9   MCHC      31.5 - 36.5 g/dL 33.5   RDW      10.0 - 15.0 % 12.5   Platelet Count      150 - 450 10e9/L 339   Creatinine      0.52 - 1.04 mg/dL 0.64   GFR Estimate      >60 mL/min/1.73:m2 >90   GFR Estimate If Black      >60 mL/min/1.73:m2 >90   CRP Inflammation      0.0 - 8.0 mg/L 7.8   Albumin      3.4 - 5.0 g/dL 3.4   ALT      0 - 50 U/L 25   AST      0 - 45 U/L 17       Impression/Plan:  1. Seropositive rheumatoid arthritis (RA) non-erosive remission activity. Poor prognosis. Rheumatoid arthritis (RA) remission on low dose methotrexate and hydroxychloroquine (plaquenil). Discussed risks with methotrexate, infection risks, pregnancy (abstince and knows needs to be off for 3-6 month before trying to concieve) seek immediate medical attention if any signs or symptoms infections. And monitor for side-effects, will abstain from intercourse (will see OBGYN discussed IUD or double protection-she discussed with PCP) and will limit 0-2 week not day or after. Rheumatoid arthritis (RA) remission. Discussed Covid-19 (coronavirus) risks, follow CDC guideline.   High risk medications, labs today normal. Labs every 12 week     2. Longterm hydroxychloroquine (plaquenil) use. Annual ophthalmologist. Diarrhea side-effects at BID dosing, will continue 1 tablet 4 times a week, daily if flares.   3. Vitamin D deficeincy- take 2000 international unit(s) day. 41 on 3-2019     4. Pityriasis lichenoids chronic-per derm   5.  Past Chronic NSAID use, informed her of risks and possible side-effects to this, and including CVD/kidney disease. Off now     Past prednisone 15-10-5 mg each for 2 weeks then stop   Methotrexate  12.5 mg once every THUR, folic acid  3 mg day  No NSAID use  Annual opthalmologist  Exam for hydroxychloroquine (plaquenil) use.  Hydroxychloroquine (plaquenil) 200 mg once day or 4 time week if doing well    RTC 6 month     The patient understood the rationale for the diagnosis and treatment plan. Patient shared in the decision making. All questions were answered to best of my ability and the patient's satisfaction  Zurdo CORDERO, CNP, MSN  [facility]  Department of Rheumatology & Autoimmune Disorders    1. Immunization: Reviewed CDC guidelines with patient updated, information provided to patient based on CDC guidelines for vaccination recommendations, patient will discuss with primary provider  2. Bone Health:Educated on adequate calcium and vitamin D intake, Educated on exercise, Glucocorticoid education discussed risks, benefits & potential long term side effects from use  3. Discussed routine annual physicals, cancer screening, cardiac risk monitoring, bone health and primary care with primary care provider.   4. Educated on diagnosis, prognosis, labs, imaging, treatment options (including risks, benefits, risk of no treatment), medications (use, dose, side-effects, risks of medications including infection/cancer/bone marrow suppression, lab monitoring), infections what to do, plan of cares, goals of treatment.

## 2020-06-25 ENCOUNTER — VIRTUAL VISIT (OUTPATIENT)
Dept: PSYCHOLOGY | Facility: CLINIC | Age: 30
End: 2020-06-25
Payer: COMMERCIAL

## 2020-06-25 DIAGNOSIS — F41.9 ANXIETY: ICD-10-CM

## 2020-06-25 DIAGNOSIS — F33.0 MILD EPISODE OF RECURRENT MAJOR DEPRESSIVE DISORDER (H): Primary | ICD-10-CM

## 2020-06-25 NOTE — PROGRESS NOTES
"    Health Psychology                      Department of Medicine                     [facility] Mail Code [address] [place] [address]              Jessica Ram, Ph.D., L.P [phone]  Yashira العلي, Ph.D.  [phone]  Shruthi Diaz, Ph.D.,  L.P. [phone]  Nati Pompa, Ph.D., L.P. [phone]  Mekhi May, Ph.D. [phone]  Key Cunha, Ph.D., L.P. [phone]  Lee Reese, Ph.D., A.B.P.P., L.P. [phone]         Mayra Parker, Ph.D., L.P. [phone]     [facility] [place]  3rd Floor  [address]   [address]    Telemental health (video) visit due to mitigation of COVID-19 pandemic, confirmed with patient.    Reason that telemedicine is appropriate: COVID-19 pandemic mitigation requiring social distancing.  Mode of transmission: Secure real time interactive audio and visual telecommunication system via [email or web address]  Location of originating and distant sites:    Originating site (patient location): patient's home; In event of emergency, patient verbally consented to having emergency response sent to their location    Distant site (provider site): home office of provider    Health Psychology Follow-Up Note     SUBJECTIVE:  Rachel Fried is a 29 year old female with PMH significant for rheumatoid arthritis, depression, and anxiety who was seen for individual psychotherapy. Reviewed functioning since last session.  She reported that mood has had ups and owns since the last session, with \"low grade\" depression given social isolation. Fears impact of social distancing on social connections, especially given recent prioritization towards personal life/relationships. Continues to experience feelings of rejection when friends cancel plans or do not reciprocate outreach. Response is to shut down and as as result her sense of worthlessness and " isolation are heightened. Discussed how to build a positive relationship with herself given her tendency to criticize and be hard on herself. Reviewed tool of self compassion to improve ability to care for self in times of emotional pain rather than get caught in critical self talk. Encouraged practice of this skill - provided instructions as a guide.      OBJECTIVE:  Appearance/Behavior/Orientation: Alert and oriented to person, place, time, and situation.    Cooperation/Reliability: Patient was open and cooperative throughout the session.    Abstract reasoning: Not assessed.   Judgment: Intact.    Mood/Affect: Mood dysphoric; affect mood congruent  Insight/Motivation: Good, good     ASSESSMENT:  Engaged and motivated for therapy. Appears to be deriving benefit from treatment.      DIAGNOSIS:  Major depressive disorder, recurrent, mild  Anxiety, unspecified     PLAN:  RTC for continued psychotherapy.      Time in: 9:08  Time out: 10:04  Extended session due to complexity of case and length of interval.     Key Cunha, PhD,   Clinical Health Psychologist     Tx plan completed:             4/16/19  Tx plan due:                        4/16/20    *no letter

## 2020-07-29 ENCOUNTER — TELEPHONE (OUTPATIENT)
Dept: INTERNAL MEDICINE | Facility: CLINIC | Age: 30
End: 2020-07-29

## 2020-07-29 DIAGNOSIS — M54.2 NECK PAIN: ICD-10-CM

## 2020-07-29 DIAGNOSIS — G62.9 PERIPHERAL POLYNEUROPATHY: ICD-10-CM

## 2020-07-29 DIAGNOSIS — F32.A DEPRESSION, UNSPECIFIED DEPRESSION TYPE: ICD-10-CM

## 2020-07-29 DIAGNOSIS — F41.9 ANXIETY: ICD-10-CM

## 2020-07-30 DIAGNOSIS — Z79.899 LONG-TERM USE OF PLAQUENIL: ICD-10-CM

## 2020-07-30 DIAGNOSIS — R76.8 CYCLIC CITRULLINATED PEPTIDE (CCP) ANTIBODY POSITIVE: ICD-10-CM

## 2020-07-30 DIAGNOSIS — M06.09 RHEUMATOID ARTHRITIS OF MULTIPLE SITES WITH NEGATIVE RHEUMATOID FACTOR (H): ICD-10-CM

## 2020-07-31 NOTE — TELEPHONE ENCOUNTER
Monitoring labs required every 8-12 weeks for medication refills.  methotrexate 2.5 MG tablet       Last Written Prescription Date:  4/3/20  Last Fill Quantity: 60,   # refills: 0  Last Office Visit: 6/3/20, recommended 6 month follow up  Future Office visit:  None scheduled    CBC RESULTS:   Recent Labs   Lab Test 05/29/20  1005   WBC 6.8   RBC 4.72   HGB 14.1   HCT 42.1   MCV 89   MCH 29.9   MCHC 33.5   RDW 12.5          Creatinine   Date Value Ref Range Status   05/29/2020 0.64 0.52 - 1.04 mg/dL Final   ]    Liver Function Studies -   Recent Labs   Lab Test 05/29/20  1005  10/31/18  1505   PROTTOTAL  --   --  7.7   ALBUMIN 3.4   < > 3.5   BILITOTAL  --   --  0.2   ALKPHOS  --   --  77   AST 17   < > 16   ALT 25   < > 22    < > = values in this interval not displayed.       Routing refill request to provider for review/approval because:  Drug not on the G, P or [facility] refill protocol

## 2020-08-03 DIAGNOSIS — F41.9 ANXIETY: ICD-10-CM

## 2020-08-03 DIAGNOSIS — F32.A DEPRESSION, UNSPECIFIED DEPRESSION TYPE: ICD-10-CM

## 2020-08-03 DIAGNOSIS — G62.9 PERIPHERAL POLYNEUROPATHY: ICD-10-CM

## 2020-08-03 DIAGNOSIS — M54.2 NECK PAIN: ICD-10-CM

## 2020-08-03 RX ORDER — DULOXETIN HYDROCHLORIDE 60 MG/1
60 CAPSULE, DELAYED RELEASE ORAL DAILY
Qty: 30 CAPSULE | Refills: 0 | Status: SHIPPED | OUTPATIENT
Start: 2020-08-03 | End: 2020-09-11

## 2020-08-03 RX ORDER — DULOXETIN HYDROCHLORIDE 60 MG/1
60 CAPSULE, DELAYED RELEASE ORAL DAILY
Qty: 30 CAPSULE | Refills: 0 | Status: SHIPPED | OUTPATIENT
Start: 2020-08-03 | End: 2020-08-03

## 2020-08-03 RX ORDER — DULOXETIN HYDROCHLORIDE 60 MG/1
CAPSULE, DELAYED RELEASE ORAL
Qty: 30 CAPSULE | Refills: 0 | OUTPATIENT
Start: 2020-08-03

## 2020-08-03 NOTE — TELEPHONE ENCOUNTER
DULOXETINE DR 60MG CAPSULES      Last Written Prescription Date:  4/8/20  Last Fill Quantity: 30,   # refills: 2  Last Office Visit : 1/14/20  Future Office visit:  None    Routing refill request to provider for review because:  Last PHQ9=9 on 4/8/2019.30 day charlie sent.  Appt due( requested RTc 2 months @ last visit).  Scheduling has been notified to contact the pt for appointment.  Overridden by Mattie Hannah RN on Apr 8, 2020 7:30 AM    Drug-Drug    1. DULOXETINE / SELECTIVE SEROTONIN REUPTAKE INHIBITORS [Level: Major]    Other Orders:  citalopram (CELEXA) 10 MG tablet

## 2020-08-03 NOTE — TELEPHONE ENCOUNTER
Pharmacy was called to confirm the need for the RX for Duloxetine to be resent.  Resend needed and sent.      Kathleen M Doege RN

## 2020-08-03 NOTE — TELEPHONE ENCOUNTER
[facility] Call Center    Phone Message    May a detailed message be left on voicemail: yes     Reason for Call: Other: Per call from Pharmacy they accidentally deleted the RX and they need a NEW RX send to [facility] as above.      Action Taken: Message routed to:  [facility] Primary Care    Travel Screening: Not Applicable

## 2020-09-01 ENCOUNTER — VIRTUAL VISIT (OUTPATIENT)
Dept: PSYCHOLOGY | Facility: CLINIC | Age: 30
End: 2020-09-01
Payer: COMMERCIAL

## 2020-09-01 DIAGNOSIS — F33.0 MILD EPISODE OF RECURRENT MAJOR DEPRESSIVE DISORDER (H): Primary | ICD-10-CM

## 2020-09-01 DIAGNOSIS — F41.9 ANXIETY: ICD-10-CM

## 2020-09-01 NOTE — PROGRESS NOTES
"The patient has been notified of the following:      \"We have found that certain health care needs can be provided without the need for a face to face visit.  This service lets us provide the care you need with a phone conversation.       I will have full access to your [facility] medical record during this entire phone call.   I will be taking notes for your medical record.      Since this is like an office visit, we will bill your insurance company for this service.       There are potential benefits and risks of telephone visits (e.g. limits to patient confidentiality) that differ from in-person visits.?  Confidentiality still applies for telephone services, and nobody will record the visit.  It is important to be in a quiet, private space that is free of distractions (including cell phone or other devices) during the visit.??      If during the course of the call I believe a telephone visit is not appropriate, you will not be charged for this service\"     Consent has been obtained for this service by care team member: Yes     [facility] Psychology                      Department of Medicine                     [facility] Mail Code [address] [place] [address]              Jessica Ram, Ph.D., L.P [phone]  Yashira العلي, Ph.D.  [phone]  Shruthi Diaz, Ph.D.,  L.P. [phone]  Nati Pompa, Ph.D., L.P. [phone]  Mekhi May, Ph.D. [phone]  Key Cunha, Ph.D., L.P. [phone]  Lee Reese, Ph.D., A.B.P.P., L.P. [phone]         Mayra Parker, Ph.D., L.P. [phone]     [facility]  3rd Floor  [address]   [address]    Telemental health visit due to mitigation of COVID-19 pandemic, confirmed with patient.    Reason that telemedicine is appropriate: COVID-19 pandemic mitigation requiring social distancing.  Mode of " "transmission: Telephone  Location of originating and distant sites:    Originating site (patient location): patient's home; In event of emergency, patient verbally consented to having emergency response sent to their location    Distant site (provider site): home office of provider    Health Psychology Follow-Up Note     SUBJECTIVE:  Rachel Fried is a 29 year old female with PMH significant for rheumatoid arthritis, depression, and anxiety who was seen for individual psychotherapy. Reviewed functioning since last session.  She reported significant stress with work and interpersonal relationships.  Regarding work, she is experiencing increased demands at work and being asked to complete tasks that do not fit her interest or skill set.  She also reported disconnection from her mother, which she attributes to her mother's mental health problems and problematic alcohol use exacerbated that by the stress of recent events.  And she holds different viewpoints regarding the pandemic and racial injustice as her family, she feels disconnected from the social support network.  This is exacerbated by her stress at work, and feels more alone.  Has been hurt by several things her mother is said to her while drinking.  Discussed ways in which she can process emotions associated with these stressors.  Focused on ways to acknowledge sadness in the disconnection in her relationship with her mother and recognize the ways in which that relationship was not serving her well.  She acknowledged elements of the relationship that would be beneficial to let go of.  Discussed ways in which to learn how to provide support to self through self compassion exercises.  She reported some hesitance and practicing this skill, stating that she often feels like her emotional reactions are \"too much\" at times and has a problem validating them because she feels like they are unjustified.  Discussed benefit in which she can recognize a broader " perspective but also recognize her emotional needs and learn how to meet them.     OBJECTIVE:  Appearance/Behavior/Orientation: Alert and oriented to person, place, time, and situation.    Cooperation/Reliability: Patient was open and cooperative throughout the session.    Abstract reasoning: Not assessed.   Judgment: Intact.    Mood/Affect: Mood dysphoric; affect mood congruent  Insight/Motivation: Good, good     ASSESSMENT:  Engaged and motivated for therapy. Appears to be deriving benefit from treatment.      DIAGNOSIS:  Major depressive disorder, recurrent, mild  Anxiety, unspecified     PLAN:  RTC for continued psychotherapy.  She will reach out to set of the next appointment as she is not sure when she will be in town versus out of town.     Time in: 4:04  Time out: 5:00  Extended session due to complexity of case and length of interval.     Key Cunha, PhD, LP  Clinical Health Psychologist     Tx plan completed:             4/16/19  Tx plan due:                        4/16/20    *no letter

## 2020-09-07 DIAGNOSIS — G62.9 PERIPHERAL POLYNEUROPATHY: ICD-10-CM

## 2020-09-07 DIAGNOSIS — F32.A DEPRESSION, UNSPECIFIED DEPRESSION TYPE: ICD-10-CM

## 2020-09-07 DIAGNOSIS — M54.2 NECK PAIN: ICD-10-CM

## 2020-09-07 DIAGNOSIS — F41.9 ANXIETY: ICD-10-CM

## 2020-09-11 RX ORDER — DULOXETIN HYDROCHLORIDE 60 MG/1
CAPSULE, DELAYED RELEASE ORAL
Qty: 90 CAPSULE | Refills: 3 | Status: SHIPPED | OUTPATIENT
Start: 2020-09-11 | End: 2021-08-26

## 2020-09-11 NOTE — TELEPHONE ENCOUNTER
"   DULOXETINE DR 60MG CAPSULES : Take 1 capsule (60 mg) by mouth daily Return appt due [phone]   Last Written Prescription Date:  8/3/20  Last Fill Quantity: 30,   # refills: 0  Last Office Visit : 1/14/20  Future Office visit:  None    Routing refill request to provider for review/approval because:  PHQ 4/8/2019=9. Medication prescribed 1/18/20 Dr Jane Cunha, Phd /Psychology:4/17/20\"Switched from citalopram to duloxetine for management of depression and pain and reported improvement in pain as a result.\"        "

## 2020-11-02 DIAGNOSIS — R76.8 CYCLIC CITRULLINATED PEPTIDE (CCP) ANTIBODY POSITIVE: ICD-10-CM

## 2020-11-02 DIAGNOSIS — Z79.899 LONG-TERM USE OF PLAQUENIL: ICD-10-CM

## 2020-11-02 DIAGNOSIS — M06.09 RHEUMATOID ARTHRITIS OF MULTIPLE SITES WITH NEGATIVE RHEUMATOID FACTOR (H): ICD-10-CM

## 2020-11-03 RX ORDER — FOLIC ACID 1 MG/1
2 TABLET ORAL DAILY
Qty: 180 TABLET | Refills: 1 | Status: SHIPPED | OUTPATIENT
Start: 2020-11-03 | End: 2021-02-23

## 2020-11-05 ENCOUNTER — VIRTUAL VISIT (OUTPATIENT)
Dept: PSYCHOLOGY | Facility: CLINIC | Age: 30
End: 2020-11-05
Payer: COMMERCIAL

## 2020-11-05 DIAGNOSIS — F41.9 ANXIETY: ICD-10-CM

## 2020-11-05 DIAGNOSIS — F33.0 MILD EPISODE OF RECURRENT MAJOR DEPRESSIVE DISORDER (H): Primary | ICD-10-CM

## 2020-11-05 PROCEDURE — 90837 PSYTX W PT 60 MINUTES: CPT | Mod: 95 | Performed by: PSYCHOLOGIST

## 2020-11-05 NOTE — PROGRESS NOTES
"The patient has been notified of the following:      \"We have found that certain health care needs can be provided without the need for a face to face visit.  This service lets us provide the care you need with a phone conversation.       I will have full access to your [facility] medical record during this entire phone call.   I will be taking notes for your medical record.      Since this is like an office visit, we will bill your insurance company for this service.       There are potential benefits and risks of telephone visits (e.g. limits to patient confidentiality) that differ from in-person visits.?  Confidentiality still applies for telephone services, and nobody will record the visit.  It is important to be in a quiet, private space that is free of distractions (including cell phone or other devices) during the visit.??      If during the course of the call I believe a telephone visit is not appropriate, you will not be charged for this service\"     Consent has been obtained for this service by care team member: [facility] Psychology                      Department of Medicine                     [facility] Mail Code 409     [address] [place], [place] [address]              Jessica Ram, Ph.D., L.P [phone]  Shruthi Diaz, Ph.D.,  L.P. [phone]  Nati Pompa, Ph.D., L.P. [phone]  Mekhi May, Ph.D., L.P [phone]  Key Cunha, Ph.D., L.P. [phone]  Lee Reese, Ph.D., A.B.P.P., L.P. [phone]         Mayra Parker, Ph.D., L.P. [phone]     [facility] [place], 3rd Floor  [address] [address]    Telemental health visit due to mitigation of COVID-19 pandemic, confirmed with patient.    Reason that telemedicine is appropriate: COVID-19 pandemic mitigation requiring social distancing.  Mode of transmission: Telephone  Location of originating and distant " sites:    Originating site (patient location): patient's home; In event of emergency, patient verbally consented to having emergency response sent to their location    Distant site (provider site): home office of provider    Health Psychology Follow-Up Note     SUBJECTIVE:  Rachel Fried is a 29 year old female with PMH significant for rheumatoid arthritis, depression, and anxiety who was seen for individual psychotherapy. Reviewed functioning since last session.  She noted high anxiety in the context of the election.  She has been coping through obtaining strong support from her brother and several friends as well as engaging in several enjoyable activities.  [place] today focused on ways to cope with differing political beliefs amongst family members as she will be spending several weeks with her parents over the holidays and hopes to find a way to navigate their are differing beliefs.  She discussed feeling hurt by her parents' support of a candidate that does not support important human rights that are meaningful to her.  Discussed past relationship dynamics in her family and how these have changed as she has grown more aware of her beliefs and values.  Discussed ways to seek to understand in conversation rather than change others' opinons.  Discussed ways to increase awareness of own action patterns when she feels hurt and let go of responsibility for her family members feelings in order to focus on being authentic to herself.  She endorsed this would be a helpful perspective to take when visiting family.     OBJECTIVE:  Appearance/Behavior/Orientation: Alert and oriented to person, place, time, and situation.    Cooperation/Reliability: Patient was open and cooperative throughout the session.    Abstract reasoning: Not assessed.   Judgment: Intact.    Mood/Affect: Mood described as anxious; affect mood congruent  Insight/Motivation: Good, good     ASSESSMENT:  Engaged and motivated for therapy. Appears to be  deriving benefit from treatment.      DIAGNOSIS:  Major depressive disorder, recurrent, mild  Anxiety, unspecified     PLAN:  RTC for continued psychotherapy.  She will reach out to set of the next appointment.     Time in: 9:02a  Time out: 10:04a  Extended session due to complexity of case and length of interval.     Key Cunha, PhD,   Clinical Health Psychologist     Tx plan completed:             4/16/19  Tx plan due:                        4/16/20    *no letter

## 2020-11-11 ENCOUNTER — MYC REFILL (OUTPATIENT)
Dept: RHEUMATOLOGY | Facility: CLINIC | Age: 30
End: 2020-11-11

## 2020-11-11 DIAGNOSIS — M06.09 RHEUMATOID ARTHRITIS OF MULTIPLE SITES WITH NEGATIVE RHEUMATOID FACTOR (H): ICD-10-CM

## 2020-11-11 DIAGNOSIS — R76.8 CYCLIC CITRULLINATED PEPTIDE (CCP) ANTIBODY POSITIVE: ICD-10-CM

## 2020-11-11 DIAGNOSIS — Z79.899 LONG-TERM USE OF PLAQUENIL: ICD-10-CM

## 2020-11-13 NOTE — TELEPHONE ENCOUNTER
Monitoring labs required every 8-12 weeks for medication refills.  methotrexate 2.5 MG tablet      Last Written Prescription Date:  8/7/20  Last Fill Quantity: 60,   # refills: 0  Last Office Visit: 6/3/20 recommended 6 month follow up  Future Office visit:  None scheduled    CBC RESULTS:   Recent Labs   Lab Test 05/29/20  1005   WBC 6.8   RBC 4.72   HGB 14.1   HCT 42.1   MCV 89   MCH 29.9   MCHC 33.5   RDW 12.5          Creatinine   Date Value Ref Range Status   05/29/2020 0.64 0.52 - 1.04 mg/dL Final   ]    Liver Function Studies -   Recent Labs   Lab Test 05/29/20  1005 10/31/18  1505 10/31/18  1505   PROTTOTAL  --   --  7.7   ALBUMIN 3.4   < > 3.5   BILITOTAL  --   --  0.2   ALKPHOS  --   --  77   AST 17   < > 16   ALT 25   < > 22    < > = values in this interval not displayed.       Routing refill request to provider for review/approval because:  Drug not on the G, P or [facility] refill protocol   Overdue for labs  Nothing more recent in care everywhere nor media

## 2020-11-17 DIAGNOSIS — Z79.899 LONG-TERM CURRENT USE OF HIGH RISK MEDICATION OTHER THAN ANTICOAGULANT: ICD-10-CM

## 2020-11-17 DIAGNOSIS — Z79.899 LONG-TERM USE OF PLAQUENIL: ICD-10-CM

## 2020-11-17 DIAGNOSIS — R76.8 CYCLIC CITRULLINATED PEPTIDE (CCP) ANTIBODY POSITIVE: ICD-10-CM

## 2020-11-17 DIAGNOSIS — M06.09 RHEUMATOID ARTHRITIS OF MULTIPLE SITES WITH NEGATIVE RHEUMATOID FACTOR (H): ICD-10-CM

## 2020-11-17 LAB
ALBUMIN SERPL-MCNC: 3.4 G/DL (ref 3.4–5)
ALT SERPL W P-5'-P-CCNC: 16 U/L (ref 0–50)
AST SERPL W P-5'-P-CCNC: 10 U/L (ref 0–45)
CREAT SERPL-MCNC: 0.66 MG/DL (ref 0.52–1.04)
CRP SERPL-MCNC: 9 MG/L (ref 0–8)
ERYTHROCYTE [DISTWIDTH] IN BLOOD BY AUTOMATED COUNT: 12.9 % (ref 10–15)
GFR SERPL CREATININE-BSD FRML MDRD: >90 ML/MIN/{1.73_M2}
HCT VFR BLD AUTO: 42.9 % (ref 35–47)
HGB BLD-MCNC: 13.8 G/DL (ref 11.7–15.7)
MCH RBC QN AUTO: 29.2 PG (ref 26.5–33)
MCHC RBC AUTO-ENTMCNC: 32.2 G/DL (ref 31.5–36.5)
MCV RBC AUTO: 91 FL (ref 78–100)
PLATELET # BLD AUTO: 389 10E9/L (ref 150–450)
RBC # BLD AUTO: 4.72 10E12/L (ref 3.8–5.2)
WBC # BLD AUTO: 9.6 10E9/L (ref 4–11)

## 2020-11-17 PROCEDURE — 84450 TRANSFERASE (AST) (SGOT): CPT | Performed by: PATHOLOGY

## 2020-11-17 PROCEDURE — 86140 C-REACTIVE PROTEIN: CPT | Performed by: PATHOLOGY

## 2020-11-17 PROCEDURE — 82565 ASSAY OF CREATININE: CPT | Performed by: PATHOLOGY

## 2020-11-17 PROCEDURE — 36415 COLL VENOUS BLD VENIPUNCTURE: CPT | Performed by: PATHOLOGY

## 2020-11-17 PROCEDURE — 82040 ASSAY OF SERUM ALBUMIN: CPT | Performed by: PATHOLOGY

## 2020-11-17 PROCEDURE — 84460 ALANINE AMINO (ALT) (SGPT): CPT | Performed by: PATHOLOGY

## 2020-11-17 PROCEDURE — 85027 COMPLETE CBC AUTOMATED: CPT | Performed by: PATHOLOGY

## 2020-11-18 NOTE — RESULT ENCOUNTER NOTE
Blood counts, liver and kidney tests are normal or stable. Mild Elevated CRP but no changes are needed.

## 2020-12-09 DIAGNOSIS — R76.8 CYCLIC CITRULLINATED PEPTIDE (CCP) ANTIBODY POSITIVE: ICD-10-CM

## 2020-12-09 DIAGNOSIS — Z79.899 LONG-TERM USE OF PLAQUENIL: ICD-10-CM

## 2020-12-09 DIAGNOSIS — M06.09 RHEUMATOID ARTHRITIS OF MULTIPLE SITES WITH NEGATIVE RHEUMATOID FACTOR (H): ICD-10-CM

## 2020-12-09 NOTE — LETTER
Dear Rachel Fried,     Regular eye exams are required while taking hydroxychloroquine (Plaquenil). Eye exams should be completed by an eye specialist who is experienced in monitoring for hydroxychloroquine toxicity (a rare effect of the drug that can damage your eyes and vision).  These may be yearly or as determined by your eye specialist.     Although vision problems and loss of sight while taking hydroxychloroquine are very rare, notify your doctor immediately if you notice changes in your vision. The goal of screening is to detect toxicity before your vision is significantly or noticeably impacted. Failing to get proper screening exams puts you at risk of vision changes which may or may not be reversible.    Per the American Academy of Ophthalmology recommendations (2016), screening tests performed may include a 10-2 visual field test, spectral-domain optical coherence tomography (SD OCT), or other screening tests as determined by the eye specialist, including a multifocal electroretinogram (mfERG) or fundus auto-fluorescence (FAF).    We received a refill request from your pharmacy. A copy of your current eye exam was not found in your medical record. Your hydroxychloroquine prescription has been refilled with a limited supply pending confirmation you have had an eye exam to test for hydroxychloroquine toxicity.      We encourage you to bring this letter to your eye exam to discuss the exams that will be performed during your visit. Please request your eye clinic fax or mail a copy of your eye exam report to our clinic indicating that testing was completed for hydroxychloroquine toxicity screening. The exam notes must specifically comment on the potential for hydroxychloroquine toxicity and outline recommended follow-up.    If you have questions about hydroxychloroquine or the information in this letter, please call the clinic at [phone] or talk to your provider at your next office  visit.                        1    Eye Specialist Letter  Dear Eye Specialist,  To ensure safe use of Plaquenil (hydroxychloroquine), we are requesting your assistance in providing the following information. Please return this form to our clinic via mail or fax, or incorporate this information into your visit summary. Your note must indicate whether or not there is evidence of toxicity from Plaquenil use. For questions regarding this form, please call [phone].  Patient Name:   :             Date of Exam:    The following exams were completed during this visit in accordance with the American Academy of Ophthalmology recommendations (2016):  ? 10-2 automated visual field  ? Spectral-domain optical coherence tomography (SD OCT)  ? Multifocal electroretinogram (mfERG)  ? Fundus autofluorescence (FAF)  ? Other (please specify)  Please select from the following:  ? The findings from the above exams are not suggestive of toxicity from Plaquenil (hydroxychloroquine).  ? The findings from the above exams may suggest toxicity from Plaquenil (hydroxychloroquine).  Directly contact the clinic and fax this form.  Please provide additional guidance on whether or not the medication may be continued from your perspective:  Date of next recommended Plaquenil (hydroxychloroquine) screening eye exam:   ? 5 years  ? 1 year  ? 6 months  Other (please specify):   Eye Specialist Name (print):                                                                Date:  Eye Specialist Signature:

## 2020-12-12 NOTE — TELEPHONE ENCOUNTER
hydroxychloroquine (PLAQUENIL) 200 MG tablet  Last Written Prescription Date:  7/8/19  Last Fill Quantity: 90,   # refills: 3  Last Office Visit : 6/3/20  Future Office visit:  12/16/20    Eye exam:  Not  Found. letter sent     methotrexate 2.5 MG tablet   Last Written Prescription Date:  11/16/20  Last Fill Quantity: 20,   # refills: 0      CBC RESULTS:   Recent Labs   Lab Test 11/17/20  1423   WBC 9.6   RBC 4.72   HGB 13.8   HCT 42.9   MCV 91   MCH 29.2   MCHC 32.2   RDW 12.9          Creatinine   Date Value Ref Range Status   11/17/2020 0.66 0.52 - 1.04 mg/dL Final   ]    Liver Function Studies -   Recent Labs   Lab Test 11/17/20  1423 10/31/18  1505 10/31/18  1505   PROTTOTAL  --   --  7.7   ALBUMIN 3.4   < > 3.5   BILITOTAL  --   --  0.2   ALKPHOS  --   --  77   AST 10   < > 16   ALT 16   < > 22    < > = values in this interval not displayed.       Routing refill request to provider for review/approval because: eye exam not found.  Methotrexate x4 wks, should have enough to cover to 12/16/20.  rf ?

## 2020-12-14 RX ORDER — HYDROXYCHLOROQUINE SULFATE 200 MG/1
200 TABLET, FILM COATED ORAL DAILY
Qty: 90 TABLET | Refills: 0 | Status: SHIPPED | OUTPATIENT
Start: 2020-12-14 | End: 2021-04-28

## 2020-12-14 NOTE — TELEPHONE ENCOUNTER
Labs CBC, liver, kidney and inflammatory labs normal Nov CRP 9  Need opthalmologist records --nursing send her MyChart and follow-up  On this

## 2020-12-16 ENCOUNTER — VIRTUAL VISIT (OUTPATIENT)
Dept: RHEUMATOLOGY | Facility: CLINIC | Age: 30
End: 2020-12-16
Attending: NURSE PRACTITIONER
Payer: COMMERCIAL

## 2020-12-16 DIAGNOSIS — M05.79 RHEUMATOID ARTHRITIS, SEROPOSITIVE, MULTIPLE SITES (H): Primary | ICD-10-CM

## 2020-12-16 DIAGNOSIS — Z79.899 LONG-TERM CURRENT USE OF HIGH RISK MEDICATION OTHER THAN ANTICOAGULANT: ICD-10-CM

## 2020-12-16 DIAGNOSIS — Z79.899 LONG-TERM USE OF PLAQUENIL: ICD-10-CM

## 2020-12-16 PROCEDURE — 99214 OFFICE O/P EST MOD 30 MIN: CPT | Mod: 95 | Performed by: NURSE PRACTITIONER

## 2020-12-16 NOTE — LETTER
Rachel Fried  [address]  [place] [address]    Dear Rachel Fried,     Regular eye exams are required while taking hydroxychloroquine (Plaquenil). Eye exams should be completed by an eye specialist who is experienced in monitoring for hydroxychloroquine toxicity (a rare effect of the drug that can damage your eyes and vision).  These may be yearly or as determined by your eye specialist.     Although vision problems and loss of sight while taking hydroxychloroquine are very rare, notify your doctor immediately if you notice changes in your vision. The goal of screening is to detect toxicity before your vision is significantly or noticeably impacted. Failing to get proper screening exams puts you at risk of vision changes which may or may not be reversible.    Per the American Academy of Ophthalmology recommendations (2016), screening tests performed may include a 10-2 visual field test, spectral-domain optical coherence tomography (SD OCT), or other screening tests as determined by the eye specialist, including a multifocal electroretinogram (mfERG) or fundus auto-fluorescence (FAF).    We received a refill request from your pharmacy. A copy of your current eye exam was not found in your medical record. Your hydroxychloroquine prescription has been refilled with a limited supply pending confirmation you have had an eye exam to test for hydroxychloroquine toxicity.      We encourage you to bring this letter to your eye exam to discuss the exams that will be performed during your visit. Please request your eye clinic fax or mail a copy of your eye exam report to our clinic indicating that testing was completed for hydroxychloroquine toxicity screening. The exam notes must specifically comment on the potential for hydroxychloroquine toxicity and outline recommended follow-up.    If you have questions about hydroxychloroquine or the information in this letter, please call the clinic at  [phone] or talk to your provider at your next office visit.                        2    Eye Specialist Letter  Dear Eye Specialist,  To ensure safe use of Plaquenil (hydroxychloroquine), we are requesting your assistance in providing the following information. Please return this form to our clinic via mail or fax, or incorporate this information into your visit summary. Your note must indicate whether or not there is evidence of toxicity from Plaquenil use. For questions regarding this form, please call [phone].  Patient Name:   :             Date of Exam:    The following exams were completed during this visit in accordance with the American Academy of Ophthalmology recommendations (2016):  ? 10-2 automated visual field  ? Spectral-domain optical coherence tomography (SD OCT)  ? Multifocal electroretinogram (mfERG)  ? Fundus autofluorescence (FAF)  ? Other (please specify)  Please select from the following:  ? The findings from the above exams are not suggestive of toxicity from Plaquenil (hydroxychloroquine).  ? The findings from the above exams may suggest toxicity from Plaquenil (hydroxychloroquine).  Directly contact the clinic and fax this form.  Please provide additional guidance on whether or not the medication may be continued from your perspective:  Date of next recommended Plaquenil (hydroxychloroquine) screening eye exam:   ? 5 years  ? 1 year  ? 6 months  Other (please specify):   Eye Specialist Name (print):                                                                Date:  Eye Specialist Signature:

## 2020-12-16 NOTE — PROGRESS NOTES
[facility] - Rheumatology Clinic Visit    Rachel Fried  is a 30 year old here for follow-up  RHEUMATOID ARTHRITIS (RA) involving hands, wrists, ankles, shoulders feet [ -RF and low CCP 25, -factor V -KAVITHA by IFA and -NADEEN panel -HLA B27] and pityriasis lichenoides chronica (seen [facility]-established here). XR  no erosions hands. Moved from [place] to be with family.       Past- hydroxychloroquine (plaquenil) since 1-2016 (2 tablet a day caused diarrhea, ok on 1 tablet) and methotrexate 10 mg week  to 4-2018 (mild fatigue day of) then restarted 10- (higher folic acid improved side-effects), naproxsyn; allergy sulfa, prednisone, advil       Initial visit copy forward  10-: Hydroxychloroquine (plaquenil) BID mild diarrhea; ocular eye exam 3-2016 baseline reports recent eye exam   EKG was recently Feb 2018 normal. Stopped methotrexate  In April as wanted to take a drug holiday and was moving here, also wanted to drink some alcohol, and discuss options. No infections. Diffuse joint pains in hands, wrists, shoulders, ankles and feet and the sides of her hips. Taking advil 600mg AM and 400 mg PM, tolerating but taking since April. Pain severe 5/10 or more. Stiffness all day. Hands are sore. No loss of function or ROM. Very sore and stiff all over. Knees are sore and hard to bend or squat. Mild diarrhea with hydroxychloroquine (plaquenil)     December 16, 2020  Denies any fever, chills, SOB, CANDELARIA, night sweats, or chest pain, high fever, cough, travel in last 14 days or exposure to covid-19 (coronavirus). Reports healthy. Follows CDC guidelines. Works from home.     Rheumatoid arthritis (RA) stiff with pain in in hands and wrists, stiff in mornings for few hours. Not able to do her massage or swim, does walk and not doing normal routine which is causing this as well. Sleep is poor now and hip pain. Upper neck discomfort from stress.     Continues on methotrexate  12.5 mg  every 7 days THURS, folic acid 2 mg day. Tolerating. Hydroxychloroquine (plaquenil) 200 mg every day  misses most days so takes <3 times a week. No GI side-effects, no hairloss or oral sores from methotrexate moreso from hydroxychloroquine (plaquenil).  Off celexa -lost 12 lbs. 197 lb last visit but this not now due to Covid-19 (coronavirus).   No longer using NSAIDs. Taking vitamin D 2000 international unit(s) day.  No EAS. No vision issues. Is exercising and doing yogo now. Goes to [facility] eye did last 3-2019 but was due this past Spring. Willing to come here if can do hydroxychloroquine (plaquenil) toxicity with glasses and contacts in 1 visit. Otherwise, will continue with hers.     PMH:  Injury-no  Medical-dermatitis, (bx 2-2016 GA vs LP vs cutaneous lupus--was not cutenaous lupus --focal interstitial lymphocytic inflammation, scattered lymphocytes), anxiety and depression, exercise induced asthma, scoliosis, pityriasis lichenoides chronica, chronic back pain, hypertension, seasonal allergies, acne, motion sickness, high cholesterol, chicken pox, headaches before    Surgical-None     FH:  No autoimmune disorders, psoriasis, UC, crohn's, RA, PsA, gout, autoimmune thyroid.  No MS, heart disease or cancer in family  Mother-factor V leiden carrier, rosea   Father-healthy  Siblings-younger bro healthy  Children-None   GF-lupus   Uncle cancer colon    SH:  Social Alcohol occasional week. Never Smoking. No IVDU. No Children. Right handed. Single--not sexually active, never.        PMS personally reviewed and updated by me.    ROS:  Negative raynaud s phenomena, hairloss, sun sensitivities, keratoconjunctivitis sicca, pleurisy, inflammatory joint symptoms, significant rashes like malar, oral/nasal or ulcerations, inflammatory eye disease, inflammatory bowel disease, dactylitis, tenosynovitis, or enthespathy. Negative for  blood clots, gout, psoriasis, UC, crohn's. No temporal headache, no jaw claudication, no  scalp tenderness, vision changes, carotidynia, cough. No STD or pregnancy symptoms. No Parotid swelling.   +tips finger white at times and toes resolved   CONSTITUTIONAL: No fevers, night sweats or unintentional weight change. No acute distress, swollen glands  EYES: No vision change, diplopia, pain in eyes or red eyes   EARS, NOSE, MOUTH, THROAT: No tinnitus or hearing change, no epistaxis or nasal discharge, no oral lesions, throat clear. Normal saliva pool.  No drymouth. No thyroid enlargement.   CARDIOVASCULAR: No chest pain, palpitations, or pain with walking, no orthopnea or PND   RESPIRATORY: No dyspnea, cough, shortness of breath or wheezing. No pleurisy.   GI: No nausea, vomiting, diarrhea or constipation, no abdominal pain, or blood in stools.   : No change in urine, no dysuria or hematuria   MUSCKL: No enthesitis, plantar fascitis or heel pain. See above   INTEGUMENTARY: No concerning lesions or moles   NEURO: No loss of strength or sensation, no numbness or tingling, no tremor, no dizziness, no headache. No falls   ENDO: No polyuria or polydipsia, no temperature intolerance   HEME/LYMPH:No concerning bumps, bleeding problems, or swollen lymph nodes. No recent infections, hospitalizations or new illnesses.   ALLERGY: No environmental allergies   Otherwise 14 point ROS obtained, reviewed and found negative.     Assessment via video:    Constitutional: WD-WN-WG cooperative  Eyes: nl EOM, PERRLA, vision, conjunctiva, sclera  ENT: nl external ears, nose, hearing, lips, teeth, gums, throat  Neck: no mass or thyroid enlargement  RESP: No cough, no audible wheezing, able to talk in full sentences  Skin: no nail pitting, alopecia, rash  Neuro: nl cranial nerves, strength, sensation  Psych: nl judgement, orientation, memory, affect.  PSYCH: Alert and oriented times 3; coherent speech, normal rate and volume, able to articulate logical thoughts, able   to abstract reason, no tangential thoughts, no hallucinations  or delusions  His affect is normal and pleasant  MSK: right 2-4 MCP swelling. All other TMJ, neck, shoulder, elbow, wrist, hands joints found normal. No deformity or nodule. Full ROM.Normal prayer sign. Negative Lhermitte's sign. No periuncle erythema  Remainder of exam unable to be completed due to video visits    Due to efforts to reduce the spread of COVID-19 in the clinic, state, nation, telephone or video visits are encouraged currently. Patient understands that diagnose and advice is limited by the inability to exam him/her/them face-to-face.    Labs/Imaging:  Normal G6PD  Neg MTB QG 2016 and hepatitis panel, negative HIV    XR hands and knees-negative 2016   Component      Latest Ref Rng & Units 11/17/2020   WBC      4.0 - 11.0 10e9/L 9.6   RBC Count      3.8 - 5.2 10e12/L 4.72   Hemoglobin      11.7 - 15.7 g/dL 13.8   Hematocrit      35.0 - 47.0 % 42.9   MCV      78 - 100 fl 91   MCH      26.5 - 33.0 pg 29.2   MCHC      31.5 - 36.5 g/dL 32.2   RDW      10.0 - 15.0 % 12.9   Platelet Count      150 - 450 10e9/L 389   Creatinine      0.52 - 1.04 mg/dL 0.66   GFR Estimate      >60 mL/min/1.73:m2 >90   GFR Estimate If Black      >60 mL/min/1.73:m2 >90   CRP Inflammation      0.0 - 8.0 mg/L 9.0 (H)   Albumin      3.4 - 5.0 g/dL 3.4   ALT      0 - 50 U/L 16   AST      0 - 45 U/L 10     Impression/Plan:  1. Seropositive rheumatoid arthritis (RA) non-erosive moderate activity. Poor prognosis. Will adjust by 2.5 mg of methotrexate      Rsks with methotrexate, infection risks, pregnancy (abstince and knows needs to be off for 3-6 month before trying to concieve) seek immediate medical attention if any signs or symptoms infections. And monitor for side-effects, will abstain from intercourse (will see OBGYN discussed IUD or double protection-she discussed with PCP) and will limit 0-2 week not day or after.     High risk medications, labs CBC, liver, kidney normal. Labs every 12 week     2. Longterm hydroxychloroquine  (plaquenil) use. Annual ophthalmologist. Diarrhea side-effects at BID dosing, will continue 1 tablet 4 times a week, daily if flares.   3. Vitamin D deficeincy- take 2000 international unit(s) day. 41 on 3-2019     4. Pityriasis lichenoids chronic-per derm   5. Past Chronic NSAID use, informed her of risks and possible side-effects to this, and including CVD/kidney disease. Off now     Past prednisone 15-10-5 mg each for 2 weeks then stop   Methotrexate  15 mg once every THUR, folic acid  3 mg day  No NSAID use  Annual opthalmologist  Exam for hydroxychloroquine (plaquenil) use. Will refer here if can do glasses with contact and hydroxychloroquine (plaquenil) toxicity or she will need her  Hydroxychloroquine (plaquenil) 200 mg once day or 4 time week if doing well    RTC 4 mth Rheum MD, and 8 mth me Tarik in 6 weeks with update      The patient understood the rationale for the diagnosis and treatment plan. Patient shared in the decision making. All questions were answered to best of my ability and the patient's satisfaction  Zurdo Wesley APRN, CNP, MSN  [facility]  Department of Rheumatology & Autoimmune Disorders    1. Immunization: Reviewed CDC guidelines with patient updated, information provided to patient based on CDC guidelines for vaccination recommendations, patient will discuss with primary provider  2. Bone Health:Educated on adequate calcium and vitamin D intake, Educated on exercise, Glucocorticoid education discussed risks, benefits & potential long term side effects from use  3. Discussed routine annual physicals, cancer screening, cardiac risk monitoring, bone health and primary care with primary care provider.   4. Educated on diagnosis, prognosis, labs, imaging, treatment options (including risks, benefits, risk of no treatment), medications (use, dose, side-effects, risks of medications including infection/cancer/bone marrow suppression, lab monitoring), infections what to  "do, plan of cares, goals of treatment.           Rachel Fried is a 30 year old female who is being evaluated via a billable video visit.      The patient has been notified of following:     \"This video visit will be conducted via a call between you and your physician/provider. We have found that certain health care needs can be provided without the need for an in-person physical exam.  This service lets us provide the care you need with a video conversation.  If a prescription is necessary we can send it directly to your pharmacy.  If lab work is needed we can place an order for that and you can then stop by our lab to have the test done at a later time.    Video visits are billed at different rates depending on your insurance coverage.  Please reach out to your insurance provider with any questions.    If during the course of the call the physician/provider feels a video visit is not appropriate, you will not be charged for this service.\"    Patient has given verbal consent for Video visit? Yes  How would you like to obtain your AVS? MyChart    Will anyone else be joining your video visit? No        Video-Visit Details    Type of service:  Video Visit    Video Start Time: 1022 PM  Video End Time: 1047 PM    Originating Location (pt. Location): Home    Distant Location (provider location):  [facility] [place]     Platform used for Video Visit: GIL Mcdonald CNP        "

## 2020-12-16 NOTE — LETTER
12/16/2020       RE: Rachel Fried  [address]  [facility] [address]     Dear Colleague,    Thank you for referring your patient, Rachel Fried, to the [facility] RHEUMATOLOGY CLINIC [place] at [facility]. Please see a copy of my visit note below.    [facility] - Rheumatology Clinic Visit    Rachel Fried  is a 30 year old here for follow-up  RHEUMATOID ARTHRITIS (RA) involving hands, wrists, ankles, shoulders feet [ -RF and low CCP 25, -factor V -KAVITHA by IFA and -NADEEN panel -HLA B27] and pityriasis lichenoides chronica (seen [facility]-established here). XR  no erosions hands. Moved from [place] to be with family.       Past- hydroxychloroquine (plaquenil) since 1-2016 (2 tablet a day caused diarrhea, ok on 1 tablet) and methotrexate 10 mg week  to 4-2018 (mild fatigue day of) then restarted 10- (higher folic acid improved side-effects), naproxsyn; allergy sulfa, prednisone, advil       Initial visit copy forward  10-: Hydroxychloroquine (plaquenil) BID mild diarrhea; ocular eye exam 3-2016 baseline reports recent eye exam   EKG was recently Feb 2018 normal. Stopped methotrexate  In April as wanted to take a drug holiday and was moving here, also wanted to drink some alcohol, and discuss options. No infections. Diffuse joint pains in hands, wrists, shoulders, ankles and feet and the sides of her hips. Taking advil 600mg AM and 400 mg PM, tolerating but taking since April. Pain severe 5/10 or more. Stiffness all day. Hands are sore. No loss of function or ROM. Very sore and stiff all over. Knees are sore and hard to bend or squat. Mild diarrhea with hydroxychloroquine (plaquenil)     December 16, 2020  Denies any fever, chills, SOB, CANDELARIA, night sweats, or chest pain, high fever, cough, travel in last 14 days or exposure to covid-19 (coronavirus). Reports healthy. Follows CDC guidelines. Works from  home.     Rheumatoid arthritis (RA) stiff with pain in in hands and wrists, stiff in mornings for few hours. Not able to do her massage or swim, does walk and not doing normal routine which is causing this as well. Sleep is poor now and hip pain. Upper neck discomfort from stress.     Continues on methotrexate  12.5 mg every 7 days THURS, folic acid 2 mg day. Tolerating. Hydroxychloroquine (plaquenil) 200 mg every day  misses most days so takes <3 times a week. No GI side-effects, no hairloss or oral sores from methotrexate moreso from hydroxychloroquine (plaquenil).  Off celexa -lost 12 lbs. 197 lb last visit but this not now due to Covid-19 (coronavirus).   No longer using NSAIDs. Taking vitamin D 2000 international unit(s) day.  No EAS. No vision issues. Is exercising and doing yogo now. Goes to [facility] did last 3-2019 but was due this past Spring. Willing to come here if can do hydroxychloroquine (plaquenil) toxicity with glasses and contacts in 1 visit. Otherwise, will continue with hers.     PMH:  Injury-no  Medical-dermatitis, (bx 2-2016 GA vs LP vs cutaneous lupus--was not cutenaous lupus --focal interstitial lymphocytic inflammation, scattered lymphocytes), anxiety and depression, exercise induced asthma, scoliosis, pityriasis lichenoides chronica, chronic back pain, hypertension, seasonal allergies, acne, motion sickness, high cholesterol, chicken pox, headaches before    Surgical-None     FH:  No autoimmune disorders, psoriasis, UC, crohn's, RA, PsA, gout, autoimmune thyroid.  No MS, heart disease or cancer in family  Mother-factor V leiden carrier, rosea   Father-healthy  Siblings-younger bro healthy  Children-None   GF-lupus   Uncle cancer colon    SH:  Social Alcohol occasional week. Never Smoking. No IVDU. No Children. Right handed. Single--not sexually active, never.        [facility] personally reviewed and updated by me.    ROS:  Negative raynaud s phenomena, hairloss, sun sensitivities,  keratoconjunctivitis sicca, pleurisy, inflammatory joint symptoms, significant rashes like malar, oral/nasal or ulcerations, inflammatory eye disease, inflammatory bowel disease, dactylitis, tenosynovitis, or enthespathy. Negative for  blood clots, gout, psoriasis, UC, crohn's. No temporal headache, no jaw claudication, no scalp tenderness, vision changes, carotidynia, cough. No STD or pregnancy symptoms. No Parotid swelling.   +tips finger white at times and toes resolved   CONSTITUTIONAL: No fevers, night sweats or unintentional weight change. No acute distress, swollen glands  EYES: No vision change, diplopia, pain in eyes or red eyes   EARS, NOSE, MOUTH, THROAT: No tinnitus or hearing change, no epistaxis or nasal discharge, no oral lesions, throat clear. Normal saliva pool.  No drymouth. No thyroid enlargement.   CARDIOVASCULAR: No chest pain, palpitations, or pain with walking, no orthopnea or PND   RESPIRATORY: No dyspnea, cough, shortness of breath or wheezing. No pleurisy.   GI: No nausea, vomiting, diarrhea or constipation, no abdominal pain, or blood in stools.   : No change in urine, no dysuria or hematuria   MUSCKL: No enthesitis, plantar fascitis or heel pain. See above   INTEGUMENTARY: No concerning lesions or moles   NEURO: No loss of strength or sensation, no numbness or tingling, no tremor, no dizziness, no headache. No falls   ENDO: No polyuria or polydipsia, no temperature intolerance   HEME/LYMPH:No concerning bumps, bleeding problems, or swollen lymph nodes. No recent infections, hospitalizations or new illnesses.   ALLERGY: No environmental allergies   Otherwise 14 point ROS obtained, reviewed and found negative.     Assessment via video:    Constitutional: WD-WN-WG cooperative  Eyes: nl EOM, PERRLA, vision, conjunctiva, sclera  ENT: nl external ears, nose, hearing, lips, teeth, gums, throat  Neck: no mass or thyroid enlargement  RESP: No cough, no audible wheezing, able to talk in full  sentences  Skin: no nail pitting, alopecia, rash  Neuro: nl cranial nerves, strength, sensation  Psych: nl judgement, orientation, memory, affect.  PSYCH: Alert and oriented times 3; coherent speech, normal rate and volume, able to articulate logical thoughts, able   to abstract reason, no tangential thoughts, no hallucinations or delusions  His affect is normal and pleasant  MSK: right 2-4 MCP swelling. All other TMJ, neck, shoulder, elbow, wrist, hands joints found normal. No deformity or nodule. Full ROM.Normal prayer sign. Negative Lhermitte's sign. No periuncle erythema  Remainder of exam unable to be completed due to video visits    Due to efforts to reduce the spread of COVID-19 in the clinic, state, nation, telephone or video visits are encouraged currently. Patient understands that diagnose and advice is limited by the inability to exam him/her/them face-to-face.    Labs/Imaging:  Normal G6PD  Neg MTB QG 2016 and hepatitis panel, negative HIV    XR hands and knees-negative 2016   Component      Latest Ref Rng & Units 11/17/2020   WBC      4.0 - 11.0 10e9/L 9.6   RBC Count      3.8 - 5.2 10e12/L 4.72   Hemoglobin      11.7 - 15.7 g/dL 13.8   Hematocrit      35.0 - 47.0 % 42.9   MCV      78 - 100 fl 91   MCH      26.5 - 33.0 pg 29.2   MCHC      31.5 - 36.5 g/dL 32.2   RDW      10.0 - 15.0 % 12.9   Platelet Count      150 - 450 10e9/L 389   Creatinine      0.52 - 1.04 mg/dL 0.66   GFR Estimate      >60 mL/min/1.73:m2 >90   GFR Estimate If Black      >60 mL/min/1.73:m2 >90   CRP Inflammation      0.0 - 8.0 mg/L 9.0 (H)   Albumin      3.4 - 5.0 g/dL 3.4   ALT      0 - 50 U/L 16   AST      0 - 45 U/L 10     Impression/Plan:  1. Seropositive rheumatoid arthritis (RA) non-erosive moderate activity. Poor prognosis. Will adjust by 2.5 mg of methotrexate      Rsks with methotrexate, infection risks, pregnancy (abstince and knows needs to be off for 3-6 month before trying to concieve) seek immediate medical attention  if any signs or symptoms infections. And monitor for side-effects, will abstain from intercourse (will see OBGYN discussed IUD or double protection-she discussed with PCP) and will limit 0-2 week not day or after.     High risk medications, labs CBC, liver, kidney normal. Labs every 12 week     2. Longterm hydroxychloroquine (plaquenil) use. Annual ophthalmologist. Diarrhea side-effects at BID dosing, will continue 1 tablet 4 times a week, daily if flares.   3. Vitamin D deficeincy- take 2000 international unit(s) day. 41 on 3-2019     4. Pityriasis lichenoids chronic-per derm   5. Past Chronic NSAID use, informed her of risks and possible side-effects to this, and including CVD/kidney disease. Off now     Past prednisone 15-10-5 mg each for 2 weeks then stop   Methotrexate  15 mg once every THUR, folic acid  3 mg day  No NSAID use  Annual opthalmologist  Exam for hydroxychloroquine (plaquenil) use. Will refer here if can do glasses with contact and hydroxychloroquine (plaquenil) toxicity or she will need her  Hydroxychloroquine (plaquenil) 200 mg once day or 4 time week if doing well    RTC 4 mth Rheum MD, and 8 mth me MyCmaximo in 6 weeks with update      The patient understood the rationale for the diagnosis and treatment plan. Patient shared in the decision making. All questions were answered to best of my ability and the patient's satisfaction  Zurdo CORDERO, CNP, MSN  [facility]  Department of Rheumatology & Autoimmune Disorders    1. Immunization: Reviewed CDC guidelines with patient updated, information provided to patient based on CDC guidelines for vaccination recommendations, patient will discuss with primary provider  2. Bone Health:Educated on adequate calcium and vitamin D intake, Educated on exercise, Glucocorticoid education discussed risks, benefits & potential long term side effects from use  3. Discussed routine annual physicals, cancer screening, cardiac risk monitoring,  "bone health and primary care with primary care provider.   4. Educated on diagnosis, prognosis, labs, imaging, treatment options (including risks, benefits, risk of no treatment), medications (use, dose, side-effects, risks of medications including infection/cancer/bone marrow suppression, lab monitoring), infections what to do, plan of cares, goals of treatment.           Rachel Fried is a 30 year old female who is being evaluated via a billable video visit.      The patient has been notified of following:     \"This video visit will be conducted via a call between you and your physician/provider. We have found that certain health care needs can be provided without the need for an in-person physical exam.  This service lets us provide the care you need with a video conversation.  If a prescription is necessary we can send it directly to your pharmacy.  If lab work is needed we can place an order for that and you can then stop by our lab to have the test done at a later time.    Video visits are billed at different rates depending on your insurance coverage.  Please reach out to your insurance provider with any questions.    If during the course of the call the physician/provider feels a video visit is not appropriate, you will not be charged for this service.\"    Patient has given verbal consent for Video visit? Yes  How would you like to obtain your AVS? MyChart    Will anyone else be joining your video visit? No        Video-Visit Details    Type of service:  Video Visit    Video Start Time: 1022 PM  Video End Time: 1047 PM    Originating Location (pt. Location): Home    Distant Location (provider location):  [facility] RHEUMATOLOGY CLINIC [place]     Platform used for Video Visit: GIL Mcdonald CNP        "

## 2020-12-22 ENCOUNTER — TELEPHONE (OUTPATIENT)
Dept: RHEUMATOLOGY | Facility: CLINIC | Age: 30
End: 2020-12-22

## 2021-01-03 ENCOUNTER — HEALTH MAINTENANCE LETTER (OUTPATIENT)
Age: 31
End: 2021-01-03

## 2021-02-05 DIAGNOSIS — Z79.899 LONG-TERM USE OF PLAQUENIL: ICD-10-CM

## 2021-02-05 DIAGNOSIS — R76.8 CYCLIC CITRULLINATED PEPTIDE (CCP) ANTIBODY POSITIVE: ICD-10-CM

## 2021-02-05 DIAGNOSIS — M06.09 RHEUMATOID ARTHRITIS OF MULTIPLE SITES WITH NEGATIVE RHEUMATOID FACTOR (H): ICD-10-CM

## 2021-02-08 NOTE — TELEPHONE ENCOUNTER
methotrexate 2.5 MG tablet   Last Written Prescription Date:  12/14/2020  Last Fill Quantity: 60,   # refills: 0  Last Office Visit: 12/16/2020  Future Office visit:  4/23/2021    CBC RESULTS:   Recent Labs   Lab Test 11/17/20  1423   WBC 9.6   RBC 4.72   HGB 13.8   HCT 42.9   MCV 91   MCH 29.2   MCHC 32.2   RDW 12.9          Creatinine   Date Value Ref Range Status   11/17/2020 0.66 0.52 - 1.04 mg/dL Final   ]    Liver Function Studies -   Recent Labs   Lab Test 11/17/20  1423 10/31/18  1505 10/31/18  1505   PROTTOTAL  --   --  7.7   ALBUMIN 3.4   < > 3.5   BILITOTAL  --   --  0.2   ALKPHOS  --   --  77   AST 10   < > 16   ALT 16   < > 22    < > = values in this interval not displayed.       Routing refill request to provider for review/approval because:  Drug not on the [facility], P or [facility] refill protocol or controlled substance      Viri Krause RN  Central Triage Red Flags/Med Refills

## 2021-02-22 ENCOUNTER — VIRTUAL VISIT (OUTPATIENT)
Dept: GASTROENTEROLOGY | Facility: CLINIC | Age: 31
End: 2021-02-22
Payer: COMMERCIAL

## 2021-02-22 DIAGNOSIS — Z79.899 LONG-TERM USE OF PLAQUENIL: ICD-10-CM

## 2021-02-22 DIAGNOSIS — R76.8 CYCLIC CITRULLINATED PEPTIDE (CCP) ANTIBODY POSITIVE: ICD-10-CM

## 2021-02-22 DIAGNOSIS — F41.9 ANXIETY: ICD-10-CM

## 2021-02-22 DIAGNOSIS — M06.09 RHEUMATOID ARTHRITIS OF MULTIPLE SITES WITH NEGATIVE RHEUMATOID FACTOR (H): ICD-10-CM

## 2021-02-22 DIAGNOSIS — F33.0 MILD EPISODE OF RECURRENT MAJOR DEPRESSIVE DISORDER (H): Primary | ICD-10-CM

## 2021-02-22 PROCEDURE — 90837 PSYTX W PT 60 MINUTES: CPT | Mod: 95 | Performed by: PSYCHOLOGIST

## 2021-02-22 NOTE — PROGRESS NOTES
"The patient has been notified of the following:      \"We have found that certain health care needs can be provided without the need for a face to face visit.  This service lets us provide the care you need with a phone conversation.       I will have full access to your [facility] medical record during this entire phone call.   I will be taking notes for your medical record.      Since this is like an office visit, we will bill your insurance company for this service.       There are potential benefits and risks of telephone visits (e.g. limits to patient confidentiality) that differ from in-person visits.?  Confidentiality still applies for telephone services, and nobody will record the visit.  It is important to be in a quiet, private space that is free of distractions (including cell phone or other devices) during the visit.??      If during the course of the call I believe a telephone visit is not appropriate, you will not be charged for this service\"     Consent has been obtained for this service by care team member: [facility] Psychology                      Department of Medicine                     [facility] Mail Code 285     [address] [place], [place] [address]              Jessica Ram, Ph.D., L.P [phone]  Shruthi Diaz, Ph.D.,  L.P. [phone]  Nati Pompa, Ph.D., L.P. [phone]  Yadira Gaming, Ph.D., L.P [phone]  Key Cunha, Ph.D., L.P. [phone]  Lee Reese, Ph.D., A.B.P.P., L.P. [phone]         Mayra Parker, Ph.D., L.P. [phone]     [facility] [place], 3rd Floor  [address] [place], [place] [id number]    Telemental health visit due to mitigation of COVID-19 pandemic, confirmed with patient.    Reason that telemedicine is appropriate: COVID-19 pandemic mitigation requiring social distancing.  Mode of transmission: Telephone  Location of originating and distant " sites:    Originating site (patient location): patient's home; In event of emergency, patient verbally consented to having emergency response sent to their location    Distant site (provider site): home office of provider    Health Psychology Follow-Up Note     SUBJECTIVE:  Rachel Fried is a 30 year old female with PMH significant for rheumatoid arthritis, depression, and anxiety who was seen for individual psychotherapy. Reviewed functioning since last session - last seen 11/5/2020 as she spent a prolonged time in [place] at her parents' home. Major updates include home purchase and sharing her concerns with her mother's alcohol use with her mother.  Discussed impacts of her mother's alcohol use as well as other family members alcohol use on her experience and how they may be connected to current habits in relationships.  Discussed use of setting boundaries with her mother as a way to acknowledge her own value; barriers to this included a sense of selfishness in doing so.      OBJECTIVE:  Appearance/Behavior/Orientation: Alert and oriented to person, place, time, and situation.    Cooperation/Reliability: Patient was open and cooperative throughout the session.    Abstract reasoning: Not assessed.   Judgment: Intact.    Mood/Affect: Mood euthymic; affect mood congruent  Insight/Motivation: Good, good     ASSESSMENT:  Engaged and motivated for therapy. Appears to be deriving benefit from treatment.      DIAGNOSIS:  Major depressive disorder, recurrent, mild  Anxiety, unspecified     PLAN:  RTC for continued psychotherapy.  She will reach out to set of the next appointment.     Time in: 4:04pm  Time out: 5:02PM   Extended session due to complexity of case and length of interval.     Key Cunha, PhD, LP  Clinical Health Psychologist     Tx plan completed:             4/16/19  Tx plan due:                        4/16/20    *no letter

## 2021-02-22 NOTE — LETTER
Date:April 18, 2021      Provider requested that no letter be sent. Do not send.       [facility]

## 2021-02-22 NOTE — LETTER
"    2/22/2021         RE: Rachel Fried  [address]  [facility] [address]        Dear Colleague,    Thank you for referring your patient, Rachel Fried, to the [facility] GASTROENTEROLOGY CLINIC [place]. Please see a copy of my visit note below.    The patient has been notified of the following:      \"We have found that certain health care needs can be provided without the need for a face to face visit.  This service lets us provide the care you need with a phone conversation.       I will have full access to your [facility] medical record during this entire phone call.   I will be taking notes for your medical record.      Since this is like an office visit, we will bill your insurance company for this service.       There are potential benefits and risks of telephone visits (e.g. limits to patient confidentiality) that differ from in-person visits.?  Confidentiality still applies for telephone services, and nobody will record the visit.  It is important to be in a quiet, private space that is free of distractions (including cell phone or other devices) during the visit.??      If during the course of the call I believe a telephone visit is not appropriate, you will not be charged for this service\"     Consent has been obtained for this service by care team member: Yes     Health Psychology                      Department of Medicine                     [facility] Mail Code 615     [address] [place] [address]              Jessica Ram, Ph.D., L.P [phone]  Shruthi Diaz, Ph.D.,  L.P. [phone]  Nati Pompa, Ph.D., L.P. [phone]  Yadira Gaming, Ph.D., L.P [phone]  Key Cunha, Ph.D., L.P. [phone]  Lee Reese, Ph.D., A.B.P.P., L.P. [phone]         Mayra Parker, Ph.D., L.P. [phone]     [facility] [place], 3rd Floor  [address] [facility], " [place]   [address]    Telemental health visit due to mitigation of COVID-19 pandemic, confirmed with patient.    Reason that telemedicine is appropriate: COVID-19 pandemic mitigation requiring social distancing.  Mode of transmission: Telephone  Location of originating and distant sites:    Originating site (patient location): patient's home; In event of emergency, patient verbally consented to having emergency response sent to their location    Distant site (provider site): home office of provider    Health Psychology Follow-Up Note     SUBJECTIVE:  Rachel Fried is a 30 year old female with PMH significant for rheumatoid arthritis, depression, and anxiety who was seen for individual psychotherapy. Reviewed functioning since last session - last seen 11/5/2020 as she spent a prolonged time in [place] at her parents' home. Major updates include home purchase and sharing her concerns with her mother's alcohol use with her mother.  Discussed impacts of her mother's alcohol use as well as other family members alcohol use on her experience and how they may be connected to current habits in relationships.  Discussed use of setting boundaries with her mother as a way to acknowledge her own value; barriers to this included a sense of selfishness in doing so.      OBJECTIVE:  Appearance/Behavior/Orientation: Alert and oriented to person, place, time, and situation.    Cooperation/Reliability: Patient was open and cooperative throughout the session.    Abstract reasoning: Not assessed.   Judgment: Intact.    Mood/Affect: Mood euthymic; affect mood congruent  Insight/Motivation: Good, good     ASSESSMENT:  Engaged and motivated for therapy. Appears to be deriving benefit from treatment.      DIAGNOSIS:  Major depressive disorder, recurrent, mild  Anxiety, unspecified     PLAN:  RTC for continued psychotherapy.  She will reach out to set of the next appointment.     Time in: 4:04pm  Time out: 5:02PM   Extended session due to complexity of  case and length of interval.     Key Cunha, PhD,   Clinical Health Psychologist     Tx plan completed:             4/16/19  Tx plan due:                        4/16/20    *no letter      Again, thank you for allowing me to participate in the care of your patient.        Sincerely,        Key Cunha, PhD

## 2021-02-23 RX ORDER — FOLIC ACID 1 MG/1
2 TABLET ORAL DAILY
Qty: 180 TABLET | Refills: 2 | Status: SHIPPED | OUTPATIENT
Start: 2021-02-23 | End: 2021-12-06

## 2021-02-23 NOTE — TELEPHONE ENCOUNTER
Last Clinic Visit: 12/16/2020  [facility] Rheumatology Clinic [place]

## 2021-02-26 ENCOUNTER — MYC MEDICAL ADVICE (OUTPATIENT)
Dept: INTERNAL MEDICINE | Facility: CLINIC | Age: 31
End: 2021-02-26

## 2021-02-26 DIAGNOSIS — Z30.41 ENCOUNTER FOR SURVEILLANCE OF CONTRACEPTIVE PILLS: ICD-10-CM

## 2021-03-03 RX ORDER — DROSPIRENONE AND ETHINYL ESTRADIOL TABLETS 0.02-3(28)
1 KIT ORAL DAILY
Qty: 56 TABLET | Refills: 0 | Status: SHIPPED | OUTPATIENT
Start: 2021-03-03 | End: 2023-04-18

## 2021-03-07 ENCOUNTER — HEALTH MAINTENANCE LETTER (OUTPATIENT)
Age: 31
End: 2021-03-07

## 2021-04-23 ENCOUNTER — VIRTUAL VISIT (OUTPATIENT)
Dept: RHEUMATOLOGY | Facility: CLINIC | Age: 31
End: 2021-04-23
Attending: INTERNAL MEDICINE
Payer: COMMERCIAL

## 2021-04-23 DIAGNOSIS — Z51.81 ENCOUNTER FOR MEDICATION MONITORING: ICD-10-CM

## 2021-04-23 DIAGNOSIS — Z79.1 NSAID LONG-TERM USE: ICD-10-CM

## 2021-04-23 DIAGNOSIS — M06.09 RHEUMATOID ARTHRITIS OF MULTIPLE SITES WITH NEGATIVE RHEUMATOID FACTOR (H): ICD-10-CM

## 2021-04-23 DIAGNOSIS — R76.8 CYCLIC CITRULLINATED PEPTIDE (CCP) ANTIBODY POSITIVE: Primary | ICD-10-CM

## 2021-04-23 DIAGNOSIS — Z79.899 LONG-TERM USE OF PLAQUENIL: ICD-10-CM

## 2021-04-23 PROCEDURE — 99214 OFFICE O/P EST MOD 30 MIN: CPT | Mod: 95 | Performed by: INTERNAL MEDICINE

## 2021-04-23 ASSESSMENT — PAIN SCALES - GENERAL: PAINLEVEL: MILD PAIN (2)

## 2021-04-23 NOTE — PROGRESS NOTES
Rachel is a 30 year old who is being evaluated via a billable video visit.      How would you like to obtain your AVS? MyChart  If the video visit is dropped, the invitation should be resent by: Text to cell phone: [phone]  Will anyone else be joining your video visit? No      Video Start Time: 11:30 am  Video-Visit Details     Type of service:  Video Visit    Video End Time:12:24 PM    Originating Location (pt. Location): Home    Distant Location (provider location):  [facility] RHEUMATOLOGY [facility] [place]     Platform used for Video Visit: FastBooking      [facility] - Rheumatology Clinic Visit    Rachel Fried  is a 30 year old here for follow-up  RHEUMATOID ARTHRITIS (RA) involving hands, wrists, ankles, shoulders feet [ -RF and low CCP 25, -factor V -KAVITHA by IFA and -NADEEN panel -HLA B27] and pityriasis lichenoides chronica (seen [facility]-established here). XR  no erosions hands. Moved from [place] to be with family.       Past- hydroxychloroquine (plaquenil) since 1-2016 (2 tablet a day caused diarrhea, ok on 1 tablet) and methotrexate 10 mg week  to 4-2018 (mild fatigue day of) then restarted 10- (higher folic acid improved side-effects), naproxsyn; allergy sulfa, prednisone, advil       Initial visit copy forward  10-: Hydroxychloroquine (plaquenil) BID mild diarrhea; ocular eye exam 3-2016 baseline reports recent eye exam   EKG was recently Feb 2018 normal. Stopped methotrexate  In April as wanted to take a drug holiday and was moving here, also wanted to drink some alcohol, and discuss options. No infections. Diffuse joint pains in hands, wrists, shoulders, ankles and feet and the sides of her hips. Taking advil 600mg AM and 400 mg PM, tolerating but taking since April. Pain severe 5/10 or more. Stiffness all day. Hands are sore. No loss of function or ROM. Very sore and stiff all over. Knees are sore and hard to bend or squat. Mild  diarrhea with hydroxychloroquine (plaquenil)     December 16, 2020  Denies any fever, chills, SOB, CANDELARIA, night sweats, or chest pain, high fever, cough, travel in last 14 days or exposure to covid-19 (coronavirus). Reports healthy. Follows CDC guidelines. Works from home.     Rheumatoid arthritis (RA) stiff with pain in in hands and wrists, stiff in mornings for few hours. Not able to do her massage or swim, does walk and not doing normal routine which is causing this as well. Sleep is poor now and hip pain. Upper neck discomfort from stress.     Continues on methotrexate  12.5 mg every 7 days THURS, folic acid 2 mg day. Tolerating. Hydroxychloroquine (plaquenil) 200 mg every day  misses most days so takes <3 times a week. No GI side-effects, no hairloss or oral sores from methotrexate moreso from hydroxychloroquine (plaquenil).  Off celexa -lost 12 lbs. 197 lb last visit but this not now due to Covid-19 (coronavirus).   No longer using NSAIDs. Taking vitamin D 2000 international unit(s) day.  No EAS. No vision issues. Is exercising and doing yogo now. Goes to [facility] eye did last 3-2019 but was due this past Spring. Willing to come here if can do hydroxychloroquine (plaquenil) toxicity with glasses and contacts in 1 visit. Otherwise, will continue with hers.     Today 4/23/21:  The patient states that she is doing ok with her RA. She states that her joint symptoms have slightly worsened during pandemic because she is not going swimming, which is traditionally very helpful for her.   The patient just got vaccinated and plans to start going swimming in May, which she thinks will help her joints. She has no active swelling now.  She is tolerating methotrexate at 12.5 mg qWeek and also on  mg every day (can not tolerate higher dose sec GI SEs).   The patient is taking ibuprofen 2-4 tabs for the last couple of weeks.  No plans to get pregnant. Not on birth control at this time, but states currently only dating  women.  She otherwise feels well with no other concerns.    PMH:  Injury-no  Medical-dermatitis, (bx 2-2016 GA vs LP vs cutaneous lupus--was not cutenaous lupus --focal interstitial lymphocytic inflammation, scattered lymphocytes), anxiety and depression, exercise induced asthma, scoliosis, pityriasis lichenoides chronica, chronic back pain, hypertension, seasonal allergies, acne, motion sickness, high cholesterol, chicken pox, headaches before    Surgical-None     FH:  No autoimmune disorders, psoriasis, UC, crohn's, RA, PsA, gout, autoimmune thyroid.  No MS, heart disease or cancer in family  Mother-factor V leiden carrier, rosea   Father-healthy  Siblings-younger bro healthy  Children-None   GF-lupus   Uncle cancer colon    SH:  Social Alcohol occasional week. Never Smoking. No IVDU. No Children. Right handed. Single--not sexually active, never.        PMSH personally reviewed and updated by me.    ROS:  A comprehensive ROS was done. Positives are per HPI.      Assessment via video:    Constitutional: WD-WN-WG, pleasant  Eyes: nl EOM, conjunctiva, sclera  RESP: No cough, no audible wheezing, able to talk in full sentences  Skin: no visible rash on face  Neuro: nl cranial nerves  Psych: nl affect  MSK: no active synovitis in hands. Good ROM of wrists.        Labs/Imaging:  Normal G6PD  Neg MTB QG 2016 and hepatitis panel, negative HIV    XR hands and knees-negative 2016   Component      Latest Ref Rng & Units 11/17/2020   WBC      4.0 - 11.0 10e9/L 9.6   RBC Count      3.8 - 5.2 10e12/L 4.72   Hemoglobin      11.7 - 15.7 g/dL 13.8   Hematocrit      35.0 - 47.0 % 42.9   MCV      78 - 100 fl 91   MCH      26.5 - 33.0 pg 29.2   MCHC      31.5 - 36.5 g/dL 32.2   RDW      10.0 - 15.0 % 12.9   Platelet Count      150 - 450 10e9/L 389   Creatinine      0.52 - 1.04 mg/dL 0.66   GFR Estimate      >60 mL/min/1.73:m2 >90   GFR Estimate If Black      >60 mL/min/1.73:m2 >90   CRP Inflammation      0.0 - 8.0 mg/L 9.0 (H)    Albumin      3.4 - 5.0 g/dL 3.4   ALT      0 - 50 U/L 16   AST      0 - 45 U/L 10     Impression/Plan:  1. Seropositive (pos anti-CCP, neg RF )rheumatoid arthritis (RA) non-erosive moderate activity.The patient is doing well only mild active RA on methotrexate 12.5 mg weekly but no active synovitis on exam. She is also on  mg qd. The patient has been requiring more ibuprofen and Tylenol recently but she feels once she starts swimming her symptoms will improve. Discussed other treatment options, however will continue on current dose of methotrexate and HCQ.    Risks with methotrexate, infection risks, and pregnancy were discussed    High risk medications, labs CBC, liver, kidney normal. Labs every 12 week -- due for labs now    2. Longterm hydroxychloroquine (plaquenil) use. Annual ophthalmologist for HCQ. Diarrhea side-effects at BID dosing, will continue 1 tablet once every other day--will consider different formulation (branded generic) of HCQ, may be helpful; higher dosing could also be beneficial if she tolerates  3. Vitamin D deficeincy- take 2000 international unit(s) day. 41 on 3-2019     4. Pityriasis lichenoids chronic-per derm   5. Chronic NSAID use, informed her of risks. Will check labs.    RTC 6 mth      Dwayne Hudson MD  Internal Medicine-Dermatology, PGY-5    Staffed with Dr. Aldridge    Attending Note: I saw and evaluated the patient with Dr. Hudson. I agree with the assessment and plan.    Naresh Aldridge MD

## 2021-04-23 NOTE — LETTER
4/23/2021       RE: Rachel Fried  [address]  [place] [address]     Dear Colleague,    Thank you for referring your patient, Rachel Fried, to the [facility] RHEUMATOLOGY CLINIC [place] at [facility]. Please see a copy of my visit note below.    Rachel is a 30 year old who is being evaluated via a billable video visit.      How would you like to obtain your AVS? MyChart  If the video visit is dropped, the invitation should be resent by: Text to cell phone: [phone]  Will anyone else be joining your video visit? No      Video Start Time: 11:30 am  Video-Visit Details     Type of service:  Video Visit    Video End Time:12:24 PM    Originating Location (pt. Location): Home    Distant Location (provider location):  [facility] RHEUMATOLOGY [facility] [place]     Platform used for Video Visit: [employer]      [facility] - Rheumatology Clinic Visit    Rachel Fried  is a 30 year old here for follow-up  RHEUMATOID ARTHRITIS (RA) involving hands, wrists, ankles, shoulders feet [ -RF and low CCP 25, -factor V -KAVITHA by IFA and -NADEEN panel -HLA B27] and pityriasis lichenoides chronica (seen [facility]-established here). XR  no erosions hands. Moved from [place] to be with family.       Past- hydroxychloroquine (plaquenil) since 1-2016 (2 tablet a day caused diarrhea, ok on 1 tablet) and methotrexate 10 mg week  to 4-2018 (mild fatigue day of) then restarted 10- (higher folic acid improved side-effects), naproxsyn; allergy sulfa, prednisone, advil       Initial visit copy forward  10-: Hydroxychloroquine (plaquenil) BID mild diarrhea; ocular eye exam 3-2016 baseline reports recent eye exam   EKG was recently Feb 2018 normal. Stopped methotrexate  In April as wanted to take a drug holiday and was moving here, also wanted to drink some alcohol, and discuss options. No infections.  Diffuse joint pains in hands, wrists, shoulders, ankles and feet and the sides of her hips. Taking advil 600mg AM and 400 mg PM, tolerating but taking since April. Pain severe 5/10 or more. Stiffness all day. Hands are sore. No loss of function or ROM. Very sore and stiff all over. Knees are sore and hard to bend or squat. Mild diarrhea with hydroxychloroquine (plaquenil)     December 16, 2020  Denies any fever, chills, SOB, CANDELARIA, night sweats, or chest pain, high fever, cough, travel in last 14 days or exposure to covid-19 (coronavirus). Reports healthy. Follows CDC guidelines. Works from home.     Rheumatoid arthritis (RA) stiff with pain in in hands and wrists, stiff in mornings for few hours. Not able to do her massage or swim, does walk and not doing normal routine which is causing this as well. Sleep is poor now and hip pain. Upper neck discomfort from stress.     Continues on methotrexate  12.5 mg every 7 days THURS, folic acid 2 mg day. Tolerating. Hydroxychloroquine (plaquenil) 200 mg every day  misses most days so takes <3 times a week. No GI side-effects, no hairloss or oral sores from methotrexate moreso from hydroxychloroquine (plaquenil).  Off celexa -lost 12 lbs. 197 lb last visit but this not now due to Covid-19 (coronavirus).   No longer using NSAIDs. Taking vitamin D 2000 international unit(s) day.  No EAS. No vision issues. Is exercising and doing yogo now. Goes to [facility] eye did last 3-2019 but was due this past Spring. Willing to come here if can do hydroxychloroquine (plaquenil) toxicity with glasses and contacts in 1 visit. Otherwise, will continue with hers.     Today 4/23/21:  The patient states that she is doing ok with her RA. She states that her joint symptoms have slightly worsened during pandemic because she is not going swimming, which is traditionally very helpful for her.   The patient just got vaccinated and plans to start going swimming in May, which she thinks will help her  joints. She has no active swelling now.  She is tolerating methotrexate at 12.5 mg qWeek and also on  mg every day (can not tolerate higher dose sec GI SEs).   The patient is taking ibuprofen 2-4 tabs for the last couple of weeks.  No plans to get pregnant. Not on birth control at this time, but states currently only dating women.  She otherwise feels well with no other concerns.    PMH:  Injury-no  Medical-dermatitis, (bx 2-2016 GA vs LP vs cutaneous lupus--was not cutenaous lupus --focal interstitial lymphocytic inflammation, scattered lymphocytes), anxiety and depression, exercise induced asthma, scoliosis, pityriasis lichenoides chronica, chronic back pain, hypertension, seasonal allergies, acne, motion sickness, high cholesterol, chicken pox, headaches before    Surgical-None     FH:  No autoimmune disorders, psoriasis, UC, crohn's, RA, PsA, gout, autoimmune thyroid.  No MS, heart disease or cancer in family  Mother-factor V leiden carrier, rosea   Father-healthy  Siblings-younger bro healthy  Children-None   GF-lupus   Uncle cancer colon    SH:  Social Alcohol occasional week. Never Smoking. No IVDU. No Children. Right handed. Single--not sexually active, never.        PMSH personally reviewed and updated by me.    ROS:  A comprehensive ROS was done. Positives are per HPI.      Assessment via video:    Constitutional: WD-WN-WG, pleasant  Eyes: nl EOM, conjunctiva, sclera  RESP: No cough, no audible wheezing, able to talk in full sentences  Skin: no visible rash on face  Neuro: nl cranial nerves  Psych: nl affect  MSK: no active synovitis in hands. Good ROM of wrists.        Labs/Imaging:  Normal G6PD  Neg MTB QG 2016 and hepatitis panel, negative HIV    XR hands and knees-negative 2016   Component      Latest Ref Rng & Units 11/17/2020   WBC      4.0 - 11.0 10e9/L 9.6   RBC Count      3.8 - 5.2 10e12/L 4.72   Hemoglobin      11.7 - 15.7 g/dL 13.8   Hematocrit      35.0 - 47.0 % 42.9   MCV      78  - 100 fl 91   MCH      26.5 - 33.0 pg 29.2   MCHC      31.5 - 36.5 g/dL 32.2   RDW      10.0 - 15.0 % 12.9   Platelet Count      150 - 450 10e9/L 389   Creatinine      0.52 - 1.04 mg/dL 0.66   GFR Estimate      >60 mL/min/1.73:m2 >90   GFR Estimate If Black      >60 mL/min/1.73:m2 >90   CRP Inflammation      0.0 - 8.0 mg/L 9.0 (H)   Albumin      3.4 - 5.0 g/dL 3.4   ALT      0 - 50 U/L 16   AST      0 - 45 U/L 10     Impression/Plan:  1. Seropositive (pos anti-CCP, neg RF )rheumatoid arthritis (RA) non-erosive moderate activity.The patient is doing well only mild active RA on methotrexate 12.5 mg weekly but no active synovitis on exam. She is also on  mg qd. The patient has been requiring more ibuprofen and Tylenol recently but she feels once she starts swimming her symptoms will improve. Discussed other treatment options, however will continue on current dose of methotrexate and HCQ.    Risks with methotrexate, infection risks, and pregnancy were discussed    High risk medications, labs CBC, liver, kidney normal. Labs every 12 week -- due for labs now    2. Longterm hydroxychloroquine (plaquenil) use. Annual ophthalmologist for HCQ. Diarrhea side-effects at BID dosing, will continue 1 tablet once every other day--will consider different formulation (branded generic) of HCQ, may be helpful; higher dosing could also be beneficial if she tolerates  3. Vitamin D deficeincy- take 2000 international unit(s) day. 41 on 3-2019     4. Pityriasis lichenoids chronic-per derm   5. Chronic NSAID use, informed her of risks. Will check labs.    RTC 6 mth      Dwayne Hudson MD  Internal Medicine-Dermatology, PGY-5    Staffed with Dr. Aldridge    Attending Note: I saw and evaluated the patient with Dr. Hudson. I agree with the assessment and plan.    Naresh Aldridge MD

## 2021-04-28 DIAGNOSIS — M06.09 RHEUMATOID ARTHRITIS OF MULTIPLE SITES WITH NEGATIVE RHEUMATOID FACTOR (H): ICD-10-CM

## 2021-04-28 DIAGNOSIS — R76.8 CYCLIC CITRULLINATED PEPTIDE (CCP) ANTIBODY POSITIVE: ICD-10-CM

## 2021-04-28 NOTE — TELEPHONE ENCOUNTER
Called and discussed with pt, we talked about branded generic and what it means. She will need to discuss with pharmacy each time before accepting prescription.    Called [facility], per pharmacist, they do not have any branded generic.    Camille Walsh RN  Rheumatology Clinic

## 2021-04-29 DIAGNOSIS — Z79.899 LONG-TERM CURRENT USE OF HIGH RISK MEDICATION OTHER THAN ANTICOAGULANT: ICD-10-CM

## 2021-04-29 DIAGNOSIS — M06.09 RHEUMATOID ARTHRITIS OF MULTIPLE SITES WITH NEGATIVE RHEUMATOID FACTOR (H): Primary | ICD-10-CM

## 2021-04-29 DIAGNOSIS — Z79.899 LONG-TERM USE OF PLAQUENIL: ICD-10-CM

## 2021-04-29 DIAGNOSIS — Z79.1 NSAID LONG-TERM USE: ICD-10-CM

## 2021-04-29 DIAGNOSIS — R76.8 CYCLIC CITRULLINATED PEPTIDE (CCP) ANTIBODY POSITIVE: ICD-10-CM

## 2021-05-04 RX ORDER — HYDROXYCHLOROQUINE SULFATE 200 MG/1
200 TABLET, FILM COATED ORAL DAILY
Qty: 90 TABLET | Refills: 0 | Status: SHIPPED | OUTPATIENT
Start: 2021-05-04 | End: 2021-10-28

## 2021-05-04 NOTE — TELEPHONE ENCOUNTER
[facility] pharmacy is unable to get the branded generic.  Unable to find the branded generic at this time.    Camille Walsh RN  Rheumatology Clinic

## 2021-05-06 ENCOUNTER — VIRTUAL VISIT (OUTPATIENT)
Dept: PSYCHOLOGY | Facility: CLINIC | Age: 31
End: 2021-05-06
Payer: COMMERCIAL

## 2021-05-06 DIAGNOSIS — F41.9 ANXIETY: ICD-10-CM

## 2021-05-06 DIAGNOSIS — F33.0 MILD EPISODE OF RECURRENT MAJOR DEPRESSIVE DISORDER (H): Primary | ICD-10-CM

## 2021-05-06 PROCEDURE — 90837 PSYTX W PT 60 MINUTES: CPT | Mod: 95 | Performed by: PSYCHOLOGIST

## 2021-05-06 NOTE — PROGRESS NOTES
"The patient has been notified of the following:      \"We have found that certain health care needs can be provided without the need for a face to face visit.  This service lets us provide the care you need with a phone conversation.       I will have full access to your [facility] medical record during this entire phone call.   I will be taking notes for your medical record.      Since this is like an office visit, we will bill your insurance company for this service.       There are potential benefits and risks of telephone visits (e.g. limits to patient confidentiality) that differ from in-person visits.?  Confidentiality still applies for telephone services, and nobody will record the visit.  It is important to be in a quiet, private space that is free of distractions (including cell phone or other devices) during the visit.??      If during the course of the call I believe a telephone visit is not appropriate, you will not be charged for this service\"     Consent has been obtained for this service by care team member: [facility]                      Department of Medicine                     [facility] Mail Code 763     [address] [place], [place] [address]              Jessica Ram, Ph.D., L.P [phone]  Shruthi Diaz, Ph.D.,  L.P. [phone]  Nati Pompa, Ph.D., L.P. [phone]  Yadira Gaming, Ph.D., L.P [phone]  Key Cunha, Ph.D., L.P. [phone]  Lee Reese, Ph.D., A.B.P.P., L.P. [phone]         Mayra Parker, Ph.D., L.P. [phone]     [facility] [place], [address] Floor  [address] [place], [place] [id number]    Telemental health visit due to mitigation of COVID-19 pandemic, confirmed with patient.    Reason that telemedicine is appropriate: COVID-19 pandemic mitigation requiring social distancing.  Mode of transmission: Telephone  Location of originating and distant " sites:    Originating site (patient location): patient's home; In event of emergency, patient verbally consented to having emergency response sent to their location    Distant site (provider site): home office of provider    Health Psychology Follow-Up Note     SUBJECTIVE:  Rachel Fried is a 30 year old female with PMH significant for rheumatoid arthritis, depression, and anxiety who was seen for individual psychotherapy.  She reported that mood and anxiety have worsened since the last session which she attributed to several factors that have depleted her resources including a busy stretch at work, closing and buying her first home, and ongoing burnout related to the pandemic with social isolation and fear related to virus exposure.  Discussed how she has the habit of overextending herself at work which has been present since adolescence.  Discussed how she is coping which includes primarily via distraction, disengagement or other strategies to help avoid emotions such as skin picking.  We processed how these provide short-term relief yet maintaining distress long-term.  Encouraged a shift to regulate emotions in a different way by excepting and allowing.  Also encouraged her to ask herself what she needs in these moments of strong emotions and ways to provide care and compassion in her struggles.  Discussed ways to apply self compassion.     OBJECTIVE:  Appearance/Behavior/Orientation: Alert and oriented to person, place, time, and situation.    Cooperation/Reliability: Patient was open and cooperative throughout the session.    Abstract reasoning: Not assessed.   Judgment: Intact.    Mood/Affect: Mood dysphoric and overwhelmed; affect mood congruent  Insight/Motivation: Good, good     ASSESSMENT:   Appears to be deriving benefit from treatment.      DIAGNOSIS:  Major depressive disorder, recurrent, mild  Anxiety, unspecified     PLAN:  RTC for continued psychotherapy.     Time in: 4:04pm  Time out: 5:03  PM  Extended session due to complexity of case and length of interval.     Key Cunha, PhD, LP  Clinical Health Psychologist     Tx plan completed:             4/16/19  Tx plan due:                        4/16/20    *no letter

## 2021-05-15 DIAGNOSIS — M06.09 RHEUMATOID ARTHRITIS OF MULTIPLE SITES WITH NEGATIVE RHEUMATOID FACTOR (H): ICD-10-CM

## 2021-05-15 PROCEDURE — 86769 SARS-COV-2 COVID-19 ANTIBODY: CPT | Performed by: PATHOLOGY

## 2021-05-15 PROCEDURE — 36415 COLL VENOUS BLD VENIPUNCTURE: CPT | Performed by: PATHOLOGY

## 2021-05-17 ENCOUNTER — MYC MEDICAL ADVICE (OUTPATIENT)
Dept: RHEUMATOLOGY | Facility: CLINIC | Age: 31
End: 2021-05-17

## 2021-05-17 LAB
SARS-COV-2 AB PNL SERPL IA: POSITIVE
SARS-COV-2 IGG SERPL IA-ACNC: NORMAL

## 2021-07-05 DIAGNOSIS — Z79.899 LONG-TERM USE OF PLAQUENIL: ICD-10-CM

## 2021-07-05 DIAGNOSIS — R76.8 CYCLIC CITRULLINATED PEPTIDE (CCP) ANTIBODY POSITIVE: ICD-10-CM

## 2021-07-05 DIAGNOSIS — M06.09 RHEUMATOID ARTHRITIS OF MULTIPLE SITES WITH NEGATIVE RHEUMATOID FACTOR (H): ICD-10-CM

## 2021-07-05 DIAGNOSIS — Z79.1 NSAID LONG-TERM USE: ICD-10-CM

## 2021-07-05 DIAGNOSIS — Z79.899 LONG-TERM CURRENT USE OF HIGH RISK MEDICATION OTHER THAN ANTICOAGULANT: ICD-10-CM

## 2021-07-05 LAB
CRP SERPL-MCNC: 7.5 MG/L (ref 0–8)
ERYTHROCYTE [DISTWIDTH] IN BLOOD BY AUTOMATED COUNT: 14.9 % (ref 10–15)
HCT VFR BLD AUTO: 43.3 % (ref 35–47)
HGB BLD-MCNC: 13.8 G/DL (ref 11.7–15.7)
MCH RBC QN AUTO: 27.8 PG (ref 26.5–33)
MCHC RBC AUTO-ENTMCNC: 31.9 G/DL (ref 31.5–36.5)
MCV RBC AUTO: 87 FL (ref 78–100)
PLATELET # BLD AUTO: 386 10E9/L (ref 150–450)
RBC # BLD AUTO: 4.96 10E12/L (ref 3.8–5.2)
WBC # BLD AUTO: 9.3 10E9/L (ref 4–11)

## 2021-07-05 PROCEDURE — 84460 ALANINE AMINO (ALT) (SGPT): CPT | Performed by: INTERNAL MEDICINE

## 2021-07-05 PROCEDURE — 82040 ASSAY OF SERUM ALBUMIN: CPT | Performed by: INTERNAL MEDICINE

## 2021-07-05 PROCEDURE — 85027 COMPLETE CBC AUTOMATED: CPT | Performed by: INTERNAL MEDICINE

## 2021-07-05 PROCEDURE — 84450 TRANSFERASE (AST) (SGOT): CPT | Performed by: INTERNAL MEDICINE

## 2021-07-05 PROCEDURE — 86140 C-REACTIVE PROTEIN: CPT | Performed by: INTERNAL MEDICINE

## 2021-07-05 PROCEDURE — 82565 ASSAY OF CREATININE: CPT | Performed by: INTERNAL MEDICINE

## 2021-07-05 PROCEDURE — 36415 COLL VENOUS BLD VENIPUNCTURE: CPT | Performed by: INTERNAL MEDICINE

## 2021-07-06 LAB
ALBUMIN SERPL-MCNC: 3.7 G/DL (ref 3.4–5)
ALT SERPL W P-5'-P-CCNC: 27 U/L (ref 0–50)
AST SERPL W P-5'-P-CCNC: 19 U/L (ref 0–45)
CREAT SERPL-MCNC: 0.71 MG/DL (ref 0.52–1.04)
GFR SERPL CREATININE-BSD FRML MDRD: >90 ML/MIN/{1.73_M2}

## 2021-07-07 NOTE — RESULT ENCOUNTER NOTE
Normal labs including normal CRP inflammation. [facility] Hospital Medicine  Discharge Summary      Patient Name: David Hadley  MRN: [id number]  Admission Date: 12/14/2017  Hospital Length of Stay: 3 days  Discharge Date and Time:  12/17/2017 1:31 PM  Attending Physician: No att. providers found   Discharging Provider: Guilherme Solares NP  Primary Care Provider: Isaac Fitch MD      HPI:   History is limited and obtained from the patients wife at bedside, due to patients dementia. David Hadley is a 63 y.o. male patient with a hx of dementia who presented to the Emergency Department for evaluation of SOB. The wife reports that they went to doctor Fitch's office today and was recommended to present to the ER for further evaluation. At this time the wife reports patients SOB is improved and the patient is in no resp. Distress at present. Symptoms are intermittent and moderate in severity. No mitigating or exacerbating factors reported. Associated sxs include increased foot swelling, fatigue, and SOB. Additionally the wife reports noticing blood (dark red) on the tissue paper when wiping the patient that started a couple of weeks ago and has had an increase in stools. Wife states patient does not complain of pain or show signs of pain when wiping him.   Pt has end-stage renal disease and goes to dialysis 3x a week. No further complaints or concerns at this time. . Patient denies any fever, chills, nausea, vomiting, abd pain, weakness/numbness, dizziness, HA, leg pain, chest pain, cough, and all other sxs at this time. Patient was evaluated in the ER, BNP >4900 and elevated troponin, likely in response to  CKD and CHF. Chest Xray Impression:Stable cardiomegaly without evidence of overt alveolar edema.  Stable moderate right pleural effusion with adjacent airspace consolidation/compressive atelectasis. Patient admitted for Fluid overload in ESRD patient on HD with CHF exacerbation and for further evaluation of rectal bleeding    Dr. Fitch office  visit note 12/14/17:  History of Present Illness:  Patient presents today with his wife and his sister they're complaining that patient has become increasingly short of breath and he is developing edema.  He has systolic heart failure with ejection fraction of 25%.  He also been having diarrhea for the last 1 month and over the last couple days he is having bloody diarrhea.  Denies any fever.  Patient has end-stage renal disease and is on dialysis also has advanced dementia     Plan:  Patient is tachycardic and dyspneic with underlying history of systolic heart failure and more recent gastrointestinal bleeding.  too unstable to be treated outpatient prescription with family recommend that he be sent to [facility] for further evaluation and treatment and admission to the hospital.     He is continuing to take his Plavix and aspirin inspite of the gastrointestinal bleed.  No orders of the defined types were placed in this encounter.           * No surgery found *      Hospital Course:   12/15 The patient was seen at bedside in the ER. Nephrology following, plans for dialysis today. 12/16 The patient reports improvement in symptoms overnight. The patient was dialyzed overnight with 3 liters off. Case discussed with Dr. Marks (Nephrology) who feels that the patient is not having enough fluid taken off. Cardiology following. 12/17 The patients family stated that they wished to be discharged home to resume outpatient dialysis if possible. This was discussed with Dr. Marks (Nephrology) who felt that the patient was ok for discharge. The patient was seen and examined today and deemed stable for discharge. The patient will resume outpatient dialysis. The patient needs to complete full dialysis treatment to prevent volume overload from occurring. Dr. Lazo (Cardiology) recommended holding his BP meds on the day of dialysis to allow him to complete treatment. On admission the patient reported dark red blood on tissue when  wiping. The patients H/H remained stable during his admission. No rectal bleeding noted. Will have patient follow up with GI. The patient will also follow up with his PCP.      Consults:   Consults         Status Ordering Provider     Inpatient consult to Cardiology  Once     Provider:  Cr Lazo MD    Completed CARINE KELLOGG     Inpatient consult to Nephrology  Once     Provider:  Bernard Marks MD    Acknowledged VLAD GOMEZ          No new Assessment & Plan notes have been filed under this hospital service since the last note was generated.  Service: Hospital Medicine    Final Active Diagnoses:    Diagnosis Date Noted POA    PRINCIPAL PROBLEM:  Acute on chronic diastolic congestive heart failure [I50.33] 12/15/2017 Yes    Persistent atrial fibrillation [I48.1] 12/16/2017 Unknown    Troponin I above reference range [R74.8] 12/15/2017 Yes    Rectal bleed [K62.5] 12/14/2017 Yes    Pleural effusion on right [J90] 08/12/2017 Yes    Type 2 diabetes mellitus with renal complication [E11.29] 07/29/2016 Yes     Chronic    ESRD (end stage renal disease) on dialysis [N18.6, Z99.2] 06/26/2013 Not Applicable     Chronic    CAD with remote CABG [Z95.1] 06/26/2013 Not Applicable     Chronic      Problems Resolved During this Admission:    Diagnosis Date Noted Date Resolved POA    Acute blood loss anemia [D62] 12/15/2017 12/17/2017 Yes       Discharged Condition: stable    Disposition: Home or Self Care    Follow Up:  Follow-up Information     Isaac Fitch MD. Schedule an appointment as soon as possible for a visit in 3 days.    Specialty:  Family Medicine  Contact information:  [address] [address] [address] [phone]             Conrado Smith Iii, MD. Schedule an appointment as soon as possible for a visit in 1 week.    Specialty:  Gastroenterology  Contact information:  [address]  [place] [address] [phone]                 Patient Instructions:   No discharge  procedures on file.    Significant Diagnostic Studies:   Imaging Results          X-Ray Chest 1 View (Final result)  Result time 12/16/17 07:26:38    Final result by Rajesh Vegas MD (12/16/17 07:26:38)                 Impression:     See above.          Electronically signed by: RAJESH VEGAS  Date:     12/16/17  Time:    07:26              Narrative:    Chest x-ray single view    Indication: Shortness of breath.    Findings: Comparison study 12/14/2017.  There is a stable right pleural fluid collection with haziness to the lower right hemithorax with likely superimposed airspace consolidation or atelectasis.  Since the prior study, there has been development of a zone of dense, somewhat wedge-shaped consolidation involving the peripheral left upper lobe/apex as well as hazy infiltrate within the peripheral mid left lung.  Atelectasis or consolidation at the left lung base is again noted.  Stable cardiomegaly status post CABG.  Stable central pulmonary vascular congestion.  No new knee                             X-Ray Chest PA And Lateral (Final result)  Result time 12/14/17 19:03:04    Final result by Tevin Peña III, MD (12/14/17 19:03:04)                 Impression:     Stable cardiomegaly without evidence of overt alveolar edema.  Stable moderate right pleural effusion with adjacent airspace consolidation/compressive atelectasis.      Electronically signed by: TEVIN PEÑA MD  Date:     12/14/17  Time:    19:03              Narrative:    XR CHEST PA AND LATERAL    Clinical history: Shortness of breath    Comparison: 08/15/2017    Findings: Prior sternotomy is again noted.  There is stable cardiomegaly without evidence of overt alveolar edema. There has been no significant interval change in the moderate right pleural effusion with adjacent airspace consolidation/compressive atelectasis in the adjacent right mid to lower lung.  The left lung appears grossly clear of active disease.  No acute osseous  abnormality detected.                                 Pending Diagnostic Studies:     Procedure Component Value Units Date/Time    Hematocrit [[id number]]     Order Status:  Sent Lab Status:  No result     Specimen:  Blood from Blood     Hemoglobin [[id number]]     Order Status:  Sent Lab Status:  No result     Specimen:  Blood from Blood          Medications:  Reconciled Home Medications:   Discharge Medication List as of 12/17/2017 12:32 PM      START taking these medications    Details   metoprolol tartrate (LOPRESSOR) 25 MG tablet Take 0.5 tablets (12.5 mg total) by mouth 2 (two) times daily., Starting Sun 12/17/2017, Until Mon 12/17/2018, Normal         CONTINUE these medications which have NOT CHANGED    Details   amlodipine (NORVASC) 5 MG tablet Take 1 tablet (5 mg total) by mouth once daily., Starting 5/9/2017, Until Wed 5/9/18, Normal      aspirin (ECOTRIN) 81 MG EC tablet Take 81 mg by mouth., Starting Tue 8/16/2016, Historical Med      atorvastatin (LIPITOR) 20 MG tablet TAKE ONE TABLET BY MOUTH ONCE DAILY, Normal      clopidogrel (PLAVIX) 75 mg tablet Take 1 tablet (75 mg total) by mouth once daily., Starting Thu 9/28/2017, Normal      diphenoxylate-atropine 2.5-0.025 mg (LOMOTIL) 2.5-0.025 mg per tablet Starting Th 12/7/2017, Historical Med      fluticasone (FLONASE) 50 mcg/actuation nasal spray 1 spray by Each Nare route 2 (two) times daily., Starting Thu 9/7/2017, Until Tue 3/6/2018, Normal      FOLIC ACID/VIT BCOMP,C (JOSAFAT-JANICE ORAL) Take by mouth., Until Discontinued, Historical Med      LORazepam (ATIVAN) 0.5 MG tablet TAKE ONE TABLET BY MOUTH EVERY 6 HOURS AS NEEDED FOR ANXIETY, Normal      losartan (COZAAR) 50 MG tablet Take 1 tablet (50 mg total) by mouth once daily. 1 tablet Oral Every day, Starting Thu 9/28/2017, Normal      multivitamin with minerals tablet Take 1 tablet by mouth., Historical Med      rivastigmine (EXELON) 4.6 mg/24 hr PT24 Place 1 patch onto the skin once daily., Starting  12/21/2016, Until Thu 12/21/17, Normal      SENSIPAR 30 mg Tab Take 1 tablet by mouth once daily., Starting 6/29/2016, Until Discontinued, Historical Med      sertraline (ZOLOFT) 50 MG tablet Take 1 tablet (50 mg total) by mouth once daily., Starting 12/21/2016, Until Thu 12/21/17, Normal      sevelamer carbonate (RENVELA) 800 mg Tab Take 800 mg by mouth., Historical Med      SPRYCEL 100 mg Tab Take 100 mg by mouth once daily. , Starting 4/20/2015, Until Discontinued, Historical Med      traZODone (DESYREL) 50 MG tablet TAKE ONE TABLET BY MOUTH EVERY EVENING, Normal             Indwelling Lines/Drains at time of discharge:   Lines/Drains/Airways          No matching active lines, drains, or airways          Time spent on the discharge of patient: > 30 minutes  Patient was seen and examined on the date of discharge and determined to be suitable for discharge.         Guilherme Solares NP  Department of Hospital Medicine  [facility]

## 2021-07-13 ENCOUNTER — VIRTUAL VISIT (OUTPATIENT)
Dept: PSYCHOLOGY | Facility: CLINIC | Age: 31
End: 2021-07-13
Payer: COMMERCIAL

## 2021-07-13 DIAGNOSIS — F41.9 ANXIETY: ICD-10-CM

## 2021-07-13 DIAGNOSIS — F33.0 MILD EPISODE OF RECURRENT MAJOR DEPRESSIVE DISORDER (H): Primary | ICD-10-CM

## 2021-07-13 PROCEDURE — 90837 PSYTX W PT 60 MINUTES: CPT | Mod: 95 | Performed by: PSYCHOLOGIST

## 2021-07-13 NOTE — PROGRESS NOTES
"The patient has been notified of the following:      \"We have found that certain health care needs can be provided without the need for a face to face visit.  This service lets us provide the care you need with a phone conversation.       I will have full access to your [facility] medical record during this entire phone call.   I will be taking notes for your medical record.      Since this is like an office visit, we will bill your insurance company for this service.       There are potential benefits and risks of telephone visits (e.g. limits to patient confidentiality) that differ from in-person visits.?  Confidentiality still applies for telephone services, and nobody will record the visit.  It is important to be in a quiet, private space that is free of distractions (including cell phone or other devices) during the visit.??      If during the course of the call I believe a telephone visit is not appropriate, you will not be charged for this service\"     Consent has been obtained for this service by care team member: [facility]                      Department of Medicine                     [facility] Mail Code 609     [address] [place], [place] [address]              Jessica Ram, Ph.D., L.P [phone]  Shruthi Diaz, Ph.D.,  L.P. [phone]  Nati Pompa, Ph.D., L.P. [phone]  Yadira Gaming, Ph.D., L.P [phone]  Key Cunha, Ph.D., L.P. [phone]  Lee Reese, Ph.D., A.B.P.P., L.P. [phone]         Mayra Parker, Ph.D., L.P. [phone]     [facility] [place], 3rd Floor  [address] [place], [place] [id number]    Telemental health visit due to mitigation of COVID-19 pandemic, confirmed with patient.    Reason that telemedicine is appropriate: COVID-19 pandemic mitigation requiring social distancing.  Mode of transmission: Telephone  Location of originating and distant " "sites:    Originating site (patient location): patient's home; In event of emergency, patient verbally consented to having emergency response sent to their location    Distant site (provider site): home office of provider    Health Psychology Follow-Up Note     SUBJECTIVE:  Rachel Fried is a 30 year old female with PMH significant for rheumatoid arthritis, depression, and anxiety who was seen for individual psychotherapy.  She reported that she has been making several notable changes in the areas of work, family, and also expanding recent friendships.  Processed various changes and how she is investing different values (relationships vs work). Discussed and normalized setting boundaries in family relationships especially when they do not feel to support her fully.  Supported exploration of how to navigate discomfort in values based changes and continue to honor emotional needs in life.     Next session to also focus on skin picking, a habit that has been persistent for several months.      OBJECTIVE:  Appearance/Behavior/Orientation: Alert and oriented to person, place, time, and situation.    Cooperation/Reliability: Patient was open and cooperative throughout the session.    Abstract reasoning: Not assessed.   Judgment: Intact.    Mood/Affect: Mood  \"stressed\"; affect mood congruent  Insight/Motivation: Good, good     ASSESSMENT:   Appears to be deriving benefit from treatment.      DIAGNOSIS:  Major depressive disorder, recurrent, mild  Anxiety, unspecified     PLAN:  RTC for continued psychotherapy.     Time in: 4:04pm  Time out: 5:02 PM  Extended session due to complexity of case and length of interval.     Key Cunha, PhD,   Clinical Health Psychologist     Tx plan completed:             4/16/19  Tx plan due:                        4/16/20    *no letter  "

## 2021-07-19 ASSESSMENT — ENCOUNTER SYMPTOMS
NERVOUS/ANXIOUS: 1
NAIL CHANGES: 0
DECREASED CONCENTRATION: 1
POOR WOUND HEALING: 0
ARTHRALGIAS: 1
STIFFNESS: 1
SKIN CHANGES: 0
DEPRESSION: 1

## 2021-07-20 ENCOUNTER — OFFICE VISIT (OUTPATIENT)
Dept: INTERNAL MEDICINE | Facility: CLINIC | Age: 31
End: 2021-07-20
Payer: COMMERCIAL

## 2021-07-20 VITALS
OXYGEN SATURATION: 99 % | WEIGHT: 234.5 LBS | BODY MASS INDEX: 34.73 KG/M2 | HEIGHT: 69 IN | DIASTOLIC BLOOD PRESSURE: 77 MMHG | HEART RATE: 86 BPM | SYSTOLIC BLOOD PRESSURE: 110 MMHG

## 2021-07-20 DIAGNOSIS — Z00.00 HEALTH MAINTENANCE EXAMINATION: Primary | ICD-10-CM

## 2021-07-20 DIAGNOSIS — L70.0 ACNE VULGARIS: ICD-10-CM

## 2021-07-20 DIAGNOSIS — L41.1 PITYRIASIS LICHENOIDES CHRONICA: ICD-10-CM

## 2021-07-20 PROCEDURE — 87624 HPV HI-RISK TYP POOLED RSLT: CPT | Mod: 90 | Performed by: PATHOLOGY

## 2021-07-20 PROCEDURE — G0145 SCR C/V CYTO,THINLAYER,RESCR: HCPCS

## 2021-07-20 PROCEDURE — 99395 PREV VISIT EST AGE 18-39: CPT | Mod: GC

## 2021-07-20 ASSESSMENT — PAIN SCALES - GENERAL: PAINLEVEL: NO PAIN (0)

## 2021-07-20 ASSESSMENT — MIFFLIN-ST. JEOR: SCORE: 1843.07

## 2021-07-20 NOTE — NURSING NOTE
Chief Complaint   Patient presents with     Physical     pap, discuss dermatology referral, and refill medications       Sabra James, EMT at 8:29 AM on 7/20/2021

## 2021-07-20 NOTE — PATIENT INSTRUCTIONS
Park Ramos,    It was a pleasure seeing you in the clinic today. We discussed quite a few things today, but I would like to focus on the 3 goals you set for yourself.  1.  Fix refrigerator to improve diet  2.  Swim twice weekly  3.  Follow-up with rheumatologist on hydroxychloroquine prescription    I will follow up with you in 3 months to discuss your current medications. Please do not hesitate to reach out to me or schedule an appointment if you have any questions or concerns before your appointment.     All the best,    Dr. Boo

## 2021-07-20 NOTE — PROGRESS NOTES
"    PRIMARY CARE CENTER         HPI:       Rachel Fried is a 30 year old female  with PMHx of rheumatoid arthritis, pityriasis lichenoides chronica, acne, anxiety and depression who presents to the clinic for wellness exam, dermatology concerns, and Pap smear. Pt reports she quit her job last week. She felt euphoria followed by panic. She is financially secure and is now looking for a better job with more regular hours. She has a therapist and meets with them every other week. She was depressed during last 3 months in her job. Mood is better since quitting. She has been hanging out with friends and taking the initiative to do things.  Has good support group. Currently on Duloxetine 60 qday. She has spurts of anxiety, but things are better since quitting her job.        Pt also states she cannot stop picking at her her skin. Thinks she has ingrown hairs on her legs. Acne has flared since stoppping JOSE birth control. Does not want to restart birth control because her libido is back and she does not want it to decrease. Pt states she is a lesbian and does not need birth control. She will discuss picking with therapist.         Lifestyle has not been as good. Has not been swimming due to lack of time. She does walk her dog, [employer]. Diet has been \"terrible.\" Recently bought a house. Large refrigerator broke and now she has time to fix fridge. She wants to improve diet. Pt also wants to lose weight so she can move better and so her RA won't flare.        Pt also has hx of RA. Her labs have been good, but feels it should be better. She was at a wedding standing for 15 min and was in pain. Last telehealth visit in approx April 2021. Really has not been taking hydroxychloroquine. Needs to find a new pharmacist to get her medication filled. Regularly takes Methotrxate.      Pt states she needs a pap smear today. It has been a few years since she has had one. Not currently sexually active and plans on being. " Just got a girlfriend for the first itime. She is supportive.          PMH: RA, Pityriasis lichenoides, Acne    Allergies: Sulfa drugs    Social hx:  Edible marijuana- 5 once per month  ETOH: 1-2 drinks/week  Occupation                  Histories:     PAST MEDICAL HISTORY:   Past Medical History:   Diagnosis Date     Allergic dermatitis      Anxiety      Depression      Pityriasis lichenoides      Rheumatoid arthritis (H)        PAST SURGICAL HISTORY: No past surgical history on file.    FAMILY HISTORY:   Family History   Problem Relation Age of Onset     Lung Cancer Paternal Grandfather      Colon Cancer Maternal Uncle        SOCIAL HISTORY:   Social History     Tobacco Use     Smoking status: Never Smoker     Smokeless tobacco: Never Used   Substance Use Topics     Alcohol use: Yes     Alcohol/week: 1.0 standard drinks     Types: 1 Glasses of wine per week              Medications and Allergies:       MEDICATIONS:   Current Outpatient Medications   Medication Sig Dispense Refill     albuterol (PROAIR HFA/PROVENTIL HFA/VENTOLIN HFA) 108 (90 Base) MCG/ACT inhaler Inhale 2 puffs into the lungs as needed for shortness of breath / dyspnea or wheezing 1 Inhaler 1     Albuterol Sulfate (PROVENTIL HFA IN)        cetirizine HCl (ZYRTEC ALLERGY) 10 MG CAPS        Cholecalciferol (VITAMIN D) 2000 units tablet Take 2,000 Units by mouth daily 1 tablet 0     DULoxetine (CYMBALTA) 60 MG capsule TAKE ONE CAPSULE BY MOUTH DAILY 90 capsule 3     folic acid (FOLVITE) 1 MG tablet Take 2 tablets (2 mg) by mouth daily 180 tablet 2     hydroquinone 4 % CREA Externally apply topically daily Do not use for more than 3 months consecutively without a 3 month break. 56.8 g 3     hydroxychloroquine (PLAQUENIL) 200 MG tablet Take 1 tablet (200 mg) by mouth daily Get annual eye exams for hydroxychloroquine (plaquenil) monitoring and fax to [phone] 90 tablet 0     LORYNA 3-0.02 MG tablet Take 1 tablet by mouth daily 56 tablet 0      "methotrexate 2.5 MG tablet Take 5 tablets (12.5 mg) by mouth every 7 days Labs due Feb & every 8-12 weeks.. Keep appt 4/23/2021 60 tablet 0     tretinoin (RETIN-A) 0.05 % cream Apply topically At Bedtime Apply a pea sized amount to the face every other night for 2 weeks, then nightly thereafter 45 g 11     triamcinolone (KENALOG) 0.1 % ointment Apply topically 2 times daily 30 g 1     gabapentin (NEURONTIN) 100 MG capsule Take 3 capsules (300 mg) by mouth 3 times daily (Patient not taking: Reported on 2/3/2020) 270 capsule 0        ALLERGIES: Sulfa drugs         Review of Systems:     ROS     General: No fevers or chills  Skin: No rash or diaphoresis, +Acne and skin scarring  Eyes: No eye redness or discharge  Ears/Nose/Throat: No rhinorrhea or nasal congestion  Respiratory: No cough or SOB  Cardiovascular: No chest pain or palpitations  Gastrointestinal: No nausea, vomiting, or diarrhea  Genitourinary: No urinary frequency, hematuria, or dysuria  Musculoskeletal: +arthralgias , No myalgias  Neurologic: No numbness or weakness  Psychiatric: +depression/anxiety intermittently, No HI/SI  Hematologic/Lymphatic/Immunologic: No leg swelling, no easy bruising/bleeding  Endocrine: No polyuria/polydypsia      I have personally reviewed and updated the complete ROS on the day of the visit.           Physical Exam:   /77 (BP Location: Left arm, Patient Position: Sitting, Cuff Size: Adult Large)   Pulse 86   Ht 1.753 m (5' 9\")   Wt 106.4 kg (234 lb 8 oz)   LMP 07/07/2021   SpO2 99%   BMI 34.63 kg/m    Body mass index is 34.63 kg/m .  Vitals were reviewed       GENERAL APPEARANCE: healthy, alert and no distress     EYES: EOMI, PERRL, conjunctive are clear     HENT: ear canals and TM's normal and nose and mouth without ulcers or lesions     NECK: no adenopathy, no asymmetry, masses, or scars      RESP: lungs clear to auscultation - no rales, rhonchi or wheezes     CV: regular rates and rhythm, normal S1 S2, no S3 or " S4 and no murmur, click or rub     ABDOMEN:  soft, nontender, no HSM or masses and bowel sounds normal     MS: extremities normal- no gross deformities noted, no evidence of inflammation in joints, FROM in all extremities.     SKIN: +hyperpigmented scarring on BLE, active acne on Upper back and mid back. Acne and hyperpigmented scarring on chin and jawline     NEURO: Normal strength and tone, sensory exam grossly normal, mentation intact and speech normal     PSYCH: mentation appears normal. and affect normal/bright     LYMPHATICS: No cervical adenopathy          Results:      Results from the last 24 hoursNo results found for this or any previous visit (from the past 24 hour(s)).  Assessment and Plan     Rachel was seen today for physical.    Diagnoses and all orders for this visit:    Health maintenance examination:  -Discussed diet and exercise  -Performed Pap/HPV today.  Will repeat in 5 years  -Patient declined STI screening  -Discussed breast exam.  I informed patient that self breast examinations may be performed at her own discretion.  We discussed proper technique for performing self breast examination.  Informed patient that she does not require mammography at this time.  -Follow-up in 1 year for routine physical exam  -Patient currently taking duloxetine 60 mg daily.  Will follow up in 3 months to discuss medication dosage.   -Pt Has has set 3 goals for herself:  1.  Fix refrigerator to improve diet  2.  Swim twice weekly  3.  Follow-up with rheumatologist on hydroxychloroquine prescription    #Pityriasis lichenoides chronica  -Patient endorses-worsening skin picking.  Exacerbated by stress Derm referral  -Derm referral  -Recommended patient continue seeing therapist every other week    #Acne vulgaris  -Pt Not currently using any-medication.  Does use over-the-counter acne wash  -Derm referral    Follow-up topics:  *3 goals pt set for herself  *Duloxetine medication    Options for treatment and follow-up  care were reviewed with the patient. Rachel Fried engaged in the decision making process and verbalized understanding of the options discussed and agreed with the final plan.    Pt should return to clinic for f/u with me in 3 months   Pt was seen and plan of care discussed with Dr. Everette Ritchie.       Susana Boo MD  PGY-1, Internal Medicine  Jul 20, 2021

## 2021-07-22 LAB
BKR LAB AP GYN ADEQUACY: NORMAL
BKR LAB AP GYN INTERPRETATION: NORMAL
BKR LAB AP HPV REFLEX: NORMAL
BKR LAB AP LMP: NORMAL
BKR LAB AP PREVIOUS ABNORMAL: NORMAL
PATH REPORT.COMMENTS IMP SPEC: NORMAL
PATH REPORT.RELEVANT HX SPEC: NORMAL

## 2021-07-26 LAB
HUMAN PAPILLOMA VIRUS 16 DNA: NEGATIVE
HUMAN PAPILLOMA VIRUS 18 DNA: NEGATIVE
HUMAN PAPILLOMA VIRUS FINAL DIAGNOSIS: NORMAL
HUMAN PAPILLOMA VIRUS OTHER HR: NEGATIVE

## 2021-07-29 ENCOUNTER — VIRTUAL VISIT (OUTPATIENT)
Dept: PSYCHOLOGY | Facility: CLINIC | Age: 31
End: 2021-07-29
Payer: COMMERCIAL

## 2021-07-29 DIAGNOSIS — F41.9 ANXIETY: ICD-10-CM

## 2021-07-29 DIAGNOSIS — F33.0 MILD EPISODE OF RECURRENT MAJOR DEPRESSIVE DISORDER (H): Primary | ICD-10-CM

## 2021-07-29 PROCEDURE — 90837 PSYTX W PT 60 MINUTES: CPT | Mod: 95 | Performed by: PSYCHOLOGIST

## 2021-07-29 NOTE — PROGRESS NOTES
"The patient has been notified of the following:      \"We have found that certain health care needs can be provided without the need for a face to face visit.  This service lets us provide the care you need with a phone conversation.       I will have full access to your [facility] medical record during this entire phone call.   I will be taking notes for your medical record.      Since this is like an office visit, we will bill your insurance company for this service.       There are potential benefits and risks of telephone visits (e.g. limits to patient confidentiality) that differ from in-person visits.?  Confidentiality still applies for telephone services, and nobody will record the visit.  It is important to be in a quiet, private space that is free of distractions (including cell phone or other devices) during the visit.??      If during the course of the call I believe a telephone visit is not appropriate, you will not be charged for this service\"     Consent has been obtained for this service by care team member: [facility] Psychology                      Department of Medicine                     [facility] Mail Code 534     [address] [place], [place] [address]              Jessica Ram, Ph.D., L.P [phone]  Shruthi Diaz, Ph.D.,  L.P. [phone]  Nati Pompa, Ph.D., L.P. [phone]  Yadira Gaming, Ph.D., L.P [phone]  Key Cunha, Ph.D., L.P. [phone]  Lee Reese, Ph.D., A.B.P.P., L.P. [phone]         Mayra Parker, Ph.D., L.P. [phone]     [facility] [place], 3rd Floor  [address] [place], [place] [id number]    Telemental health visit due to mitigation of COVID-19 pandemic, confirmed with patient.    Reason that telemedicine is appropriate: COVID-19 pandemic mitigation requiring social distancing.  Mode of transmission: Telephone  Location of originating and distant " "sites:    Originating site (patient location): patient's home; In event of emergency, patient verbally consented to having emergency response sent to their location    Distant site (provider site): home office of provider    Health Psychology Follow-Up Note     SUBJECTIVE:  Rachel Fried is a 30 year old female with PMH significant for rheumatoid arthritis, depression, and anxiety who was seen for individual psychotherapy.  She has made significant changes in life such as putting her notice at work and ended a romantic relationship after recognizing me there is fully satisfying to her.  Provided encouragement for being attuned to her needs and creating an environment that supports them.  [place] today focused on skin picking behaviors, a habit that has been bothersome for several months but has been present on and off for several years.  Discussed history of this behavior, when it originated, and triggers for recent exacerbations.  She noticed feelings of stress and overwhelm triggering this behavior.  Discussed finding competing behaviors in an effort to think about habit reversal as well as long-term identifying what stressors contribute to feeling overwhelmed, thus precipitating the need for this.  Plan currently is to find several competing, alternative behaviors and practice using them in place of picking.      OBJECTIVE:  Appearance/Behavior/Orientation: Alert and oriented to person, place, time, and situation.    Cooperation/Reliability: Patient was open and cooperative throughout the session.    Abstract reasoning: Not assessed.   Judgment: Intact.    Mood/Affect: Mood  \"okay\"; affect stressed but hopeful  Insight/Motivation: Good, good     ASSESSMENT:   Appears to be deriving benefit from treatment.      DIAGNOSIS:  Major depressive disorder, recurrent, mild  Anxiety, unspecified     PLAN:  RTC for continued psychotherapy.     Time in: 3:06 pm  Time out: 4:06 pm  Extended session due to complexity of case " and length of interval.     Key Cunha, PhD,   Clinical Health Psychologist     Tx plan completed:             4/16/19  Tx plan due:                        4/16/20    *no letter

## 2021-08-02 ENCOUNTER — TRANSFERRED RECORDS (OUTPATIENT)
Dept: HEALTH INFORMATION MANAGEMENT | Facility: CLINIC | Age: 31
End: 2021-08-02

## 2021-08-17 ENCOUNTER — VIRTUAL VISIT (OUTPATIENT)
Dept: PSYCHOLOGY | Facility: CLINIC | Age: 31
End: 2021-08-17
Payer: COMMERCIAL

## 2021-08-17 DIAGNOSIS — F41.9 ANXIETY: ICD-10-CM

## 2021-08-17 DIAGNOSIS — F33.0 MILD EPISODE OF RECURRENT MAJOR DEPRESSIVE DISORDER (H): Primary | ICD-10-CM

## 2021-08-17 PROCEDURE — 90837 PSYTX W PT 60 MINUTES: CPT | Mod: 95 | Performed by: PSYCHOLOGIST

## 2021-08-17 NOTE — PROGRESS NOTES
"The patient has been notified of the following:      \"We have found that certain health care needs can be provided without the need for a face to face visit.  This service lets us provide the care you need with a phone conversation.       I will have full access to your [facility] medical record during this entire phone call.   I will be taking notes for your medical record.      Since this is like an office visit, we will bill your insurance company for this service.       There are potential benefits and risks of telephone visits (e.g. limits to patient confidentiality) that differ from in-person visits.?  Confidentiality still applies for telephone services, and nobody will record the visit.  It is important to be in a quiet, private space that is free of distractions (including cell phone or other devices) during the visit.??      If during the course of the call I believe a telephone visit is not appropriate, you will not be charged for this service\"     Consent has been obtained for this service by care team member: [facility] Psychology                      Department of Medicine                     [facility] Mail Code 557     [address] [place], [place] [address]              Jessica Ram, Ph.D., L.P [phone]  Shruthi Diaz, Ph.D.,  L.P. [phone]  Nati Pompa, Ph.D., L.P. [phone]  Yadira Gaming, Ph.D., L.P [phone]  Key Cunha, Ph.D., L.P. [phone]  Lee Reese, Ph.D., A.B.P.P., L.P. [phone]         Mayra Parker, Ph.D., L.P. [phone]     [facility] [place], 3rd Floor  [address] [place], [place] [id number]    Telemental health visit due to mitigation of COVID-19 pandemic, confirmed with patient.    Reason that telemedicine is appropriate: COVID-19 pandemic mitigation requiring social distancing.  Mode of transmission: Telephone  Location of originating and distant " sites:    Originating site (patient location): patient's home; In event of emergency, patient verbally consented to having emergency response sent to their location    Distant site (provider site): home office of provider    Health Psychology Follow-Up Note     SUBJECTIVE:  Rachel Fried is a 30 year old female with PMH significant for rheumatoid arthritis, depression, and anxiety who was seen for individual psychotherapy.  Interim history since last session included wrapping up recent job and vacation to visit brother.  Noticed that urge for skin picking was limited to 9 during vacation.  Discussed how situational differences contribute to these types of behavioral urges.  [place] focused on discussing ways to cope with challenging emotions including anger as this emotions arising in regard to others in her life.  She and I discussed how early messaging regarding the appropriateness and acceptability of emotions may have influenced her ability to give permission to feelings and cope adequately with them, as she reports pushing down feelings as a primary habit.  Discussed ways in which emotions can play important roles and messages to attend to and ways to give permission, identify and cope with feelings of rejection and anger.  Discussed writing as a potential outlet for processing feelings, compassion towards her self, as well as acceptance of herself as is.         She has made significant changes in life such as putting her notice at work and ended a romantic relationship after recognizing me there is fully satisfying to her.  Provided encouragement for being attuned to her needs and creating an environment that supports them.  [place] today focused on skin picking behaviors, a habit that has been bothersome for several months but has been present on and off for several years.  Discussed history of this behavior, when it originated, and triggers for recent exacerbations.  She noticed feelings of stress and  "overwhelm triggering this behavior.  Discussed finding competing behaviors in an effort to think about habit reversal as well as long-term identifying what stressors contribute to feeling overwhelmed, thus precipitating the need for this.  Plan currently is to find several competing, alternative behaviors and practice using them in place of picking.      OBJECTIVE:  Appearance/Behavior/Orientation: Alert and oriented to person, place, time, and situation.    Cooperation/Reliability: Patient was open and cooperative throughout the session.    Abstract reasoning: Not assessed.   Judgment: Intact.    Mood/Affect: Mood  \"okay\"; affect stressed but hopeful  Insight/Motivation: Good, good     ASSESSMENT:   Appears to be deriving benefit from treatment.      DIAGNOSIS:  Major depressive disorder, recurrent, mild  Anxiety, unspecified     PLAN:  RTC for continued psychotherapy.     Time in: 10:07 AM  Time out: 11:10 AM  Extended session due to complexity of case and length of interval.     Key Cunha, PhD,   Clinical Health Psychologist     Tx plan completed:             4/16/19  Tx plan due:                        4/16/20    *no letter  "

## 2021-08-31 ENCOUNTER — VIRTUAL VISIT (OUTPATIENT)
Dept: GASTROENTEROLOGY | Facility: CLINIC | Age: 31
End: 2021-08-31
Payer: COMMERCIAL

## 2021-08-31 DIAGNOSIS — F41.9 ANXIETY: ICD-10-CM

## 2021-08-31 DIAGNOSIS — F33.0 MILD EPISODE OF RECURRENT MAJOR DEPRESSIVE DISORDER (H): Primary | ICD-10-CM

## 2021-08-31 PROCEDURE — 90837 PSYTX W PT 60 MINUTES: CPT | Mod: 95 | Performed by: PSYCHOLOGIST

## 2021-08-31 NOTE — PROGRESS NOTES
"The patient has been notified of the following:      \"We have found that certain health care needs can be provided without the need for a face to face visit.  This service lets us provide the care you need with a phone conversation.       I will have full access to your [place] medical record during this entire phone call.   I will be taking notes for your medical record.      Since this is like an office visit, we will bill your insurance company for this service.       There are potential benefits and risks of telephone visits (e.g. limits to patient confidentiality) that differ from in-person visits.?  Confidentiality still applies for telephone services, and nobody will record the visit.  It is important to be in a quiet, private space that is free of distractions (including cell phone or other devices) during the visit.??      If during the course of the call I believe a telephone visit is not appropriate, you will not be charged for this service\"     Consent has been obtained for this service by care team member: [facility]                      Department of Medicine                     [facility] Mail Code 446     [address] [place], [place] [address]              Jessica Ram, Ph.D., L.P [phone]  Shruthi Diaz, Ph.D.,  L.P. [phone]  Nati Pompa, Ph.D., L.P. [phone]  Yadira Gaming, Ph.D., L.P [phone]  Key Cunha, Ph.D., L.P. [phone]  Lee Reese, Ph.D., A.B.P.P., L.P. [phone]         Mayra Parker, Ph.D., L.P. [phone]     [facility] [place], 3rd Floor  [address] [place], [place] [id number]    Telemental health visit due to mitigation of COVID-19 pandemic, confirmed with patient.    Reason that telemedicine is appropriate: COVID-19 pandemic mitigation requiring social distancing.  Mode of transmission: Telephone  Location of originating and distant " "sites:    Originating site (patient location): patient's home; In event of emergency, patient verbally consented to having emergency response sent to their location    Distant site (provider site): home office of provider    Health Psychology Follow-Up Note     SUBJECTIVE:  Rachel Fried is a 30 year old female with PMH significant for rheumatoid arthritis, depression, and anxiety who was seen for individual psychotherapy.  Interim history included review of benefit of writing as an outlet for emotional processing.  She discussed significant benefit to doing so 1 night in which she was having difficulty sleeping, which is atypical for her.  She noticed identifying themes of feeling invalidated and dismissed as well as recognizing there were messages of conditional worth throughout her upbringing.  Discussed ways to recognize how these messages have impacted her internal worth with mindfulness based cognitive behavioral interventions to raise awareness to the ways that these experiences have impacted her.  Discussed ways to reframe habitual critical self statements and compassion to her sense of self well validating her individual interests and hobbies.  Discussed how creative writing through fiction can be an outlet for her.    OBJECTIVE:  Appearance/Behavior/Orientation: Alert and oriented to person, place, time, and situation.    Cooperation/Reliability: Patient was open and cooperative throughout the session.    Abstract reasoning: Not assessed.   Judgment: Intact.    Mood/Affect: Mood  \"okay\"; affect stressed but hopeful  Insight/Motivation: Good, good     ASSESSMENT:   Appears to be deriving benefit from treatment.      DIAGNOSIS:  Major depressive disorder, recurrent, mild  Anxiety, unspecified     PLAN:  RTC for continued psychotherapy.    Informed the patient about my plan for maternity leave which will likely start in early December 2021. Discussed options of how to approach psychological services during " this time, which includes waiting until I return from maternity leave to resume therapy, being given the name(s) of another psychologist to schedule with if needed while I am on leave, or to proactively schedule with another psychologist while I am out on maternity leave.  Agreed to discuss what the patient feels would be best closer to my leave dates. Anticipates wanting to meeting with a therapist every 4-6 weeks.        Time in: 1:04 PM  Time out: 2:00 PM  Extended session due to complexity of case and length of interval.     Key Cunha, PhD,   Clinical Health Psychologist     Tx plan completed:             4/16/19  Tx plan due:                        4/16/20    *no letter

## 2021-08-31 NOTE — LETTER
8/31/2021         RE: Rachel Fried  [address]  [facility] [address]        Dear Colleague,    Thank you for referring your patient, Rachel Fried, to the [facility] [place]. Please see a copy of my visit note below.    Health Psychology                      Department of Medicine                     [facility] Mail Code 6[address] [place] [address]              Jessica Ram, Ph.D., L.P [phone]  Shruthi Diaz, Ph.D.,  L.P. [phone]  Nati Pompa, Ph.D., L.P. [phone]  Yadira Gaming, Ph.D., L.P [phone]  Key Cunha, Ph.D., L.P. [phone]  Lee Reese, Ph.D., A.B.P.P., L.P. [phone]         Mayra Parker, Ph.D., L.P. [phone]     [facility] [place], 3rd Floor  [address] [place] [address]   [id number]    Telemental health visit due to mitigation of COVID-19 pandemic, confirmed with patient.    Reason that telemedicine is appropriate: COVID-19 pandemic mitigation requiring social distancing.  Mode of transmission: Telephone  Location of originating and distant sites:    Originating site (patient location): patient's home; In event of emergency, patient verbally consented to having emergency response sent to their location    Distant site (provider site): home office of provider    Health Psychology Follow-Up Note     SUBJECTIVE:  Rachel Fried is a 30 year old female with PMH significant for rheumatoid arthritis, depression, and anxiety who was seen for individual psychotherapy.  Interim history included review of benefit of writing as an outlet for emotional processing.  She discussed significant benefit to doing so 1 night in which she was having difficulty sleeping, which is atypical for her.  She noticed identifying themes of feeling invalidated and dismissed as well as recognizing there were messages of conditional worth  "throughout her upbringing.  Discussed ways to recognize how these messages have impacted her internal worth with mindfulness based cognitive behavioral interventions to raise awareness to the ways that these experiences have impacted her.  Discussed ways to reframe habitual critical self statements and compassion to her sense of self well validating her individual interests and hobbies.  Discussed how creative writing through fiction can be an outlet for her.    OBJECTIVE:  Appearance/Behavior/Orientation: Alert and oriented to person, place, time, and situation.    Cooperation/Reliability: Patient was open and cooperative throughout the session.    Abstract reasoning: Not assessed.   Judgment: Intact.    Mood/Affect: Mood  \"okay\"; affect stressed but hopeful  Insight/Motivation: Good, good     ASSESSMENT:   Appears to be deriving benefit from treatment.      DIAGNOSIS:  Major depressive disorder, recurrent, mild  Anxiety, unspecified     PLAN:  RTC for continued psychotherapy.    Informed the patient about my plan for maternity leave which will likely start in early December 2021. Discussed options of how to approach psychological services during this time, which includes waiting until I return from maternity leave to resume therapy, being given the name(s) of another psychologist to schedule with if needed while I am on leave, or to proactively schedule with another psychologist while I am out on maternity leave.  Agreed to discuss what the patient feels would be best closer to my leave dates. Anticipates wanting to meeting with a therapist every 4-6 weeks.        Time in: 1:04 PM  Time out: 2:00 PM  Extended session due to complexity of case and length of interval.     Key Cunha, PhD,   Clinical Health Psychologist     Tx plan completed:             4/16/19  Tx plan due:                        4/16/20    *no letter      Again, thank you for allowing me to participate in the care of your patient.  "     Sincerely,    Key Cunha, PhD

## 2021-09-14 ENCOUNTER — VIRTUAL VISIT (OUTPATIENT)
Dept: PSYCHOLOGY | Facility: CLINIC | Age: 31
End: 2021-09-14

## 2021-09-14 DIAGNOSIS — F33.0 MILD EPISODE OF RECURRENT MAJOR DEPRESSIVE DISORDER (H): Primary | ICD-10-CM

## 2021-09-14 DIAGNOSIS — F41.9 ANXIETY: ICD-10-CM

## 2021-09-14 PROCEDURE — 90837 PSYTX W PT 60 MINUTES: CPT | Mod: 95 | Performed by: PSYCHOLOGIST

## 2021-09-14 NOTE — PROGRESS NOTES
"The patient has been notified of the following:      \"We have found that certain health care needs can be provided without the need for a face to face visit.  This service lets us provide the care you need with a phone conversation.       I will have full access to your [facility] medical record during this entire phone call.   I will be taking notes for your medical record.      Since this is like an office visit, we will bill your insurance company for this service.       There are potential benefits and risks of telephone visits (e.g. limits to patient confidentiality) that differ from in-person visits.?  Confidentiality still applies for telephone services, and nobody will record the visit.  It is important to be in a quiet, private space that is free of distractions (including cell phone or other devices) during the visit.??      If during the course of the call I believe a telephone visit is not appropriate, you will not be charged for this service\"     Consent has been obtained for this service by care team member: [facility] Psychology                      Department of Medicine                     [facility] Mail Code 204     [address] [place], [place] [address]              Jessica Ram, Ph.D., L.P [phone]  Shruthi Diaz, Ph.D.,  L.P. [phone]  Nati Pompa, Ph.D., L.P. [phone]  Yadira Gaming, Ph.D., L.P [phone]  Key Cunha, Ph.D., L.P. [phone]  Lee Reese, Ph.D., A.B.P.P., L.P. [phone]         Mayra Parker, Ph.D., L.P. [phone]     [facility] and [facility] [place], 3rd Floor  [address] [place], [place] [id number]    Telemental health visit due to mitigation of COVID-19 pandemic, confirmed with patient.    Reason that telemedicine is appropriate: COVID-19 pandemic mitigation requiring social distancing.  Mode of transmission: Telephone  Location of originating and distant " "sites:    Originating site (patient location): patient's home; In event of emergency, patient verbally consented to having emergency response sent to their location    Distant site (provider site): home office of provider    Health Psychology Follow-Up Note     SUBJECTIVE:  Rachel Fried is a 30 year old female with PMH significant for rheumatoid arthritis, depression, and anxiety who was seen for individual psychotherapy.  Interim history included adopting a new dog and applying for several jobs. Is feeling optimistic in her current job prospects. [place] focused on discussing cognitive and emotion-focused interventions for managing symptoms of anxiety, particularly in moments of insecurity in relationships. Explored cognitive, emotional and behavioral components of anxiety - identified physiologic manifestations of feelings of anxiety. Dicussed ways to recognize needs for recognition of worth and value as well as application of cognitive interventions focused on addressing core beliefs related to worth.     OBJECTIVE:  Appearance/Behavior/Orientation: Alert and oriented to person, place, time, and situation.    Cooperation/Reliability: Patient was open and cooperative throughout the session.    Abstract reasoning: Not assessed.   Judgment: Intact.    Mood/Affect: Mood  \"good\"; affect stressed but hopeful  Insight/Motivation: Good, good     ASSESSMENT:   Appears to be deriving benefit from treatment.      DIAGNOSIS:  Major depressive disorder, recurrent, mild  Anxiety, unspecified     PLAN:  RTC for continued psychotherapy.    Informed the patient about my plan for maternity leave which will likely start in early December 2021. Discussed options of how to approach psychological services during this time, which includes waiting until I return from maternity leave to resume therapy, being given the name(s) of another psychologist to schedule with if needed while I am on leave, or to proactively schedule with another " psychologist while I am out on maternity leave.  Agreed to discuss what the patient feels would be best closer to my leave dates. Anticipates wanting to meeting with a therapist every 4-6 weeks.        Time in:11:05 AM  Time out: 12:00 PM  Extended session due to complexity of case and length of interval.     Key Cunha, PhD,   Clinical Health Psychologist     Tx plan completed:             4/16/19  Tx plan due:                        4/16/20    *no letter

## 2021-09-28 ENCOUNTER — VIRTUAL VISIT (OUTPATIENT)
Dept: PSYCHOLOGY | Facility: CLINIC | Age: 31
End: 2021-09-28

## 2021-09-28 DIAGNOSIS — F41.9 ANXIETY: ICD-10-CM

## 2021-09-28 DIAGNOSIS — F33.0 MILD EPISODE OF RECURRENT MAJOR DEPRESSIVE DISORDER (H): Primary | ICD-10-CM

## 2021-09-28 PROCEDURE — 90837 PSYTX W PT 60 MINUTES: CPT | Mod: 95 | Performed by: PSYCHOLOGIST

## 2021-09-28 NOTE — PROGRESS NOTES
"The patient has been notified of the following:      \"We have found that certain health care needs can be provided without the need for a face to face visit.  This service lets us provide the care you need with a phone conversation.       I will have full access to your [place] medical record during this entire phone call.   I will be taking notes for your medical record.      Since this is like an office visit, we will bill your insurance company for this service.       There are potential benefits and risks of telephone visits (e.g. limits to patient confidentiality) that differ from in-person visits.?  Confidentiality still applies for telephone services, and nobody will record the visit.  It is important to be in a quiet, private space that is free of distractions (including cell phone or other devices) during the visit.??      If during the course of the call I believe a telephone visit is not appropriate, you will not be charged for this service\"     Consent has been obtained for this service by care team member: [facility] Psychology                      Department of Medicine                     [facility] Mail Code 943     [address] [place] [address]              Jessica Ram, Ph.D., L.P [phone]  Nati Pompa, Ph.D., L.P. [phone]  Yadira Gaming, Ph.D., L.P [phone]  Key Cunha, Ph.D., L.P. [phone]  Lee Reese, Ph.D., A.B.P.P., L.P. [phone]         Mayra Parker, Ph.D., L.P. [phone]     [facility] [place], 3rd Floor  [address] [place]   [address]    Telemental health visit due to mitigation of COVID-19 pandemic, confirmed with patient.    Reason that telemedicine is appropriate: COVID-19 pandemic mitigation requiring social distancing.  Mode of transmission: Telephone  Location of originating and distant sites:    Originating site (patient location): " "patient's home; In event of emergency, patient verbally consented to having emergency response sent to their location    Distant site (provider site): home office of provider    Health Psychology Follow-Up Note     SUBJECTIVE:  Rachel Fried is a 30 year old female with PMH significant for rheumatoid arthritis, depression, and anxiety who was seen for individual psychotherapy.  Interim history included feeling optimistic about several job prospects, satisfied with several friendships progressing to include a sense of reciprocity, and anticipating upcoming visit from her parents.  [place] continue to focus on discussing themes of self worth in different domains of life including work and relationship with family.  Discussed how her intention for this job shift is to choose a new work dynamic that facilitates overall wellness and she would like to remind herself of this and tension as she is interviewing for jobs in the upcoming weeks.  Also discussed anticipatory anxiety about her parents having visit and factors that contribute to this.  Alcohol use in her family contributes to sense of low worth and ways in which she would like to proactively address this with her parents upcoming visit.  Discussed that the ability to recognize her needs in view them is valid as positive shifts and how she relates to others.    OBJECTIVE:  Appearance/Behavior/Orientation: Alert and oriented to person, place, time, and situation.    Cooperation/Reliability: Patient was open and cooperative throughout the session.    Abstract reasoning: Not assessed.   Judgment: Intact.    Mood/Affect: Mood  \"good\"; affect hopeful  Insight/Motivation: Good, good     ASSESSMENT:   Appears to be deriving benefit from treatment.      DIAGNOSIS:  Major depressive disorder, recurrent, mild  Anxiety, unspecified     PLAN:  RTC for continued psychotherapy.    Informed the patient about my plan for maternity leave which will likely start in early December " 2021. Discussed options of how to approach psychological services during this time, which includes waiting until I return from maternity leave to resume therapy, being given the name(s) of another psychologist to schedule with if needed while I am on leave, or to proactively schedule with another psychologist while I am out on maternity leave.  Agreed to discuss what the patient feels would be best closer to my leave dates. Anticipates wanting to meeting with a therapist every 4-6 weeks.        Time in:11:01 AM  Time out: 12:00 PM  Extended session due to complexity of case and length of interval.     Key Cunha, PhD,   Clinical Health Psychologist     Tx plan completed:             4/16/19  Tx plan due:                        4/16/20    *no letter

## 2021-10-10 ENCOUNTER — HEALTH MAINTENANCE LETTER (OUTPATIENT)
Age: 31
End: 2021-10-10

## 2021-10-12 ENCOUNTER — VIRTUAL VISIT (OUTPATIENT)
Dept: PSYCHOLOGY | Facility: CLINIC | Age: 31
End: 2021-10-12
Payer: COMMERCIAL

## 2021-10-12 DIAGNOSIS — F33.0 MILD EPISODE OF RECURRENT MAJOR DEPRESSIVE DISORDER (H): Primary | ICD-10-CM

## 2021-10-12 DIAGNOSIS — F41.9 ANXIETY: ICD-10-CM

## 2021-10-12 PROCEDURE — 90837 PSYTX W PT 60 MINUTES: CPT | Mod: 95 | Performed by: PSYCHOLOGIST

## 2021-10-12 NOTE — PROGRESS NOTES
"The patient has been notified of the following:      \"We have found that certain health care needs can be provided without the need for a face to face visit.  This service lets us provide the care you need with a phone conversation.       I will have full access to your [place] medical record during this entire phone call.   I will be taking notes for your medical record.      Since this is like an office visit, we will bill your insurance company for this service.       There are potential benefits and risks of telephone visits (e.g. limits to patient confidentiality) that differ from in-person visits.?  Confidentiality still applies for telephone services, and nobody will record the visit.  It is important to be in a quiet, private space that is free of distractions (including cell phone or other devices) during the visit.??      If during the course of the call I believe a telephone visit is not appropriate, you will not be charged for this service\"     Consent has been obtained for this service by care team member: [facility] Psychology                      Department of Medicine                     [facility] Mail Code 852     [address] [place] [address]              Jessica Ram, Ph.D., L.P [phone]  Nati Pompa, Ph.D., L.P. [phone]  Yadira Gaming, Ph.D., L.P [phone]  Key Cunha, Ph.D., L.P. [phone]  Lee Reese, Ph.D., A.B.P.P., L.P. [phone]         Mayra Parker, Ph.D., L.P. [phone]     [facility] [place], 3rd Floor  [address] [place]   [address]    Telemental health visit due to mitigation of COVID-19 pandemic, confirmed with patient.    Reason that telemedicine is appropriate: COVID-19 pandemic mitigation requiring social distancing.  Mode of transmission: Telephone  Location of originating and distant sites:    Originating site (patient location): " patient's home; In event of emergency, patient verbally consented to having emergency response sent to their location    Distant site (provider site): home office of provider    Health Psychology Follow-Up Note     SUBJECTIVE:  Rachel Fried is a 30 year old female with PMH significant for rheumatoid arthritis, depression, and anxiety who was seen for individual psychotherapy.  Interim history included ongoing job interviews and a visit from parents. Discussed anxiety in the context of family relationships and how she remained committed to these relationships and showing up authentically in them despite anxiety. Discussed ways in which she set boundaries with family and how these were received. Has an upcoming ADHD assessment at [facility] - [email or web address]     Reviewed treatment plan; progress towards previous goals includes establishing authentic friendships with others who accept her as is as well as shifting approach to anxiety in the sense that she feels increasingly comfortable with experiencing emotions and remaining committed to valued activities. Updated plan is included below.     Discussed behavioral interventions for increasing engagement in activities in presence of amotivation. Discussed behavioral strategies such as focusing on smaller segments of larger tasks and tolerating feelings of larger tasks remaining unfinished.     OBJECTIVE:  Appearance/Behavior/Orientation: Alert and oriented to person, place, time, and situation.    Cooperation/Reliability: Patient was open and cooperative throughout the session.    Abstract reasoning: Not assessed.   Judgment: Intact.    Mood/Affect: Mood euthymic; affect anxious  Insight/Motivation: Good, good     ASSESSMENT:   Appears to be deriving benefit from treatment.      DIAGNOSIS:  Major depressive disorder, recurrent, mild  Anxiety, unspecified     PLAN:  RTC for continued psychotherapy.    Informed the patient about  my plan for maternity leave which will likely start in early December 2021. Discussed options of how to approach psychological services during this time, which includes waiting until I return from maternity leave to resume therapy, being given the name(s) of another psychologist to schedule with if needed while I am on leave, or to proactively schedule with another psychologist while I am out on maternity leave.  Agreed to discuss what the patient feels would be best closer to my leave dates. Anticipates wanting to meeting with a therapist every 4-6 weeks.        Time in: 9:03 AM  Time out: 10:05 AM  Extended session due to complexity of case and length of interval.     Key Cunha, PhD,   Clinical Health Psychologist     Tx plan completed:             10/12/21  Tx plan due:                        10/12/22    *no letter    OUTPATIENT TREATMENT PLAN SUMMARY    Date of Treatment Plan:  10/12/21  90-Day Review Date:  NA  Date of Initial Service:  4/11/2019        1. DSM-V Diagnosis (include numeric code)  Major depressive disorder, recurrent, mild  Anxiety, unspecified    2. Current symptoms and circumstances that substantiate the diagnosis:  Anxiety    3. How symptoms and/or behaviors are affecting level of function:   Distress related to anxiety, depressive symptoms impairing social and work functioning     4. Risk Assessment:  Suicide:  Assessed Level of Immediate Risk: Low  Ideation: No  Plan:  No  Means: No  Intent: No    Homicide/Violence:  Assessed Level of Immediate Risk: Low  Ideation: No  Plan: No  Means: No  Intent: No    If on a medication, please include name and dosage: Cymbalta      Symptom/Problem Measurable Goals Interventions Gains Made   1.Amotivation 1. Learn strategies to appraoch tasks in realistic segments of work rather than all or nothing 1.CBT 1. TBD   2.Emotion regulation 2. Continue to learn and apply emotion acceptance and regulation strategies 2.CBT 2. TBD   3.Social connection 3.  Establish dating relationships 3.CBT 3. TBD       5. Frequency of Sessions: 2 x / Month    6. Discharge and Aftercare Goals: TBD    7. Expected duration of treatment:  TBD     8. Participants in therapy plan (family, friends, support network): Self      See scanned document for Acknowledgement of Current Treatment Plan - Patient signature not available due to telehealth format of session; verbal consent obtained.    Regulatory Guidelines for Updating Treatment Plan  [place] Medical Assistance: Reviewed & signed at least every 90 days  Medicare:  Update per policy

## 2021-10-26 ENCOUNTER — VIRTUAL VISIT (OUTPATIENT)
Dept: PSYCHOLOGY | Facility: CLINIC | Age: 31
End: 2021-10-26
Payer: COMMERCIAL

## 2021-10-26 DIAGNOSIS — F41.9 ANXIETY: ICD-10-CM

## 2021-10-26 DIAGNOSIS — F33.0 MILD EPISODE OF RECURRENT MAJOR DEPRESSIVE DISORDER (H): Primary | ICD-10-CM

## 2021-10-26 PROCEDURE — 90837 PSYTX W PT 60 MINUTES: CPT | Mod: 95 | Performed by: PSYCHOLOGIST

## 2021-10-26 NOTE — PROGRESS NOTES
"The patient has been notified of the following:      \"We have found that certain health care needs can be provided without the need for a face to face visit.  This service lets us provide the care you need with a phone conversation.       I will have full access to your [place] medical record during this entire phone call.   I will be taking notes for your medical record.      Since this is like an office visit, we will bill your insurance company for this service.       There are potential benefits and risks of telephone visits (e.g. limits to patient confidentiality) that differ from in-person visits.?  Confidentiality still applies for telephone services, and nobody will record the visit.  It is important to be in a quiet, private space that is free of distractions (including cell phone or other devices) during the visit.??      If during the course of the call I believe a telephone visit is not appropriate, you will not be charged for this service\"     Consent has been obtained for this service by care team member: [facility] Psychology                      Department of Medicine                     [facility] Mail Code 934     [address] [place] [address]              Jessica Ram, Ph.D., L.P [phone]  Nati Pompa, Ph.D., L.P. [phone]  Yadira Gaming, Ph.D., L.P [phone]  Key Cunha, Ph.D., L.P. [phone]  Lee Reese, Ph.D., A.B.P.P., L.P. [phone]         Mayra Parker, Ph.D., L.P. [phone]     [facility] [place], 3rd Floor  [address] [place]   [address]    Telemental health visit due to mitigation of COVID-19 pandemic, confirmed with patient.    Reason that telemedicine is appropriate: COVID-19 pandemic mitigation requiring social distancing.  Mode of transmission: Telephone  Location of originating and distant sites:    Originating site (patient location): " patient's home; In event of emergency, patient verbally consented to having emergency response sent to their location    Distant site (provider site): home office of provider    Health Psychology Follow-Up Note     SUBJECTIVE:  Rachel Fried is a 30 year old female with PMH significant for rheumatoid arthritis, depression, and anxiety who was seen for individual psychotherapy. Interim history included ongoing job search and beginning process for ADHD assessment at [facility] Found some success in approaching tasks in smaller segments, which was likely linked to increasing self accepting cognitions and allowing others to witness herself as is. [place] focused on coping with anxiety in midst of uncertainty related to job search. Interventions included flexibility shifting between problem solving and emotion focused coping, with emotion focused coping including mindful emotion awareness, cognitive restructuring to increase sense of capability and viewing discomfort as a sign of growth, and shift to engaging in values based behaviors vs avoidance behaviors.     OBJECTIVE:  Appearance/Behavior/Orientation: Alert and oriented to person, place, time, and situation.    Cooperation/Reliability: Patient was open and cooperative throughout the session.    Abstract reasoning: Not assessed.   Judgment: Intact.    Mood/Affect: Mood euthymic; affect mildly anxious  Insight/Motivation: Good, good     ASSESSMENT:   Appears to be deriving benefit from treatment.      DIAGNOSIS:  Major depressive disorder, recurrent, mild  Anxiety, unspecified     PLAN:  RTC for continued psychotherapy.    Plan re: maternity leave: agreed to discuss what the patient feels would be best closer to my leave dates. Anticipates wanting to meeting with a therapist every 4-6 weeks - will arrange referral as indicated.     Time in: 9:03 AM  Time out: 9:58 PM  Extended session due to complexity of case and length of  interval.     Key Cunha, PhD,   Clinical Health Psychologist     Tx plan completed:             10/12/21  Tx plan due:                        10/12/22    *no letter    OUTPATIENT TREATMENT PLAN SUMMARY    Date of Treatment Plan:  10/12/21  90-Day Review Date:  NA  Date of Initial Service:  4/11/2019        1. DSM-V Diagnosis (include numeric code)  Major depressive disorder, recurrent, mild  Anxiety, unspecified    2. Current symptoms and circumstances that substantiate the diagnosis:  Anxiety    3. How symptoms and/or behaviors are affecting level of function:   Distress related to anxiety, depressive symptoms impairing social and work functioning     4. Risk Assessment:  Suicide:  Assessed Level of Immediate Risk: Low  Ideation: No  Plan:  No  Means: No  Intent: No    Homicide/Violence:  Assessed Level of Immediate Risk: Low  Ideation: No  Plan: No  Means: No  Intent: No    If on a medication, please include name and dosage: Cymbalta      Symptom/Problem Measurable Goals Interventions Gains Made   1.Amotivation 1. Learn strategies to appraoch tasks in realistic segments of work rather than all or nothing 1.CBT 1. TBD   2.Emotion regulation 2. Continue to learn and apply emotion acceptance and regulation strategies 2.CBT 2. TBD   3.Social connection 3. Establish dating relationships 3.CBT 3. TBD       5. Frequency of Sessions: 2 x / Month    6. Discharge and Aftercare Goals: TBD    7. Expected duration of treatment:  TBD     8. Participants in therapy plan (family, friends, support network): Self      See scanned document for Acknowledgement of Current Treatment Plan - Patient signature not available due to telehealth format of session; verbal consent obtained.    Regulatory Guidelines for Updating Treatment Plan  [place] Medical Assistance: Reviewed & signed at least every 90 days  Medicare:  Update per policy

## 2021-10-28 ENCOUNTER — VIRTUAL VISIT (OUTPATIENT)
Dept: RHEUMATOLOGY | Facility: CLINIC | Age: 31
End: 2021-10-28
Attending: INTERNAL MEDICINE
Payer: COMMERCIAL

## 2021-10-28 DIAGNOSIS — Z51.81 ENCOUNTER FOR MEDICATION MONITORING: Primary | ICD-10-CM

## 2021-10-28 DIAGNOSIS — M06.09 RHEUMATOID ARTHRITIS OF MULTIPLE SITES WITH NEGATIVE RHEUMATOID FACTOR (H): ICD-10-CM

## 2021-10-28 PROCEDURE — 99214 OFFICE O/P EST MOD 30 MIN: CPT | Mod: 95 | Performed by: INTERNAL MEDICINE

## 2021-10-28 ASSESSMENT — PAIN SCALES - GENERAL: PAINLEVEL: NO PAIN (0)

## 2021-10-28 NOTE — PROGRESS NOTES
Left voicemail for patient to call back to set up telemedicine visit, will call again before appointment time.  Mary Lou Jefferson CMA on 10/28/2021 at 8:30 AM

## 2021-10-28 NOTE — PROGRESS NOTES
Rachel is a 30 year old who is being evaluated via a billable video visit.      How would you like to obtain your AVS? MyChart  If the video visit is dropped, the invitation should be resent by: Text to cell phone: [phone]  Will anyone else be joining your video visit? No      Video Start Time: 9 AM  Video-Visit Details    Type of service:  Video Visit    Video End Time: 9:32 AM    Originating Location (pt. Location): Home    Distant Location (provider location):  [facility] RHEUMATOLOGY CLINIC [place]     Platform used for Video Visit: Onel      Last seen: 4/23/2021    DOS: 10/28/2021    [facility] Rheumatology Clinic Visit    Rachel Fried  is a 30 year old here for follow-up  RHEUMATOID ARTHRITIS (RA) involving hands, wrists, ankles, shoulders feet [ -RF and low CCP 25, -factor V -KAVITHA by IFA and -NADEEN panel -HLA B27] and pityriasis lichenoides chronica (seen [facility]-established here). XR  no erosions hands. Moved from [place] to be with family.       Past- hydroxychloroquine (plaquenil) since 1-2016 (2 tablet a day caused diarrhea, ok on 1 tablet) and methotrexate 10 mg week  to 4-2018 (mild fatigue day of) then restarted 10- (higher folic acid improved side-effects), naproxsyn; allergy sulfa, prednisone, advil       Initial visit copy forward  10-: Hydroxychloroquine (plaquenil) BID mild diarrhea; ocular eye exam 3-2016 baseline reports recent eye exam   EKG was recently Feb 2018 normal. Stopped methotrexate  In April as wanted to take a drug holiday and was moving here, also wanted to drink some alcohol, and discuss options. No infections. Diffuse joint pains in hands, wrists, shoulders, ankles and feet and the sides of her hips. Taking advil 600mg AM and 400 mg PM, tolerating but taking since April. Pain severe 5/10 or more. Stiffness all day. Hands are sore. No loss of function or ROM. Very sore and stiff all over. Knees are sore and  hard to bend or squat. Mild diarrhea with hydroxychloroquine (plaquenil)     December 16, 2020  Denies any fever, chills, SOB, CANDELARIA, night sweats, or chest pain, high fever, cough, travel in last 14 days or exposure to covid-19 (coronavirus). Reports healthy. Follows CDC guidelines. Works from home.     Rheumatoid arthritis (RA) stiff with pain in in hands and wrists, stiff in mornings for few hours. Not able to do her massage or swim, does walk and not doing normal routine which is causing this as well. Sleep is poor now and hip pain. Upper neck discomfort from stress.     Continues on methotrexate  12.5 mg every 7 days THURS, folic acid 2 mg day. Tolerating. Hydroxychloroquine (plaquenil) 200 mg every day  misses most days so takes <3 times a week. No GI side-effects, no hairloss or oral sores from methotrexate moreso from hydroxychloroquine (plaquenil).  Off celexa -lost 12 lbs. 197 lb last visit but this not now due to Covid-19 (coronavirus).   No longer using NSAIDs. Taking vitamin D 2000 international unit(s) day.  No EAS. No vision issues. Is exercising and doing yogo now. Goes to [facility] eye did last 3-2019 but was due this past Spring. Willing to come here if can do hydroxychloroquine (plaquenil) toxicity with glasses and contacts in 1 visit. Otherwise, will continue with hers.     4/23/21:  The patient states that she is doing ok with her RA. She states that her joint symptoms have slightly worsened during pandemic because she is not going swimming, which is traditionally very helpful for her.   The patient just got vaccinated and plans to start going swimming in May, which she thinks will help her joints. She has no active swelling now.  She is tolerating methotrexate at 12.5 mg qWeek and also on  mg every day (can not tolerate higher dose sec GI SEs).   The patient is taking ibuprofen 2-4 tabs for the last couple of weeks.  No plans to get pregnant. Not on birth control at this time, but states  currently only dating women.  She otherwise feels well with no other concerns.    Today 10/28/2021:    Doing ok for most part with her RA. AM stiffness is 1.5 hr. Knees and ankles hurt and swell up a little bit and back hurts with standing for too long, but has hard time to say if her joints are swollen. On MTX 5 tabs a wk, last time increased her MTX, got brain fog (has it with MTX current dose but it got worse) and migraines and nausea. It ruined her Sturdays. She takes NSAIDs rarely like going to a concert. Used to be naproxen every day before RA Dx. Since stopping HCQ every other day is having more joint sx, but hard to tell, HCQ causes her diarrhea at higher dose, tried to get generic brand but was never able to get it from pharmacy.      Had covid booster vaccine, no SE.    PMH:  Injury-no  Medical-dermatitis, (bx 2-2016 GA vs LP vs cutaneous lupus--was not cutenaous lupus --focal interstitial lymphocytic inflammation, scattered lymphocytes), anxiety and depression, exercise induced asthma, scoliosis, pityriasis lichenoides chronica, chronic back pain, hypertension, seasonal allergies, acne, motion sickness, high cholesterol, chicken pox, headaches before    Surgical-None     FH:  No autoimmune disorders, psoriasis, UC, crohn's, RA, PsA, gout, autoimmune thyroid.  No MS, heart disease or cancer in family  Mother-factor V leiden carrier, rosea   Father-healthy  Siblings-younger bro healthy  Children-None   GF-lupus   Uncle cancer colon    SH:  Social Alcohol occasional week. Never Smoking. No IVDU. No Children. Right handed. Single--not sexually active, never.        PMSH personally reviewed and updated by me.    ROS:  A comprehensive ROS was done. Positives are per HPI.      Assessment via video:    Constitutional: WD-WN-WG, pleasant  Eyes: nl EOM, conjunctiva, sclera  RESP: No cough, no audible wheezing, able to talk in full sentences  Skin: no rash  Neuro: nl cranial nerves  Psych: nl affect  MSK: no  active synovitis in hands.         Labs/Imaging:  Normal G6PD  Neg MTB QG 2016 and hepatitis panel, negative HIV    XR hands and knees-negative 2016   Component      Latest Ref Rng & Units 11/17/2020   WBC      4.0 - 11.0 10e9/L 9.6   RBC Count      3.8 - 5.2 10e12/L 4.72   Hemoglobin      11.7 - 15.7 g/dL 13.8   Hematocrit      35.0 - 47.0 % 42.9   MCV      78 - 100 fl 91   MCH      26.5 - 33.0 pg 29.2   MCHC      31.5 - 36.5 g/dL 32.2   RDW      10.0 - 15.0 % 12.9   Platelet Count      150 - 450 10e9/L 389   Creatinine      0.52 - 1.04 mg/dL 0.66   GFR Estimate      >60 mL/min/1.73:m2 >90   GFR Estimate If Black      >60 mL/min/1.73:m2 >90   CRP Inflammation      0.0 - 8.0 mg/L 9.0 (H)   Albumin      3.4 - 5.0 g/dL 3.4   ALT      0 - 50 U/L 16   AST      0 - 45 U/L 10     Impression/Plan:  1. Seropositive (pos anti-CCP, neg RF )rheumatoid arthritis (RA) non-erosive. Active RA on methotrexate 12.5 mg weekly, worse after stopping HCQ due to diarrhea (and never was successful to get generic brand HCQ, brand is too expensive). Can't tolerate MTX at higher dose, above 5 tabs qwk due to nausea, headaches, brain fog and nausea. Recommend to add humira; risks were discussed.    Risks with methotrexate, infection risks, and pregnancy were discussed    2. High risk medications, labs CBC, liver, kidney normal. Labs every 12 week     3. Vitamin D deficeincy- take 2000 international unit(s) day.     4. Pityriasis lichenoids chronic-per derm     5. Chronic NSAID use. Will check labs.    Plan:    Add humira 40 mg q2wk    Labs    Return in 3-4 months (in person preferred)      Naresh Aldridge MD        Orders Placed This Encounter   Procedures     ALT     Albumin level     AST     Creatinine     Vitamin D Deficiency     CRP inflammation

## 2021-10-28 NOTE — LETTER
10/28/2021       RE: Rachel Fried  [address]  [place] [place] [address]     Dear Colleague,    Thank you for referring your patient, Rachel Fried, to the [facility] RHEUMATOLOGY CLINIC [place] [facility]. Please see a copy of my visit note below.    Left voicemail for patient to call back to set up telemedicine visit, will call again before appointment time.  Mary Lou Jefferson, CMA on 10/28/2021 at 8:30 AM      Rachel is a 30 year old who is being evaluated via a billable video visit.      How would you like to obtain your AVS? MyChart  If the video visit is dropped, the invitation should be resent by: Text to cell phone: [phone]  Will anyone else be joining your video visit? No      Video Start Time: 9 AM  Video-Visit Details    Type of service:  Video Visit    Video End Time: 9:32 AM    Originating Location (pt. Location): Home    Distant Location (provider location):  [facility] RHEUMATOLOGY [facility]     Platform used for Video Visit: Onel      Last seen: 4/23/2021    DOS: 10/28/2021    [facility] - Rheumatology Clinic Visit    Rachel Fried  is a 30 year old here for follow-up  RHEUMATOID ARTHRITIS (RA) involving hands, wrists, ankles, shoulders feet [ -RF and low CCP 25, -factor V -KAVITHA by IFA and -NADEEN panel -HLA B27] and pityriasis lichenoides chronica (seen [facility]-established here). XR  no erosions hands. Moved from [place] to be with family.       Past- hydroxychloroquine (plaquenil) since 1-2016 (2 tablet a day caused diarrhea, ok on 1 tablet) and methotrexate 10 mg week  to 4-2018 (mild fatigue day of) then restarted 10- (higher folic acid improved side-effects), naproxsyn; allergy sulfa, prednisone, advil       Initial visit copy forward  10-: Hydroxychloroquine (plaquenil) BID mild diarrhea; ocular eye exam 3-2016 baseline reports recent eye exam   EKG was recently Feb  2018 normal. Stopped methotrexate  In April as wanted to take a drug holiday and was moving here, also wanted to drink some alcohol, and discuss options. No infections. Diffuse joint pains in hands, wrists, shoulders, ankles and feet and the sides of her hips. Taking advil 600mg AM and 400 mg PM, tolerating but taking since April. Pain severe 5/10 or more. Stiffness all day. Hands are sore. No loss of function or ROM. Very sore and stiff all over. Knees are sore and hard to bend or squat. Mild diarrhea with hydroxychloroquine (plaquenil)     December 16, 2020  Denies any fever, chills, SOB, CANDELARIA, night sweats, or chest pain, high fever, cough, travel in last 14 days or exposure to covid-19 (coronavirus). Reports healthy. Follows CDC guidelines. Works from home.     Rheumatoid arthritis (RA) stiff with pain in in hands and wrists, stiff in mornings for few hours. Not able to do her massage or swim, does walk and not doing normal routine which is causing this as well. Sleep is poor now and hip pain. Upper neck discomfort from stress.     Continues on methotrexate  12.5 mg every 7 days THURS, folic acid 2 mg day. Tolerating. Hydroxychloroquine (plaquenil) 200 mg every day  misses most days so takes <3 times a week. No GI side-effects, no hairloss or oral sores from methotrexate moreso from hydroxychloroquine (plaquenil).  Off celexa -lost 12 lbs. 197 lb last visit but this not now due to Covid-19 (coronavirus).   No longer using NSAIDs. Taking vitamin D 2000 international unit(s) day.  No EAS. No vision issues. Is exercising and doing yogo now. Goes to [facility] eye did last 3-2019 but was due this past Spring. Willing to come here if can do hydroxychloroquine (plaquenil) toxicity with glasses and contacts in 1 visit. Otherwise, will continue with hers.     4/23/21:  The patient states that she is doing ok with her RA. She states that her joint symptoms have slightly worsened during pandemic because she is not going  swimming, which is traditionally very helpful for her.   The patient just got vaccinated and plans to start going swimming in May, which she thinks will help her joints. She has no active swelling now.  She is tolerating methotrexate at 12.5 mg qWeek and also on  mg every day (can not tolerate higher dose sec GI SEs).   The patient is taking ibuprofen 2-4 tabs for the last couple of weeks.  No plans to get pregnant. Not on birth control at this time, but states currently only dating women.  She otherwise feels well with no other concerns.    Today 10/28/2021:    Doing ok for most part with her RA. AM stiffness is 1.5 hr. Knees and ankles hurt and swell up a little bit and back hurts with standing for too long, but has hard time to say if her joints are swollen. On MTX 5 tabs a wk, last time increased her MTX, got brain fog (has it with MTX current dose but it got worse) and migraines and nausea. It ruined her Sturdays. She takes NSAIDs rarely like going to a concert. Used to be naproxen every day before RA Dx. Since stopping HCQ every other day is having more joint sx, but hard to tell, HCQ causes her diarrhea at higher dose, tried to get generic brand but was never able to get it from pharmacy.      Had covid booster vaccine, no SE.    PMH:  Injury-no  Medical-dermatitis, (bx 2-2016 GA vs LP vs cutaneous lupus--was not cutenaous lupus --focal interstitial lymphocytic inflammation, scattered lymphocytes), anxiety and depression, exercise induced asthma, scoliosis, pityriasis lichenoides chronica, chronic back pain, hypertension, seasonal allergies, acne, motion sickness, high cholesterol, chicken pox, headaches before    Surgical-None     FH:  No autoimmune disorders, psoriasis, UC, crohn's, RA, PsA, gout, autoimmune thyroid.  No MS, heart disease or cancer in family  Mother-factor V leiden carrier, rosea   Father-healthy  Siblings-younger bro healthy  Children-None   GF-lupus   Uncle cancer  colon    SH:  Social Alcohol occasional week. Never Smoking. No IVDU. No Children. Right handed. Single--not sexually active, never.        [facility] personally reviewed and updated by me.    ROS:  A comprehensive ROS was done. Positives are per HPI.      Assessment via video:    Constitutional: WD-WN-WG, pleasant  Eyes: nl EOM, conjunctiva, sclera  RESP: No cough, no audible wheezing, able to talk in full sentences  Skin: no rash  Neuro: nl cranial nerves  Psych: nl affect  MSK: no active synovitis in hands.         Labs/Imaging:  Normal G6PD  Neg MTB QG 2016 and hepatitis panel, negative HIV    XR hands and knees-negative 2016   Component      Latest Ref Rng & Units 11/17/2020   WBC      4.0 - 11.0 10e9/L 9.6   RBC Count      3.8 - 5.2 10e12/L 4.72   Hemoglobin      11.7 - 15.7 g/dL 13.8   Hematocrit      35.0 - 47.0 % 42.9   MCV      78 - 100 fl 91   MCH      26.5 - 33.0 pg 29.2   MCHC      31.5 - 36.5 g/dL 32.2   RDW      10.0 - 15.0 % 12.9   Platelet Count      150 - 450 10e9/L 389   Creatinine      0.52 - 1.04 mg/dL 0.66   GFR Estimate      >60 mL/min/1.73:m2 >90   GFR Estimate If Black      >60 mL/min/1.73:m2 >90   CRP Inflammation      0.0 - 8.0 mg/L 9.0 (H)   Albumin      3.4 - 5.0 g/dL 3.4   ALT      0 - 50 U/L 16   AST      0 - 45 U/L 10     Impression/Plan:  1. Seropositive (pos anti-CCP, neg RF )rheumatoid arthritis (RA) non-erosive. Active RA on methotrexate 12.5 mg weekly, worse after stopping HCQ due to diarrhea (and never was successful to get generic brand HCQ, brand is too expensive). Can't tolerate MTX at higher dose, above 5 tabs qwk due to nausea, headaches, brain fog and nausea. Recommend to add humira; risks were discussed.    Risks with methotrexate, infection risks, and pregnancy were discussed    2. High risk medications, labs CBC, liver, kidney normal. Labs every 12 week     3. Vitamin D deficeincy- take 2000 international unit(s) day.     4. Pityriasis lichenoids chronic-per derm     5.  Chronic NSAID use. Will check labs.    Plan:    Add humira 40 mg q2wk    Labs    Return in 3-4 months (in person preferred)      Naresh Aldridge MD        Orders Placed This Encounter   Procedures     ALT     Albumin level     AST     Creatinine     Vitamin D Deficiency     CRP inflammation

## 2021-10-29 ENCOUNTER — LAB (OUTPATIENT)
Dept: LAB | Facility: CLINIC | Age: 31
End: 2021-10-29
Payer: COMMERCIAL

## 2021-10-29 DIAGNOSIS — Z51.81 ENCOUNTER FOR MEDICATION MONITORING: ICD-10-CM

## 2021-10-29 DIAGNOSIS — M06.09 RHEUMATOID ARTHRITIS OF MULTIPLE SITES WITH NEGATIVE RHEUMATOID FACTOR (H): ICD-10-CM

## 2021-10-29 LAB
ALBUMIN SERPL-MCNC: 3.7 G/DL (ref 3.4–5)
ALT SERPL W P-5'-P-CCNC: 42 U/L (ref 0–50)
AST SERPL W P-5'-P-CCNC: 22 U/L (ref 0–45)
BASOPHILS # BLD AUTO: 0 10E3/UL (ref 0–0.2)
BASOPHILS NFR BLD AUTO: 0 %
CREAT SERPL-MCNC: 0.7 MG/DL (ref 0.52–1.04)
CRP SERPL-MCNC: 4 MG/L (ref 0–8)
EOSINOPHIL # BLD AUTO: 0.1 10E3/UL (ref 0–0.7)
EOSINOPHIL NFR BLD AUTO: 1 %
ERYTHROCYTE [DISTWIDTH] IN BLOOD BY AUTOMATED COUNT: 15.5 % (ref 10–15)
GFR SERPL CREATININE-BSD FRML MDRD: >90 ML/MIN/1.73M2
HCT VFR BLD AUTO: 42.4 % (ref 35–47)
HGB BLD-MCNC: 13.6 G/DL (ref 11.7–15.7)
LYMPHOCYTES # BLD AUTO: 2.5 10E3/UL (ref 0.8–5.3)
LYMPHOCYTES NFR BLD AUTO: 26 %
MCH RBC QN AUTO: 28 PG (ref 26.5–33)
MCHC RBC AUTO-ENTMCNC: 32.1 G/DL (ref 31.5–36.5)
MCV RBC AUTO: 87 FL (ref 78–100)
MONOCYTES # BLD AUTO: 0.9 10E3/UL (ref 0–1.3)
MONOCYTES NFR BLD AUTO: 10 %
NEUTROPHILS # BLD AUTO: 6.2 10E3/UL (ref 1.6–8.3)
NEUTROPHILS NFR BLD AUTO: 64 %
PLATELET # BLD AUTO: 395 10E3/UL (ref 150–450)
RBC # BLD AUTO: 4.86 10E6/UL (ref 3.8–5.2)
WBC # BLD AUTO: 9.7 10E3/UL (ref 4–11)

## 2021-10-29 PROCEDURE — 85025 COMPLETE CBC W/AUTO DIFF WBC: CPT

## 2021-10-29 PROCEDURE — 86481 TB AG RESPONSE T-CELL SUSP: CPT

## 2021-10-29 PROCEDURE — 36415 COLL VENOUS BLD VENIPUNCTURE: CPT

## 2021-10-29 PROCEDURE — 84460 ALANINE AMINO (ALT) (SGPT): CPT

## 2021-10-29 PROCEDURE — 86140 C-REACTIVE PROTEIN: CPT

## 2021-10-29 PROCEDURE — 84450 TRANSFERASE (AST) (SGOT): CPT

## 2021-10-29 PROCEDURE — 82306 VITAMIN D 25 HYDROXY: CPT

## 2021-10-29 PROCEDURE — 82565 ASSAY OF CREATININE: CPT

## 2021-10-29 PROCEDURE — 82040 ASSAY OF SERUM ALBUMIN: CPT

## 2021-10-31 LAB
GAMMA INTERFERON BACKGROUND BLD IA-ACNC: 0.06 IU/ML
M TB IFN-G BLD-IMP: NEGATIVE
M TB IFN-G CD4+ BCKGRND COR BLD-ACNC: 9.94 IU/ML
MITOGEN IGNF BCKGRD COR BLD-ACNC: 0.03 IU/ML
MITOGEN IGNF BCKGRD COR BLD-ACNC: 0.05 IU/ML
QUANTIFERON MITOGEN: 10 IU/ML
QUANTIFERON NIL TUBE: 0.06 IU/ML
QUANTIFERON TB1 TUBE: 0.11 IU/ML
QUANTIFERON TB2 TUBE: 0.09

## 2021-11-01 LAB — DEPRECATED CALCIDIOL+CALCIFEROL SERPL-MC: 25 UG/L (ref 20–75)

## 2021-11-02 ENCOUNTER — OFFICE VISIT (OUTPATIENT)
Dept: INTERNAL MEDICINE | Facility: CLINIC | Age: 31
End: 2021-11-02
Payer: COMMERCIAL

## 2021-11-02 VITALS
OXYGEN SATURATION: 99 % | HEART RATE: 99 BPM | BODY MASS INDEX: 37.33 KG/M2 | WEIGHT: 252 LBS | SYSTOLIC BLOOD PRESSURE: 122 MMHG | HEIGHT: 69 IN | RESPIRATION RATE: 16 BRPM | DIASTOLIC BLOOD PRESSURE: 87 MMHG

## 2021-11-02 DIAGNOSIS — E66.812 CLASS 2 OBESITY WITHOUT SERIOUS COMORBIDITY WITH BODY MASS INDEX (BMI) OF 37.0 TO 37.9 IN ADULT, UNSPECIFIED OBESITY TYPE: ICD-10-CM

## 2021-11-02 DIAGNOSIS — E55.9 VITAMIN D DEFICIENCY: Primary | ICD-10-CM

## 2021-11-02 DIAGNOSIS — F41.9 ANXIETY: ICD-10-CM

## 2021-11-02 DIAGNOSIS — L70.0 ACNE VULGARIS: Primary | ICD-10-CM

## 2021-11-02 DIAGNOSIS — M06.09 RHEUMATOID ARTHRITIS OF MULTIPLE SITES WITH NEGATIVE RHEUMATOID FACTOR (H): ICD-10-CM

## 2021-11-02 PROCEDURE — 90471 IMMUNIZATION ADMIN: CPT

## 2021-11-02 PROCEDURE — 99214 OFFICE O/P EST MOD 30 MIN: CPT | Mod: 25

## 2021-11-02 PROCEDURE — 90686 IIV4 VACC NO PRSV 0.5 ML IM: CPT

## 2021-11-02 RX ORDER — ERGOCALCIFEROL 1.25 MG/1
50000 CAPSULE, LIQUID FILLED ORAL
Qty: 12 CAPSULE | Refills: 0 | Status: SHIPPED | OUTPATIENT
Start: 2021-11-02 | End: 2021-12-06

## 2021-11-02 RX ORDER — DROSPIRENONE AND ETHINYL ESTRADIOL 0.02-3(28)
1 KIT ORAL DAILY
Qty: 90 TABLET | Refills: 3 | Status: SHIPPED | OUTPATIENT
Start: 2021-11-02 | End: 2023-04-18

## 2021-11-02 RX ORDER — TRETINOIN 0.25 MG/G
GEL TOPICAL AT BEDTIME
Qty: 15 G | Refills: 2 | Status: SHIPPED | OUTPATIENT
Start: 2021-11-02 | End: 2023-05-16

## 2021-11-02 ASSESSMENT — ANXIETY QUESTIONNAIRES
1. FEELING NERVOUS, ANXIOUS, OR ON EDGE: SEVERAL DAYS
6. BECOMING EASILY ANNOYED OR IRRITABLE: SEVERAL DAYS
GAD7 TOTAL SCORE: 5
IF YOU CHECKED OFF ANY PROBLEMS ON THIS QUESTIONNAIRE, HOW DIFFICULT HAVE THESE PROBLEMS MADE IT FOR YOU TO DO YOUR WORK, TAKE CARE OF THINGS AT HOME, OR GET ALONG WITH OTHER PEOPLE: SOMEWHAT DIFFICULT
5. BEING SO RESTLESS THAT IT IS HARD TO SIT STILL: NOT AT ALL
3. WORRYING TOO MUCH ABOUT DIFFERENT THINGS: SEVERAL DAYS
2. NOT BEING ABLE TO STOP OR CONTROL WORRYING: SEVERAL DAYS
7. FEELING AFRAID AS IF SOMETHING AWFUL MIGHT HAPPEN: NOT AT ALL

## 2021-11-02 ASSESSMENT — PAIN SCALES - GENERAL: PAINLEVEL: NO PAIN (0)

## 2021-11-02 ASSESSMENT — PATIENT HEALTH QUESTIONNAIRE - PHQ9
5. POOR APPETITE OR OVEREATING: SEVERAL DAYS
SUM OF ALL RESPONSES TO PHQ QUESTIONS 1-9: 4

## 2021-11-02 ASSESSMENT — MIFFLIN-ST. JEOR: SCORE: 1922.44

## 2021-11-02 NOTE — PATIENT INSTRUCTIONS
Park Ramos,    It was so nice seeing you in clinic today!  We discussed your acne and a few other topics.    I have sent tretinoin and Aaliyah birth control to your pharmacy.  Please remember to use sunscreen daily.  SPF 30 and above.  I will see in 6 months.    Please do not hesitate to reach out to me or schedule an appointment if you have any questions or concerns.     All the best,    Dr. Boo

## 2021-11-02 NOTE — NURSING NOTE
Rachel Fried is a 30 year old female patient that presents today in clinic for the following:    Chief Complaint   Patient presents with     RECHECK     Patient comes for a three-month follow up from last visit.      The patient's allergies and medications were reviewed as noted. A set of vitals were recorded as noted without incident. The patient does not have any other questions for the provider.    Juan Antonio Mukherjee, EMT at 12:54 PM on 11/2/2021

## 2021-11-02 NOTE — PROGRESS NOTES
"  PRIMARY CARE CENTER         HPI:      HPI:  Rachel Fried is a 30 year old female who presents for the following  Patient presents with: RECHECK (Patient comes for a three-month follow up from last visit. )    Patient is here to follow up on action items from last visit.     Pt states she accepted a new job and is starting in 2 weeks. She is a  and works in Banking and financial services.  She will not be working 40 hours/week, which is improved from sometimes 60 hour work weeks with her last job.     She is still working on lifestyle modification.  She has not been swimming since getting tattoo. Now can start since tattoo is healed. Will start swimming tomorrow. She did get a fridge. Eats bagged salads and less takeout.     She spoke to Rheum and will be stopping plaquenil. She will be continuing methotrexate and starting Humira. Also increased vitamin d dose.     Went a couple of weeks without anxiety meds, but now has them again. Feels good and mood is good. Also working with [facility] for more mental health services. Concerned that she has ADHD. Has not yet set up appt with [facility] because she just got insurance with her new job.     Thinks she should go back on Aaliyah birth control. Stopped in March because she ran out. MD said they wouldn't refill unless she comes back in. Her acne on her  face flares more around her period. Back acne is \"really back.\"  No history of DVT.  This sometimes gets headaches.  Has previously tolerated Aaliyah well in the past.     She has received her COVID booster shot.  Has not yet had her flu shot. Would like to get it today.     Denies fevers, chills, chest pain, shortness of breath, cough, wheeze, abdominal pain, nausea, vomiting, diarrhea, changes in urination or bowel movements, numbness, weakness, tingling.  Medications:  Current Outpatient Medications   Medication     adalimumab (HUMIRA *CF*) 40 MG/0.4ML pen kit     albuterol (PROAIR HFA/PROVENTIL HFA/VENTOLIN HFA) 108 (90 " "Base) MCG/ACT inhaler     cetirizine HCl (ZYRTEC ALLERGY) 10 MG CAPS     Cholecalciferol (VITAMIN D) 2000 units tablet     drospirenone-ethinyl estradiol (JOSE) 3-0.02 MG tablet     DULoxetine (CYMBALTA) 60 MG capsule     folic acid (FOLVITE) 1 MG tablet     methotrexate 2.5 MG tablet     tretinoin (RETIN-A) 0.025 % external gel     vitamin D2 (ERGOCALCIFEROL) 36252 units (1250 mcg) capsule     Albuterol Sulfate (PROVENTIL HFA IN)     gabapentin (NEURONTIN) 100 MG capsule     hydroquinone 4 % CREA     LORYNA 3-0.02 MG tablet     tretinoin (RETIN-A) 0.05 % cream     triamcinolone (KENALOG) 0.1 % ointment     No current facility-administered medications for this visit.        Allergies:     Allergies   Allergen Reactions     Peanut-Derived Hives     Sulfa Drugs Hives and Itching       Medical History:  Past Medical History:   Diagnosis Date     Allergic dermatitis      Anxiety      Depression      Pityriasis lichenoides      Rheumatoid arthritis (H)        Problem, Medication and Allergy Lists were reviewed and are current.  Patient is an established patient of this clinic.         Review of Systems:     5 point ROS negative unless otherwise specified in HPI  ROS  I have personally reviewed and updated the complete ROS on the day of the visit.           Physical Exam:   /87 (BP Location: Right arm, Patient Position: Sitting, Cuff Size: Adult Large)   Pulse 99   Resp 16   Ht 1.753 m (5' 9\")   Wt 114.3 kg (252 lb)   SpO2 99%   BMI 37.21 kg/m    Body mass index is 37.21 kg/m .  Vitals were reviewed    Physical Exam:   General: Sitting upright, talking in complete sentences, non-distressed  HEENT: Normocephalic, atraumatic, PERRL, EOMI, no conjunctival pallor, anicteric, nares patent, oropharynx clear and moist  CVS: S1/S2 WNL, RRR, no murmur, no LE edema, cap refill <2 seconds  Pulm: Non-labored breathing, vesicular breath sounds, no crackles or wheeze  Abdo: Non-distended, non-tender to palpation, no rebound " or guarding, BS present   MSK: No obvious bony deformities, no clubbing  Skin: Warm and dry, no obvious rashes, acne in periorbital distribution and along jawline. Hyperpigmented lesions on face, back and shoulders.  Open excoriations on back and shoulders.   Neuro: Alert and oriented x3, non-focal   Psych: Normal mood and affect       Results:      Results from the last 24 hoursNo results found for this or any previous visit (from the past 24 hour(s)).  Assessment and Plan     Rachel was seen today for 3 month follow up on her action items (rheum f/u, lifestyle modification, skin concerns, anxiety.    Diagnoses and all orders for this visit:    Acne vulgaris  States acne has been flaring since stopping Jose 3 months ago after she ran out.  Was unable to see dermatology before due to scheduling conflict.  She is still picking at her acne lesions when she feels anxious.   -     Restarted drospirenone-ethinyl estradiol (JOSE) 3-0.02 MG tablet; Take 1 tablet by mouth daily  -     tretinoin (RETIN-A) 0.025 % external gel; Apply topically At Bedtime  -     Adult Dermatology Referral  -Recommended patient try to avoid picking at the lesions and also recommended SPF 30+ sunscreen due to possible sun sensitivity from tretinoin    Rheumatoid arthritis of multiple sites with negative rheumatoid factor (H)  Following with Dr. Aldridge (rheumatologist).  Continue methotrexate, Humira and vitamin D. F/u with Dr. Aldridge as previously scheduled    Class 2 obesity without serious comorbidity with body mass index (BMI) of 37.0 to 37.9 in adult, unspecified obesity type  -Continue lifestyle modification.  Patient will try to start swimming this week    Anxiety  Patient states mood has been good lately.  Feeling better since she got a new job which will have more reasonable hours.     -Continue following with therapist and schedule appointment with [facility].   -Continue duloxetine 60 mg daily as this is working for her    Other  orders  -     FLU VAC PRESRV FREE QUAD SPLIT VIR 3+YRS IM    F/u in July 2022 for health maintenance examination      Options for treatment and follow-up care were reviewed with the patient. Rachel Fried engaged in the decision making process and verbalized understanding of the options discussed and agreed with the final plan.      Pt was seen and plan of care discussed with Dr. Carrasco.    Susana Boo MD  Internal Medicine-PGY1  [facility]  Pager: [phone]  Nov 2, 2021

## 2021-11-03 ASSESSMENT — ANXIETY QUESTIONNAIRES: GAD7 TOTAL SCORE: 5

## 2021-11-05 ENCOUNTER — TELEPHONE (OUTPATIENT)
Dept: RHEUMATOLOGY | Facility: CLINIC | Age: 31
End: 2021-11-05
Payer: COMMERCIAL

## 2021-11-05 NOTE — TELEPHONE ENCOUNTER
PA Initiation    Medication: HUMIRA   Insurance Company: Other (see comments)Comment:  [employer] [place]  Pharmacy Filling the Rx: [facility] - [place], [place] - [facility] [address]  Filling Pharmacy Phone:    Filling Pharmacy Fax:    Start Date: 11/5/2021

## 2021-11-08 NOTE — TELEPHONE ENCOUNTER
Prior Authorization Approval    Authorization Effective Date: 11/5/2021  Authorization Expiration Date: 11/5/2022  Medication: HUMIRA PA Approved  Approved Dose/Quantity: 2/28ds  Reference #:   [id number]  Insurance Company: Other (see comments)Comment:  [facility]  Expected CoPay: $5 with copay card     CoPay Card Available: Yes    Foundation Assistance Needed:    Which Pharmacy is filling the prescription (Not needed for infusion/clinic administered): [facility] [address] [address]   Pharmacy Notified: Yes  Patient Notified: Yes

## 2021-11-09 ENCOUNTER — VIRTUAL VISIT (OUTPATIENT)
Dept: PSYCHOLOGY | Facility: CLINIC | Age: 31
End: 2021-11-09
Payer: COMMERCIAL

## 2021-11-09 DIAGNOSIS — F33.0 MILD EPISODE OF RECURRENT MAJOR DEPRESSIVE DISORDER (H): Primary | ICD-10-CM

## 2021-11-09 DIAGNOSIS — F41.9 ANXIETY: ICD-10-CM

## 2021-11-09 PROCEDURE — 90837 PSYTX W PT 60 MINUTES: CPT | Mod: 95 | Performed by: PSYCHOLOGIST

## 2021-11-09 NOTE — PROGRESS NOTES
"The patient has been notified of the following:      \"We have found that certain health care needs can be provided without the need for a face to face visit.  This service lets us provide the care you need with a phone conversation.       I will have full access to your [place] medical record during this entire phone call.   I will be taking notes for your medical record.      Since this is like an office visit, we will bill your insurance company for this service.       There are potential benefits and risks of telephone visits (e.g. limits to patient confidentiality) that differ from in-person visits.?  Confidentiality still applies for telephone services, and nobody will record the visit.  It is important to be in a quiet, private space that is free of distractions (including cell phone or other devices) during the visit.??      If during the course of the call I believe a telephone visit is not appropriate, you will not be charged for this service\"     Consent has been obtained for this service by care team member: [facility]                      Department of Medicine                     [facility] Mail Code 848     [address] [place] [address]              Jessica Ram, Ph.D., L.P [phone]  Nati Pompa, Ph.D., L.P. [phone]  Yadira Gaming, Ph.D., L.P [phone]  Key Cunha, Ph.D., L.P. [phone]  Lee Reese, Ph.D., A.B.P.P., L.P. [phone]         Mayra Parker, Ph.D., L.P. [phone]     [facility] [place], 3rd Floor  [address] [place]   [address]    Telemental health visit due to mitigation of COVID-19 pandemic, confirmed with patient.    Reason that telemedicine is appropriate: COVID-19 pandemic mitigation requiring social distancing.  Mode of transmission: Telephone  Location of originating and distant sites:    Originating site (patient location): " patient's home; In event of emergency, patient verbally consented to having emergency response sent to their location    Distant site (provider site): home office of provider    Health Psychology Follow-Up Note     SUBJECTIVE:  Rachel Fried is a 30 year old female with PMH significant for rheumatoid arthritis, depression, and anxiety who was seen for individual psychotherapy. Interim history included accepting a new job which meets personal and professional needs. Reviewed how taking a risk resulted in stress/anxiety but significant benefits. Has not been able to schedule a full ADHD eval with the [facility] as they have not responded to her scheduling requests, so I recommended the [facility] for another eval option. Reviewed her ability to reframe risk as growth, which she stated was a helpful as she was able to see the purpose in the discomfort.  [place] focused on continued boundary setting in the context of family relationships.  Discussed concerns related to boundaries, ways to communicate these effectively, and maintain boundaries when push back happens.  Encouraged recognition of on her own needs versus sense of obligation.  Discussed ways to shift expectations in the role she plays in her family dynamics, with a focus on reducing responsibility to take care of others and building authentic connections with self and others.    OBJECTIVE:  Appearance/Behavior/Orientation: Alert and oriented to person, place, time, and situation.    Cooperation/Reliability: Patient was open and cooperative throughout the session.    Abstract reasoning: Not assessed.   Judgment: Intact.    Mood/Affect: Mood euthymic; affect mood congruent  Insight/Motivation: Good, good     ASSESSMENT:   Appears to be deriving benefit from treatment.      DIAGNOSIS:  Major depressive disorder, recurrent, mild  Anxiety, unspecified     PLAN:  RTC for continued psychotherapy.    Plan re: maternity leave: Ms. Fried would  like to meet with another psychologist every 4 weeks - will arrange referral to Dr Gaming.     Time in: 9:06 AM  Time out: 10:04 AM  Extended session due to complexity of case and length of interval.     Key Cunha, PhD,   Clinical Health Psychologist     Tx plan completed:             10/12/21  Tx plan due:                        10/12/22    *no letter    OUTPATIENT TREATMENT PLAN SUMMARY    Date of Treatment Plan:  10/12/21  90-Day Review Date:  NA  Date of Initial Service:  4/11/2019        1. DSM-V Diagnosis (include numeric code)  Major depressive disorder, recurrent, mild  Anxiety, unspecified    2. Current symptoms and circumstances that substantiate the diagnosis:  Anxiety    3. How symptoms and/or behaviors are affecting level of function:   Distress related to anxiety, depressive symptoms impairing social and work functioning     4. Risk Assessment:  Suicide:  Assessed Level of Immediate Risk: Low  Ideation: No  Plan:  No  Means: No  Intent: No    Homicide/Violence:  Assessed Level of Immediate Risk: Low  Ideation: No  Plan: No  Means: No  Intent: No    If on a medication, please include name and dosage: Cymbalta      Symptom/Problem Measurable Goals Interventions Gains Made   1.Amotivation 1. Learn strategies to appraoch tasks in realistic segments of work rather than all or nothing 1.CBT 1. TBD   2.Emotion regulation 2. Continue to learn and apply emotion acceptance and regulation strategies 2.CBT 2. TBD   3.Social connection 3. Establish dating relationships 3.CBT 3. TBD       5. Frequency of Sessions: 2 x / Month    6. Discharge and Aftercare Goals: TBD    7. Expected duration of treatment:  TBD     8. Participants in therapy plan (family, friends, support network): Self      See scanned document for Acknowledgement of Current Treatment Plan - Patient signature not available due to telehealth format of session; verbal consent obtained.    Regulatory Guidelines for Updating Treatment Plan  [place]  Medical Assistance: Reviewed & signed at least every 90 days  Medicare:  Update per policy

## 2021-11-24 ENCOUNTER — VIRTUAL VISIT (OUTPATIENT)
Dept: PSYCHOLOGY | Facility: CLINIC | Age: 31
End: 2021-11-24
Payer: COMMERCIAL

## 2021-11-24 DIAGNOSIS — F33.0 MILD EPISODE OF RECURRENT MAJOR DEPRESSIVE DISORDER (H): Primary | ICD-10-CM

## 2021-11-24 DIAGNOSIS — F41.9 ANXIETY: ICD-10-CM

## 2021-11-24 PROCEDURE — 90832 PSYTX W PT 30 MINUTES: CPT | Mod: 95 | Performed by: PSYCHOLOGIST

## 2021-11-24 NOTE — PROGRESS NOTES
"The patient has been notified of the following:      \"We have found that certain health care needs can be provided without the need for a face to face visit.  This service lets us provide the care you need with a phone conversation.       I will have full access to your [place] medical record during this entire phone call.   I will be taking notes for your medical record.      Since this is like an office visit, we will bill your insurance company for this service.       There are potential benefits and risks of telephone visits (e.g. limits to patient confidentiality) that differ from in-person visits.?  Confidentiality still applies for telephone services, and nobody will record the visit.  It is important to be in a quiet, private space that is free of distractions (including cell phone or other devices) during the visit.??      If during the course of the call I believe a telephone visit is not appropriate, you will not be charged for this service\"     Consent has been obtained for this service by care team member: [facility] Psychology                      Department of Medicine                     [facility] Mail Code 556     [address] [place] [address], [place] [address]              Jessica Ram, Ph.D., L.P [phone]  Nati Pompa, Ph.D., L.P. [phone]  Yadira Gaming, Ph.D., L.P [phone]  Key Cunha, Ph.D., L.P. [phone]  Lee Reese, Ph.D., A.B.P.P., L.P. [phone]         Mayra Parker, Ph.D., L.P. [phone]     [facility] [place], 3rd Floor  [address] [place] [address]   [address]    Telemental health visit due to mitigation of COVID-19 pandemic, confirmed with patient.    Reason that telemedicine is appropriate: COVID-19 pandemic mitigation requiring social distancing.  Mode of transmission: Telephone  Location of originating and distant sites:    Originating site (patient location): " patient's worksite; In event of emergency, patient verbally consented to having emergency response sent to their location    Distant site (provider site): home office of provider    Health Psychology Follow-Up Note     SUBJECTIVE:  Rachel Fried is a 30 year old female with PMH significant for rheumatoid arthritis, depression, and anxiety who was seen for individual psychotherapy. Interim history included starting a new job which started off on a very positive note. [place] focused on feelings of anxiety and insecurity in relationships with family, friends in romantic relationship. Interventions focused on mindful emotion awareness to allow and accept emotions as well as grounding exercises. Endorses strong anticipatory anxiety and desire to use control based coping strategies. Discussed costs to control based coping, which include exhaustion and lack of enjoyment. Discussed how to recognize when worry has limited returns and how to use cognitive behavioral strategies to cope adaptively.     OBJECTIVE:  Appearance/Behavior/Orientation: Alert and oriented to person, place, time, and situation.    Cooperation/Reliability: Patient was open and cooperative throughout the session.    Abstract reasoning: Not assessed.   Judgment: Intact.    Mood/Affect: Mood euthymic; affect mood congruent  Insight/Motivation: Good, good     ASSESSMENT:   Appears to be deriving benefit from treatment.      DIAGNOSIS:  Major depressive disorder, recurrent, mild  Anxiety, unspecified     PLAN:  RTC for continued psychotherapy.    Plan re: maternity leave: Ms. Fried would like to meet with another psychologist every 4 weeks - will arrange referral to Dr Gaming.     Time in: 12:16 PM  Time out: 12:48 PM   Extended session due to complexity of case and length of interval.     Key Cunha, PhD, LP  Clinical Health Psychologist     Tx plan completed:             10/12/21  Tx plan due:                        10/12/22    *no letter    OUTPATIENT  TREATMENT PLAN SUMMARY    Date of Treatment Plan:  10/12/21  90-Day Review Date:  NA  Date of Initial Service:  4/11/2019        1. DSM-V Diagnosis (include numeric code)  Major depressive disorder, recurrent, mild  Anxiety, unspecified    2. Current symptoms and circumstances that substantiate the diagnosis:  Anxiety    3. How symptoms and/or behaviors are affecting level of function:   Distress related to anxiety, depressive symptoms impairing social and work functioning     4. Risk Assessment:  Suicide:  Assessed Level of Immediate Risk: Low  Ideation: No  Plan:  No  Means: No  Intent: No    Homicide/Violence:  Assessed Level of Immediate Risk: Low  Ideation: No  Plan: No  Means: No  Intent: No    If on a medication, please include name and dosage: Cymbalta      Symptom/Problem Measurable Goals Interventions Gains Made   1.Amotivation 1. Learn strategies to appraoch tasks in realistic segments of work rather than all or nothing 1.CBT 1. TBD   2.Emotion regulation 2. Continue to learn and apply emotion acceptance and regulation strategies 2.CBT 2. TBD   3.Social connection 3. Establish dating relationships 3.CBT 3. TBD       5. Frequency of Sessions: 2 x / Month    6. Discharge and Aftercare Goals: TBD    7. Expected duration of treatment:  TBD     8. Participants in therapy plan (family, friends, support network): Self      See scanned document for Acknowledgement of Current Treatment Plan - Patient signature not available due to telehealth format of session; verbal consent obtained.    Regulatory Guidelines for Updating Treatment Plan  [place] Medical Assistance: Reviewed & signed at least every 90 days  Medicare:  Update per policy

## 2022-01-13 DIAGNOSIS — M06.09 RHEUMATOID ARTHRITIS OF MULTIPLE SITES WITH NEGATIVE RHEUMATOID FACTOR (H): ICD-10-CM

## 2022-01-13 NOTE — TELEPHONE ENCOUNTER
Please re-fax Humira prescription. Fax sent on 01/10 only contained insurance information, but no prescription.    Fax: [phone]

## 2022-01-17 NOTE — TELEPHONE ENCOUNTER
Prescription needs to go to [facility] per PA.  Sent to them.    Camille Walsh RN  Rheumatology Clinic

## 2022-01-19 ENCOUNTER — TELEPHONE (OUTPATIENT)
Dept: RHEUMATOLOGY | Facility: CLINIC | Age: 32
End: 2022-01-19
Payer: COMMERCIAL

## 2022-01-19 ENCOUNTER — VIRTUAL VISIT (OUTPATIENT)
Dept: RHEUMATOLOGY | Facility: CLINIC | Age: 32
End: 2022-01-19
Attending: INTERNAL MEDICINE
Payer: COMMERCIAL

## 2022-01-19 DIAGNOSIS — M06.09 RHEUMATOID ARTHRITIS OF MULTIPLE SITES WITH NEGATIVE RHEUMATOID FACTOR (H): ICD-10-CM

## 2022-01-19 DIAGNOSIS — Z51.81 ENCOUNTER FOR MEDICATION MONITORING: ICD-10-CM

## 2022-01-19 DIAGNOSIS — E55.9 VITAMIN D DEFICIENCY: Primary | ICD-10-CM

## 2022-01-19 DIAGNOSIS — R76.8 CYCLIC CITRULLINATED PEPTIDE (CCP) ANTIBODY POSITIVE: ICD-10-CM

## 2022-01-19 DIAGNOSIS — Z79.899 LONG-TERM USE OF PLAQUENIL: ICD-10-CM

## 2022-01-19 PROCEDURE — 99214 OFFICE O/P EST MOD 30 MIN: CPT | Mod: GT | Performed by: INTERNAL MEDICINE

## 2022-01-19 ASSESSMENT — PAIN SCALES - GENERAL: PAINLEVEL: MILD PAIN (3)

## 2022-01-19 NOTE — LETTER
1/19/2022       RE: Rachel Fried  [address]  [facility] [address]     Dear Colleague,    Thank you for referring your patient, Rachel Fried, to the [facility] RHEUMATOLOGY CLINIC [place] [facility]. Please see a copy of my visit note below.    Rachel is a 31 year old who is being evaluated via a billable video visit.      How would you like to obtain your AVS? MyChart  If the video visit is dropped, the invitation should be resent by: Text to cell phone: [phone]  Will anyone else be joining your video visit? No      Video Start Time: 2:22 PM  Video-Visit Details    Type of service:  Video Visit    Video End Time:2:50 PM    Originating Location (pt. Location): Home    Distant Location (provider location):  [facility] RHEUMATOLOGY [facility]     Platform used for Video Visit: Onel      Last seen: 10/28/2021   DOS: 1/19/20222    [facility] - Rheumatology Clinic Visit    Rachel Fried  is a 30 year old here for follow-up  RHEUMATOID ARTHRITIS (RA) involving hands, wrists, ankles, shoulders feet [ -RF and low CCP 25, -factor V -KAVITHA by IFA and -NADEEN panel -HLA B27] and pityriasis lichenoides chronica (seen [facility]-established here). XR  no erosions hands. Moved from [place] to be with family.       Past- hydroxychloroquine (plaquenil) since 1-2016 (2 tablet a day caused diarrhea, ok on 1 tablet) and methotrexate 10 mg week  to 4-2018 (mild fatigue day of) then restarted 10- (higher folic acid improved side-effects), naproxsyn; allergy sulfa, prednisone, advil       Initial visit copy forward  10-: Hydroxychloroquine (plaquenil) BID mild diarrhea; ocular eye exam 3-2016 baseline reports recent eye exam   EKG was recently Feb 2018 normal. Stopped methotrexate  In April as wanted to take a drug holiday and was moving here, also wanted to drink some alcohol, and discuss options. No  infections. Diffuse joint pains in hands, wrists, shoulders, ankles and feet and the sides of her hips. Taking advil 600mg AM and 400 mg PM, tolerating but taking since April. Pain severe 5/10 or more. Stiffness all day. Hands are sore. No loss of function or ROM. Very sore and stiff all over. Knees are sore and hard to bend or squat. Mild diarrhea with hydroxychloroquine (plaquenil)     December 16, 2020  Denies any fever, chills, SOB, CANDELARIA, night sweats, or chest pain, high fever, cough, travel in last 14 days or exposure to covid-19 (coronavirus). Reports healthy. Follows CDC guidelines. Works from home.     Rheumatoid arthritis (RA) stiff with pain in in hands and wrists, stiff in mornings for few hours. Not able to do her massage or swim, does walk and not doing normal routine which is causing this as well. Sleep is poor now and hip pain. Upper neck discomfort from stress.     Continues on methotrexate  12.5 mg every 7 days THURS, folic acid 2 mg day. Tolerating. Hydroxychloroquine (plaquenil) 200 mg every day  misses most days so takes <3 times a week. No GI side-effects, no hairloss or oral sores from methotrexate moreso from hydroxychloroquine (plaquenil).  Off celexa -lost 12 lbs. 197 lb last visit but this not now due to Covid-19 (coronavirus).   No longer using NSAIDs. Taking vitamin D 2000 international unit(s) day.  No EAS. No vision issues. Is exercising and doing yogo now. Goes to [facility] eye did last 3-2019 but was due this past Spring. Willing to come here if can do hydroxychloroquine (plaquenil) toxicity with glasses and contacts in 1 visit. Otherwise, will continue with hers.     4/23/21:  The patient states that she is doing ok with her RA. She states that her joint symptoms have slightly worsened during pandemic because she is not going swimming, which is traditionally very helpful for her.   The patient just got vaccinated and plans to start going swimming in May, which she thinks will help her  joints. She has no active swelling now.  She is tolerating methotrexate at 12.5 mg qWeek and also on  mg every day (can not tolerate higher dose sec GI SEs).   The patient is taking ibuprofen 2-4 tabs for the last couple of weeks.  No plans to get pregnant. Not on birth control at this time, but states currently only dating women.  She otherwise feels well with no other concer     10/28/2021:    Doing ok for most part with her RA. AM stiffness is 1.5 hr. Knees and ankles hurt and swell up a little bit and back hurts with standing for too long, but has hard time to say if her joints are swollen. On MTX 5 tabs a wk, last time increased her MTX, got brain fog (has it with MTX current dose but it got worse) and migraines and nausea. It ruined her Sturdays. She takes NSAIDs rarely like going to a concert. Used to be naproxen every day before RA Dx. Since stopping HCQ every other day is having more joint sx, but hard to tell, HCQ causes her diarrhea at higher dose, tried to get generic brand but was never able to get it from pharmacy.      Had covid booster vaccine, no SE.    Today 1/19/2022: Rachel presents for follow up. Humira was added at last visit. Did 4 shots of humira, right before brendan, had last shot, now out. No SEs and it significantly helped with joint pain/stiffness, now symptoms are slowly returning.    Reports 1 hr of knee stiffness, 20 min of hand stiffness.    Reports brain fog 24-48 hours after taking methotrexate.          PMH:  Injury-no  Medical-dermatitis, (bx 2-2016 GA vs LP vs cutaneous lupus--was not cutenaous lupus --focal interstitial lymphocytic inflammation, scattered lymphocytes), anxiety and depression, exercise induced asthma, scoliosis, pityriasis lichenoides chronica, chronic back pain, hypertension, seasonal allergies, acne, motion sickness, high cholesterol, chicken pox, headaches before    Surgical-None     FH:  No autoimmune disorders, psoriasis, UC, crohn's, RA, PsA,  gout, autoimmune thyroid.  No MS, heart disease or cancer in family  Mother-factor V leiden carrier, rosea   Father-healthy  Siblings-younger bro healthy  Children-None   GF-lupus   Uncle cancer colon    SH:  Social Alcohol occasional week. Never Smoking. No IVDU. No Children. Right handed. Single--not sexually active, never.        [facility] personally reviewed and updated by me.    ROS:  A comprehensive ROS was done. Positives are per HPI.      Assessment via video:    Constitutional: NAD, pleasant  Eyes: nl EOM, conjunctiva, sclera  RESP: No cough, no audible wheezing, able to talk in full sentences  Skin: no rash  Neuro: nl cranial nerves  Psych: nl affect  MSK: no active synovitis in hands.         Labs/Imaging:  Normal G6PD  Neg MTB QG 2016 and hepatitis panel, negative HIV    XR hands and knees-negative 2016   Component      Latest Ref Rng & Units 11/17/2020   WBC      4.0 - 11.0 10e9/L 9.6   RBC Count      3.8 - 5.2 10e12/L 4.72   Hemoglobin      11.7 - 15.7 g/dL 13.8   Hematocrit      35.0 - 47.0 % 42.9   MCV      78 - 100 fl 91   MCH      26.5 - 33.0 pg 29.2   MCHC      31.5 - 36.5 g/dL 32.2   RDW      10.0 - 15.0 % 12.9   Platelet Count      150 - 450 10e9/L 389   Creatinine      0.52 - 1.04 mg/dL 0.66   GFR Estimate      >60 mL/min/1.73:m2 >90   GFR Estimate If Black      >60 mL/min/1.73:m2 >90   CRP Inflammation      0.0 - 8.0 mg/L 9.0 (H)   Albumin      3.4 - 5.0 g/dL 3.4   ALT      0 - 50 U/L 16   AST      0 - 45 U/L 10     Impression/Plan:  1. Seropositive (pos anti-CCP, neg RF )rheumatoid arthritis (RA) non-erosive. Active RA on methotrexate 12.5 mg weekly, worse after stopping HCQ due to diarrhea (and never was successful to get generic brand HCQ, brand is too expensive). Can't tolerate MTX at higher dose, above 5 tabs qwk due to nausea, headaches, brain fog and nausea. Recommended to add humira at last visit; risks were discussed.    Rachel reports significant improvement of joint symptoms on  humira 40 mg sub q inj q2wk, s/p 4 shots, no SEs, now out of humira. Has hard time tolerating methotrexate, will resume humira and taper methotrexate.    2. High risk medications, labs CBC, liver, kidney normal. Labs every 12 week     3. Vitamin D deficeincy- take 2000 international unit(s) day.     4. Pityriasis lichenoids chronic-per derm     5. Chronic NSAID use. Will check labs.    6. COVID vaccination, booster. Rachel had question regarding another booster as CDC guideline are not very clear, I will check with ID and get back to her.    Plan:    Labs next month    Resume humira 40 mg sub q inj q2wk    Taper methotrexate to 4 tabs a week now and then if no joint pain, taper it down further to 3 tabs a week in a month and stay on it till you see me    Check with me about additional booster in couple of weeks    Return in 3-4 months (in person or video)      Naresh Aldridge MD        Orders Placed This Encounter   Procedures     ALT     Albumin level     AST     Creatinine     CRP inflammation     Vitamin D Deficiency     CBC with Platelets & Differential

## 2022-01-19 NOTE — TELEPHONE ENCOUNTER
Prior Authorization Approval    Authorization Effective Date: 12/20/2021  Authorization Expiration Date: 1/19/2024  Medication: Humira-PA Approved  Approved Dose/Quantity: 2/28  Reference #: [id number]   Insurance Company: [employer] - Phone [phone] Fax [phone]  Expected CoPay:       CoPay Card Available: Yes    Foundation Assistance Needed:    Which Pharmacy is filling the prescription (Not needed for infusion/clinic administered): [facility] - [place] [address]  Pharmacy Notified: Yes  Patient Notified: Yes

## 2022-01-19 NOTE — TELEPHONE ENCOUNTER
PA Initiation    Medication: Humira-PA Approved  Insurance Company: [employer] - Phone [phone] Fax [phone]  Pharmacy Filling the Rx: [facility] - [place], [place] - [facility] [address]  Filling Pharmacy Phone:    Filling Pharmacy Fax:    Start Date: 1/19/2022

## 2022-01-19 NOTE — PATIENT INSTRUCTIONS
Labs next month    Resume humira    Taper methotrexate to 4 tabs a week now and then if no joint pain, taper it down further to 3 tabs a week in a month and stay on it till you see me    Check with me about additional booster in couple of weeks    Return in 3-4 months (in person or video)

## 2022-01-19 NOTE — PROGRESS NOTES
Rachel is a 31 year old who is being evaluated via a billable video visit.      How would you like to obtain your AVS? MyChart  If the video visit is dropped, the invitation should be resent by: Text to cell phone: [phone]  Will anyone else be joining your video visit? No      Video Start Time: 2:22 PM  Video-Visit Details    Type of service:  Video Visit    Video End Time:2:50 PM    Originating Location (pt. Location): Home    Distant Location (provider location):  [facility] [place]     Platform used for Video Visit: Onel      Last seen: 10/28/2021   DOS: 1/19/20222    [facility]ology [facility] Visit    Rachel Fried  is a 30 year old here for follow-up  RHEUMATOID ARTHRITIS (RA) involving hands, wrists, ankles, shoulders feet [ -RF and low CCP 25, -factor V -KAVITHA by IFA and -NADEEN panel -HLA B27] and pityriasis lichenoides chronica (seen [facility]-established here). XR  no erosions hands. Moved from [place] to be with family.       Past- hydroxychloroquine (plaquenil) since 1-2016 (2 tablet a day caused diarrhea, ok on 1 tablet) and methotrexate 10 mg week  to 4-2018 (mild fatigue day of) then restarted 10- (higher folic acid improved side-effects), naproxsyn; allergy sulfa, prednisone, advil       Initial visit copy forward  10-: Hydroxychloroquine (plaquenil) BID mild diarrhea; ocular eye exam 3-2016 baseline reports recent eye exam   EKG was recently Feb 2018 normal. Stopped methotrexate  In April as wanted to take a drug holiday and was moving here, also wanted to drink some alcohol, and discuss options. No infections. Diffuse joint pains in hands, wrists, shoulders, ankles and feet and the sides of her hips. Taking advil 600mg AM and 400 mg PM, tolerating but taking since April. Pain severe 5/10 or more. Stiffness all day. Hands are sore. No loss of function or ROM. Very sore and stiff all over. Knees are sore and  hard to bend or squat. Mild diarrhea with hydroxychloroquine (plaquenil)     December 16, 2020  Denies any fever, chills, SOB, CANDELARIA, night sweats, or chest pain, high fever, cough, travel in last 14 days or exposure to covid-19 (coronavirus). Reports healthy. Follows CDC guidelines. Works from home.     Rheumatoid arthritis (RA) stiff with pain in in hands and wrists, stiff in mornings for few hours. Not able to do her massage or swim, does walk and not doing normal routine which is causing this as well. Sleep is poor now and hip pain. Upper neck discomfort from stress.     Continues on methotrexate  12.5 mg every 7 days THURS, folic acid 2 mg day. Tolerating. Hydroxychloroquine (plaquenil) 200 mg every day  misses most days so takes <3 times a week. No GI side-effects, no hairloss or oral sores from methotrexate moreso from hydroxychloroquine (plaquenil).  Off celexa -lost 12 lbs. 197 lb last visit but this not now due to Covid-19 (coronavirus).   No longer using NSAIDs. Taking vitamin D 2000 international unit(s) day.  No EAS. No vision issues. Is exercising and doing yogo now. Goes to [facility] did last 3-2019 but was due this past Spring. Willing to come here if can do hydroxychloroquine (plaquenil) toxicity with glasses and contacts in 1 visit. Otherwise, will continue with hers.     4/23/21:  The patient states that she is doing ok with her RA. She states that her joint symptoms have slightly worsened during pandemic because she is not going swimming, which is traditionally very helpful for her.   The patient just got vaccinated and plans to start going swimming in May, which she thinks will help her joints. She has no active swelling now.  She is tolerating methotrexate at 12.5 mg qWeek and also on  mg every day (can not tolerate higher dose sec GI SEs).   The patient is taking ibuprofen 2-4 tabs for the last couple of weeks.  No plans to get pregnant. Not on birth control at this time, but states  currently only dating women.  She otherwise feels well with no other concer     10/28/2021:    Doing ok for most part with her RA. AM stiffness is 1.5 hr. Knees and ankles hurt and swell up a little bit and back hurts with standing for too long, but has hard time to say if her joints are swollen. On MTX 5 tabs a wk, last time increased her MTX, got brain fog (has it with MTX current dose but it got worse) and migraines and nausea. It ruined her Sturdays. She takes NSAIDs rarely like going to a concert. Used to be naproxen every day before RA Dx. Since stopping HCQ every other day is having more joint sx, but hard to tell, HCQ causes her diarrhea at higher dose, tried to get generic brand but was never able to get it from pharmacy.      Had covid booster vaccine, no SE.    Today 1/19/2022: Rachel presents for follow up. Humira was added at last visit. Did 4 shots of humira, right before brendan, had last shot, now out. No SEs and it significantly helped with joint pain/stiffness, now symptoms are slowly returning.    Reports 1 hr of knee stiffness, 20 min of hand stiffness.    Reports brain fog 24-48 hours after taking methotrexate.          PMH:  Injury-no  Medical-dermatitis, (bx 2-2016 GA vs LP vs cutaneous lupus--was not cutenaous lupus --focal interstitial lymphocytic inflammation, scattered lymphocytes), anxiety and depression, exercise induced asthma, scoliosis, pityriasis lichenoides chronica, chronic back pain, hypertension, seasonal allergies, acne, motion sickness, high cholesterol, chicken pox, headaches before    Surgical-None     FH:  No autoimmune disorders, psoriasis, UC, crohn's, RA, PsA, gout, autoimmune thyroid.  No MS, heart disease or cancer in family  Mother-factor V leiden carrier, rosea   Father-healthy  Siblings-younger bro healthy  Children-None   GF-lupus   Uncle cancer colon    SH:  Social Alcohol occasional week. Never Smoking. No IVDU. No Children. Right handed. Single--not sexually  active, never.        [facility] personally reviewed and updated by me.    ROS:  A comprehensive ROS was done. Positives are per HPI.      Assessment via video:    Constitutional: NAD, pleasant  Eyes: nl EOM, conjunctiva, sclera  RESP: No cough, no audible wheezing, able to talk in full sentences  Skin: no rash  Neuro: nl cranial nerves  Psych: nl affect  MSK: no active synovitis in hands.         Labs/Imaging:  Normal G6PD  Neg MTB QG 2016 and hepatitis panel, negative HIV    XR hands and knees-negative 2016   Component      Latest Ref Rng & Units 11/17/2020   WBC      4.0 - 11.0 10e9/L 9.6   RBC Count      3.8 - 5.2 10e12/L 4.72   Hemoglobin      11.7 - 15.7 g/dL 13.8   Hematocrit      35.0 - 47.0 % 42.9   MCV      78 - 100 fl 91   MCH      26.5 - 33.0 pg 29.2   MCHC      31.5 - 36.5 g/dL 32.2   RDW      10.0 - 15.0 % 12.9   Platelet Count      150 - 450 10e9/L 389   Creatinine      0.52 - 1.04 mg/dL 0.66   GFR Estimate      >60 mL/min/1.73:m2 >90   GFR Estimate If Black      >60 mL/min/1.73:m2 >90   CRP Inflammation      0.0 - 8.0 mg/L 9.0 (H)   Albumin      3.4 - 5.0 g/dL 3.4   ALT      0 - 50 U/L 16   AST      0 - 45 U/L 10     Impression/Plan:  1. Seropositive (pos anti-CCP, neg RF )rheumatoid arthritis (RA) non-erosive. Active RA on methotrexate 12.5 mg weekly, worse after stopping HCQ due to diarrhea (and never was successful to get generic brand HCQ, brand is too expensive). Can't tolerate MTX at higher dose, above 5 tabs qwk due to nausea, headaches, brain fog and nausea. Recommended to add humira at last visit; risks were discussed.    Rachel reports significant improvement of joint symptoms on humira 40 mg sub q inj q2wk, s/p 4 shots, no SEs, now out of humira. Has hard time tolerating methotrexate, will resume humira and taper methotrexate.    2. High risk medications, labs CBC, liver, kidney normal. Labs every 12 week     3. Vitamin D deficeincy- take 2000 international unit(s) day.     4. Pityriasis  lichenoids chronic-per derm     5. Chronic NSAID use. Will check labs.    6. COVID vaccination, booster. Rachel had question regarding another booster as CDC guideline are not very clear, I will check with ID and get back to her.    Plan:    Labs next month    Resume humira 40 mg sub q inj q2wk    Taper methotrexate to 4 tabs a week now and then if no joint pain, taper it down further to 3 tabs a week in a month and stay on it till you see me    Check with me about additional booster in couple of weeks    Return in 3-4 months (in person or video)      Naresh Aldridge MD        Orders Placed This Encounter   Procedures     ALT     Albumin level     AST     Creatinine     CRP inflammation     Vitamin D Deficiency     CBC with Platelets & Differential

## 2022-02-01 ENCOUNTER — MYC REFILL (OUTPATIENT)
Dept: INTERNAL MEDICINE | Facility: CLINIC | Age: 32
End: 2022-02-01
Payer: COMMERCIAL

## 2022-02-01 DIAGNOSIS — F32.A DEPRESSION, UNSPECIFIED DEPRESSION TYPE: ICD-10-CM

## 2022-02-01 DIAGNOSIS — F41.9 ANXIETY: ICD-10-CM

## 2022-02-01 RX ORDER — DULOXETIN HYDROCHLORIDE 60 MG/1
60 CAPSULE, DELAYED RELEASE ORAL DAILY
Qty: 90 CAPSULE | Refills: 0 | Status: CANCELLED | OUTPATIENT
Start: 2022-02-01

## 2022-02-08 ENCOUNTER — VIRTUAL VISIT (OUTPATIENT)
Dept: PSYCHOLOGY | Facility: CLINIC | Age: 32
End: 2022-02-08
Payer: COMMERCIAL

## 2022-02-08 DIAGNOSIS — F41.9 ANXIETY: ICD-10-CM

## 2022-02-08 DIAGNOSIS — F33.0 MILD EPISODE OF RECURRENT MAJOR DEPRESSIVE DISORDER (H): Primary | ICD-10-CM

## 2022-02-08 PROCEDURE — 90837 PSYTX W PT 60 MINUTES: CPT | Mod: GT | Performed by: STUDENT IN AN ORGANIZED HEALTH CARE EDUCATION/TRAINING PROGRAM

## 2022-02-09 NOTE — PROGRESS NOTES
"  Health Psychology                                                                                                                          Jessica Ram, Ph.D., L.P [phone]  Nati Pompa, Ph.D., L.P. [phone]  Yadira Gaming, Ph.D, L..P. [phone]  Key Cunha, Ph.D., L.P. [phone]  Lee Reese, Ph.D., A.B.P.P., L.P. [phone]         Mayra Parker, Ph.D., L.P. [phone]       Tara Gabriel, Ph.D., A.B.P.P., LP [phone]           [facility], 3rd Floor  [address] [place] [address]      Health Psychology Follow-Up Note - Telehealth Visit  The patient has been notified of the following:      \"We have found that certain health care needs can be provided without the need for a face to face visit.  This service lets us provide the care you need with a phone/video conversation.  I will have full access to your [place] medical record during this entire phone/video visit.   I will be taking notes for your medical record.      Since this is like an office visit, we will bill your insurance company for this service.  There are potential benefits and risks of telephone/video visits (e.g. limits to patient confidentiality) that differ from in-person visits.?  Confidentiality still applies for telephone/video services, and nobody will record the visit.  It is important to be in a quiet, private space that is free of distractions (including cell phone or other devices) during the visit.?? If during the course of the visit I believe a telephone/video visit is not appropriate, you will not be charged for this service\"     Consent has been obtained for this service by care team member: Yes     Date of Service:  2/8/22    SUBJECTIVE:  Rachel Fried is a 30 year old female with PMH significant for rheumatoid arthritis, depression, and anxiety who was seen for individual psychotherapy. She has been participating in psychotherapy with Dr." Alphonse who is out on leave.  She will be meeting with this provider while Dr. Cunha is out until she returns.  Last visit with Dr. Cunha was 11/24/21.     Symptoms reported:  Rachel described increased symptoms of anxiety and depression in interim, especially in the month of January.  Contributing factors were: impacts of COVID-19 variants, barriers to having cymbalta refilled and being without medication for weeks, and recent interpersonal distress with partner related to safety and trust.    Progress towards goals: Rachel utilizes effective problem solving strategies to navigate healthcare system/refilling prescription. Connected with social support effectively to process emotional reactions to interpersonal distress.  Implemented clear boundaries with partner.    Interventions utilized:  Reviewed Rachel's progression of symptoms and contributing factors.  She provided an overview of past weeks and we discussed events of the past weekend in more detail. Rachel described emotional reactions and how she utilized support from friends to process experience and decide what she wanted to do in response.  Rachel acknowledged that she felt she needed permission to be feel the emotions she was feeling, sought external validation for her reactions.  We talked about this and also how she was utilizing social support for additional points of reference as she is new to navigating romantic relationships. Supported Rachel in identifying a plan for what she wanted to do moving forward and assessed role of anxiety/emotion mind in decision making process.  Validated Rachel's emotional experiences and provided positive feedback for her insight and work she has done acknowledging emotions prior to this visit.     Rachel was engaged throughout the visit and expressed understanding of information provided.       OBJECTIVE:  Appearance/Behavior/Orientation: Alert and oriented to person, place, time, and situation.     Cooperation/Reliability: Patient was open and cooperative throughout the session.    Abstract reasoning: Not assessed.   Judgment: Intact.    Mood/Affect: Mood sad, frustrated; affect mood congruent  Insight/Motivation: Good, good    ASSESSMENT:   Appears to be deriving benefit from treatment.      DIAGNOSIS:  Major depressive disorder, recurrent, mild  Anxiety, unspecified      PLAN:  Follow-up in 2-3 weeks.     Type of service: Telemedicine Psychotherapy for support in coping with chronic health condition and symptoms of anxiety and depression  Time of service:     Date: 2/8/22    Time Service Began: 4:06    Time Service Ended: 5:00  Reason that telemedicine is appropriate: Public health regulations due to COVID-19 pandemic/state of emergency  Mode of transmission: Secure real time interactive audio and visual telecommunication system via [email or web address]  Location of originating and distant sites:    Originating site (patient location): Patient's home    Distant site (provider site): Provider's office    Patient has agreed to receiving services via telemedicine technology.      Yadira Gaming, PhD,   Clinical Health Psychologist    Tx plan completed:             10/12/21  Tx plan due:                        10/12/22    *no letter

## 2022-02-10 ENCOUNTER — MYC REFILL (OUTPATIENT)
Dept: INTERNAL MEDICINE | Facility: CLINIC | Age: 32
End: 2022-02-10
Payer: COMMERCIAL

## 2022-02-10 DIAGNOSIS — F41.9 ANXIETY: ICD-10-CM

## 2022-02-10 DIAGNOSIS — F32.A DEPRESSION, UNSPECIFIED DEPRESSION TYPE: ICD-10-CM

## 2022-02-11 RX ORDER — DULOXETIN HYDROCHLORIDE 60 MG/1
60 CAPSULE, DELAYED RELEASE ORAL DAILY
Qty: 90 CAPSULE | Refills: 0 | Status: SHIPPED | OUTPATIENT
Start: 2022-02-11 | End: 2022-05-11

## 2022-02-11 NOTE — TELEPHONE ENCOUNTER
DULoxetine (CYMBALTA) 60 MG capsule  TAKE 1 CAPSULE BY MOUTH DAILY      Last Written Prescription Date:  8/26/21  Last Fill Quantity: 90,   # refills: 0  Last Office Visit : 11/2/21  Anxiety: Continue duloxetine 60 mg daily as this is working for her  Return in about 8 months (around 7/2/2022) for with me, in person, Follow up.    Future Office visit:  none    Refill to pharmacy.     FYI - PHQ9 - 11/2/21 = 4

## 2022-02-19 ENCOUNTER — LAB (OUTPATIENT)
Dept: LAB | Facility: CLINIC | Age: 32
End: 2022-02-19
Attending: INTERNAL MEDICINE
Payer: COMMERCIAL

## 2022-02-19 DIAGNOSIS — M06.09 RHEUMATOID ARTHRITIS OF MULTIPLE SITES WITH NEGATIVE RHEUMATOID FACTOR (H): ICD-10-CM

## 2022-02-19 DIAGNOSIS — Z51.81 ENCOUNTER FOR MEDICATION MONITORING: ICD-10-CM

## 2022-02-19 DIAGNOSIS — E55.9 VITAMIN D DEFICIENCY: ICD-10-CM

## 2022-02-19 LAB
ALBUMIN SERPL-MCNC: 3.2 G/DL (ref 3.4–5)
ALT SERPL W P-5'-P-CCNC: 16 U/L (ref 0–50)
AST SERPL W P-5'-P-CCNC: 14 U/L (ref 0–45)
BASOPHILS # BLD AUTO: 0 10E3/UL (ref 0–0.2)
BASOPHILS NFR BLD AUTO: 1 %
CREAT SERPL-MCNC: 0.63 MG/DL (ref 0.52–1.04)
CRP SERPL-MCNC: 8.4 MG/L (ref 0–8)
DEPRECATED CALCIDIOL+CALCIFEROL SERPL-MC: 36 UG/L (ref 20–75)
EOSINOPHIL # BLD AUTO: 0.1 10E3/UL (ref 0–0.7)
EOSINOPHIL NFR BLD AUTO: 1 %
ERYTHROCYTE [DISTWIDTH] IN BLOOD BY AUTOMATED COUNT: 14.6 % (ref 10–15)
GFR SERPL CREATININE-BSD FRML MDRD: >90 ML/MIN/1.73M2
HCT VFR BLD AUTO: 41.8 % (ref 35–47)
HGB BLD-MCNC: 13.6 G/DL (ref 11.7–15.7)
IMM GRANULOCYTES # BLD: 0 10E3/UL
IMM GRANULOCYTES NFR BLD: 0 %
LYMPHOCYTES # BLD AUTO: 3 10E3/UL (ref 0.8–5.3)
LYMPHOCYTES NFR BLD AUTO: 36 %
MCH RBC QN AUTO: 27.7 PG (ref 26.5–33)
MCHC RBC AUTO-ENTMCNC: 32.5 G/DL (ref 31.5–36.5)
MCV RBC AUTO: 85 FL (ref 78–100)
MONOCYTES # BLD AUTO: 0.5 10E3/UL (ref 0–1.3)
MONOCYTES NFR BLD AUTO: 6 %
NEUTROPHILS # BLD AUTO: 4.5 10E3/UL (ref 1.6–8.3)
NEUTROPHILS NFR BLD AUTO: 56 %
NRBC # BLD AUTO: 0 10E3/UL
NRBC BLD AUTO-RTO: 0 /100
PLATELET # BLD AUTO: 392 10E3/UL (ref 150–450)
RBC # BLD AUTO: 4.91 10E6/UL (ref 3.8–5.2)
WBC # BLD AUTO: 8.1 10E3/UL (ref 4–11)

## 2022-02-19 PROCEDURE — 82306 VITAMIN D 25 HYDROXY: CPT | Mod: 90 | Performed by: PATHOLOGY

## 2022-02-19 PROCEDURE — 86769 SARS-COV-2 COVID-19 ANTIBODY: CPT | Mod: 90 | Performed by: PATHOLOGY

## 2022-02-19 PROCEDURE — 84460 ALANINE AMINO (ALT) (SGPT): CPT | Performed by: PATHOLOGY

## 2022-02-19 PROCEDURE — 82565 ASSAY OF CREATININE: CPT | Performed by: PATHOLOGY

## 2022-02-19 PROCEDURE — 36415 COLL VENOUS BLD VENIPUNCTURE: CPT | Performed by: PATHOLOGY

## 2022-02-19 PROCEDURE — 84450 TRANSFERASE (AST) (SGOT): CPT | Performed by: PATHOLOGY

## 2022-02-19 PROCEDURE — 86140 C-REACTIVE PROTEIN: CPT | Performed by: PATHOLOGY

## 2022-02-19 PROCEDURE — 99000 SPECIMEN HANDLING OFFICE-LAB: CPT | Performed by: PATHOLOGY

## 2022-02-19 PROCEDURE — 85025 COMPLETE CBC W/AUTO DIFF WBC: CPT | Performed by: PATHOLOGY

## 2022-02-19 PROCEDURE — 82040 ASSAY OF SERUM ALBUMIN: CPT | Performed by: PATHOLOGY

## 2022-02-22 LAB
SARS-COV-2 AB SERPL IA-ACNC: >250 U/ML
SARS-COV-2 AB SERPL-IMP: POSITIVE

## 2022-02-28 ENCOUNTER — VIRTUAL VISIT (OUTPATIENT)
Dept: PSYCHOLOGY | Facility: CLINIC | Age: 32
End: 2022-02-28
Payer: COMMERCIAL

## 2022-02-28 DIAGNOSIS — F41.9 ANXIETY: ICD-10-CM

## 2022-02-28 DIAGNOSIS — F33.0 MILD EPISODE OF RECURRENT MAJOR DEPRESSIVE DISORDER (H): Primary | ICD-10-CM

## 2022-02-28 PROCEDURE — 90837 PSYTX W PT 60 MINUTES: CPT | Mod: GT | Performed by: STUDENT IN AN ORGANIZED HEALTH CARE EDUCATION/TRAINING PROGRAM

## 2022-02-28 NOTE — PROGRESS NOTES
"  Health Psychology                                                                                                                          Jessica Ram, Ph.D., L.P [phone]  Nati Pompa, Ph.D., L.P. [phone]  Yadira Gaming, Ph.D, L..P. [phone]  Key Cunha, Ph.D., L.P. [phone]  Lee Reese, Ph.D., A.B.P.P., L.P. [phone]         Mayra Parker, Ph.D., L.P. [phone]       Tara Gabriel, Ph.D., A.B.P.P., LP [phone]           [facility], 3rd Floor  [address] [place] [address]    Health Psychology Follow-Up Note - Telehealth Visit  The patient has been notified of the following:      \"We have found that certain health care needs can be provided without the need for a face to face visit.  This service lets us provide the care you need with a phone/video conversation.  I will have full access to your [place] medical record during this entire phone/video visit.   I will be taking notes for your medical record.      Since this is like an office visit, we will bill your insurance company for this service.  There are potential benefits and risks of telephone/video visits (e.g. limits to patient confidentiality) that differ from in-person visits.?  Confidentiality still applies for telephone/video services, and nobody will record the visit.  It is important to be in a quiet, private space that is free of distractions (including cell phone or other devices) during the visit.?? If during the course of the visit I believe a telephone/video visit is not appropriate, you will not be charged for this service\"     Consent has been obtained for this service by care team member: Yes     Date of Service:  2/28/22    SUBJECTIVE:  Rachel Fried is a 31 year old female with PMH significant for rheumatoid arthritis, depression, and anxiety who was seen for individual psychotherapy. She has been participating in psychotherapy with Dr." Alphonse who is out on leave.  She will be meeting with this provider while Dr. Cunha is out until she returns.     Symptoms reported: Improvement in mood symptoms and anxiety overall, noticing ongoing anxiety or uneasiness related to sociopolitical context. Looking forward to returning to work in-person more frequently.     Progress towards goals: Engaging in physical activity and pleasant activity, taking medication regularly, connecting with social support effectively, actively challenging unhelpful automatic thoughts about her definitions of weakness and productivity.     Interventions utilized: Reviewed experiences with communicating with partner and ending relationship. Rachel said she still feels good about this decision. Discussed how to cope with universal stress, especially when it seems like others are not bothered. Encouraged settling limits on consumption of media, which she is doing, implementing self-compassion, and making sure to schedule rest.     Rachel was engaged throughout the visit and expressed understanding of information provided.       OBJECTIVE:  Appearance/Behavior/Orientation: Alert and oriented to person, place, time, and situation.    Cooperation/Reliability: Patient was open and cooperative throughout the session.    Abstract reasoning: Not assessed.   Judgment: Intact.    Mood/Affect: Mood improved, good; affect mood congruent  Insight/Motivation: Good, good    ASSESSMENT:   Appears to be deriving benefit from treatment.      DIAGNOSIS:  Major depressive disorder, recurrent, mild  Anxiety, unspecified      PLAN:  Follow-up in 3 weeks.     Type of service: Telemedicine Psychotherapy for support in coping with chronic health condition and symptoms of anxiety and depression  Time of service:     Date: 2/28/22    Time Service Began: 4:00    Time Service Ended: 5:00  Reason that telemedicine is appropriate: Public health regulations due to COVID-19 pandemic/state of emergency  Mode of  transmission: Secure real time interactive audio and visual telecommunication system via [email or web address]  Location of originating and distant sites:    Originating site (patient location): Patient's home    Distant site (provider site): Provider's office    Patient has agreed to receiving services via telemedicine technology.      Yadira Gaming, PhD,   Clinical Health Psychologist    Tx plan completed:             10/12/21  Tx plan due:                        10/12/22    *no letter

## 2022-03-18 ENCOUNTER — VIRTUAL VISIT (OUTPATIENT)
Dept: PSYCHOLOGY | Facility: CLINIC | Age: 32
End: 2022-03-18
Payer: COMMERCIAL

## 2022-03-18 DIAGNOSIS — F41.9 ANXIETY: ICD-10-CM

## 2022-03-18 DIAGNOSIS — F33.0 MILD EPISODE OF RECURRENT MAJOR DEPRESSIVE DISORDER (H): Primary | ICD-10-CM

## 2022-03-18 PROCEDURE — 90834 PSYTX W PT 45 MINUTES: CPT | Mod: GT | Performed by: STUDENT IN AN ORGANIZED HEALTH CARE EDUCATION/TRAINING PROGRAM

## 2022-03-18 NOTE — PROGRESS NOTES
"  Health Psychology                                                                                                                          Jessica Ram, Ph.D., L.P [phone]  Nati Pompa, Ph.D., L.P. [phone]  Yadira Gaming, Ph.D, L..P. [phone]  Key Cunha, Ph.D., L.P. [phone]  Lee Reese, Ph.D., A.B.P.P., L.P. [phone]         Mayra Parker, Ph.D., L.P. [phone]       Tara Gabriel, Ph.D., A.B.P.P., LP [phone]           [facility], 3rd Floor  [address] [place] [address]    Health Psychology Follow-Up Note - Telehealth Visit  The patient has been notified of the following:      \"We have found that certain health care needs can be provided without the need for a face to face visit.  This service lets us provide the care you need with a phone/video conversation.  I will have full access to your [place] medical record during this entire phone/video visit.   I will be taking notes for your medical record.      Since this is like an office visit, we will bill your insurance company for this service.  There are potential benefits and risks of telephone/video visits (e.g. limits to patient confidentiality) that differ from in-person visits.?  Confidentiality still applies for telephone/video services, and nobody will record the visit.  It is important to be in a quiet, private space that is free of distractions (including cell phone or other devices) during the visit.?? If during the course of the visit I believe a telephone/video visit is not appropriate, you will not be charged for this service\"     Consent has been obtained for this service by care team member: Yes     Date of Service:  3/18/22    SUBJECTIVE:  Rachel Fried is a 31 year old female with PMH significant for rheumatoid arthritis, depression, and anxiety who was seen for individual psychotherapy. She has been participating in psychotherapy with Dr." Alphonse who is out on leave.  She will be meeting with this provider while Dr. Cunha is out until she returns.     Symptoms reported: Mood symptoms stabilized, mood good. Denied clinically significant symptoms of depression. Anxiety symptoms reduced and stable. Feeling positive about progress with returning to work and increasing activity. Discussed Rachel's patterns of inattention and challenges with initiating tasks.     Progress towards goals: Increased activity and social activities, working in office again. Plans to swim twice/week, Rachel signs up for swimming time in advance to help with accountability.     Interventions utilized: Reviewed experiences in interim.  Discussed return to working in-person and how she has been adjusting.  Rachel reported only mild anxiety with this and she has been adjusting well, enjoying the balance of working in the office four days/week and at home one-day.  We discussed Rachel's plans for ADHD assessment and how she has been procrastinating responding to the clinic to reschedule.  While in session Rachel sent the email so she could make progress towards this goals. Reviewed other way sin which procrastination presents and strategies to manage this.  Discussed transition back to Dr. Cunha and made plans to have an appointment on the calendar if needed but will communicate via [employer] about timing of Dr. Cunha's return.     Rachel was engaged throughout the visit and expressed understanding of information provided.       OBJECTIVE:  Appearance/Behavior/Orientation: Alert and oriented to person, place, time, and situation.    Cooperation/Reliability: Patient was open and cooperative throughout the session.    Abstract reasoning: Not assessed.   Judgment: Intact.    Mood/Affect: Mood improved, good; affect mood congruent  Insight/Motivation: Good, good    ASSESSMENT:   Appears to be deriving benefit from treatment.      DIAGNOSIS:  Major depressive disorder, recurrent,  mild  Anxiety, unspecified      PLAN:  Tentative appointment scheduled 4/15/22 at 1:00 if needed.    Type of service: Telemedicine Psychotherapy for support in coping with chronic health condition and symptoms of anxiety and depression  Time of service:     Date: 3/18/22    Time Service Began: 1:05    Time Service Ended: 1:45  Reason that telemedicine is appropriate: Public health regulations due to COVID-19 pandemic/state of emergency  Mode of transmission: Secure real time interactive audio and visual telecommunication system via [email or web address]  Location of originating and distant sites:    Originating site (patient location): Patient's home    Distant site (provider site): Provider's office    Patient has agreed to receiving services via telemedicine technology.      Yadira Gaming, PhD, LP  Clinical Health Psychologist    Tx plan completed:             10/12/21  Tx plan due:                        10/12/22    *no letter

## 2022-04-21 DIAGNOSIS — Z79.899 LONG-TERM USE OF PLAQUENIL: ICD-10-CM

## 2022-04-21 DIAGNOSIS — M06.09 RHEUMATOID ARTHRITIS OF MULTIPLE SITES WITH NEGATIVE RHEUMATOID FACTOR (H): ICD-10-CM

## 2022-04-21 DIAGNOSIS — R76.8 CYCLIC CITRULLINATED PEPTIDE (CCP) ANTIBODY POSITIVE: ICD-10-CM

## 2022-04-23 RX ORDER — FOLIC ACID 1 MG/1
TABLET ORAL
Qty: 180 TABLET | Refills: 3 | OUTPATIENT
Start: 2022-04-23

## 2022-04-29 ENCOUNTER — VIRTUAL VISIT (OUTPATIENT)
Dept: PSYCHOLOGY | Facility: CLINIC | Age: 32
End: 2022-04-29
Payer: COMMERCIAL

## 2022-04-29 DIAGNOSIS — F33.0 MILD EPISODE OF RECURRENT MAJOR DEPRESSIVE DISORDER (H): Primary | ICD-10-CM

## 2022-04-29 PROCEDURE — 99207 PR NO CHARGE LOS: CPT | Performed by: PSYCHOLOGIST

## 2022-05-02 NOTE — PROGRESS NOTES
Appt rescheduled, initiated by provider. Rescheduled for 5/6 at 12p.     Key Cunha, PhD,   Clinical Health Psychologist

## 2022-05-06 ENCOUNTER — VIRTUAL VISIT (OUTPATIENT)
Dept: PSYCHOLOGY | Facility: CLINIC | Age: 32
End: 2022-05-06
Payer: COMMERCIAL

## 2022-05-06 DIAGNOSIS — F33.0 MILD EPISODE OF RECURRENT MAJOR DEPRESSIVE DISORDER (H): Primary | ICD-10-CM

## 2022-05-06 DIAGNOSIS — F41.9 ANXIETY: ICD-10-CM

## 2022-05-06 PROCEDURE — 90837 PSYTX W PT 60 MINUTES: CPT | Mod: TEL | Performed by: PSYCHOLOGIST

## 2022-05-06 NOTE — PROGRESS NOTES
"The patient has been notified of the following:      \"We have found that certain health care needs can be provided without the need for a face to face visit.  This service lets us provide the care you need with a phone conversation.       I will have full access to your [place] medical record during this entire phone call.   I will be taking notes for your medical record.      Since this is like an office visit, we will bill your insurance company for this service.       There are potential benefits and risks of telephone visits (e.g. limits to patient confidentiality) that differ from in-person visits.?  Confidentiality still applies for telephone services, and nobody will record the visit.  It is important to be in a quiet, private space that is free of distractions (including cell phone or other devices) during the visit.??      If during the course of the call I believe a telephone visit is not appropriate, you will not be charged for this service\"     Consent has been obtained for this service by care team member: Yes     Health Psychology                      Department of Medicine                     [facility] Mail Code [id number]     [address] [place] [address], [address]       [address] [facility], 3rd Floor  [address] [place] [address]    Telemental health visit due to mitigation of COVID-19 pandemic, confirmed with patient.    Reason that telemedicine is appropriate: COVID-19 pandemic mitigation requiring social distancing.  Mode of transmission: Telephone  Location of originating and distant sites:    Originating site (patient location): patient's worksite; In event of emergency, patient verbally consented to having emergency response sent to their location    Distant site (provider site): home office of provider    Health Psychology Follow-Up Note     SUBJECTIVE:  Rachel Fried is a 31 year old female with PMH " significant for rheumatoid arthritis, depression, and anxiety who was seen for individual psychotherapy. Interim history since last session was reviewed with a focus on updates at work, dating life, and family.  She endorsed strong emotions regarding news of overturning  rights and likelihood of this legal ruling to negatively impact other human rights such as nicole marriage.  Discussed emotional reactions and explored this via emotion focused interventions.  Discussed anger pertaining to this and how it connects to her parents lack of support for this via their political affiliation.  Discussed how this feels invalidating.  Discussed ways to validate emotional experience and acceptance of family relationships despite the pain surrounding this issue.    OBJECTIVE:  Appearance/Behavior/Orientation: Alert and oriented to person, place, time, and situation.    Cooperation/Reliability: Patient was open and cooperative throughout the session.    Abstract reasoning: Not assessed.   Judgment: Intact.    Mood/Affect: Mood anxious; affect mood congruent  Insight/Motivation: Good, good     ASSESSMENT:   Appears to be deriving benefit from treatment.      DIAGNOSIS:  Major depressive disorder, recurrent, mild  Anxiety, unspecified     PLAN:  RTC for continued psychotherapy.    Time in: 12:08 PM  Time out: 1:10 PM   Extended session due to complexity of case and length of interval.     Key Cunha, PhD,   Clinical Health Psychologist     Tx plan completed:             10/12/21  Tx plan due:                        10/12/22    *no letter    OUTPATIENT TREATMENT PLAN SUMMARY    Date of Treatment Plan:  10/12/21  90-Day Review Date:  NA  Date of Initial Service:  2019        1. DSM-V Diagnosis (include numeric code)  Major depressive disorder, recurrent, mild  Anxiety, unspecified    2. Current symptoms and circumstances that substantiate the diagnosis:  Anxiety    3. How symptoms and/or behaviors are affecting level  of function:   Distress related to anxiety, depressive symptoms impairing social and work functioning     4. Risk Assessment:  Suicide:  Assessed Level of Immediate Risk: Low  Ideation: No  Plan:  No  Means: No  Intent: No    Homicide/Violence:  Assessed Level of Immediate Risk: Low  Ideation: No  Plan: No  Means: No  Intent: No    If on a medication, please include name and dosage: Cymbalta      Symptom/Problem Measurable Goals Interventions Gains Made   1.Amotivation 1. Learn strategies to appraoch tasks in realistic segments of work rather than all or nothing 1.CBT 1. TBD   2.Emotion regulation 2. Continue to learn and apply emotion acceptance and regulation strategies 2.CBT 2. TBD   3.Social connection 3. Establish dating relationships 3.CBT 3. TBD       5. Frequency of Sessions: 2 x / Month    6. Discharge and Aftercare Goals: TBD    7. Expected duration of treatment:  TBD     8. Participants in therapy plan (family, friends, support network): Self      See scanned document for Acknowledgement of Current Treatment Plan - Patient signature not available due to telehealth format of session; verbal consent obtained.    Regulatory Guidelines for Updating Treatment Plan  [place] Medical Assistance: Reviewed & signed at least every 90 days  Medicare:  Update per policy

## 2022-05-10 DIAGNOSIS — F41.9 ANXIETY: ICD-10-CM

## 2022-05-10 DIAGNOSIS — M06.09 RHEUMATOID ARTHRITIS OF MULTIPLE SITES WITH NEGATIVE RHEUMATOID FACTOR (H): Primary | ICD-10-CM

## 2022-05-10 DIAGNOSIS — F32.A DEPRESSION, UNSPECIFIED DEPRESSION TYPE: ICD-10-CM

## 2022-05-11 RX ORDER — DULOXETIN HYDROCHLORIDE 60 MG/1
60 CAPSULE, DELAYED RELEASE ORAL DAILY
Qty: 90 CAPSULE | Refills: 0 | Status: SHIPPED | OUTPATIENT
Start: 2022-05-11 | End: 2023-04-18

## 2022-05-11 NOTE — TELEPHONE ENCOUNTER
DULoxetine (CYMBALTA) 60 MG capsule   Take 1 capsule (60 mg) by mouth daily      Last Written Prescription Date:  2/11/22  Last Fill Quantity: 90,   # refills: 0  Last Office Visit : 11/2/21  Anxiety  Patient states mood has been good lately.  Feeling better since she got a new job which will have more reasonable hours.     -Continue following with therapist and schedule appointment with [facility].   -Continue duloxetine 60 mg daily as this is working for her  Future Office visit:  none    Routing refill request to provider for review/approval because:   Overdue PHQ9 on 11/2/21 = 4.       *PCP Susana Boo MD added 2/8/22. 90 day charlie refill to pharmacy. Referred to Clinic for follow up and PHQ9.

## 2022-05-12 RX ORDER — ADALIMUMAB 40MG/0.4ML
40 KIT SUBCUTANEOUS
Qty: 1 EACH | Refills: 3 | Status: SHIPPED | OUTPATIENT
Start: 2022-05-12 | End: 2022-08-12

## 2022-05-13 NOTE — TELEPHONE ENCOUNTER
LVM w/ pcc number for pt to schedule clinic visit for med refills - advised pt to call back in a bout a month when provider's schedule has been released.

## 2022-05-20 ENCOUNTER — VIRTUAL VISIT (OUTPATIENT)
Dept: PSYCHOLOGY | Facility: CLINIC | Age: 32
End: 2022-05-20
Payer: COMMERCIAL

## 2022-05-20 DIAGNOSIS — F41.9 ANXIETY: ICD-10-CM

## 2022-05-20 DIAGNOSIS — F33.0 MILD EPISODE OF RECURRENT MAJOR DEPRESSIVE DISORDER (H): Primary | ICD-10-CM

## 2022-05-20 PROCEDURE — 90837 PSYTX W PT 60 MINUTES: CPT | Mod: TEL | Performed by: PSYCHOLOGIST

## 2022-05-20 NOTE — PROGRESS NOTES
"The patient has been notified of the following:      \"We have found that certain health care needs can be provided without the need for a face to face visit.  This service lets us provide the care you need with a phone conversation.       I will have full access to your [place] medical record during this entire phone call.   I will be taking notes for your medical record.      Since this is like an office visit, we will bill your insurance company for this service.       There are potential benefits and risks of telephone visits (e.g. limits to patient confidentiality) that differ from in-person visits.?  Confidentiality still applies for telephone services, and nobody will record the visit.  It is important to be in a quiet, private space that is free of distractions (including cell phone or other devices) during the visit.??      If during the course of the call I believe a telephone visit is not appropriate, you will not be charged for this service\"     Consent has been obtained for this service by care team member: Yes     Health Psychology                      Department of Medicine                     [facility] Mail Code 369     [address] [place] [address], [address]       [address] [facility], 3rd Floor  [address] [place] [address]    Telemental health visit due to mitigation of COVID-19 pandemic, confirmed with patient.    Reason that telemedicine is appropriate: COVID-19 pandemic mitigation requiring social distancing.  Mode of transmission: Telephone  Location of originating and distant sites:    Originating site (patient location): patient's home; In event of emergency, patient verbally consented to having emergency response sent to their location    Distant site (provider site): home office of provider    Health Psychology Follow-Up Note     SUBJECTIVE:  Rachel Fried is a 31 year old female with PMH " significant for rheumatoid arthritis, depression, and anxiety who was seen for individual psychotherapy. Interim history since last session included spending enjoyable time with her brother that provided a brochure to the impact of challenging political stressors.  [place] today focused on continuing to explore her experience in family relationships, particularly with her parents.  She endorsed a conversation recently with her father in which she asked him to directly support her and realistic ways on issues important to her, such as through donations, and shift away from unrealistic expectations of him such as him shifting his political voting.  He was receptive to this and as a result she felt increasingly connected.  However she also endorsed feeling elements of their relationship to reflect his lack of connection to who she is as a person now.  Discussed ways to continue setting direct boundaries with her family and give herself permission for focusing on her needs rather than what is most important to her parents.    This session started late today, and Rachel voiced her concern around the late start to this session and previous sessions, and asked for alterations to be made to start closer to session start time.  I acknowledged her concern, and communicated that I value her time and will make the appropriate changes.  Also expressed gratitude for her ability to share directly with me her needs for an effective therapeutic relationship.    OBJECTIVE:  Appearance/Behavior/Orientation: Alert and oriented to person, place, time, and situation.    Cooperation/Reliability: Patient was open and cooperative throughout the session.    Abstract reasoning: Not assessed.   Judgment: Intact.    Mood/Affect: Mood anxious; affect mood congruent  Insight/Motivation: Good, good     ASSESSMENT:   Appears to be deriving benefit from treatment.      DIAGNOSIS:  Major depressive disorder, recurrent, mild  Anxiety,  unspecified     PLAN:  RTC for continued psychotherapy.    Time in: 12:21 PM  Time out: 1:18 PM  Extended session due to complexity of case and length of interval.     Key Cunha, PhD,   Clinical Health Psychologist     Tx plan completed:             10/12/21  Tx plan due:                        10/12/22    *no letter    OUTPATIENT TREATMENT PLAN SUMMARY    Date of Treatment Plan:  10/12/21  90-Day Review Date:  NA  Date of Initial Service:  4/11/2019        1. DSM-V Diagnosis (include numeric code)  Major depressive disorder, recurrent, mild  Anxiety, unspecified    2. Current symptoms and circumstances that substantiate the diagnosis:  Anxiety    3. How symptoms and/or behaviors are affecting level of function:   Distress related to anxiety, depressive symptoms impairing social and work functioning     4. Risk Assessment:  Suicide:  Assessed Level of Immediate Risk: Low  Ideation: No  Plan:  No  Means: No  Intent: No    Homicide/Violence:  Assessed Level of Immediate Risk: Low  Ideation: No  Plan: No  Means: No  Intent: No    If on a medication, please include name and dosage: Cymbalta      Symptom/Problem Measurable Goals Interventions Gains Made   1.Amotivation 1. Learn strategies to appraoch tasks in realistic segments of work rather than all or nothing 1.CBT 1. TBD   2.Emotion regulation 2. Continue to learn and apply emotion acceptance and regulation strategies 2.CBT 2. TBD   3.Social connection 3. Establish dating relationships 3.CBT 3. TBD       5. Frequency of Sessions: 2 x / Month    6. Discharge and Aftercare Goals: TBD    7. Expected duration of treatment:  TBD     8. Participants in therapy plan (family, friends, support network): Self      See scanned document for Acknowledgement of Current Treatment Plan - Patient signature not available due to telehealth format of session; verbal consent obtained.    Regulatory Guidelines for Updating Treatment Plan  [place] Medical Assistance: Reviewed &  signed at least every 90 days  Medicare:  Update per policy

## 2022-05-22 DIAGNOSIS — R76.8 CYCLIC CITRULLINATED PEPTIDE (CCP) ANTIBODY POSITIVE: ICD-10-CM

## 2022-05-22 DIAGNOSIS — Z79.899 LONG-TERM USE OF PLAQUENIL: ICD-10-CM

## 2022-05-22 DIAGNOSIS — M06.09 RHEUMATOID ARTHRITIS OF MULTIPLE SITES WITH NEGATIVE RHEUMATOID FACTOR (H): ICD-10-CM

## 2022-05-25 RX ORDER — METHOTREXATE 2.5 MG/1
5 TABLET ORAL
Qty: 20 TABLET | Refills: 0 | Status: SHIPPED | OUTPATIENT
Start: 2022-05-25 | End: 2022-06-02

## 2022-05-25 NOTE — TELEPHONE ENCOUNTER
methotrexate 2.5 MG tablet      Last Written Prescription Date:  1/19/2022  Last Fill Quantity: 20,   # refills: 1  Last Office Visit: 1/19/2022  Future Office visit:  8/12/2022    CBC RESULTS: Recent Labs   Lab Test 02/19/22  0907   WBC 8.1   RBC 4.91   HGB 13.6   HCT 41.8   MCV 85   MCH 27.7   MCHC 32.5   RDW 14.6          Creatinine   Date Value Ref Range Status   02/19/2022 0.63 0.52 - 1.04 mg/dL Final   07/05/2021 0.71 0.52 - 1.04 mg/dL Final   ]    Liver Function Studies -   Recent Labs   Lab Test 02/19/22  0907 12/04/18  0824 10/31/18  1505   PROTTOTAL  --   --  7.7   ALBUMIN 3.2*   < > 3.5   BILITOTAL  --   --  0.2   ALKPHOS  --   --  77   AST 14   < > 16   ALT 16   < > 22    < > = values in this interval not displayed.       Routing refill request to provider/staff for review/approval because:  DMARD medication.  - labs due (last 2/19/2022)

## 2022-06-01 ENCOUNTER — LAB (OUTPATIENT)
Dept: LAB | Facility: CLINIC | Age: 32
End: 2022-06-01
Payer: COMMERCIAL

## 2022-06-01 DIAGNOSIS — Z51.81 ENCOUNTER FOR MEDICATION MONITORING: ICD-10-CM

## 2022-06-01 LAB
BASOPHILS # BLD AUTO: 0 10E3/UL (ref 0–0.2)
BASOPHILS NFR BLD AUTO: 0 %
EOSINOPHIL # BLD AUTO: 0.1 10E3/UL (ref 0–0.7)
EOSINOPHIL NFR BLD AUTO: 1 %
ERYTHROCYTE [DISTWIDTH] IN BLOOD BY AUTOMATED COUNT: 14 % (ref 10–15)
HCT VFR BLD AUTO: 41.9 % (ref 35–47)
HGB BLD-MCNC: 13.7 G/DL (ref 11.7–15.7)
LYMPHOCYTES # BLD AUTO: 4 10E3/UL (ref 0.8–5.3)
LYMPHOCYTES NFR BLD AUTO: 34 %
MCH RBC QN AUTO: 28.2 PG (ref 26.5–33)
MCHC RBC AUTO-ENTMCNC: 32.7 G/DL (ref 31.5–36.5)
MCV RBC AUTO: 86 FL (ref 78–100)
MONOCYTES # BLD AUTO: 1.1 10E3/UL (ref 0–1.3)
MONOCYTES NFR BLD AUTO: 9 %
NEUTROPHILS # BLD AUTO: 6.4 10E3/UL (ref 1.6–8.3)
NEUTROPHILS NFR BLD AUTO: 55 %
PLATELET # BLD AUTO: 357 10E3/UL (ref 150–450)
RBC # BLD AUTO: 4.85 10E6/UL (ref 3.8–5.2)
WBC # BLD AUTO: 11.6 10E3/UL (ref 4–11)

## 2022-06-01 PROCEDURE — 82565 ASSAY OF CREATININE: CPT

## 2022-06-01 PROCEDURE — 85025 COMPLETE CBC W/AUTO DIFF WBC: CPT

## 2022-06-01 PROCEDURE — 84450 TRANSFERASE (AST) (SGOT): CPT

## 2022-06-01 PROCEDURE — 36415 COLL VENOUS BLD VENIPUNCTURE: CPT

## 2022-06-01 PROCEDURE — 82040 ASSAY OF SERUM ALBUMIN: CPT

## 2022-06-01 PROCEDURE — 84460 ALANINE AMINO (ALT) (SGPT): CPT

## 2022-06-02 DIAGNOSIS — R76.8 CYCLIC CITRULLINATED PEPTIDE (CCP) ANTIBODY POSITIVE: ICD-10-CM

## 2022-06-02 DIAGNOSIS — M06.09 RHEUMATOID ARTHRITIS OF MULTIPLE SITES WITH NEGATIVE RHEUMATOID FACTOR (H): ICD-10-CM

## 2022-06-02 DIAGNOSIS — Z79.899 LONG-TERM USE OF PLAQUENIL: ICD-10-CM

## 2022-06-02 LAB
ALBUMIN SERPL-MCNC: 3.6 G/DL (ref 3.4–5)
ALT SERPL W P-5'-P-CCNC: 22 U/L (ref 0–50)
AST SERPL W P-5'-P-CCNC: 15 U/L (ref 0–45)
CREAT SERPL-MCNC: 0.63 MG/DL (ref 0.52–1.04)
GFR SERPL CREATININE-BSD FRML MDRD: >90 ML/MIN/1.73M2

## 2022-06-02 RX ORDER — METHOTREXATE 2.5 MG/1
5 TABLET ORAL
Qty: 60 TABLET | Refills: 1 | Status: SHIPPED | OUTPATIENT
Start: 2022-06-02 | End: 2022-08-12

## 2022-08-08 ASSESSMENT — ENCOUNTER SYMPTOMS
BACK PAIN: 0
CHILLS: 0
ARTHRALGIAS: 1
NECK PAIN: 0
NIGHT SWEATS: 0
WEIGHT LOSS: 0
POLYDIPSIA: 0
DECREASED APPETITE: 0
FATIGUE: 1
MUSCLE CRAMPS: 0
ALTERED TEMPERATURE REGULATION: 0
MYALGIAS: 0
POLYPHAGIA: 0
FEVER: 0
WEIGHT GAIN: 0
MUSCLE WEAKNESS: 0
INCREASED ENERGY: 0
HALLUCINATIONS: 0
JOINT SWELLING: 0
STIFFNESS: 1

## 2022-08-09 ENCOUNTER — MYC MEDICAL ADVICE (OUTPATIENT)
Dept: INTERNAL MEDICINE | Facility: CLINIC | Age: 32
End: 2022-08-09

## 2022-08-09 ENCOUNTER — OFFICE VISIT (OUTPATIENT)
Dept: INTERNAL MEDICINE | Facility: CLINIC | Age: 32
End: 2022-08-09
Payer: COMMERCIAL

## 2022-08-09 VITALS
SYSTOLIC BLOOD PRESSURE: 111 MMHG | BODY MASS INDEX: 37.65 KG/M2 | TEMPERATURE: 98.2 F | HEIGHT: 69 IN | OXYGEN SATURATION: 98 % | WEIGHT: 254.2 LBS | DIASTOLIC BLOOD PRESSURE: 79 MMHG | HEART RATE: 85 BPM

## 2022-08-09 DIAGNOSIS — L70.0 ACNE VULGARIS: ICD-10-CM

## 2022-08-09 DIAGNOSIS — M06.09 RHEUMATOID ARTHRITIS OF MULTIPLE SITES WITH NEGATIVE RHEUMATOID FACTOR (H): ICD-10-CM

## 2022-08-09 DIAGNOSIS — Z00.00 HEALTH MAINTENANCE EXAMINATION: ICD-10-CM

## 2022-08-09 DIAGNOSIS — F41.9 ANXIETY: ICD-10-CM

## 2022-08-09 DIAGNOSIS — J45.909 ASTHMA, UNSPECIFIED ASTHMA SEVERITY, UNSPECIFIED WHETHER COMPLICATED, UNSPECIFIED WHETHER PERSISTENT: ICD-10-CM

## 2022-08-09 DIAGNOSIS — J45.909 UNCOMPLICATED ASTHMA, UNSPECIFIED ASTHMA SEVERITY, UNSPECIFIED WHETHER PERSISTENT: Primary | ICD-10-CM

## 2022-08-09 DIAGNOSIS — F32.A DEPRESSION, UNSPECIFIED DEPRESSION TYPE: ICD-10-CM

## 2022-08-09 DIAGNOSIS — F32.A DEPRESSION, UNSPECIFIED DEPRESSION TYPE: Primary | ICD-10-CM

## 2022-08-09 DIAGNOSIS — E66.812 CLASS 2 OBESITY WITHOUT SERIOUS COMORBIDITY WITH BODY MASS INDEX (BMI) OF 37.0 TO 37.9 IN ADULT, UNSPECIFIED OBESITY TYPE: ICD-10-CM

## 2022-08-09 PROCEDURE — 99395 PREV VISIT EST AGE 18-39: CPT | Mod: GE

## 2022-08-09 ASSESSMENT — PAIN SCALES - GENERAL: PAINLEVEL: MILD PAIN (3)

## 2022-08-09 ASSESSMENT — ANXIETY QUESTIONNAIRES
GAD7 TOTAL SCORE: 2
5. BEING SO RESTLESS THAT IT IS HARD TO SIT STILL: NOT AT ALL
GAD7 TOTAL SCORE: 2
IF YOU CHECKED OFF ANY PROBLEMS ON THIS QUESTIONNAIRE, HOW DIFFICULT HAVE THESE PROBLEMS MADE IT FOR YOU TO DO YOUR WORK, TAKE CARE OF THINGS AT HOME, OR GET ALONG WITH OTHER PEOPLE: SOMEWHAT DIFFICULT
2. NOT BEING ABLE TO STOP OR CONTROL WORRYING: SEVERAL DAYS
7. FEELING AFRAID AS IF SOMETHING AWFUL MIGHT HAPPEN: NOT AT ALL
3. WORRYING TOO MUCH ABOUT DIFFERENT THINGS: NOT AT ALL
6. BECOMING EASILY ANNOYED OR IRRITABLE: NOT AT ALL
1. FEELING NERVOUS, ANXIOUS, OR ON EDGE: SEVERAL DAYS

## 2022-08-09 ASSESSMENT — PATIENT HEALTH QUESTIONNAIRE - PHQ9
5. POOR APPETITE OR OVEREATING: NOT AT ALL
SUM OF ALL RESPONSES TO PHQ QUESTIONS 1-9: 2

## 2022-08-09 NOTE — PATIENT INSTRUCTIONS
Park Ramos,    It was a pleasure seeing you in the clinic today. Keep up the great work you are doing. Feel free to reach out if you need anything. You do qualify for a 5th Covid Booster and you can discuss this further with Dr Aldridge if you would like. Please remember to get your flu shot as well. You do not need an additional Pneumococcal vaccination at this time.     Please do not hesitate to reach out to me or schedule an appointment if you have any questions or concerns.     All the best,    Dr. Boo

## 2022-08-09 NOTE — PROGRESS NOTES
"  PRIMARY CARE CENTER         HPI:      HPI:  Rachel Fried is a 31 year old female with PMH including Rheumatoid Arthritis, asthma, depression, anxiety and obesity who presents for her annual physical exam.     Last RA flare about 2 weeks ago. Did 1 week taper of 20 mg prednisone. Started to taper her methotrexate in January. Doesn't think the methotrexate taper is contributing to increased pain. She has been restricting her lifestyle ad realizes she is in more pain. Pain today is a 3/10, mostly in her knees and some in her hands.     Has appt with new therapist this Friday who is experienced with mental health for queer population. Feels a majority of mental health issues are related to being queer. Has been having a stressful summer. Has family tension with parents regarding being queer.     She is trying to be active and swim 2x/week. Hard to exercise when she is in pain. She has not had a lot of energy to meal prep. She is back on Nutrisystem now.     Work has been going well. Working in new law office. Enjoying new job. Just went to [employer] enjoyed it. Not in a relationship right now.     Denies fatigue,  fevers, chills, CP, SOB, abd pain, N/V/D and BLE edema    Family History:  -mother had psoriasis and maternal GF had \"skin lupus\"    Social History:  EtOH: maybe 1/week  Smoking/ilicit drugs- some hemp low dose 2x/month  Occupation:     Medications:  Current Outpatient Medications   Medication     adalimumab (HUMIRA *CF* PEN) 40 MG/0.4ML pen kit     albuterol (PROAIR HFA/PROVENTIL HFA/VENTOLIN HFA) 108 (90 Base) MCG/ACT inhaler     Albuterol Sulfate (PROVENTIL HFA IN)     cetirizine HCl (ZYRTEC ALLERGY) 10 MG CAPS     Cholecalciferol (VITAMIN D) 2000 units tablet     drospirenone-ethinyl estradiol (JOSE) 3-0.02 MG tablet     DULoxetine (CYMBALTA) 60 MG capsule     DULoxetine (CYMBALTA) 60 MG capsule     folic acid (FOLVITE) 1 MG tablet     gabapentin (NEURONTIN) 100 MG capsule     hydroquinone 4 % CREA "     LORYNA 3-0.02 MG tablet     methotrexate sodium 2.5 MG TABS     predniSONE (DELTASONE) 5 MG tablet     tretinoin (RETIN-A) 0.025 % external gel     tretinoin (RETIN-A) 0.05 % cream     triamcinolone (KENALOG) 0.1 % ointment     vitamin D2 (ERGOCALCIFEROL) 85665 units (1250 mcg) capsule     No current facility-administered medications for this visit.        Allergies:     Allergies   Allergen Reactions     Peanut-Derived Hives     Sulfa Drugs Hives and Itching       Medical History:  Past Medical History:   Diagnosis Date     Allergic dermatitis      Anxiety      Depression      Pityriasis lichenoides      Rheumatoid arthritis (H)        Surgical History:  No past surgical history on file.           Review of Systems:     10 point ROS negative unless otherwise specified in HPI  ROS  I have personally reviewed and updated the complete ROS on the day of the visit.           Physical Exam:   There were no vitals taken for this visit.  There is no height or weight on file to calculate BMI.  Vitals were reviewed    Physical Exam:   General: Sitting upright, talking in complete sentences, non-distressed  HEENT: Normocephalic, atraumatic, PERRL, EOMI, no conjunctival pallor, anicteric, nares patent, oropharynx clear and moist  CVS: S1/S2 WNL, RRR, no murmur, no LE edema  Pulm: Non-labored breathing, vesicular breath sounds, no crackles or wheeze  Abdo: Non-distended, non-tender to palpation, no rebound or guarding, BS present   MSK: No obvious bony deformities, no clubbing  Skin: Warm and dry, no obvious rashes  Neuro: Alert and oriented x3, non-focal   Psych: Normal mood and affect       Assessment and Plan     Diagnoses and all orders for this visit:    Health maintenance examination    HCM  -Colonoscopy: n/a   -Mammogram: n/a  -Pap/HPV: normal pap in 7/2021, next due 7/2026  -STI/HIV: declined, never sexually active  -HCV: nonreactive in 2018  -DM:   -HTN: controlled 111/79 today  -Lipids: will check baseline today  given BMI  -Smoking/AAA: n/a  -EtOH: minimal  -DEXA: n/a  -Immunizations: Got 2nd Covid booster in April 2022. Needs flu shot. No need for additional prevnar at this time. She does qualify for a 5th Covid Booster and will discuss this further with Dr Aldridge at upcoming appt.      Rheumatoid arthritis of multiple sites with negative rheumatoid factor (H)  Last Seen by Dr Aldridge in 1/2022. On Humira and Methotrexate. LFTs, Creatinine and CBC stable in 6/2022.   -Continue Humira and Methotrexate  -F/u with Dr Aldridge as scheduled on Friday  -Can discuss medical marijuana if pain is not well controlled in coming months with DMARDs    Depression, unspecified depression type  Anxiety  -Continue Cymbalta 60 mg daily  -Has appt with new therapist, Ludmila Zambrano     Acne vulgaris  -Continue Tretinoin    Class 2 obesity without serious comorbidity with body mass index (BMI) of 37.0 to 37.9 in adult, unspecified obesity type  Started swimming 2x/week and nutrisystem. Encouraged pt in her weight loss journey  -Will check lipid panel and A1c today for baseline    Asthma  -Continue Albuterol    RTC in 1 year for annual exam     Additional Issues:    Options for treatment and follow-up care were reviewed with the patient. Rachel Fried engaged in the decision making process and verbalized understanding of the options discussed and agreed with the final plan.      Pt was seen and plan of care discussed with Dr kami Merrill MD.    Susana Boo MD  Internal Medicine-PGY2  [facility]  Pager: [phone]  Aug 9, 2022     While the patient was in clinic, I reviewed the pertinent medical history and results.  I discussed the current findings on physical examination, as well as the patient s diagnosis and treatment plan with the resident and agree with the information as documented with the following exceptions: none.  Kami Merrill MD  Internal Medicine

## 2022-08-09 NOTE — NURSING NOTE
Chief Complaint   Patient presents with     Physical     Yearly physical.       Vic Remy CMA, EMT at 8:24 AM on 8/9/2022.

## 2022-08-12 ENCOUNTER — OFFICE VISIT (OUTPATIENT)
Dept: RHEUMATOLOGY | Facility: CLINIC | Age: 32
End: 2022-08-12
Attending: INTERNAL MEDICINE
Payer: COMMERCIAL

## 2022-08-12 VITALS
OXYGEN SATURATION: 97 % | DIASTOLIC BLOOD PRESSURE: 80 MMHG | WEIGHT: 252.8 LBS | SYSTOLIC BLOOD PRESSURE: 112 MMHG | BODY MASS INDEX: 37.44 KG/M2 | HEART RATE: 87 BPM | HEIGHT: 69 IN

## 2022-08-12 DIAGNOSIS — Z51.81 ENCOUNTER FOR MEDICATION MONITORING: Primary | ICD-10-CM

## 2022-08-12 DIAGNOSIS — M06.09 RHEUMATOID ARTHRITIS OF MULTIPLE SITES WITH NEGATIVE RHEUMATOID FACTOR (H): ICD-10-CM

## 2022-08-12 PROCEDURE — G0463 HOSPITAL OUTPT CLINIC VISIT: HCPCS

## 2022-08-12 PROCEDURE — 99214 OFFICE O/P EST MOD 30 MIN: CPT | Performed by: INTERNAL MEDICINE

## 2022-08-12 RX ORDER — PREDNISONE 5 MG/1
TABLET ORAL
Qty: 30 TABLET | Refills: 0 | Status: SHIPPED | OUTPATIENT
Start: 2022-08-12 | End: 2022-10-03

## 2022-08-12 RX ORDER — TOCILIZUMAB 180 MG/ML
162 INJECTION, SOLUTION SUBCUTANEOUS WEEKLY
Qty: 3.6 ML | Refills: 3 | Status: SHIPPED | OUTPATIENT
Start: 2022-08-12 | End: 2022-12-30

## 2022-08-12 ASSESSMENT — PAIN SCALES - GENERAL: PAINLEVEL: MILD PAIN (3)

## 2022-08-12 NOTE — LETTER
8/12/2022       RE: Rachel Fried  [address]  [facility] [address]     Dear Colleague,    Thank you for referring your patient, Rachel Fried, to the [facility] [place] [facility]. Please see a copy of my visit note below.    Last seen:  1/19/20222  DOS: 8/12/2022    Reason for visit: RA    [facility] - Rheumatology In Person Clinic Visit    Rachel Fried  is a 30 year old here for follow-up  RHEUMATOID ARTHRITIS (RA) involving hands, wrists, ankles, shoulders feet [ -RF and low CCP 25, -factor V -KAVITHA by IFA and -NADEEN panel -HLA B27] and pityriasis lichenoides chronica (seen [facility]-established here). XR  no erosions hands. Moved from [place] to be with family.       Past- hydroxychloroquine (plaquenil) since 1-2016 (2 tablet a day caused diarrhea, ok on 1 tablet) and methotrexate 10 mg week  to 4-2018 (mild fatigue day of) then restarted 10- (higher folic acid improved side-effects), naproxsyn; allergy sulfa, prednisone, advil       Initial visit copy forward  10-: Hydroxychloroquine (plaquenil) BID mild diarrhea; ocular eye exam 3-2016 baseline reports recent eye exam   EKG was recently Feb 2018 normal. Stopped methotrexate  In April as wanted to take a drug holiday and was moving here, also wanted to drink some alcohol, and discuss options. No infections. Diffuse joint pains in hands, wrists, shoulders, ankles and feet and the sides of her hips. Taking advil 600mg AM and 400 mg PM, tolerating but taking since April. Pain severe 5/10 or more. Stiffness all day. Hands are sore. No loss of function or ROM. Very sore and stiff all over. Knees are sore and hard to bend or squat. Mild diarrhea with hydroxychloroquine (plaquenil)     December 16, 2020  Denies any fever, chills, SOB, CANDELARIA, night sweats, or chest pain, high fever, cough, travel in last 14 days or exposure to covid-19  (coronavirus). Reports healthy. Follows CDC guidelines. Works from home.     Rheumatoid arthritis (RA) stiff with pain in in hands and wrists, stiff in mornings for few hours. Not able to do her massage or swim, does walk and not doing normal routine which is causing this as well. Sleep is poor now and hip pain. Upper neck discomfort from stress.     Continues on methotrexate  12.5 mg every 7 days THURS, folic acid 2 mg day. Tolerating. Hydroxychloroquine (plaquenil) 200 mg every day  misses most days so takes <3 times a week. No GI side-effects, no hairloss or oral sores from methotrexate moreso from hydroxychloroquine (plaquenil).  Off celexa -lost 12 lbs. 197 lb last visit but this not now due to Covid-19 (coronavirus).   No longer using NSAIDs. Taking vitamin D 2000 international unit(s) day.  No EAS. No vision issues. Is exercising and doing yogo now. Goes to [facility] eye did last 3-2019 but was due this past Spring. Willing to come here if can do hydroxychloroquine (plaquenil) toxicity with glasses and contacts in 1 visit. Otherwise, will continue with hers.     4/23/21:  The patient states that she is doing ok with her RA. She states that her joint symptoms have slightly worsened during pandemic because she is not going swimming, which is traditionally very helpful for her.   The patient just got vaccinated and plans to start going swimming in May, which she thinks will help her joints. She has no active swelling now.  She is tolerating methotrexate at 12.5 mg qWeek and also on  mg every day (can not tolerate higher dose sec GI SEs).   The patient is taking ibuprofen 2-4 tabs for the last couple of weeks.  No plans to get pregnant. Not on birth control at this time, but states currently only dating women.  She otherwise feels well with no other concer     10/28/2021:    Doing ok for most part with her RA. AM stiffness is 1.5 hr. Knees and ankles hurt and swell up a little bit and back hurts with  standing for too long, but has hard time to say if her joints are swollen. On MTX 5 tabs a wk, last time increased her MTX, got brain fog (has it with MTX current dose but it got worse) and migraines and nausea. It ruined her Sturdays. She takes NSAIDs rarely like going to a concert. Used to be naproxen every day before RA Dx. Since stopping HCQ every other day is having more joint sx, but hard to tell, HCQ causes her diarrhea at higher dose, tried to get generic brand but was never able to get it from pharmacy.      Had covid booster vaccine, no SE.    1/19/2022: Rachel presents for follow up. Humira was added at last visit. Did 4 shots of humira, right before brendan, had last shot, now out. No SEs and it significantly helped with joint pain/stiffness, now symptoms are slowly returning.    Reports 1 hr of knee stiffness, 20 min of hand stiffness.    Reports brain fog 24-48 hours after taking methotrexate.      Today 8/12/2022: Rachel presents for follow up. Did not tolerate MTX, HCQ in the past. Back on humira. Has ongoing arthralgia, did not realize how much pain she had tills he took the prednisone which made her feel normal, pain free. Now realized that humira was not helping her as much as she thought. She is hoping to try a med which works as well as prednisone. Wondering if she could try medical cannabis.    Most pain is localized to her hands, which limits how she uses her hands.    Am stiffness is >30 min. Can not tell if joints swell up or not. No rash, cough, SOB. Regained 50 lbs recently.    MTX caused her brain fog, headaches. Felt miserable on it.        PMH:  Injury-no  Medical-dermatitis, (bx 2-2016 GA vs LP vs cutaneous lupus--was not cutenaous lupus --focal interstitial lymphocytic inflammation, scattered lymphocytes), anxiety and depression, exercise induced asthma, scoliosis, pityriasis lichenoides chronica, chronic back pain, hypertension, seasonal allergies, acne, motion sickness, high  "cholesterol, chicken pox, headaches before    Surgical-None     FH:  No autoimmune disorders, psoriasis, UC, crohn's, RA, PsA, gout, autoimmune thyroid.  No MS, heart disease or cancer in family  Mother-factor V leiden carrier, rosea   Father-healthy  Siblings-younger bro healthy  Children-None   GF-lupus   Uncle cancer colon    SH:  Social Alcohol occasional week. Never Smoking. No IVDU. No Children. Right handed. Single--not sexually active, never.        PMS personally reviewed and updated by me.    ROS:  A comprehensive ROS was done. Positives are per HPI.      Ph. E:    /80   Pulse 87   Ht 1.753 m (5' 9\")   Wt 114.7 kg (252 lb 12.8 oz)   SpO2 97%   BMI 37.33 kg/m      Constitutional: NAD, pleasant  Eyes: nl EOM, conjunctiva, sclera  HEENT: no oral ulcers  RESP: CTAB  CV: no M/R/G, RRR  Abdomen: soft, NT  Skin: no rash  Neuro: grossly non focal  Psych: nl affect  MSK: no active synovitis but painful fist making.        Labs/Imaging:  Normal G6PD  Neg MTB QG 2016 and hepatitis panel, negative HIV    XR hands and knees-negative 2016   Component      Latest Ref Rng & Units 11/17/2020   WBC      4.0 - 11.0 10e9/L 9.6   RBC Count      3.8 - 5.2 10e12/L 4.72   Hemoglobin      11.7 - 15.7 g/dL 13.8   Hematocrit      35.0 - 47.0 % 42.9   MCV      78 - 100 fl 91   MCH      26.5 - 33.0 pg 29.2   MCHC      31.5 - 36.5 g/dL 32.2   RDW      10.0 - 15.0 % 12.9   Platelet Count      150 - 450 10e9/L 389   Creatinine      0.52 - 1.04 mg/dL 0.66   GFR Estimate      >60 mL/min/1.73:m2 >90   GFR Estimate If Black      >60 mL/min/1.73:m2 >90   CRP Inflammation      0.0 - 8.0 mg/L 9.0 (H)   Albumin      3.4 - 5.0 g/dL 3.4   ALT      0 - 50 U/L 16   AST      0 - 45 U/L 10     Impression/Plan:  1. Seropositive (pos anti-CCP, neg RF )rheumatoid arthritis (RA) non-erosive. Active RA on methotrexate 12.5 mg weekly, worse after stopping HCQ due to diarrhea (and never was successful to get generic brand HCQ, brand is too " expensive). Did not tolerate MTX even at lowest effective dose of 10 mg qwk due to nausea, headaches, brain fog and nausea. Was placed on humira q2wk, initially thought it was helping but after feeling normal by taking prednisone taper ( 20 mg every day max) realized that humira was not helping much.    Recommend to proceed with actemra as next step; risks were discussed.    I support her using medical cannabis, no interaction with RA meds and could be beneficial as non steroid agent to help managing pain, it might have some anti-inflammatory benefit and it is not addictive. I advsied her to reach out to her PCP jenny nichols for Rx.    2. Vitamin D deficeincy- take 2000 international unit(s) day.     3. Pityriasis lichenoids chronic-per derm     5. Chronic NSAID use. Will check labs q6-12 mo.        Plan:    Stop methotrexate    Prednisone as needed : 4tab=20 mg qd x 5 days, 3tab=15 mg qd x 5 days, 2tab=10 mg qd x 5 days, 1 tab=5 mg qd x 5 days then stop     Last humira tomorrow 8/13    1st actemra 8/26    Labs one month after start actemra      Return in 2 months (video visit)    Naresh Aldridge MD        Orders Placed This Encounter   Procedures     ALT     Albumin level     AST     Creatinine     CBC with Platelets & Differential         Naresh Aldridge MD

## 2022-08-12 NOTE — PATIENT INSTRUCTIONS
Stop methotrexate    Prednisone as needed : 4tab=20 mg qd x 5 days, 3tab=15 mg qd x 5 days, 2tab=10 mg qd x 5 days, 1 tab=5 mg qd x 5 days then stop     Last humira tomorrow 8/13    1st actemra 8/26    Labs one month after start actemra      Return in 2 months (video visit)

## 2022-08-12 NOTE — NURSING NOTE
"Chief Complaint   Patient presents with     RECHECK     Follow up visit     /80   Pulse 87   Ht 1.753 m (5' 9\")   Wt 114.7 kg (252 lb 12.8 oz)   SpO2 97%   BMI 37.33 kg/m      Angelica Townsend MA on 8/12/2022 at 3:03 PM    "

## 2022-08-12 NOTE — PROGRESS NOTES
Last seen:  1/19/20222  DOS: 8/12/2022    Reason for visit: RA    [facility] - Rheumatology In Person Clinic Visit    Rachel Fried  is a 30 year old here for follow-up  RHEUMATOID ARTHRITIS (RA) involving hands, wrists, ankles, shoulders feet [ -RF and low CCP 25, -factor V -KAVITHA by IFA and -NADEEN panel -HLA B27] and pityriasis lichenoides chronica (seen [facility]-established here). XR  no erosions hands. Moved from [place] to be with family.       Past- hydroxychloroquine (plaquenil) since 1-2016 (2 tablet a day caused diarrhea, ok on 1 tablet) and methotrexate 10 mg week  to 4-2018 (mild fatigue day of) then restarted 10- (higher folic acid improved side-effects), naproxsyn; allergy sulfa, prednisone, advil       Initial visit copy forward  10-: Hydroxychloroquine (plaquenil) BID mild diarrhea; ocular eye exam 3-2016 baseline reports recent eye exam   EKG was recently Feb 2018 normal. Stopped methotrexate  In April as wanted to take a drug holiday and was moving here, also wanted to drink some alcohol, and discuss options. No infections. Diffuse joint pains in hands, wrists, shoulders, ankles and feet and the sides of her hips. Taking advil 600mg AM and 400 mg PM, tolerating but taking since April. Pain severe 5/10 or more. Stiffness all day. Hands are sore. No loss of function or ROM. Very sore and stiff all over. Knees are sore and hard to bend or squat. Mild diarrhea with hydroxychloroquine (plaquenil)     December 16, 2020  Denies any fever, chills, SOB, CANDELARIA, night sweats, or chest pain, high fever, cough, travel in last 14 days or exposure to covid-19 (coronavirus). Reports healthy. Follows CDC guidelines. Works from home.     Rheumatoid arthritis (RA) stiff with pain in in hands and wrists, stiff in mornings for few hours. Not able to do her massage or swim, does walk and not doing normal routine which is causing this as well. Sleep is poor now and hip pain.  Upper neck discomfort from stress.     Continues on methotrexate  12.5 mg every 7 days THURS, folic acid 2 mg day. Tolerating. Hydroxychloroquine (plaquenil) 200 mg every day  misses most days so takes <3 times a week. No GI side-effects, no hairloss or oral sores from methotrexate moreso from hydroxychloroquine (plaquenil).  Off celexa -lost 12 lbs. 197 lb last visit but this not now due to Covid-19 (coronavirus).   No longer using NSAIDs. Taking vitamin D 2000 international unit(s) day.  No EAS. No vision issues. Is exercising and doing yogo now. Goes to [facility] eye did last 3-2019 but was due this past Spring. Willing to come here if can do hydroxychloroquine (plaquenil) toxicity with glasses and contacts in 1 visit. Otherwise, will continue with hers.     4/23/21:  The patient states that she is doing ok with her RA. She states that her joint symptoms have slightly worsened during pandemic because she is not going swimming, which is traditionally very helpful for her.   The patient just got vaccinated and plans to start going swimming in May, which she thinks will help her joints. She has no active swelling now.  She is tolerating methotrexate at 12.5 mg qWeek and also on  mg every day (can not tolerate higher dose sec GI SEs).   The patient is taking ibuprofen 2-4 tabs for the last couple of weeks.  No plans to get pregnant. Not on birth control at this time, but states currently only dating women.  She otherwise feels well with no other concer     10/28/2021:    Doing ok for most part with her RA. AM stiffness is 1.5 hr. Knees and ankles hurt and swell up a little bit and back hurts with standing for too long, but has hard time to say if her joints are swollen. On MTX 5 tabs a wk, last time increased her MTX, got brain fog (has it with MTX current dose but it got worse) and migraines and nausea. It ruined her Sturdays. She takes NSAIDs rarely like going to a concert. Used to be naproxen every day  before RA Dx. Since stopping HCQ every other day is having more joint sx, but hard to tell, HCQ causes her diarrhea at higher dose, tried to get generic brand but was never able to get it from pharmacy.      Had covid booster vaccine, no SE.    1/19/2022: Rachel presents for follow up. Humira was added at last visit. Did 4 shots of humira, right before brendan, had last shot, now out. No SEs and it significantly helped with joint pain/stiffness, now symptoms are slowly returning.    Reports 1 hr of knee stiffness, 20 min of hand stiffness.    Reports brain fog 24-48 hours after taking methotrexate.      Today 8/12/2022: Rachel presents for follow up. Did not tolerate MTX, HCQ in the past. Back on humira. Has ongoing arthralgia, did not realize how much pain she had tills he took the prednisone which made her feel normal, pain free. Now realized that humira was not helping her as much as she thought. She is hoping to try a med which works as well as prednisone. Wondering if she could try medical cannabis.    Most pain is localized to her hands, which limits how she uses her hands.    Am stiffness is >30 min. Can not tell if joints swell up or not. No rash, cough, SOB. Regained 50 lbs recently.    MTX caused her brain fog, headaches. Felt miserable on it.        PMH:  Injury-no  Medical-dermatitis, (bx 2-2016 GA vs LP vs cutaneous lupus--was not cutenaous lupus --focal interstitial lymphocytic inflammation, scattered lymphocytes), anxiety and depression, exercise induced asthma, scoliosis, pityriasis lichenoides chronica, chronic back pain, hypertension, seasonal allergies, acne, motion sickness, high cholesterol, chicken pox, headaches before    Surgical-None     FH:  No autoimmune disorders, psoriasis, UC, crohn's, RA, PsA, gout, autoimmune thyroid.  No MS, heart disease or cancer in family  Mother-factor V leiden carrier, rosea   Father-healthy  Siblings-younger bro healthy  Children-None   GF-lupus   Uncle  "cancer colon    SH:  Social Alcohol occasional week. Never Smoking. No IVDU. No Children. Right handed. Single--not sexually active, never.        [facility] personally reviewed and updated by me.    ROS:  A comprehensive ROS was done. Positives are per HPI.      Ph. E:    /80   Pulse 87   Ht 1.753 m (5' 9\")   Wt 114.7 kg (252 lb 12.8 oz)   SpO2 97%   BMI 37.33 kg/m      Constitutional: NAD, pleasant  Eyes: nl EOM, conjunctiva, sclera  HEENT: no oral ulcers  RESP: CTAB  CV: no M/R/G, RRR  Abdomen: soft, NT  Skin: no rash  Neuro: grossly non focal  Psych: nl affect  MSK: no active synovitis but painful fist making.        Labs/Imaging:  Normal G6PD  Neg MTB QG 2016 and hepatitis panel, negative HIV    XR hands and knees-negative 2016   Component      Latest Ref Rng & Units 11/17/2020   WBC      4.0 - 11.0 10e9/L 9.6   RBC Count      3.8 - 5.2 10e12/L 4.72   Hemoglobin      11.7 - 15.7 g/dL 13.8   Hematocrit      35.0 - 47.0 % 42.9   MCV      78 - 100 fl 91   MCH      26.5 - 33.0 pg 29.2   MCHC      31.5 - 36.5 g/dL 32.2   RDW      10.0 - 15.0 % 12.9   Platelet Count      150 - 450 10e9/L 389   Creatinine      0.52 - 1.04 mg/dL 0.66   GFR Estimate      >60 mL/min/1.73:m2 >90   GFR Estimate If Black      >60 mL/min/1.73:m2 >90   CRP Inflammation      0.0 - 8.0 mg/L 9.0 (H)   Albumin      3.4 - 5.0 g/dL 3.4   ALT      0 - 50 U/L 16   AST      0 - 45 U/L 10     Impression/Plan:  1. Seropositive (pos anti-CCP, neg RF )rheumatoid arthritis (RA) non-erosive. Active RA on methotrexate 12.5 mg weekly, worse after stopping HCQ due to diarrhea (and never was successful to get generic brand HCQ, brand is too expensive). Did not tolerate MTX even at lowest effective dose of 10 mg qwk due to nausea, headaches, brain fog and nausea. Was placed on humira q2wk, initially thought it was helping but after feeling normal by taking prednisone taper ( 20 mg every day max) realized that humira was not helping much.    Recommend " to proceed with actemra as next step; risks were discussed.    I support her using medical cannabis, no interaction with RA meds and could be beneficial as non steroid agent to help managing pain, it might have some anti-inflammatory benefit and it is not addictive. I advsied her to reach out to her PCP jenny nichols for Rx.    2. Vitamin D deficeincy- take 2000 international unit(s) day.     3. Pityriasis lichenoids chronic-per derm     5. Chronic NSAID use. Will check labs q6-12 mo.        Plan:    Stop methotrexate    Prednisone as needed : 4tab=20 mg qd x 5 days, 3tab=15 mg qd x 5 days, 2tab=10 mg qd x 5 days, 1 tab=5 mg qd x 5 days then stop     Last humira tomorrow 8/13    1st actemra 8/26    Labs one month after start actemra      Return in 2 months (video visit)    Naresh Aldridge MD        Orders Placed This Encounter   Procedures     ALT     Albumin level     AST     Creatinine     CBC with Platelets & Differential

## 2022-08-15 ENCOUNTER — TELEPHONE (OUTPATIENT)
Dept: RHEUMATOLOGY | Facility: CLINIC | Age: 32
End: 2022-08-15

## 2022-08-15 NOTE — TELEPHONE ENCOUNTER
PA Initiation    Medication: Actemra pending  Insurance Company: [employer] - Phone [phone] Fax [phone]  Pharmacy Filling the Rx: [facility] - [place], [place] - [facility] [address]  Filling Pharmacy Phone:    Filling Pharmacy Fax:    Start Date: 8/15/2022    [id number]

## 2022-08-20 RX ORDER — DULOXETIN HYDROCHLORIDE 60 MG/1
60 CAPSULE, DELAYED RELEASE ORAL DAILY
Qty: 90 CAPSULE | Refills: 1 | Status: SHIPPED | OUTPATIENT
Start: 2022-08-20 | End: 2023-09-05

## 2022-08-20 RX ORDER — ALBUTEROL SULFATE 90 UG/1
2 AEROSOL, METERED RESPIRATORY (INHALATION) PRN
Qty: 16 G | Refills: 1 | Status: SHIPPED | OUTPATIENT
Start: 2022-08-20 | End: 2023-09-05

## 2022-09-07 NOTE — TELEPHONE ENCOUNTER
PRIOR AUTHORIZATION DENIED    Medication: Actemra denied    Denial Date: 8/29/2022    Denial Rational:       Appeal Information:

## 2022-09-08 RX ORDER — ADALIMUMAB 40MG/0.4ML
40 KIT SUBCUTANEOUS
Qty: 6 EACH | Refills: 1 | OUTPATIENT
Start: 2022-09-08

## 2022-09-08 NOTE — TELEPHONE ENCOUNTER
Medication/Dose: HUMIRA *CF* PEN 40 MG/0.4ML pen kit    Last Written : Not on current med list. Was dc'd on 8/12/22 and to start Actemra. Does not appear the Actemra has been approved by insurance yet.    Last Office Visit :  8/12/22  Pending appointment: 10/13/22       Prescription approved per Refill Protocol because med not on med list.    Panfilo Brito, ASAN, RN

## 2022-09-12 NOTE — TELEPHONE ENCOUNTER
Medication Appeal Initiation    We have initiated an appeal for the requested medication:  Medication: Actemra denied  Appeal Start Date:  9/7/2022  Insurance Company: [employer] - Phone [phone] Fax [phone]  Comments:       Follow up call [phone]

## 2022-09-15 ENCOUNTER — TELEPHONE (OUTPATIENT)
Dept: RHEUMATOLOGY | Facility: CLINIC | Age: 32
End: 2022-09-15

## 2022-09-15 NOTE — TELEPHONE ENCOUNTER
Health Call Center    Phone Message    May a detailed message be left on voicemail: yes     Reason for Call: Medication Question or concern regarding medication   Prescription Clarification  Name of Medication: Humira  Prescribing Provider: Luis Carlos   Pharmacy:  Specialty Pharmacy     What on the order needs clarification? Pt states while the appeal for the Actemra is pending, Pt is wondering if she should continue to take Humira in the interim as oppose to taking no medications?    Pt received a message that her current Humira medication was cancled as well?  Pt does not have any Humira medication left and would be due for it this Saturday, 9/17/22.  Pt is leaving [facility] on 9/21/22 and would need medication sent in before then.    Please contact the Pt back to let her know what she should be doing?       Action Taken: Message routed to:  [facility] Adult Rheumatology

## 2022-09-18 ENCOUNTER — HEALTH MAINTENANCE LETTER (OUTPATIENT)
Age: 32
End: 2022-09-18

## 2022-09-19 NOTE — TELEPHONE ENCOUNTER
MEDICATION APPEAL APPROVED    Medication: Actemra appeal approved  Authorization Effective Date: 9/15/2022  Authorization Expiration Date: 9/15/2024  Approved Dose/Quantity: 4 pens per 28 days  Reference #: [id number]   Insurance Company: [employer] - Phone [phone] Fax [phone]  Expected CoPay: $1153.00     CoPay Card Available: Yes    Foundation Assistance Needed:    Which Pharmacy is filling the prescription (Not needed for infusion/clinic administered): [facility] - [place], [place] - [address]

## 2022-09-20 NOTE — TELEPHONE ENCOUNTER
Please see my chart message encounter from 9/13/22 for resolution of this matter.    Camille Walsh RN  Rheumatology Clinic

## 2022-10-03 DIAGNOSIS — L70.0 ACNE VULGARIS: Primary | ICD-10-CM

## 2022-10-06 ENCOUNTER — MYC REFILL (OUTPATIENT)
Dept: INTERNAL MEDICINE | Facility: CLINIC | Age: 32
End: 2022-10-06

## 2022-10-06 DIAGNOSIS — Z30.41 ENCOUNTER FOR SURVEILLANCE OF CONTRACEPTIVE PILLS: ICD-10-CM

## 2022-10-06 RX ORDER — DROSPIRENONE AND ETHINYL ESTRADIOL 0.02-3(28)
1 KIT ORAL DAILY
Qty: 90 TABLET | Refills: 3 | Status: SHIPPED | OUTPATIENT
Start: 2022-10-06 | End: 2023-09-06

## 2022-10-06 NOTE — TELEPHONE ENCOUNTER
VESTURA 3 MG-0.02 MG TABLET  Last Written Prescription Date:   11/2/2021  Last Fill Quantity: 90,   # refills: 3  Last Office Visit :  8/9/2022  Future Office visit:  None  Refer to clinic for review and new orders for Pt care.       Viri Krause RN  Central Triage Red Flags/Med Refills

## 2022-10-07 RX ORDER — DROSPIRENONE AND ETHINYL ESTRADIOL TABLETS 0.02-3(28)
1 KIT ORAL DAILY
Qty: 56 TABLET | Refills: 6 | OUTPATIENT
Start: 2022-10-07

## 2022-12-07 ENCOUNTER — LAB (OUTPATIENT)
Dept: LAB | Facility: CLINIC | Age: 32
End: 2022-12-07
Payer: COMMERCIAL

## 2022-12-07 DIAGNOSIS — Z51.81 ENCOUNTER FOR MEDICATION MONITORING: ICD-10-CM

## 2022-12-07 DIAGNOSIS — M06.09 RHEUMATOID ARTHRITIS OF MULTIPLE SITES WITH NEGATIVE RHEUMATOID FACTOR (H): ICD-10-CM

## 2022-12-07 LAB
ALBUMIN SERPL BCG-MCNC: 4.2 G/DL (ref 3.5–5.2)
ALT SERPL W P-5'-P-CCNC: 16 U/L (ref 10–35)
AST SERPL W P-5'-P-CCNC: 24 U/L (ref 10–35)
BASOPHILS # BLD AUTO: 0 10E3/UL (ref 0–0.2)
BASOPHILS NFR BLD AUTO: 0 %
CREAT SERPL-MCNC: 0.71 MG/DL (ref 0.51–0.95)
EOSINOPHIL # BLD AUTO: 0.1 10E3/UL (ref 0–0.7)
EOSINOPHIL NFR BLD AUTO: 1 %
ERYTHROCYTE [DISTWIDTH] IN BLOOD BY AUTOMATED COUNT: 14.6 % (ref 10–15)
GFR SERPL CREATININE-BSD FRML MDRD: >90 ML/MIN/1.73M2
HCT VFR BLD AUTO: 41.6 % (ref 35–47)
HGB BLD-MCNC: 13.8 G/DL (ref 11.7–15.7)
LYMPHOCYTES # BLD AUTO: 2.9 10E3/UL (ref 0.8–5.3)
LYMPHOCYTES NFR BLD AUTO: 41 %
MCH RBC QN AUTO: 29.8 PG (ref 26.5–33)
MCHC RBC AUTO-ENTMCNC: 33.2 G/DL (ref 31.5–36.5)
MCV RBC AUTO: 90 FL (ref 78–100)
MONOCYTES # BLD AUTO: 0.6 10E3/UL (ref 0–1.3)
MONOCYTES NFR BLD AUTO: 8 %
NEUTROPHILS # BLD AUTO: 3.5 10E3/UL (ref 1.6–8.3)
NEUTROPHILS NFR BLD AUTO: 50 %
PLATELET # BLD AUTO: 269 10E3/UL (ref 150–450)
RBC # BLD AUTO: 4.63 10E6/UL (ref 3.8–5.2)
WBC # BLD AUTO: 7.1 10E3/UL (ref 4–11)

## 2022-12-07 PROCEDURE — 36415 COLL VENOUS BLD VENIPUNCTURE: CPT

## 2022-12-07 PROCEDURE — 85025 COMPLETE CBC W/AUTO DIFF WBC: CPT

## 2022-12-07 PROCEDURE — 82040 ASSAY OF SERUM ALBUMIN: CPT

## 2022-12-07 PROCEDURE — 82565 ASSAY OF CREATININE: CPT

## 2022-12-07 PROCEDURE — 84460 ALANINE AMINO (ALT) (SGPT): CPT

## 2022-12-07 PROCEDURE — 84450 TRANSFERASE (AST) (SGOT): CPT

## 2022-12-27 DIAGNOSIS — M06.09 RHEUMATOID ARTHRITIS OF MULTIPLE SITES WITH NEGATIVE RHEUMATOID FACTOR (H): ICD-10-CM

## 2022-12-29 DIAGNOSIS — M06.09 RHEUMATOID ARTHRITIS OF MULTIPLE SITES WITH NEGATIVE RHEUMATOID FACTOR (H): ICD-10-CM

## 2022-12-30 RX ORDER — TOCILIZUMAB 180 MG/ML
0.9 INJECTION, SOLUTION SUBCUTANEOUS
Qty: 3.6 ML | Refills: 3 | Status: SHIPPED | OUTPATIENT
Start: 2022-12-30 | End: 2023-04-23

## 2022-12-30 RX ORDER — TOCILIZUMAB 180 MG/ML
162 INJECTION, SOLUTION SUBCUTANEOUS WEEKLY
Qty: 3.6 ML | Refills: 3 | OUTPATIENT
Start: 2022-12-30

## 2022-12-30 NOTE — TELEPHONE ENCOUNTER
ACTEMRA ACTPEN 162MG/0.9ML SOAJ    Last Written Prescription Date:  8/12/22  Last Fill Quantity: 3.6,   # refills: 3  Last Office Visit: 8/12/22  Future Office visit:  3/24/23    CBC RESULTS: Recent Labs   Lab Test 12/07/22  1303   WBC 7.1   RBC 4.63   HGB 13.8   HCT 41.6   MCV 90   MCH 29.8   MCHC 33.2   RDW 14.6          Creatinine   Date Value Ref Range Status   12/07/2022 0.71 0.51 - 0.95 mg/dL Final   07/05/2021 0.71 0.52 - 1.04 mg/dL Final   ]    Liver Function Studies -   Recent Labs   Lab Test 12/07/22  1303 12/04/18  0824 10/31/18  1505   PROTTOTAL  --   --  7.7   ALBUMIN 4.2   < > 3.5   BILITOTAL  --   --  0.2   ALKPHOS  --   --  77   AST 24   < > 16   ALT 16   < > 22    < > = values in this interval not displayed.       Routing refill request to provider for review/approval because:  Drug not on the Rheumatology refill protocol

## 2022-12-30 NOTE — TELEPHONE ENCOUNTER
Tocilizumab (ACTEMRA ACTPEN) 162 MG/0.9ML SOAJ  Refill request addressed in another encounter  Pending with provider

## 2023-01-12 ENCOUNTER — MYC MEDICAL ADVICE (OUTPATIENT)
Dept: RHEUMATOLOGY | Facility: CLINIC | Age: 33
End: 2023-01-12

## 2023-01-12 DIAGNOSIS — M06.09 RHEUMATOID ARTHRITIS OF MULTIPLE SITES WITH NEGATIVE RHEUMATOID FACTOR (H): Primary | ICD-10-CM

## 2023-01-27 ENCOUNTER — VIRTUAL VISIT (OUTPATIENT)
Dept: INTERNAL MEDICINE | Facility: CLINIC | Age: 33
End: 2023-01-27
Payer: COMMERCIAL

## 2023-01-27 DIAGNOSIS — Z79.899 MEDICAL CANNABIS USE: ICD-10-CM

## 2023-01-27 PROCEDURE — 99214 OFFICE O/P EST MOD 30 MIN: CPT | Mod: GT | Performed by: PEDIATRICS

## 2023-01-27 NOTE — PROGRESS NOTES
"Rachel is a 32 year old who is being evaluated via a billable video visit.      How would you like to obtain your AVS? MyChart  If the video visit is dropped, the invitation should be resent by: Text to cell phone: [phone]  Will anyone else be joining your video visit? Judy SONG F      Dear patient. Thank you for visiting with me. I want you to feel respected, understood, and empowered. \"Respect\" is valuing you as much as I would a close family member. \"Empowerment\" happens when you are fully informed, and can make the best possible decision for you.  Please ask me questions!  Challenge anything that is not clear.    Medical records are primarily used as memory aids for me and my colleagues. Things to know about my documentation style:  - The 'problem list' includes current symptoms or diagnoses, and some problems that are resolved but may return. I use the past medical history for problems not expected to return.  - I use single quotation marks for things that you or I said, when I want to clarify who was speaking.  - I use double quotation marks when copying a term from elsewhere in your records. Italics (besides here) may also denote a quotation.  If you have questions or concerns, please contact me; I will reply as soon as time allows.      Virtual Visit Details    Start Time: 3:08 PM    End Time:3:22 PM    Type of service:  Video Visit    Originating Location (pt. Location): Home    Platform used for Visit: [facility] Location (provider location):  Off-site    PCP: Susana Boo  Visit type: problem-oriented  Time note (e4, 30'): The total time (on the date of service) for this service was 20 minutes, including discussion/face-to-face, chart review, interpretation not otherwise reported, documentation, and updating of the computerized record.        Context    Rachel Fried is a 32 year old woman, with concerns including:  Chief Complaint   Patient presents with     Video Visit     " intractable pain       History, update, and/or problems      Problem List as of 1/27/2023 Reviewed: 1/27/2023  3:28 PM by Cr Murphy MD          Noted       Behavioral    1. Medical cannabis use 1/27/2023     Overview Signed 1/27/2023  3:25 PM by Cr Murphy MD      Certified 1/27/2023 by Dr. Murphy          Last Assessment & Plan 1/27/2023 Virtual Visit Written 1/27/2023  3:26 PM by Cr Murphy MD      This is a pleasant patient of Dr. Boo's whom I am seeing for certification of medical cannabis.  I have previously reviewed her chart, and today we discussed her chronic/intractable pain due to rheumatoid arthritis.  Her goal is to 'take the edge off,' so she can have more success with house work, and other activity at the end of the day.    We talked about the process of approval and going to the dispensary.  We also talked about strategies for looking at dosing to help at night and less dosing during the day, in order to avoid having an injury due to total pain relief.  I also mentioned that were not sure about the upcoming changes with the possible legal approval of recreational cannabis.  It may be even now that she will have a cheaper situation for nighttime use with over-the-counter cannabis, while keeping a lower dose regimen from the dispensary during the day.      Please answer the following 3 questions, about your pain.    Question 1.  What number best describes your pain on average in the past week, on a scale from 0 to 10 where 0 is  no pain  and 10 is  pain as bad as you can imagine ?  (Please reply, Question 1 = your number)  6    The following two questions ask you to describe how, during the past week, pain has interfered with your life on a  0 to 10  scale, where 0 is  does not interfere at all  and 10 is  completely interferes.     Question 2. What number best describes how, during the past week, pain has interfered with your enjoyment of life? (Please reply,  "Question 2 = your number)  8    Question 3. What number best describes how, during the past week, pain has interfered with your general activity? (Please reply, Question 3 = your number)  7      [The above questions are called the \"PEG3 item pain scale.\"  Documenting the PEG3 is necessary for ongoing certification of medical marijuana]                     CHRONIC CARE MANAGEMENT at [facility]  -------------------------------------------  PENDING ISSUES for [facility] [place]:   -------------------------------------------    (medical cannabis ongoing certification by Dr. Murphy)  ------------------------------------------------  ONGOING SPECIALTY CARE BY:  ------------------------------------------------  -- RA: Dr. Naresh Aldridge      ----------------------------------------------------------------------------------  OUTSIDE PERSONNEL FOR INSURANCE, CADI, ETC:    ----------------------------------------------------------------------------------  SUPPLIERS AND VENDORS:   ---------------------------------------------------------------------------------        "

## 2023-01-27 NOTE — NURSING NOTE
PT has medication that are over 10 years not taking on the list, would like those removed.     AMADOR GARDNER

## 2023-01-27 NOTE — ASSESSMENT & PLAN NOTE
"This is a pleasant patient of Dr. Boo's whom I am seeing for certification of medical cannabis.  I have previously reviewed her chart, and today we discussed her chronic/intractable pain due to rheumatoid arthritis.  Her goal is to 'take the edge off,' so she can have more success with house work, and other activity at the end of the day.    We talked about the process of approval and going to the dispensary.  We also talked about strategies for looking at dosing to help at night and less dosing during the day, in order to avoid having an injury due to total pain relief.  I also mentioned that were not sure about the upcoming changes with the possible legal approval of recreational cannabis.  It may be even now that she will have a cheaper situation for nighttime use with over-the-counter cannabis, while keeping a lower dose regimen from the dispensary during the day.      Please answer the following 3 questions, about your pain.    Question 1.  What number best describes your pain on average in the past week, on a scale from 0 to 10 where 0 is  no pain  and 10 is  pain as bad as you can imagine ?  (Please reply, Question 1 = your number)  6    The following two questions ask you to describe how, during the past week, pain has interfered with your life on a  0 to 10  scale, where 0 is  does not interfere at all  and 10 is  completely interferes.     Question 2. What number best describes how, during the past week, pain has interfered with your enjoyment of life? (Please reply, Question 2 = your number)  8    Question 3. What number best describes how, during the past week, pain has interfered with your general activity? (Please reply, Question 3 = your number)  7      [The above questions are called the \"PEG3 item pain scale.\"  Documenting the PEG3 is necessary for ongoing certification of medical marijuana]    "

## 2023-01-30 ENCOUNTER — MYC MEDICAL ADVICE (OUTPATIENT)
Dept: RHEUMATOLOGY | Facility: CLINIC | Age: 33
End: 2023-01-30
Payer: COMMERCIAL

## 2023-01-30 DIAGNOSIS — M06.09 RHEUMATOID ARTHRITIS OF MULTIPLE SITES WITH NEGATIVE RHEUMATOID FACTOR (H): Primary | ICD-10-CM

## 2023-02-03 ENCOUNTER — MYC MEDICAL ADVICE (OUTPATIENT)
Dept: INTERNAL MEDICINE | Facility: CLINIC | Age: 33
End: 2023-02-03
Payer: COMMERCIAL

## 2023-03-08 ASSESSMENT — ENCOUNTER SYMPTOMS
DECREASED APPETITE: 0
HALLUCINATIONS: 0
MYALGIAS: 1
CHILLS: 0
NERVOUS/ANXIOUS: 0
WEIGHT GAIN: 0
ALTERED TEMPERATURE REGULATION: 0
POLYDIPSIA: 0
JOINT SWELLING: 1
BACK PAIN: 1
FEVER: 0
MUSCLE CRAMPS: 0
DEPRESSION: 1
STIFFNESS: 1
DECREASED CONCENTRATION: 1
FATIGUE: 1
PANIC: 0
NECK PAIN: 1
INSOMNIA: 0
INCREASED ENERGY: 0
POLYPHAGIA: 0
MUSCLE WEAKNESS: 0
NIGHT SWEATS: 0
WEIGHT LOSS: 0
ARTHRALGIAS: 1

## 2023-03-09 ENCOUNTER — OFFICE VISIT (OUTPATIENT)
Dept: ANESTHESIOLOGY | Facility: CLINIC | Age: 33
End: 2023-03-09
Payer: COMMERCIAL

## 2023-03-09 VITALS
SYSTOLIC BLOOD PRESSURE: 122 MMHG | DIASTOLIC BLOOD PRESSURE: 75 MMHG | OXYGEN SATURATION: 96 % | HEIGHT: 69 IN | WEIGHT: 252 LBS | HEART RATE: 104 BPM | BODY MASS INDEX: 37.33 KG/M2

## 2023-03-09 DIAGNOSIS — M79.18 MYOFASCIAL PAIN: ICD-10-CM

## 2023-03-09 DIAGNOSIS — M06.09 RHEUMATOID ARTHRITIS OF MULTIPLE SITES WITH NEGATIVE RHEUMATOID FACTOR (H): ICD-10-CM

## 2023-03-09 DIAGNOSIS — Z79.899 MEDICAL CANNABIS USE: ICD-10-CM

## 2023-03-09 DIAGNOSIS — M41.86 DEXTROSCOLIOSIS OF LUMBAR SPINE: Primary | ICD-10-CM

## 2023-03-09 PROCEDURE — 99204 OFFICE O/P NEW MOD 45 MIN: CPT | Performed by: ANESTHESIOLOGY

## 2023-03-09 RX ORDER — METHOCARBAMOL 500 MG/1
500 TABLET, FILM COATED ORAL 4 TIMES DAILY PRN
Qty: 50 TABLET | Refills: 0 | Status: SHIPPED | OUTPATIENT
Start: 2023-03-09 | End: 2023-10-28

## 2023-03-09 RX ORDER — LIDOCAINE 40 MG/G
CREAM TOPICAL
Qty: 30 G | Refills: 0 | Status: SHIPPED | OUTPATIENT
Start: 2023-03-09

## 2023-03-09 ASSESSMENT — ENCOUNTER SYMPTOMS
NECK PAIN: 1
DEPRESSION: 1
POLYDIPSIA: 0
INSOMNIA: 0
NERVOUS/ANXIOUS: 0
FEVER: 0
HALLUCINATIONS: 0
CHILLS: 0
BACK PAIN: 1
WEIGHT LOSS: 0
MYALGIAS: 1

## 2023-03-09 ASSESSMENT — PAIN SCALES - GENERAL: PAINLEVEL: MILD PAIN (3)

## 2023-03-09 NOTE — PATIENT INSTRUCTIONS
Medications:    methocarbamol (ROBAXIN) 500 MG tablet - Take 1 tablet (500 mg) by mouth 4 times daily as needed for muscle spasms    lidocaine (LMX4) 4 % external cream - Apply topically once as needed for mild pain      Please provide the clinic with a minium of 1 week notice, on all prescription refills.       Referrals:    Trent Fleming [phone]    Acupuncture Referral. Please call to schedule.  -Please call your insurance provider to find out about acupuncture coverage, being that not all policies cover acupuncture services.         Recommended Follow up:      Follow up in 4-6 months.      To speak with a nurse, schedule/reschedule/cancel a clinic appointment, or request a medication refill call: [phone].    You can also reach us by [employer]: [email or web address]

## 2023-03-09 NOTE — PROGRESS NOTES
[facility] for Comprehensive Chronic Pain Management : Consultation Note    Patient: Rachel Fried Age: 32 year old   MRN: [id number] Referred by:  Luis Carlos     Date of Visit: March 9, 2023    Reason for consultation:    Rachel Fried is a 32 year old female who is seen in consultation today at the request of her provider,Dr. Aldridge for a comprehensive evaluation and management of pain.  Primary Care Provider is Susana Boo.      Chief complaints: Mid back pain, upper back pain, and low back pain    History of Present illness:     Rachel Fried is a 32 year old female with pain history as described below:     Location: Mid mid, upper back, and lower back  Laterality: both   Quality:  Sharp, dull aching   Radiation: none  Duration:  10 years   Severity: 5/10  Aggravating factors include: Movement and activities  Relieving factors include: none   Any bowel or bladder incontinence: None  Other associated symptoms: None    Patient has a medical history significant for seropositive nonerosive rheumatoid arthritis involving hands, wrist, ankles, shoulders, and feet.  She recently moved from [place] to be with her family.  She has taken multiple rheumatoid arthritis medications including Humira, methotrexate, and Plaquenil. Currently managing pain with Advil 600 mg as needed.  Occasionally she develops stiffness in her hands and joints but otherwise her rheumatoid arthritis symptoms are stable.  She is worried about generalized back pain.  She was diagnosed with kyphoscoliosis since her childhood.  She did not meet the criteria for the surgery.  But now notices increased pain on prolonged standing and sitting.  She has been doing physical therapy 3 times a week.  Wanted to get a opinion about other options available to manage her back pain.  She recently got certified with the medical cannabis.  Has been using THC at night, which is beneficial.  Her x-ray of the lumbosacral spine is reviewed  patient.  She has normal vertebral height and disc spaces.  There is a dextroconvex curvature of the lumbar spine (rotatory dextroscoliosis approximately 25 degrees).     [place] Prescription Monitoring Program:   Reviewed. No concerns    Review of Systems:  Review of Systems   Constitutional: Negative for chills, fever and weight loss.   Musculoskeletal: Positive for back pain, myalgias and neck pain.   Endo/Heme/Allergies: Negative for polydipsia.   Psychiatric/Behavioral: Positive for depression. Negative for hallucinations. The patient is not nervous/anxious and does not have insomnia.        Patient Supplied Answers To the  Pain Questionnaire  [facility] -  Patient Entered Questionnaire/Answers 3/8/2023   What number best describes your pain right now:  0 = No pain  to  10 = Worst pain imaginable 5   How would you describe the pain? sharp, dull, aching   Which of the following worsen your pain? standing, walking, exercise   Which of the following improve or reduce your pain? lying down, sitting, medication   What number best describes your average pain for the past week:  0 = No pain  to  10 = Worst pain imaginable 5   What number best describes your LOWEST pain in past 24 hours:  0 = No pain  to  10 = Worst pain imaginable 4   What number best describes your WORST pain in past 24 hours:  0 = No pain  to  10 = Worst pain imaginable 6   When is your pain worst? Constant   What non-medicine treatments have you already had for your pain? physical therapy, TENS (electrical stimulator), counseling, exercise            NAWAF-7 SCORE 4/8/2019 11/2/2021 8/9/2022   Total Score 10 (moderate anxiety) - -   Total Score - 5 2   Some encounter information is confidential and restricted. Go to Review Flowsheets activity to see all data.        PHQ-2 ( 1999 Pf[employer]) 1/27/2023 4/8/2019   Q1: Little interest or pleasure in doing things 0 -   Q2: Feeling down, depressed or hopeless 1 -   PHQ-2 Score 1 -   PHQ-2 Total Score (12-17  Years)- Positive if 3 or more points; Administer PHQ-A if positive - -   Q1: Little interest or pleasure in doing things Not at all Several days   Q2: Feeling down, depressed or hopeless Several days Several days   PHQ-2 Score 1 2   Some encounter information is confidential and restricted. Go to Review Flowsheets activity to see all data.        [facility] Follow-up to PHQ 1/22/2019 11/2/2021 8/9/2022   PHQ-9 9. Suicide Ideation past 2 weeks Not at all Not at all Not at all   Some encounter information is confidential and restricted. Go to Review Flowsheets activity to see all data.          Past Medical History:  Past Medical History:   Diagnosis Date     Allergic dermatitis      Anxiety      Depression      Pityriasis lichenoides      Rheumatoid arthritis (H)        Past Surgical History:  No past surgical history on file.    Medications:  Current Outpatient Medications   Medication Sig Dispense Refill     albuterol (PROAIR HFA/PROVENTIL HFA/VENTOLIN HFA) 108 (90 Base) MCG/ACT inhaler Inhale 2 puffs into the lungs as needed for shortness of breath / dyspnea or wheezing 16 g 1     Albuterol Sulfate (PROVENTIL HFA IN)        cetirizine HCl 10 MG CAPS        Cholecalciferol (VITAMIN D) 2000 units tablet Take 2,000 Units by mouth daily 1 tablet 0     drospirenone-ethinyl estradiol (VESTURA) 3-0.02 MG tablet Take 1 tablet by mouth daily 90 tablet 3     drospirenone-ethinyl estradiol (JOSE) 3-0.02 MG tablet Take 1 tablet by mouth daily 90 tablet 3     DULoxetine (CYMBALTA) 60 MG capsule Take 1 capsule (60 mg) by mouth daily 90 capsule 1     DULoxetine (CYMBALTA) 60 MG capsule Take 1 capsule (60 mg) by mouth daily Please call Primary Care at [phone] to schedule follow up appointment. 90 capsule 0     LORYNA 3-0.02 MG tablet Take 1 tablet by mouth daily 56 tablet 0     predniSONE (DELTASONE) 5 MG tablet 4tab=20 mg qd x 3 days, 3tab=15 mg qd x 3 days, 2tab=10 mg qd x 3 days, 1 tab=5 mg qd x 3 days then stop 30 tablet 1      Tocilizumab (ACTEMRA ACTPEN) 162 MG/0.9ML SOAJ Inject 0.9 mLs Subcutaneous every 7 days 3.6 mL 3     tretinoin (RETIN-A) 0.025 % external gel Apply topically At Bedtime 15 g 2     tretinoin (RETIN-A) 0.05 % cream Apply topically At Bedtime Apply a pea sized amount to the face every other night for 2 weeks, then nightly thereafter 45 g 11     triamcinolone (KENALOG) 0.1 % ointment Apply topically 2 times daily 30 g 1     vitamin D2 (ERGOCALCIFEROL) 68655 units (1250 mcg) capsule Take 1 capsule (50,000 Units) by mouth every 7 days 12 capsule 0     gabapentin (NEURONTIN) 100 MG capsule Take 3 capsules (300 mg) by mouth 3 times daily (Patient not taking: Reported on 2/3/2020) 270 capsule 0     hydroquinone 4 % CREA Externally apply topically daily Do not use for more than 3 months consecutively without a 3 month break. (Patient not taking: Reported on 3/9/2023) 56.8 g 3         Medications related to Pain Management:   Medications related to Pain Management (From now, onward)    None          Allergies:       Allergies   Allergen Reactions     Sulfa Drugs Hives and Itching       Social History:    Social History     Socioeconomic History     Marital status: Single     Spouse name: Not on file     Number of children: Not on file     Years of education: Not on file     Highest education level: Not on file   Occupational History     Not on file   Tobacco Use     Smoking status: Never     Smokeless tobacco: Never   Substance and Sexual Activity     Alcohol use: Yes     Alcohol/week: 1.0 standard drink     Types: 1 Glasses of wine per week     Drug use: No     Sexual activity: Never     Birth control/protection: Pill   Other Topics Concern     Not on file   Social History Narrative     Not on file     Social Determinants of Health     Financial Resource Strain: Not on file   Food Insecurity: Not on file   Transportation Needs: Not on file   Physical Activity: Not on file   Stress: Not on file   Social Connections: Not on  "file   Intimate Partner Violence: Not on file   Housing Stability: Not on file     Social History     Social History Narrative     Not on file         Family history:  Family History   Problem Relation Age of Onset     Lung Cancer Paternal Grandfather      Colon Cancer Maternal Uncle          Physical Exam:  Vitals:    03/09/23 0720   BP: 122/75   BP Location: Right arm   Patient Position: Chair   Cuff Size: Adult Large   Pulse: 104   SpO2: 96%   Weight: 114.3 kg (252 lb)   Height: 1.753 m (5' 9\")       General: Awake in no apparent distress.   Eyes: Sclerae are anicteric. PERRLA, EOMI   Neck: supple, no masses.   Lungs: unlabored.   Heart: regular rate and rhythm   Abdomen: soft non tender.  Extremities: Pulses are well palpable, no peripheral edema.   Musculoskeletal: All muscle groups are normal in bulk and tone. The patient changes position without pain behavior. The patient walks with a normal gait. Posture is normal. Muscle strength was rated at 5/5 in all groups in the extremities. Examination of the joints reveals preserved range of motion.   The range of motion of the lumbar spine is normal  in all directions. The spinous processes in the cervical, thoracic, and lumbar spine are midline, with diffuse tenderness over the paraspinous muscles and facet joints in the entire back region.There was some tenderness to palpation noted over the sacroiliac joints bilaterall. Virgilio s test was negative.  Straight leg raise tests are negative.  Neurologic exam: Sensation to light touch intact throughout all dermatomes bilateral upper extremities and lower extremities  Psychiatric; Normal affect.   Skin: Warm and Dry.       LABORATORY VALUES:   Recent Labs   Lab Test 12/07/22  1303 06/01/22  1147 12/04/18  0824 10/31/18  1505   NA  --   --   --  139   POTASSIUM  --   --   --  3.9   CHLORIDE  --   --   --  109   CO2  --   --   --  21   ANIONGAP  --   --   --  9   GLC  --   --   --  81   BUN  --   --   --  12   CR 0.71 " 0.63   < > 0.71   MELA  --   --   --  8.5    < > = values in this interval not displayed.       CBC RESULTS:   Recent Labs   Lab Test 12/07/22  1303   WBC 7.1   RBC 4.63   HGB 13.8   HCT 41.6   MCV 90   MCH 29.8   MCHC 33.2   RDW 14.6          Most Recent 3 INR's:No lab results found.      Diagnostic tests:         ASSESSMENT/PLAN:                             ASSESSMENT:    Diagnoses       Codes Comments    Dextroscoliosis of lumbar spine    -  Primary M41.86     Rheumatoid arthritis of multiple sites with negative rheumatoid factor (H)     M06.09     Myofascial pain     M79.18     Medical cannabis use     Z79.899            PLAN:    - Medications.     If you are experiencing moderate to severe pain, take 400mg of ibuprofen and 500mg of acetaminophen at the same time. You may take this combination every six hours as needed.  The maximum daily dose of ibuprofen is 3200 mg. The maximum daily dose of acetaminophen  is 3000 mg. I recommend monitoring labs every 4 months while taking these medication. The monitoring labs should include creatinine and UA to monitor the kidneys, CBC to monitor the GI tract, and AST or ALT to monitor the liver function.     Continue medical cannabis.  She she recently got certified by another provider.    Lidocaine cream 4% 4 times daily as needed    Methocarbamol 500 mg 4 times daily as needed.  30 tablets dispensed.      - Interventional procedures:    None at this time.  Discussed about trigger point injections in the future.      - Labs and imaging: None needed for pain management.     - Rehab: Advised her to continue physical therapy, 3 times a week.  Advised her to use TENS unit on a more regular basis.     - Psychology: We will consider health psychology referral in the future.    - Integrated medicine: Referral to acupuncture therapy    - Disposition: Follow-up in 4 to 6 months      Assessment will be ongoing with changes in treatment as indicated.  Benefits/risks/alternatives  to treatment have been reviewed and the patient has been instructed to contact this office if they have any questions or concerns.  This plan of care has been discussed with the patient and the patient is in agreement.     Wilmer Sanchez MD, PHD    Answers for HPI/ROS submitted by the patient on 3/8/2023  General Symptoms: Yes  Skin Symptoms: No  HENT Symptoms: No  EYE SYMPTOMS: No  HEART SYMPTOMS: No  LUNG SYMPTOMS: No  INTESTINAL SYMPTOMS: No  URINARY SYMPTOMS: No  GYNECOLOGIC SYMPTOMS: No  BREAST SYMPTOMS: No  SKELETAL SYMPTOMS: Yes  BLOOD SYMPTOMS: No  NERVOUS SYSTEM SYMPTOMS: No  MENTAL HEALTH SYMPTOMS: Yes  Loss of appetite: No  Weight gain: No  Fatigue: Yes  Night sweats: No  Increased stress: No  Excessive hunger: No  Feeling hot or cold when others believe the temperature is normal: No  Loss of height: No  Post-operative complications: No  Surgical site pain: No  Change in or Loss of Energy: No  Hyperactivity: No  Confusion: No  Swollen joints: Yes  Joint pain: Yes  Bone pain: No  Muscle cramps: No  Muscle weakness: No  Joint stiffness: Yes  Bone fracture: No  Trouble thinking or concentrating: Yes  Mood changes: No  Panic attacks: No

## 2023-03-09 NOTE — LETTER
3/9/2023       RE: Rachel Fried  [address]  [facility] [address]     Dear Colleague,    Thank you for referring your patient, Rachel Fried, to the [facility] FOR COMPREHENSIVE PAIN MANAGEMENT [facility]. Please see a copy of my visit note below.    [facility] for Comprehensive Chronic Pain Management : Consultation Note    Patient: Rachel Fried Age: 32 year old   MRN: [id number] Referred by:  Luis Carlos     Date of Visit: March 9, 2023    Reason for consultation:    Rachel Fried is a 32 year old female who is seen in consultation today at the request of her provider,Dr. Aldridge for a comprehensive evaluation and management of pain.  Primary Care Provider is Susana Boo.      Chief complaints: Mid back pain, upper back pain, and low back pain    History of Present illness:     Rachel Fried is a 32 year old female with pain history as described below:     Location: Mid mid, upper back, and lower back  Laterality: both   Quality:  Sharp, dull aching   Radiation: none  Duration:  10 years   Severity: 5/10  Aggravating factors include: Movement and activities  Relieving factors include: none   Any bowel or bladder incontinence: None  Other associated symptoms: None    Patient has a medical history significant for seropositive nonerosive rheumatoid arthritis involving hands, wrist, ankles, shoulders, and feet.  She recently moved from [place] to be with her family.  She has taken multiple rheumatoid arthritis medications including Humira, methotrexate, and Plaquenil. Currently managing pain with Advil 600 mg as needed.  Occasionally she develops stiffness in her hands and joints but otherwise her rheumatoid arthritis symptoms are stable.  She is worried about generalized back pain.  She was diagnosed with kyphoscoliosis since her childhood.  She did not meet the criteria for the surgery.  But now notices  increased pain on prolonged standing and sitting.  She has been doing physical therapy 3 times a week.  Wanted to get a opinion about other options available to manage her back pain.  She recently got certified with the medical cannabis.  Has been using THC at night, which is beneficial.  Her x-ray of the lumbosacral spine is reviewed patient.  She has normal vertebral height and disc spaces.  There is a dextroconvex curvature of the lumbar spine (rotatory dextroscoliosis approximately 25 degrees).     [place] Prescription Monitoring Program:   Reviewed. No concerns    Review of Systems:  Review of Systems   Constitutional: Negative for chills, fever and weight loss.   Musculoskeletal: Positive for back pain, myalgias and neck pain.   Endo/Heme/Allergies: Negative for polydipsia.   Psychiatric/Behavioral: Positive for depression. Negative for hallucinations. The patient is not nervous/anxious and does not have insomnia.        Patient Supplied Answers To the  Pain Questionnaire  [facility] Pain -  Patient Entered Questionnaire/Answers 3/8/2023   What number best describes your pain right now:  0 = No pain  to  10 = Worst pain imaginable 5   How would you describe the pain? sharp, dull, aching   Which of the following worsen your pain? standing, walking, exercise   Which of the following improve or reduce your pain? lying down, sitting, medication   What number best describes your average pain for the past week:  0 = No pain  to  10 = Worst pain imaginable 5   What number best describes your LOWEST pain in past 24 hours:  0 = No pain  to  10 = Worst pain imaginable 4   What number best describes your WORST pain in past 24 hours:  0 = No pain  to  10 = Worst pain imaginable 6   When is your pain worst? Constant   What non-medicine treatments have you already had for your pain? physical therapy, TENS (electrical stimulator), counseling, exercise            NAWAF-7 SCORE 4/8/2019 11/2/2021 8/9/2022   Total Score 10 (moderate  anxiety) - -   Total Score - 5 2   Some encounter information is confidential and restricted. Go to Review Flowsheets activity to see all data.        PHQ-2 ( 1999 Pf[employer]) 1/27/2023 4/8/2019   Q1: Little interest or pleasure in doing things 0 -   Q2: Feeling down, depressed or hopeless 1 -   PHQ-2 Score 1 -   PHQ-2 Total Score (12-17 Years)- Positive if 3 or more points; Administer PHQ-A if positive - -   Q1: Little interest or pleasure in doing things Not at all Several days   Q2: Feeling down, depressed or hopeless Several days Several days   PHQ-2 Score 1 2   Some encounter information is confidential and restricted. Go to Review Flowsheets activity to see all data.        [facility] Follow-up to PHQ 1/22/2019 11/2/2021 8/9/2022   PHQ-9 9. Suicide Ideation past 2 weeks Not at all Not at all Not at all   Some encounter information is confidential and restricted. Go to Review Flowsheets activity to see all data.          Past Medical History:  Past Medical History:   Diagnosis Date     Allergic dermatitis      Anxiety      Depression      Pityriasis lichenoides      Rheumatoid arthritis (H)        Past Surgical History:  No past surgical history on file.    Medications:  Current Outpatient Medications   Medication Sig Dispense Refill     albuterol (PROAIR HFA/PROVENTIL HFA/VENTOLIN HFA) 108 (90 Base) MCG/ACT inhaler Inhale 2 puffs into the lungs as needed for shortness of breath / dyspnea or wheezing 16 g 1     Albuterol Sulfate (PROVENTIL HFA IN)        cetirizine HCl 10 MG CAPS        Cholecalciferol (VITAMIN D) 2000 units tablet Take 2,000 Units by mouth daily 1 tablet 0     drospirenone-ethinyl estradiol (VESTURA) 3-0.02 MG tablet Take 1 tablet by mouth daily 90 tablet 3     drospirenone-ethinyl estradiol (JOSE) 3-0.02 MG tablet Take 1 tablet by mouth daily 90 tablet 3     DULoxetine (CYMBALTA) 60 MG capsule Take 1 capsule (60 mg) by mouth daily 90 capsule 1     DULoxetine (CYMBALTA) 60 MG capsule Take 1 capsule (60  mg) by mouth daily Please call Primary Care at [phone] to schedule follow up appointment. 90 capsule 0     LORYNA 3-0.02 MG tablet Take 1 tablet by mouth daily 56 tablet 0     predniSONE (DELTASONE) 5 MG tablet 4tab=20 mg qd x 3 days, 3tab=15 mg qd x 3 days, 2tab=10 mg qd x 3 days, 1 tab=5 mg qd x 3 days then stop 30 tablet 1     Tocilizumab (ACTEMRA ACTPEN) 162 MG/0.9ML SOAJ Inject 0.9 mLs Subcutaneous every 7 days 3.6 mL 3     tretinoin (RETIN-A) 0.025 % external gel Apply topically At Bedtime 15 g 2     tretinoin (RETIN-A) 0.05 % cream Apply topically At Bedtime Apply a pea sized amount to the face every other night for 2 weeks, then nightly thereafter 45 g 11     triamcinolone (KENALOG) 0.1 % ointment Apply topically 2 times daily 30 g 1     vitamin D2 (ERGOCALCIFEROL) 60626 units (1250 mcg) capsule Take 1 capsule (50,000 Units) by mouth every 7 days 12 capsule 0     gabapentin (NEURONTIN) 100 MG capsule Take 3 capsules (300 mg) by mouth 3 times daily (Patient not taking: Reported on 2/3/2020) 270 capsule 0     hydroquinone 4 % CREA Externally apply topically daily Do not use for more than 3 months consecutively without a 3 month break. (Patient not taking: Reported on 3/9/2023) 56.8 g 3         Medications related to Pain Management:   Medications related to Pain Management (From now, onward)    None          Allergies:       Allergies   Allergen Reactions     Sulfa Drugs Hives and Itching       Social History:    Social History     Socioeconomic History     Marital status: Single     Spouse name: Not on file     Number of children: Not on file     Years of education: Not on file     Highest education level: Not on file   Occupational History     Not on file   Tobacco Use     Smoking status: Never     Smokeless tobacco: Never   Substance and Sexual Activity     Alcohol use: Yes     Alcohol/week: 1.0 standard drink     Types: 1 Glasses of wine per week     Drug use: No     Sexual activity: Never     Birth  "control/protection: Pill   Other Topics Concern     Not on file   Social History Narrative     Not on file     Social Determinants of Health     Financial Resource Strain: Not on file   Food Insecurity: Not on file   Transportation Needs: Not on file   Physical Activity: Not on file   Stress: Not on file   Social Connections: Not on file   Intimate Partner Violence: Not on file   Housing Stability: Not on file     Social History     Social History Narrative     Not on file         Family history:  Family History   Problem Relation Age of Onset     Lung Cancer Paternal Grandfather      Colon Cancer Maternal Uncle          Physical Exam:  Vitals:    03/09/23 0720   BP: 122/75   BP Location: Right arm   Patient Position: Chair   Cuff Size: Adult Large   Pulse: 104   SpO2: 96%   Weight: 114.3 kg (252 lb)   Height: 1.753 m (5' 9\")       General: Awake in no apparent distress.   Eyes: Sclerae are anicteric. PERRLA, EOMI   Neck: supple, no masses.   Lungs: unlabored.   Heart: regular rate and rhythm   Abdomen: soft non tender.  Extremities: Pulses are well palpable, no peripheral edema.   Musculoskeletal: All muscle groups are normal in bulk and tone. The patient changes position without pain behavior. The patient walks with a normal gait. Posture is normal. Muscle strength was rated at 5/5 in all groups in the extremities. Examination of the joints reveals preserved range of motion.   The range of motion of the lumbar spine is normal  in all directions. The spinous processes in the cervical, thoracic, and lumbar spine are midline, with diffuse tenderness over the paraspinous muscles and facet joints in the entire back region.There was some tenderness to palpation noted over the sacroiliac joints bilaterall. Virgilio s test was negative.  Straight leg raise tests are negative.  Neurologic exam: Sensation to light touch intact throughout all dermatomes bilateral upper extremities and lower extremities  Psychiatric; Normal " affect.   Skin: Warm and Dry.       LABORATORY VALUES:   Recent Labs   Lab Test 12/07/22  1303 06/01/22  1147 12/04/18  0824 10/31/18  1505   NA  --   --   --  139   POTASSIUM  --   --   --  3.9   CHLORIDE  --   --   --  109   CO2  --   --   --  21   ANIONGAP  --   --   --  9   GLC  --   --   --  81   BUN  --   --   --  12   CR 0.71 0.63   < > 0.71   MELA  --   --   --  8.5    < > = values in this interval not displayed.       CBC RESULTS:   Recent Labs   Lab Test 12/07/22  1303   WBC 7.1   RBC 4.63   HGB 13.8   HCT 41.6   MCV 90   MCH 29.8   MCHC 33.2   RDW 14.6          Most Recent 3 INR's:No lab results found.      Diagnostic tests:         ASSESSMENT/PLAN:                             ASSESSMENT:    Diagnoses       Codes Comments    Dextroscoliosis of lumbar spine    -  Primary M41.86     Rheumatoid arthritis of multiple sites with negative rheumatoid factor (H)     M06.09     Myofascial pain     M79.18     Medical cannabis use     Z79.899            PLAN:    - Medications.     If you are experiencing moderate to severe pain, take 400mg of ibuprofen and 500mg of acetaminophen at the same time. You may take this combination every six hours as needed.  The maximum daily dose of ibuprofen is 3200 mg. The maximum daily dose of acetaminophen  is 3000 mg. I recommend monitoring labs every 4 months while taking these medication. The monitoring labs should include creatinine and UA to monitor the kidneys, CBC to monitor the GI tract, and AST or ALT to monitor the liver function.     Continue medical cannabis.  She she recently got certified by another provider.    Lidocaine cream 4% 4 times daily as needed    Methocarbamol 500 mg 4 times daily as needed.  30 tablets dispensed.      - Interventional procedures:    None at this time.  Discussed about trigger point injections in the future.      - Labs and imaging: None needed for pain management.     - Rehab: Advised her to continue physical therapy, 3 times a week.   Advised her to use TENS unit on a more regular basis.     - Psychology: We will consider health psychology referral in the future.    - Integrated medicine: Referral to acupuncture therapy    - Disposition: Follow-up in 4 to 6 months      Assessment will be ongoing with changes in treatment as indicated.  Benefits/risks/alternatives to treatment have been reviewed and the patient has been instructed to contact this office if they have any questions or concerns.  This plan of care has been discussed with the patient and the patient is in agreement.     Wilmer Sanchez MD, PHD    Answers for HPI/ROS submitted by the patient on 3/8/2023  General Symptoms: Yes  Skin Symptoms: No  HENT Symptoms: No  EYE SYMPTOMS: No  HEART SYMPTOMS: No  LUNG SYMPTOMS: No  INTESTINAL SYMPTOMS: No  URINARY SYMPTOMS: No  GYNECOLOGIC SYMPTOMS: No  BREAST SYMPTOMS: No  SKELETAL SYMPTOMS: Yes  BLOOD SYMPTOMS: No  NERVOUS SYSTEM SYMPTOMS: No  MENTAL HEALTH SYMPTOMS: Yes  Loss of appetite: No  Weight gain: No  Fatigue: Yes  Night sweats: No  Increased stress: No  Excessive hunger: No  Feeling hot or cold when others believe the temperature is normal: No  Loss of height: No  Post-operative complications: No  Surgical site pain: No  Change in or Loss of Energy: No  Hyperactivity: No  Confusion: No  Swollen joints: Yes  Joint pain: Yes  Bone pain: No  Muscle cramps: No  Muscle weakness: No  Joint stiffness: Yes  Bone fracture: No  Trouble thinking or concentrating: Yes  Mood changes: No  Panic attacks: No          Sincerely,    Wilmer Sanchez MD

## 2023-03-09 NOTE — NURSING NOTE
Patient presents with:  Consult: Consult Back pain      Mild Pain (3)         What medications are you using for pain? Advil, medical cannabis    (New patients only) Have you been seen by another pain clinic/ provider? no    (Return Patients only) What refills are you needing today? No    Expectations want a plan to reduce her pain    
RN reviewed AVS with patient. Patient to contact clinic if any questions/concerns. Patient verbalized understanding.    Nellie Skaggs RNCC    
normal...

## 2023-03-24 ENCOUNTER — VIRTUAL VISIT (OUTPATIENT)
Dept: RHEUMATOLOGY | Facility: CLINIC | Age: 33
End: 2023-03-24
Attending: INTERNAL MEDICINE
Payer: COMMERCIAL

## 2023-03-24 DIAGNOSIS — M06.09 RHEUMATOID ARTHRITIS OF MULTIPLE SITES WITH NEGATIVE RHEUMATOID FACTOR (H): Primary | ICD-10-CM

## 2023-03-24 DIAGNOSIS — Z51.81 ENCOUNTER FOR MEDICATION MONITORING: ICD-10-CM

## 2023-03-24 PROCEDURE — G0463 HOSPITAL OUTPT CLINIC VISIT: HCPCS | Mod: PN,GT | Performed by: INTERNAL MEDICINE

## 2023-03-24 PROCEDURE — 99214 OFFICE O/P EST MOD 30 MIN: CPT | Mod: VID | Performed by: INTERNAL MEDICINE

## 2023-03-24 RX ORDER — PREDNISONE 5 MG/1
TABLET ORAL
Qty: 30 TABLET | Refills: 1 | Status: SHIPPED | OUTPATIENT
Start: 2023-03-24 | End: 2023-12-29

## 2023-03-24 NOTE — LETTER
3/24/2023       RE: Rachel Fried  [address]  [facility] [address]     Dear Colleague,    Thank you for referring your patient, Rachel Fried, to the [facility] RHEUMATOLOGY CLINIC [place] [facility]. Please see a copy of my visit note below.    Virtual Visit Details    Type of service:  Video Visit     3 pm 3:13 pm    Originating Location (pt. Location): Home    Distant Location (provider location):  On-site  Platform used for Video Visit: Onel     Last seen:  8/12/2022  DOS: 3/24/2023    Reason for visit: RA    [facility] Visit    Rachel Fried  is a 30 year old here for follow-up  RHEUMATOID ARTHRITIS (RA) involving hands, wrists, ankles, shoulders feet [ -RF and low CCP 25, -factor V -KAVITHA by IFA and -NADEEN panel -HLA B27] and pityriasis lichenoides chronica (seen [facility]-established here). XR  no erosions hands. Moved from [place] to be with family.       Past- hydroxychloroquine (plaquenil) since 1-2016 (2 tablet a day caused diarrhea, ok on 1 tablet) and methotrexate 10 mg week  to 4-2018 (mild fatigue day of) then restarted 10- (higher folic acid improved side-effects), naproxsyn; allergy sulfa, prednisone, advil       Initial visit copy forward  10-: Hydroxychloroquine (plaquenil) BID mild diarrhea; ocular eye exam 3-2016 baseline reports recent eye exam   EKG was recently Feb 2018 normal. Stopped methotrexate  In April as wanted to take a drug holiday and was moving here, also wanted to drink some alcohol, and discuss options. No infections. Diffuse joint pains in hands, wrists, shoulders, ankles and feet and the sides of her hips. Taking advil 600mg AM and 400 mg PM, tolerating but taking since April. Pain severe 5/10 or more. Stiffness all day. Hands are sore. No loss of function or ROM. Very sore and stiff all over. Knees are sore and hard to bend or squat. Mild  diarrhea with hydroxychloroquine (plaquenil)     December 16, 2020  Denies any fever, chills, SOB, CANDELARIA, night sweats, or chest pain, high fever, cough, travel in last 14 days or exposure to covid-19 (coronavirus). Reports healthy. Follows CDC guidelines. Works from home.     Rheumatoid arthritis (RA) stiff with pain in in hands and wrists, stiff in mornings for few hours. Not able to do her massage or swim, does walk and not doing normal routine which is causing this as well. Sleep is poor now and hip pain. Upper neck discomfort from stress.     Continues on methotrexate  12.5 mg every 7 days THURS, folic acid 2 mg day. Tolerating. Hydroxychloroquine (plaquenil) 200 mg every day  misses most days so takes <3 times a week. No GI side-effects, no hairloss or oral sores from methotrexate moreso from hydroxychloroquine (plaquenil).  Off celexa -lost 12 lbs. 197 lb last visit but this not now due to Covid-19 (coronavirus).   No longer using NSAIDs. Taking vitamin D 2000 international unit(s) day.  No EAS. No vision issues. Is exercising and doing yogo now. Goes to [facility] eye did last 3-2019 but was due this past Spring. Willing to come here if can do hydroxychloroquine (plaquenil) toxicity with glasses and contacts in 1 visit. Otherwise, will continue with hers.     4/23/21:  The patient states that she is doing ok with her RA. She states that her joint symptoms have slightly worsened during pandemic because she is not going swimming, which is traditionally very helpful for her.   The patient just got vaccinated and plans to start going swimming in May, which she thinks will help her joints. She has no active swelling now.  She is tolerating methotrexate at 12.5 mg qWeek and also on  mg every day (can not tolerate higher dose sec GI SEs).   The patient is taking ibuprofen 2-4 tabs for the last couple of weeks.  No plans to get pregnant. Not on birth control at this time, but states currently only dating  women.  She otherwise feels well with no other concer     10/28/2021:    Doing ok for most part with her RA. AM stiffness is 1.5 hr. Knees and ankles hurt and swell up a little bit and back hurts with standing for too long, but has hard time to say if her joints are swollen. On MTX 5 tabs a wk, last time increased her MTX, got brain fog (has it with MTX current dose but it got worse) and migraines and nausea. It ruined her Sturdays. She takes NSAIDs rarely like going to a concert. Used to be naproxen every day before RA Dx. Since stopping HCQ every other day is having more joint sx, but hard to tell, HCQ causes her diarrhea at higher dose, tried to get generic brand but was never able to get it from pharmacy.      Had covid booster vaccine, no SE.    1/19/2022: Rachel presents for follow up. Humira was added at last visit. Did 4 shots of humira, right before brendan, had last shot, now out. No SEs and it significantly helped with joint pain/stiffness, now symptoms are slowly returning.    Reports 1 hr of knee stiffness, 20 min of hand stiffness.    Reports brain fog 24-48 hours after taking methotrexate.      8/12/2022: Rachel presents for follow up. Did not tolerate MTX, HCQ in the past. Back on humira. Has ongoing arthralgia, did not realize how much pain she had tills he took the prednisone which made her feel normal, pain free. Now realized that humira was not helping her as much as she thought. She is hoping to try a med which works as well as prednisone. Wondering if she could try medical cannabis.    Most pain is localized to her hands, which limits how she uses her hands.    Am stiffness is >30 min. Can not tell if joints swell up or not. No rash, cough, SOB. Regained 50 lbs recently.    MTX caused her brain fog, headaches. Felt miserable on it.    Today 3/24/2023: Humira was switched to actemra. She is doing better on actemra and thinks that it is helping her more with joint sx (pain and stiffness). No  SEs.    Had hard time making herself doing anything, she was very stiff while she was off humira and waiting for actemra approval by [employer], worked with PT. Has back posture pain.    Hands ache today but that's from broken popsocket.    RA is overall is good.    No Actemra SEs.    Has scoliosis, was doing PT 2-3 times a week, now twice a wk.            PMH:  Injury-no  Medical-dermatitis, (bx 2-2016 GA vs LP vs cutaneous lupus--was not cutenaous lupus --focal interstitial lymphocytic inflammation, scattered lymphocytes), anxiety and depression, exercise induced asthma, scoliosis, pityriasis lichenoides chronica, chronic back pain, hypertension, seasonal allergies, acne, motion sickness, high cholesterol, chicken pox, headaches before    Surgical-None     FH:  No autoimmune disorders, psoriasis, UC, crohn's, RA, PsA, gout, autoimmune thyroid.  No MS, heart disease or cancer in family  Mother-factor V leiden carrier, rosea   Father-healthy  Siblings-younger bro healthy  Children-None   GF-lupus   Uncle cancer colon    SH:  Social Alcohol occasional week. Never Smoking. No IVDU. No Children. Right handed. Single--not sexually active, never.        PMSH personally reviewed and updated by me.    ROS:  A comprehensive ROS was done. Positives are per HPI.      Ph. E:      Constitutional: NAD, pleasant  Eyes: nl EOM, conjunctiva, sclera  Skin: no rash  Neuro: grossly non focal  Psych: nl affect  MSK: no active synovitis        Labs/Imaging:  Normal G6PD  Neg MTB QG 2016 and hepatitis panel, negative HIV    XR hands and knees-negative 2016   Component      Latest Ref Rng & Units 11/17/2020   WBC      4.0 - 11.0 10e9/L 9.6   RBC Count      3.8 - 5.2 10e12/L 4.72   Hemoglobin      11.7 - 15.7 g/dL 13.8   Hematocrit      35.0 - 47.0 % 42.9   MCV      78 - 100 fl 91   MCH      26.5 - 33.0 pg 29.2   MCHC      31.5 - 36.5 g/dL 32.2   RDW      10.0 - 15.0 % 12.9   Platelet Count      150 - 450 10e9/L 389   Creatinine       0.52 - 1.04 mg/dL 0.66   GFR Estimate      >60 mL/min/1.73:m2 >90   GFR Estimate If Black      >60 mL/min/1.73:m2 >90   CRP Inflammation      0.0 - 8.0 mg/L 9.0 (H)   Albumin      3.4 - 5.0 g/dL 3.4   ALT      0 - 50 U/L 16   AST      0 - 45 U/L 10     Impression/Plan:  1. Seropositive (pos anti-CCP, neg RF )rheumatoid arthritis (RA) non-erosive. Active RA on methotrexate 12.5 mg weekly, worse after stopping HCQ due to diarrhea (and never was successful to get generic brand HCQ, brand is too expensive). Did not tolerate MTX even at lowest effective dose of 10 mg qwk due to nausea, headaches, brain fog and nausea. Was placed on humira q2wk, initially thought it was helping but after feeling normal by taking prednisone taper ( 20 mg every day max) realized that humira was not helping much.    8/2022: Recommend to proceed with actemra as next step; risks were discussed.    I support her using medical cannabis, no interaction with RA meds and could be beneficial as non steroid agent to help managing pain, it might have some anti-inflammatory benefit and it is not addictive. I advsied her to reach out to her PCP jenny nichols for Rx.    Today 3/24/2023: Last humira 8/13/2022. On actemra since 10/2022, it took >month to get it insurance approved. It is helping her more and will continue with that. No SEs.    2. Vitamin D deficeincy- take 2000 international unit(s) day.     3. Pityriasis lichenoids chronic-per derm     5. Chronic NSAID use. Will check labs q6-12 mo.        Plan:    Prednisone as needed : 4tab=20 mg qd x 5 days, 3tab=15 mg qd x 5 days, 2tab=10 mg qd x 5 days, 1 tab=5 mg qd x 5 days then stop     Continue actemra    Labs every 6 months, due in 6/2023    Return in person in 6 months    Naresh Aldridge MD        Orders Placed This Encounter   Procedures    ALT    Albumin level    AST    Creatinine    CBC with Platelets & Differential

## 2023-03-24 NOTE — NURSING NOTE
Is the patient currently in the state of [place]? NO    Visit mode:VIDEO    If the visit is dropped, the patient can be reconnected by: VIDEO VISIT: Text to cell phone: [phone]    Will anyone else be joining the visit? NO      How would you like to obtain your AVS? MyChart    Are changes needed to the allergy or medication list? YES: Pt has a lot of doubles that need to be removed     Reason for visit: Follow up on meds      Prednisone- is PRN  Pt  states she takes Claritin 1 pill 1X day    Pt states she has muscle soreness from physical therpahy- hands are a little sore. Pain level 2.    Malena Jamison on 3/24/2023 at 2:52 PM

## 2023-03-24 NOTE — PROGRESS NOTES
Virtual Visit Details    Type of service:  Video Visit     3 pm 3:13 pm    Originating Location (pt. Location): Home    Distant Location (provider location):  On-site  Platform used for Video Visit: Onel     Last seen:  8/12/2022  DOS: 3/24/2023    Reason for visit: RA    [facility] - Rheumatology Clinic Visit    Rachel Fried  is a 30 year old here for follow-up  RHEUMATOID ARTHRITIS (RA) involving hands, wrists, ankles, shoulders feet [ -RF and low CCP 25, -factor V -KAVITHA by IFA and -NADEEN panel -HLA B27] and pityriasis lichenoides chronica (seen [facility]-established here). XR  no erosions hands. Moved from [place] to be with family.       Past- hydroxychloroquine (plaquenil) since 1-2016 (2 tablet a day caused diarrhea, ok on 1 tablet) and methotrexate 10 mg week  to 4-2018 (mild fatigue day of) then restarted 10- (higher folic acid improved side-effects), naproxsyn; allergy sulfa, prednisone, advil       Initial visit copy forward  10-: Hydroxychloroquine (plaquenil) BID mild diarrhea; ocular eye exam 3-2016 baseline reports recent eye exam   EKG was recently Feb 2018 normal. Stopped methotrexate  In April as wanted to take a drug holiday and was moving here, also wanted to drink some alcohol, and discuss options. No infections. Diffuse joint pains in hands, wrists, shoulders, ankles and feet and the sides of her hips. Taking advil 600mg AM and 400 mg PM, tolerating but taking since April. Pain severe 5/10 or more. Stiffness all day. Hands are sore. No loss of function or ROM. Very sore and stiff all over. Knees are sore and hard to bend or squat. Mild diarrhea with hydroxychloroquine (plaquenil)     December 16, 2020  Denies any fever, chills, SOB, CANDELARIA, night sweats, or chest pain, high fever, cough, travel in last 14 days or exposure to covid-19 (coronavirus). Reports healthy. Follows CDC guidelines. Works from home.     Rheumatoid arthritis (RA) stiff with  pain in in hands and wrists, stiff in mornings for few hours. Not able to do her massage or swim, does walk and not doing normal routine which is causing this as well. Sleep is poor now and hip pain. Upper neck discomfort from stress.     Continues on methotrexate  12.5 mg every 7 days THURS, folic acid 2 mg day. Tolerating. Hydroxychloroquine (plaquenil) 200 mg every day  misses most days so takes <3 times a week. No GI side-effects, no hairloss or oral sores from methotrexate moreso from hydroxychloroquine (plaquenil).  Off celexa -lost 12 lbs. 197 lb last visit but this not now due to Covid-19 (coronavirus).   No longer using NSAIDs. Taking vitamin D 2000 international unit(s) day.  No EAS. No vision issues. Is exercising and doing yogo now. Goes to [facility] did last 3-2019 but was due this past Spring. Willing to come here if can do hydroxychloroquine (plaquenil) toxicity with glasses and contacts in 1 visit. Otherwise, will continue with hers.     4/23/21:  The patient states that she is doing ok with her RA. She states that her joint symptoms have slightly worsened during pandemic because she is not going swimming, which is traditionally very helpful for her.   The patient just got vaccinated and plans to start going swimming in May, which she thinks will help her joints. She has no active swelling now.  She is tolerating methotrexate at 12.5 mg qWeek and also on  mg every day (can not tolerate higher dose sec GI SEs).   The patient is taking ibuprofen 2-4 tabs for the last couple of weeks.  No plans to get pregnant. Not on birth control at this time, but states currently only dating women.  She otherwise feels well with no other concer     10/28/2021:    Doing ok for most part with her RA. AM stiffness is 1.5 hr. Knees and ankles hurt and swell up a little bit and back hurts with standing for too long, but has hard time to say if her joints are swollen. On MTX 5 tabs a wk, last time increased her  MTX, got brain fog (has it with MTX current dose but it got worse) and migraines and nausea. It ruined her Sturdays. She takes NSAIDs rarely like going to a concert. Used to be naproxen every day before RA Dx. Since stopping HCQ every other day is having more joint sx, but hard to tell, HCQ causes her diarrhea at higher dose, tried to get generic brand but was never able to get it from pharmacy.      Had covid booster vaccine, no SE.    1/19/2022: Rachel presents for follow up. Humira was added at last visit. Did 4 shots of humira, right before brendan, had last shot, now out. No SEs and it significantly helped with joint pain/stiffness, now symptoms are slowly returning.    Reports 1 hr of knee stiffness, 20 min of hand stiffness.    Reports brain fog 24-48 hours after taking methotrexate.      8/12/2022: Rachel presents for follow up. Did not tolerate MTX, HCQ in the past. Back on humira. Has ongoing arthralgia, did not realize how much pain she had tills he took the prednisone which made her feel normal, pain free. Now realized that humira was not helping her as much as she thought. She is hoping to try a med which works as well as prednisone. Wondering if she could try medical cannabis.    Most pain is localized to her hands, which limits how she uses her hands.    Am stiffness is >30 min. Can not tell if joints swell up or not. No rash, cough, SOB. Regained 50 lbs recently.    MTX caused her brain fog, headaches. Felt miserable on it.    Today 3/24/2023: Humira was switched to actemra. She is doing better on actemra and thinks that it is helping her more with joint sx (pain and stiffness). No SEs.    Had hard time making herself doing anything, she was very stiff while she was off humira and waiting for actemra approval by [facility], worked with PT. Has back posture pain.    Hands ache today but that's from broken popsocket.    RA is overall is good.    No Actemra SEs.    Has scoliosis, was doing PT 2-3 times  a week, now twice a wk.            PMH:  Injury-no  Medical-dermatitis, (bx 2-2016 GA vs LP vs cutaneous lupus--was not cutenaous lupus --focal interstitial lymphocytic inflammation, scattered lymphocytes), anxiety and depression, exercise induced asthma, scoliosis, pityriasis lichenoides chronica, chronic back pain, hypertension, seasonal allergies, acne, motion sickness, high cholesterol, chicken pox, headaches before    Surgical-None     FH:  No autoimmune disorders, psoriasis, UC, crohn's, RA, PsA, gout, autoimmune thyroid.  No MS, heart disease or cancer in family  Mother-factor V leiden carrier, rosea   Father-healthy  Siblings-younger bro healthy  Children-None   GF-lupus   Uncle cancer colon    SH:  Social Alcohol occasional week. Never Smoking. No IVDU. No Children. Right handed. Single--not sexually active, never.        PMSH personally reviewed and updated by me.    ROS:  A comprehensive ROS was done. Positives are per HPI.      Ph. E:      Constitutional: NAD, pleasant  Eyes: nl EOM, conjunctiva, sclera  Skin: no rash  Neuro: grossly non focal  Psych: nl affect  MSK: no active synovitis        Labs/Imaging:  Normal G6PD  Neg MTB QG 2016 and hepatitis panel, negative HIV    XR hands and knees-negative 2016   Component      Latest Ref Rng & Units 11/17/2020   WBC      4.0 - 11.0 10e9/L 9.6   RBC Count      3.8 - 5.2 10e12/L 4.72   Hemoglobin      11.7 - 15.7 g/dL 13.8   Hematocrit      35.0 - 47.0 % 42.9   MCV      78 - 100 fl 91   MCH      26.5 - 33.0 pg 29.2   MCHC      31.5 - 36.5 g/dL 32.2   RDW      10.0 - 15.0 % 12.9   Platelet Count      150 - 450 10e9/L 389   Creatinine      0.52 - 1.04 mg/dL 0.66   GFR Estimate      >60 mL/min/1.73:m2 >90   GFR Estimate If Black      >60 mL/min/1.73:m2 >90   CRP Inflammation      0.0 - 8.0 mg/L 9.0 (H)   Albumin      3.4 - 5.0 g/dL 3.4   ALT      0 - 50 U/L 16   AST      0 - 45 U/L 10     Impression/Plan:  1. Seropositive (pos anti-CCP, neg RF )rheumatoid  arthritis (RA) non-erosive. Active RA on methotrexate 12.5 mg weekly, worse after stopping HCQ due to diarrhea (and never was successful to get generic brand HCQ, brand is too expensive). Did not tolerate MTX even at lowest effective dose of 10 mg qwk due to nausea, headaches, brain fog and nausea. Was placed on humira q2wk, initially thought it was helping but after feeling normal by taking prednisone taper ( 20 mg every day max) realized that humira was not helping much.    8/2022: Recommend to proceed with actemra as next step; risks were discussed.    I support her using medical cannabis, no interaction with RA meds and could be beneficial as non steroid agent to help managing pain, it might have some anti-inflammatory benefit and it is not addictive. I advsied her to reach out to her PCP jenny nichols for Rx.    Today 3/24/2023: Last humira 8/13/2022. On actemra since 10/2022, it took >month to get it insurance approved. It is helping her more and will continue with that. No SEs.    2. Vitamin D deficeincy- take 2000 international unit(s) day.     3. Pityriasis lichenoids chronic-per derm     5. Chronic NSAID use. Will check labs q6-12 mo.        Plan:    Prednisone as needed : 4tab=20 mg qd x 5 days, 3tab=15 mg qd x 5 days, 2tab=10 mg qd x 5 days, 1 tab=5 mg qd x 5 days then stop     Continue actemra    Labs every 6 months, due in 6/2023    Return in person in 6 months    Naresh Aldridge MD        Orders Placed This Encounter   Procedures     ALT     Albumin level     AST     Creatinine     CBC with Platelets & Differential

## 2023-04-18 ENCOUNTER — MYC MEDICAL ADVICE (OUTPATIENT)
Dept: INTERNAL MEDICINE | Facility: CLINIC | Age: 33
End: 2023-04-18

## 2023-04-18 ENCOUNTER — OFFICE VISIT (OUTPATIENT)
Dept: INTERNAL MEDICINE | Facility: CLINIC | Age: 33
End: 2023-04-18
Payer: COMMERCIAL

## 2023-04-18 VITALS
SYSTOLIC BLOOD PRESSURE: 139 MMHG | OXYGEN SATURATION: 97 % | HEART RATE: 90 BPM | DIASTOLIC BLOOD PRESSURE: 80 MMHG | WEIGHT: 260.2 LBS | HEIGHT: 70 IN | BODY MASS INDEX: 37.25 KG/M2

## 2023-04-18 DIAGNOSIS — F90.2 ATTENTION DEFICIT HYPERACTIVITY DISORDER (ADHD), COMBINED TYPE: Primary | ICD-10-CM

## 2023-04-18 PROCEDURE — 99213 OFFICE O/P EST LOW 20 MIN: CPT | Performed by: INTERNAL MEDICINE

## 2023-04-18 RX ORDER — DEXTROAMPHETAMINE SACCHARATE, AMPHETAMINE ASPARTATE MONOHYDRATE, DEXTROAMPHETAMINE SULFATE AND AMPHETAMINE SULFATE 2.5; 2.5; 2.5; 2.5 MG/1; MG/1; MG/1; MG/1
10 CAPSULE, EXTENDED RELEASE ORAL DAILY
Qty: 30 CAPSULE | Refills: 0 | Status: SHIPPED | OUTPATIENT
Start: 2023-04-18 | End: 2023-05-16

## 2023-04-18 NOTE — NURSING NOTE
Rachel Fried is a 32 year old female patient that presents today in clinic for the following:    Chief Complaint   Patient presents with     RECHECK     ADHD medications     The patient's allergies and medications were reviewed as noted. A set of vitals were recorded as noted without incident. The patient does not have any other questions for the provider.    Erick Montano, EMT at 6:27 PM on 4/18/2023

## 2023-04-18 NOTE — PROGRESS NOTES
"  Assessment & Plan     Attention deficit hyperactivity disorder (ADHD), combined type    Rachel presents with a new diagnosis of ADHD and would like to start medication, and we discussed trying Adderall and possible side effects.  She would prefer a long acting med so she only has to take it once per day- Epic formulary indicates XR may not be covered, but we'll send it to the pharmacy to verify in case that is not accurate.  If not covered, will change to IR 5mg BID.  Controlled substance agreement signed.  Recommended talking to her therapist about lifestyle measures to help with ADHD as well.  We'll follow-up in 1 month.     - amphetamine-dextroamphetamine (ADDERALL XR) 10 MG 24 hr capsule; Take 1 capsule (10 mg) by mouth daily    Return in about 1 month (around 5/18/2023) for Follow up.    Panfilo Solano MD  [facility] INTERNAL MEDICINE [facility]   Rachel is a 32 year old, presenting for the following health issues:  RECHECK (ADHD medications)         View : No data to display.              HPI     Rachel presents to discuss starting medication for ADHD.     She had an assessment at [facility] earlier this month, which she sent via [employer].      From their note:    \"DIAGNOSTIC IMPRESSIONS:  1. Attention-Deficit/Hyperactivity Disorder, Combined type.  2. Generalized Anxiety Disorder    RECOMMENDATIONS  1. You are encouraged to continue taking your medication as prescribed and continue seeing your medication  provider regularly. It is recommended that you provide your prescriber with a copy of this evaluation.  2. It is recommended that you continue to see your individual therapist to help support your therapy goals and  work to reduce your anxiety.  3. Retain a copy of this report in a safe place for your records and to give to any new provider.\"    Rachel is on duloxetine, which helps with anxiety. She is following with a therapist and they have been discussing " "possibility of untreated ADHD contributing to anxiety.  She attends weekly.      [place]  reviewed[place] no [place] listed        Review of Systems   Constitutional, psych systems are negative, except as otherwise noted.      Objective    /80 (BP Location: Right arm, Patient Position: Sitting, Cuff Size: Adult Large)   Pulse 90   Ht 1.772 m (5' 9.76\")   Wt 118 kg (260 lb 3.2 oz)   SpO2 97%   BMI 37.59 kg/m    Body mass index is 37.59 kg/m .  Physical Exam   GENERAL: healthy, alert and no distress  PSYCH: mentation appears normal, affect normal/bright               "

## 2023-04-18 NOTE — LETTER
[facility]  04/18/23  Patient: Rachel Fried  YOB: 1990  Medical Record Number: [id number]                                                                                  Non-Opioid Controlled Substance Agreement    This is an agreement between you and your provider regarding safe and appropriate use of controlled substances prescribed by your care team. Controlled substances are?medicines that can cause physical and mental dependence (abuse).     There are strict laws about having and using these medicines. We here at [facility] are  committed to working with you in your efforts to get better. To support you in this work, we'll help you schedule regular office appointments for medicine refills. If we must cancel or change your appointment for any reason, we'll make sure you have enough medicine to last until your next appointment.     As a Provider, I will:   Listen carefully to your concerns while treating you with respect.   Recommend a treatment plan that I believe is in your best interest and may involve therapies other than medicine.    Talk with you often about the possible benefits and the risk of harm of any medicine that we prescribe for you.  Assess the safety of this medicine and check how well it works.    Provide a plan on how to taper (discontinue or go off) using this medicine if the decision is made to stop its use.      ::  As a Patient, I understand controlled substances:     Are prescribed by my care provider to help me function or work and manage my condition(s).?  Are strong medicines and can cause serious side effects.     Need to be taken exactly as prescribed.?Combining controlled substances with certain medicines or chemicals (such as illegal drugs, alcohol, sedatives, sleeping pills, and benzodiazepines) can be dangerous or even fatal.? If I stop taking my medicines suddenly, I may have severe withdrawal symptoms.      The risks, benefits, and side effects of these medicine(s) were explained to me. I agree that:    I will take part in other treatments as advised by my care team. This may be psychiatry or counseling, physical therapy, behavioral therapy, group treatment or a referral to specialist.    I will keep all my appointments and understand this is part of the monitoring of controlled substances.?My care team may require an office visit for EVERY controlled substance refill. If I miss appointments or don t follow instructions, my care team may stop my medicine    I will take my medicines as prescribed. I will not change the dose or schedule unless my care team tells me to. There will be no refills if I run out early.      I may be asked to come to the clinic and complete a urine drug test or complete a pill count. If I don t give a urine sample or participate in a pill count, the care team may stop my medicine.    I will only receive controlled substance prescriptions from this clinic. If I am treated by another provider, I will tell them that I am taking controlled substances and that I have a treatment agreement with this provider. I will inform my [facility] care team within one business day if I am given a prescription for any controlled substance by another healthcare provider. My [facility] care team can contact other providers and pharmacists about my use of any medicines.    It is up to me to make sure that I don't run out of my medicines on weekends or holidays.?If my care team is willing to refill my prescription without a visit, I must request refills only during office hours. Refills may take up to 3 business days to process. I will use one pharmacy to fill all my controlled substance prescriptions. I will notify the clinic about any changes to my insurance or medicine availability.    I am responsible for my prescriptions. If the medicine/prescription is lost, stolen or destroyed, it will not  be replaced.?I also agree not to share controlled substance medicines with anyone.     I am aware I should not use any illegal or recreational drugs. I agree not to drink alcohol unless my care team says I can.     If I enroll in the [place] Medical Cannabis program, I will tell my care team before my next refill.    I will tell my care team right away if I become pregnant, have a new medical problem treated outside of my regular clinic, or have a change in my medicines.     I understand that this medicine can affect my thinking, judgment and reaction time.? Alcohol and drugs affect the brain and body, which can affect the safety of my driving. Being under the influence of alcohol or drugs can affect my decision-making, behaviors, personal safety and the safety of others. Driving while impaired (DWI) can occur if a person is driving, operating or in physical control of a car, motorcycle, boat, snowmobile, ATV, motorbike, off-road vehicle or any other motor vehicle ([place] Statute 169[place].20). I understand the risk if I choose to drive or operate any vehicle or machinery.    I understand that if I do not follow any of the conditions above, my prescriptions or treatment may be stopped or changed.   I agree that my provider, clinic care team and pharmacy may work with any city, state or federal law enforcement agency that investigates the misuse, sale or other diversion of my controlled medicine. I will allow my provider to discuss my care with, or share a copy of, this agreement with any other treating provider, pharmacy or emergency room where I receive care.     I have read this agreement and have asked questions about anything I did not understand.    ________________________________________________________  Patient Signature - Rachel Fried     ___________________                   Date     ________________________________________________________  Provider Signature - Panfilo Solano MD       ___________________                    Date     ________________________________________________________  Witness Signature (required if provider not present while patient signing)          ___________________                   Date

## 2023-04-20 DIAGNOSIS — M06.09 RHEUMATOID ARTHRITIS OF MULTIPLE SITES WITH NEGATIVE RHEUMATOID FACTOR (H): ICD-10-CM

## 2023-04-20 NOTE — TELEPHONE ENCOUNTER
Tocilizumab (ACTEMRA ACTPEN) 162 MG/0.9ML SOAJ     Last Written Prescription Date:  12/30/22  Last Fill Quantity: 3.6ml,   # refills: 3  Last Office Visit: 3/24/23  Future Office visit:  10/5/23    CBC RESULTS: Recent Labs   Lab Test 12/07/22  1303   WBC 7.1   RBC 4.63   HGB 13.8   HCT 41.6   MCV 90   MCH 29.8   MCHC 33.2   RDW 14.6          Creatinine   Date Value Ref Range Status   12/07/2022 0.71 0.51 - 0.95 mg/dL Final   07/05/2021 0.71 0.52 - 1.04 mg/dL Final       Liver Function Studies -   Recent Labs   Lab Test 12/07/22  1303 12/04/18  0824 10/31/18  1505   PROTTOTAL  --   --  7.7   ALBUMIN 4.2   < > 3.5   BILITOTAL  --   --  0.2   ALKPHOS  --   --  77   AST 24   < > 16   ALT 16   < > 22    < > = values in this interval not displayed.       Routing refill request to provider for review/approval because:    DMARD  Overdue for 8-12wk labs per protocol  Per last OV notes 3/4/23, follow up in 6mo. Next appt isn't until 10/5/23. Does patient need to be seen sooner?

## 2023-04-23 RX ORDER — TOCILIZUMAB 180 MG/ML
0.9 INJECTION, SOLUTION SUBCUTANEOUS
Qty: 3.6 ML | Refills: 3 | Status: SHIPPED | OUTPATIENT
Start: 2023-04-23 | End: 2023-08-25

## 2023-05-16 ENCOUNTER — VIRTUAL VISIT (OUTPATIENT)
Dept: INTERNAL MEDICINE | Facility: CLINIC | Age: 33
End: 2023-05-16
Payer: COMMERCIAL

## 2023-05-16 DIAGNOSIS — F90.2 ATTENTION DEFICIT HYPERACTIVITY DISORDER (ADHD), COMBINED TYPE: ICD-10-CM

## 2023-05-16 PROCEDURE — 99213 OFFICE O/P EST LOW 20 MIN: CPT | Mod: VID | Performed by: INTERNAL MEDICINE

## 2023-05-16 RX ORDER — DEXTROAMPHETAMINE SACCHARATE, AMPHETAMINE ASPARTATE MONOHYDRATE, DEXTROAMPHETAMINE SULFATE AND AMPHETAMINE SULFATE 3.75; 3.75; 3.75; 3.75 MG/1; MG/1; MG/1; MG/1
15 CAPSULE, EXTENDED RELEASE ORAL DAILY
Qty: 30 CAPSULE | Refills: 0 | Status: SHIPPED | OUTPATIENT
Start: 2023-05-16 | End: 2023-10-20

## 2023-05-16 NOTE — NURSING NOTE
Is the patient currently in the state of [place]? YES    Visit mode:VIDEO    If the visit is dropped, the patient can be reconnected by: VIDEO VISIT: Text to cell phone: [phone]    Will anyone else be joining the visit? NO      How would you like to obtain your AVS? My Chart    Are changes needed to the allergy or medication list? NO    Reason for visit: Video Visit (Follow up)    Ginger CARCAMO

## 2023-05-16 NOTE — PROGRESS NOTES
"Virtual Visit Details    Type of service:  Video Visit       Rachel is a 32 year old who is being evaluated via a billable video visit.      How would you like to obtain your AVS? Enjoihart  If the video visit is dropped, the invitation should be resent by: Text to cell phone: [phone]  Will anyone else be joining your video visit? No      Assessment & Plan     Attention deficit hyperactivity disorder (ADHD), combined type    Rachel reports that the Adderall XR 10mg has been really helpful without side effects.  She wonders if she may get more benefit from increasing to 15mg, so we'll try that and can drop back down if side effects develop.    Advised the following for follow-up:    Send me a [employer] message in one month to let me know how the 15mg dose is going.  If going well, we'll continue and I'll send in two more months of refills.  Then contact us 3 months from now (August) for the next set of three refills, then follow-up visit 6 months from now (November)- a virtual visit would be fine.     - amphetamine-dextroamphetamine (ADDERALL XR) 15 MG 24 hr capsule; Take 1 capsule (15 mg) by mouth daily      Panfilo Solano MD  [facility] INTERNAL [facility]IC[facility] [place]    Subjective   Rachel is a 32 year old, presenting for the following health issues:  No chief complaint on file.    HPI     I saw Rachel on 4/18: \"Rachel presents with a new diagnosis of ADHD and would like to start medication, and we discussed trying Adderall and possible side effects.  She would prefer a long acting med so she only has to take it once per day- Epic formulary indicates XR may not be covered, but we'll send it to the pharmacy to verify in case that is not accurate.  If not covered, will change to IR 5mg BID.  Controlled substance agreement signed.  Recommended talking to her therapist about lifestyle measures to help with ADHD as well.  We'll follow-up in 1 month.\"    Today, Rachel reports things are going really well " since last visit.   Her therapist is having her track symptoms.  Her brain feels much quieter all of the time.  No longer having multiple lines of internal conversations and warp speed songs running through her brain.  Med has not worsened anxiety at all and maybe has helped by settling the racing thoughts.  Her sleep has been great.  No crash toward the end of the day.   She is able to focus much more at work.  Reduced sugar cravings.  Reduced executive dysfunction.  No negative side effects at this point- had some diarrhea for the first couple of days but that resolved.     [place]  reviewed: Med refilled on 4/18 for 30 days.      Review of Systems   Constitutional, CV, psych systems are negative, except as otherwise noted.      Objective           Vitals:  No vitals were obtained today due to virtual visit.    Physical Exam   GENERAL: Healthy, alert and no distress  EYES: Eyes grossly normal to inspection.  No discharge or erythema, or obvious scleral/conjunctival abnormalities.  RESP: No audible wheeze, cough, or visible cyanosis.  No visible retractions or increased work of breathing.    SKIN: Visible skin clear. No significant rash, abnormal pigmentation or lesions.  NEURO: Cranial nerves grossly intact.  Mentation and speech appropriate for age.  PSYCH: Mentation appears normal, affect normal/bright, judgement and insight intact, normal speech and appearance well-groomed.          Video-Visit Details    Type of service:  Video Visit   Start time: 858am  End time: 908am    Originating Location (pt. Location): Home  Distant Location (provider location):  Off-site  Platform used for Video Visit: Boatbound

## 2023-05-16 NOTE — PATIENT INSTRUCTIONS
Send me a [employer] message in one month to let me know how the 15mg dose is going.  If going well, we'll continue and I'll send in two more months of refills.  Then contact us 3 months from now (August) for the next set of three refills, then follow-up visit 6 months from now (November)- a virtual visit would be fine.       Thank you for visiting the Primary Care Center today at the [facility]! The following is some information about our clinic:     Primary Care Center Frequently-Asked Questions    (1) How do I schedule appointments at the [facility]?     Primary Care--to schedule or make changes to an existing appointment, please call our primary care line at [phone].    Labs--to schedule a lab appointment at the [facility] you can use [employer] or call [phone]. If you have a [place] location that is closer to home, you can reach out to that location for scheduling options.     Imaging--if you need to schedule a CT, X-ray, MRI, ultrasound, or other imaging study you can call [phone] to schedule at the [facility] or any other [facility] imaging location.     Referrals--if a referral to another specialty was ordered you can expect a phone call from their scheduling team. If you have not heard from them in a week, please call us or send us a [employer] message to check the status or get a scheduling number. Please note that this only applies to internal [facility] referrals. If the referral is external you would need to contact their office for scheduling.     (2) I have a question about my visit, who do I contact?     You can call us at the primary care line at [phone] to ask questions about your visit. You can also send a secure message through [employer], which is reviewed by clinic staff. Please note that [employer] messages have a twenty-four to forty-eight business hour turnaround time and should not be used for urgent  concerns.    (3) How will I get the results of my tests?    If you are signed up for [employer] all tests will be released to you within twenty-four hours of resulting. Please allow three to five days for your doctor to review your results and place a note interpreting the results. If you do not have MyChart you will receive your results through mail seven to ten business days following the return of the tests. Please note that if there should be any urgent or concerning results that your doctor or their registered nurse will reach out to you the same day as the tests come back. If you have follow up questions about your results or would like to discuss the results in detail please schedule a follow up with your provider either in person or virtually.     (4) How do I get refills of my prescriptions?     You should always first contact your pharmacy for refills of your medications. If submitting a refill request on Vpon, please be sure to submit the request only once--repeat requests can cause delays in refill. If you are requesting a NEW medication or a medication related to new symptoms you will need to schedule an appointment with a provider prior to approval. Please note: Routine medication refills have up to one to three business day turnaround whereas controlled substances refills have up to five to seven business day turnaround.    (5) I have new symptoms, what do I do?     If you are having an immediate medical emergency, you should dial 911 for assistance.   For anything urgent that needs to be seen within a few hours to one day you should visit a local urgent care for assistance.  For non-urgent symptoms that need to be seen within a few days to a week you can schedule with an available provider in primary care by going to [employer] or calling [phone].   If you are not sure how serious your symptoms are or you would like to receive medical advice you can always call [phone] to speak with a triage  nurse.

## 2023-06-05 ENCOUNTER — LAB (OUTPATIENT)
Dept: LAB | Facility: CLINIC | Age: 33
End: 2023-06-05
Payer: COMMERCIAL

## 2023-06-05 DIAGNOSIS — Z51.81 ENCOUNTER FOR MEDICATION MONITORING: ICD-10-CM

## 2023-06-05 DIAGNOSIS — E66.812 CLASS 2 OBESITY WITHOUT SERIOUS COMORBIDITY WITH BODY MASS INDEX (BMI) OF 37.0 TO 37.9 IN ADULT, UNSPECIFIED OBESITY TYPE: ICD-10-CM

## 2023-06-05 DIAGNOSIS — M06.09 RHEUMATOID ARTHRITIS OF MULTIPLE SITES WITH NEGATIVE RHEUMATOID FACTOR (H): ICD-10-CM

## 2023-06-05 LAB
ALBUMIN SERPL BCG-MCNC: 4.2 G/DL (ref 3.5–5.2)
ALT SERPL W P-5'-P-CCNC: 14 U/L (ref 10–35)
AST SERPL W P-5'-P-CCNC: 20 U/L (ref 10–35)
BASOPHILS # BLD AUTO: 0 10E3/UL (ref 0–0.2)
BASOPHILS NFR BLD AUTO: 0 %
CHOLEST SERPL-MCNC: 230 MG/DL
CREAT SERPL-MCNC: 0.71 MG/DL (ref 0.51–0.95)
EOSINOPHIL # BLD AUTO: 0.1 10E3/UL (ref 0–0.7)
EOSINOPHIL NFR BLD AUTO: 2 %
ERYTHROCYTE [DISTWIDTH] IN BLOOD BY AUTOMATED COUNT: 11.8 % (ref 10–15)
GFR SERPL CREATININE-BSD FRML MDRD: >90 ML/MIN/1.73M2
HBA1C MFR BLD: 5.6 % (ref 0–5.6)
HCT VFR BLD AUTO: 42.8 % (ref 35–47)
HDLC SERPL-MCNC: 59 MG/DL
HGB BLD-MCNC: 14.4 G/DL (ref 11.7–15.7)
IMM GRANULOCYTES # BLD: 0 10E3/UL
IMM GRANULOCYTES NFR BLD: 0 %
LDLC SERPL CALC-MCNC: 140 MG/DL
LYMPHOCYTES # BLD AUTO: 2.6 10E3/UL (ref 0.8–5.3)
LYMPHOCYTES NFR BLD AUTO: 37 %
MCH RBC QN AUTO: 29.9 PG (ref 26.5–33)
MCHC RBC AUTO-ENTMCNC: 33.6 G/DL (ref 31.5–36.5)
MCV RBC AUTO: 89 FL (ref 78–100)
MONOCYTES # BLD AUTO: 0.6 10E3/UL (ref 0–1.3)
MONOCYTES NFR BLD AUTO: 9 %
NEUTROPHILS # BLD AUTO: 3.6 10E3/UL (ref 1.6–8.3)
NEUTROPHILS NFR BLD AUTO: 53 %
NONHDLC SERPL-MCNC: 171 MG/DL
PLATELET # BLD AUTO: 270 10E3/UL (ref 150–450)
RBC # BLD AUTO: 4.82 10E6/UL (ref 3.8–5.2)
TRIGL SERPL-MCNC: 155 MG/DL
WBC # BLD AUTO: 6.9 10E3/UL (ref 4–11)

## 2023-06-05 PROCEDURE — 82040 ASSAY OF SERUM ALBUMIN: CPT

## 2023-06-05 PROCEDURE — 36415 COLL VENOUS BLD VENIPUNCTURE: CPT

## 2023-06-05 PROCEDURE — 83036 HEMOGLOBIN GLYCOSYLATED A1C: CPT

## 2023-06-05 PROCEDURE — 82565 ASSAY OF CREATININE: CPT

## 2023-06-05 PROCEDURE — 85025 COMPLETE CBC W/AUTO DIFF WBC: CPT

## 2023-06-05 PROCEDURE — 80061 LIPID PANEL: CPT

## 2023-06-05 PROCEDURE — 84460 ALANINE AMINO (ALT) (SGPT): CPT

## 2023-06-05 PROCEDURE — 84450 TRANSFERASE (AST) (SGOT): CPT

## 2023-06-12 ENCOUNTER — MYC MEDICAL ADVICE (OUTPATIENT)
Dept: RHEUMATOLOGY | Facility: CLINIC | Age: 33
End: 2023-06-12
Payer: COMMERCIAL

## 2023-08-16 ENCOUNTER — MYC MEDICAL ADVICE (OUTPATIENT)
Dept: INTERNAL MEDICINE | Facility: CLINIC | Age: 33
End: 2023-08-16
Payer: COMMERCIAL

## 2023-08-24 DIAGNOSIS — M06.09 RHEUMATOID ARTHRITIS OF MULTIPLE SITES WITH NEGATIVE RHEUMATOID FACTOR (H): ICD-10-CM

## 2023-08-25 RX ORDER — TOCILIZUMAB 180 MG/ML
162 INJECTION, SOLUTION SUBCUTANEOUS
Qty: 3.6 ML | Refills: 5 | Status: SHIPPED | OUTPATIENT
Start: 2023-08-25 | End: 2024-02-28

## 2023-08-25 NOTE — TELEPHONE ENCOUNTER
Medication/Dose: ACTEMRA ACTPEN 162 MG/0.9ML SOAJ    Last Written : 4/23/23  Last Quantity: 3.6 mL, # refills: 3  Last Office Visit :  3/24/23  Pending appointment:     Oct 05, 2023 10:30 AM  (Arrive by 10:15 AM)  Return Visit with Naresh Aldridge MD  [facility] [place] ([facility] ) [address] [place] [id number] [phone]       WBC   Date Value Ref Range Status   07/05/2021 9.3 4.0 - 11.0 10e9/L Final     WBC Count   Date Value Ref Range Status   06/05/2023 6.9 4.0 - 11.0 10e3/uL Final     RBC Count   Date Value Ref Range Status   06/05/2023 4.82 3.80 - 5.20 10e6/uL Final   07/05/2021 4.96 3.8 - 5.2 10e12/L Final     Hemoglobin   Date Value Ref Range Status   06/05/2023 14.4 11.7 - 15.7 g/dL Final   07/05/2021 13.8 11.7 - 15.7 g/dL Final     Hematocrit   Date Value Ref Range Status   06/05/2023 42.8 35.0 - 47.0 % Final   07/05/2021 43.3 35.0 - 47.0 % Final     MCV   Date Value Ref Range Status   06/05/2023 89 78 - 100 fL Final   07/05/2021 87 78 - 100 fl Final     MCH   Date Value Ref Range Status   06/05/2023 29.9 26.5 - 33.0 pg Final   07/05/2021 27.8 26.5 - 33.0 pg Final     MCHC   Date Value Ref Range Status   06/05/2023 33.6 31.5 - 36.5 g/dL Final   07/05/2021 31.9 31.5 - 36.5 g/dL Final     RDW   Date Value Ref Range Status   06/05/2023 11.8 10.0 - 15.0 % Final   07/05/2021 14.9 10.0 - 15.0 % Final     Platelet Count   Date Value Ref Range Status   06/05/2023 270 150 - 450 10e3/uL Final   07/05/2021 386 150 - 450 10e9/L Final     AST   Date Value Ref Range Status   06/05/2023 20 10 - 35 U/L Final   07/05/2021 19 0 - 45 U/L Final     ALT   Date Value Ref Range Status   06/05/2023 14 10 - 35 U/L Final   07/05/2021 27 0 - 50 U/L Final     Creatinine   Date Value Ref Range Status   06/05/2023 0.71 0.51 - 0.95 mg/dL Final   07/05/2021 0.71 0.52 - 1.04 mg/dL Final     Albumin   Date Value Ref Range Status   06/05/2023 4.2 3.5 -  5.2 g/dL Final   06/01/2022 3.6 3.4 - 5.0 g/dL Final   07/05/2021 3.7 3.4 - 5.0 g/dL Final     CRP Inflammation   Date Value Ref Range Status   02/19/2022 8.4 (H) 0.0 - 8.0 mg/L Final   07/05/2021 7.5 0.0 - 8.0 mg/L Final     No components found for: ESR    Prescription set up and routed to provider per Refill Protocol    YOUNG Prieto, RN  [facility] Refill Team

## 2023-08-30 ENCOUNTER — TELEPHONE (OUTPATIENT)
Dept: ANESTHESIOLOGY | Facility: CLINIC | Age: 33
End: 2023-08-30
Payer: COMMERCIAL

## 2023-08-30 ASSESSMENT — ENCOUNTER SYMPTOMS
MUSCLE WEAKNESS: 0
DECREASED CONCENTRATION: 0
INCREASED ENERGY: 0
ARTHRALGIAS: 1
FATIGUE: 1
ALTERED TEMPERATURE REGULATION: 1
NIGHT SWEATS: 0
POLYPHAGIA: 0
MYALGIAS: 0
DECREASED APPETITE: 0
POLYDIPSIA: 1
FEVER: 0
HALLUCINATIONS: 0
CHILLS: 0
WEIGHT GAIN: 0
STIFFNESS: 1
JOINT SWELLING: 1
WEIGHT LOSS: 0
DEPRESSION: 1
PANIC: 0
NERVOUS/ANXIOUS: 1
MUSCLE CRAMPS: 0
BACK PAIN: 1
NECK PAIN: 0
INSOMNIA: 0

## 2023-08-30 NOTE — TELEPHONE ENCOUNTER
[facility] Call Center    Phone Message    May a detailed message be left on voicemail: yes     Reason for Call: Other: Pt is returning a call to Bridgette, Pt stated that she brought the disk in 6 months ago. At first appt and Pt handed it to the nurse that did her check in. Pt stated that they could not get the images downloaded. Pt is very frustrated at this point. Please call  Pt back to discuss.      Action Taken: Message routed to:  [facility] Pain    Travel Screening: Not Applicable

## 2023-08-30 NOTE — TELEPHONE ENCOUNTER
RN spoke with patient to report her CD images have not been uploaded to her chart. Writer advised patient to contact location where imaging was complete to see if they could send over the images. Writer gave patient pain clinic fax number. Patient appreciated the call back.    Nellie Skaggs RNCC

## 2023-08-30 NOTE — TELEPHONE ENCOUNTER
I called the patient and left an message regarding a disc with some imaging on it. I cannot locate any in her chart. Usually when a patient brings in a disc it can be down loading the same day.      Waiting for patient to call back   with additional information

## 2023-08-30 NOTE — TELEPHONE ENCOUNTER
Health Call Center    Phone Message    May a detailed message be left on voicemail: yes     Reason for Call: Other: Pt calling to see if the disc she gave at her last appt with her x rays on it will be uploaded now so that she and  can talk about them at her upcoming appt tomorrow 8/31. Please add to chart so it can be discussed tomorrow. Can MyChart Pt back to let them know that the imaging will be available to be seen/talked about at appt tomorrow.      Action Taken: Message routed to:  [facility]     Travel Screening: Not Applicable

## 2023-08-31 ENCOUNTER — OFFICE VISIT (OUTPATIENT)
Dept: ANESTHESIOLOGY | Facility: CLINIC | Age: 33
End: 2023-08-31
Payer: COMMERCIAL

## 2023-08-31 VITALS — SYSTOLIC BLOOD PRESSURE: 127 MMHG | HEART RATE: 92 BPM | DIASTOLIC BLOOD PRESSURE: 84 MMHG | OXYGEN SATURATION: 98 %

## 2023-08-31 DIAGNOSIS — M41.86 DEXTROSCOLIOSIS OF LUMBAR SPINE: ICD-10-CM

## 2023-08-31 DIAGNOSIS — Z79.1 NSAID LONG-TERM USE: ICD-10-CM

## 2023-08-31 DIAGNOSIS — M79.18 MYOFASCIAL PAIN: ICD-10-CM

## 2023-08-31 DIAGNOSIS — Z79.899 MEDICAL CANNABIS USE: ICD-10-CM

## 2023-08-31 DIAGNOSIS — M06.09 RHEUMATOID ARTHRITIS OF MULTIPLE SITES WITH NEGATIVE RHEUMATOID FACTOR (H): Primary | ICD-10-CM

## 2023-08-31 PROCEDURE — 99214 OFFICE O/P EST MOD 30 MIN: CPT | Performed by: ANESTHESIOLOGY

## 2023-08-31 ASSESSMENT — PAIN SCALES - GENERAL: PAINLEVEL: MILD PAIN (3)

## 2023-08-31 NOTE — PATIENT INSTRUCTIONS
Medications:     Continue Methocarbamol. Contact our clinic if you start Lyrica.       Recommended Follow up:      Follow up as needed.         To speak with a nurse, schedule/reschedule/cancel a clinic appointment, or request a medication refill call: [phone]    You can also reach us by [employer]: [email or web address]

## 2023-08-31 NOTE — LETTER
8/31/2023       RE: Rachel Fried  [address]  [facility] [address]       Dear Colleague,    Thank you for referring your patient, Rachel Fried, to the [facility]NAG[facility] [place] [facility]. Please see a copy of my visit note below.    [facility] for Comprehensive Chronic Pain Management : Progress Note    Date of visit: 8/31/2023    Chief Complaint   Patient presents with    Follow Up     For lower back pain         Interval history:  Rachel Fried is a 32 year old female  is known to me for chronic pain syndrome.  Patient has a medical history significant for seropositive non-erosive rheumatoid arthritis involving hands, wrist, ankles, shoulders, and feet.  She recently moved from [place] to be with her family.  She has taken multiple rheumatoid arthritis medications including Humira, methotrexate, and Plaquenil. Currently managing pain with Advil 600 mg as needed.  Occasionally she develops stiffness in her hands and joints but otherwise her rheumatoid arthritis symptoms are stable.  She is worried about generalized back pain.  She was diagnosed with kyphoscoliosis since her childhood.  She did not meet the criteria for the surgery.  But now notices increased pain on prolonged standing and sitting.  She has been doing physical therapy 3 times a week.  Wanted to get a opinion about other options available to manage her back pain.  She recently got certified with the medical cannabis.  Has been using THC at night, which is beneficial.  Her x-ray of the lumbosacral spine is reviewed.  She has normal vertebral height and disc spaces.  There is a dextroconvex curvature of the lumbar spine (rotatory dextroscoliosis approximately 25 degrees).      Since the last visit, Rachel Fried reports:    She came today with spine x-rays which was done in outside hospital.  X-ray showed scoliosis of  the lumbar spine..She said her pain is tolerable most of the time but sometimes she developed pain flare which caused a lot of distress.  During pain flares she is unable to bend and have difficulty get up from the chair.  She denies radicular symptoms to the lower extremities.  She also denies bowel or bladder dysfunction.  During the last clinic visit she was prescribed with methocarbamol but she has not tried yet.  She has been using medical cannabis, cold pack, Tylenol, ibuprofen, and lidocaine cream.  These therapies are not enough during pain flare.  Discussed about taking methocarbamol 500 mg 4 times daily as needed.  If methocarbamol l does not provide adequate pain relief will consider Lyrica therapy.      [place] Prescription Monitoring Program:   Reviewed. No concerns     Review of Systems:  The 14 system ROS was reviewed and was negative except what is documented above and as follows.  Any bowel or bladder problems: none  Mood: okay    Physical Exam:  Vitals:    08/31/23 0843   BP: 127/84   BP Location: Left arm   Patient Position: Chair   Cuff Size: Adult Large   Pulse: 92   SpO2: 98%       General: Awake in no apparent distress.   Eyes: Sclerae are anicteric. PERRLA, EOMI   Neck: supple, no masses.   Lungs: unlabored.   Heart: regular rate and rhythm   Abdomen: soft non tender.  Extremities: Pulses are well palpable, no peripheral edema.   Musculoskeletal: 5/5 muscle strength in all extremities.  Tenderness to palpation noted in the entire spine.  Neurologic exam:Sensation intact throughout all dermatomes bilateral upper extremities and lower extremities  Psychiatric; Normal affect.   Skin: Warm and Dry.    Medications:  Current Outpatient Medications   Medication Sig Dispense Refill    albuterol (PROAIR HFA/PROVENTIL HFA/VENTOLIN HFA) 108 (90 Base) MCG/ACT inhaler Inhale 2 puffs into the lungs as needed for shortness of breath / dyspnea or wheezing 16 g 1    amphetamine-dextroamphetamine (ADDERALL  XR) 10 MG 24 hr capsule Take 1 capsule (10 mg) by mouth daily 30 capsule 0    [START ON 9/17/2023] amphetamine-dextroamphetamine (ADDERALL XR) 10 MG 24 hr capsule Take 1 capsule (10 mg) by mouth daily 30 capsule 0    [START ON 10/17/2023] amphetamine-dextroamphetamine (ADDERALL XR) 10 MG 24 hr capsule Take 1 capsule (10 mg) by mouth daily 30 capsule 0    amphetamine-dextroamphetamine (ADDERALL XR) 10 MG 24 hr capsule Take 1 capsule (10 mg) by mouth daily 30 capsule 0    amphetamine-dextroamphetamine (ADDERALL XR) 15 MG 24 hr capsule Take 1 capsule (15 mg) by mouth daily 30 capsule 0    cetirizine HCl 10 MG CAPS       Cholecalciferol (VITAMIN D) 2000 units tablet Take 2,000 Units by mouth daily 1 tablet 0    drospirenone-ethinyl estradiol (VESTURA) 3-0.02 MG tablet Take 1 tablet by mouth daily 90 tablet 3    DULoxetine (CYMBALTA) 60 MG capsule Take 1 capsule (60 mg) by mouth daily 90 capsule 1    lidocaine (LMX4) 4 % external cream Apply topically once as needed for mild pain 30 g 0    methocarbamol (ROBAXIN) 500 MG tablet Take 1 tablet (500 mg) by mouth 4 times daily as needed for muscle spasms 50 tablet 0    predniSONE (DELTASONE) 5 MG tablet 4tab=20 mg qd x 3 days, 3tab=15 mg qd x 3 days, 2tab=10 mg qd x 3 days, 1 tab=5 mg qd x 3 days then stop 30 tablet 1    Tocilizumab (ACTEMRA ACTPEN) 162 MG/0.9ML SOAJ Inject 162 mg Subcutaneous every 7 days Hold for signs of infection, and seek medical attention. 3.6 mL 5       Analgesic Medications:   Medications related to Pain Management (From now, onward)      None               LABORATORY VALUES:   Recent Labs   Lab Test 06/05/23  1313 12/07/22  1303 12/04/18  0824 10/31/18  1505   NA  --   --   --  139   POTASSIUM  --   --   --  3.9   CHLORIDE  --   --   --  109   CO2  --   --   --  21   ANIONGAP  --   --   --  9   GLC  --   --   --  81   BUN  --   --   --  12   CR 0.71 0.71   < > 0.71   MELA  --   --   --  8.5    < > = values in this interval not displayed.       CBC  RESULTS:   Recent Labs   Lab Test 06/05/23  1313   WBC 6.9   RBC 4.82   HGB 14.4   HCT 42.8   MCV 89   MCH 29.9   MCHC 33.6   RDW 11.8          Most Recent 3 INR's:No lab results found.        ASSESSMENT:       Diagnoses         Codes Comments    Rheumatoid arthritis of multiple sites with negative rheumatoid factor (H)    -  Primary M06.09     Myofascial pain     M79.18     Dextroscoliosis of lumbar spine     M41.86     Medical cannabis use     Z79.899     NSAID long-term use     Z79.1             PLAN:     Medications.      If you are experiencing moderate to severe pain, take 400mg of ibuprofen and 500mg of acetaminophen at the same time. You may take this combination every six hours as needed.  The maximum daily dose of ibuprofen is 3200 mg. The maximum daily dose of acetaminophen  is 3000 mg. I recommend monitoring labs every 4 months while taking these medication. The monitoring labs should include creatinine and UA to monitor the kidneys, CBC to monitor the GI tract, and AST or ALT to monitor the liver function.      Continue medical cannabis.  She she recently got certified by another provider.     Lidocaine cream 4% 4 times daily as needed     Methocarbamol 500 mg 4 times daily as needed.  30 tablets dispensed.  May consider Lyrica in the future.        - Interventional procedures:     None at this time.  We will discuss about trigger point injections in the future.        - Labs and imaging: None needed for pain management.      - Rehab: Advised her to continue physical therapy, 3 times a week.  Advised her to use TENS unit on a more regular basis.     - Psychology: We will consider health psychology referral in the future.     - Integrated medicine: Referral to acupuncture therapy     - Disposition: Follow-up in 4 to 6 months      Assessment will be ongoing with changes in treatment as indicated.  Benefits/risks/alternatives to treatment have been reviewed and the patient has been instructed to  contact this office if they have any questions or concerns.  This plan of care has been discussed with the patient and the patient is in agreement.     Wilmer Sanchez MD, PHD    Answers submitted by the patient for this visit:  Symptoms you have experienced in the last 30 days (Submitted on 8/30/2023)  General Symptoms: Yes  Skin Symptoms: No  HENT Symptoms: No  EYE SYMPTOMS: No  HEART SYMPTOMS: No  LUNG SYMPTOMS: No  INTESTINAL SYMPTOMS: No  URINARY SYMPTOMS: No  GYNECOLOGIC SYMPTOMS: No  BREAST SYMPTOMS: No  SKELETAL SYMPTOMS: Yes  BLOOD SYMPTOMS: No  NERVOUS SYSTEM SYMPTOMS: No  MENTAL HEALTH SYMPTOMS: Yes  Please answer the questions below to tell us what conditions you are experiencing: (Submitted on 8/30/2023)  Fever: No  Loss of appetite: No  Weight loss: No  Weight gain: No  Fatigue: Yes  Night sweats: No  Chills: No  Increased stress: No  Excessive hunger: No  Excessive thirst: Yes  Feeling hot or cold when others believe the temperature is normal: Yes  Loss of height: No  Post-operative complications: No  Surgical site pain: No  Hallucinations: No  Change in or Loss of Energy: No  Hyperactivity: No  Confusion: No  Please answer the questions below to tell us what condition you are experiencing: (Submitted on 8/30/2023)  Back pain: Yes  Muscle aches: No  Neck pain: No  Swollen joints: Yes  Joint pain: Yes  Bone pain: No  Muscle cramps: No  Muscle weakness: No  Joint stiffness: Yes  Bone fracture: No  Please answer the questions below to tell us what condition you are experiencing: (Submitted on 8/30/2023)  Nervous or Anxious: Yes  Depression: Yes  Trouble sleeping: No  Trouble thinking or concentrating: No  Mood changes: No  Panic attacks: No      Again, thank you for allowing me to participate in the care of your patient.      Sincerely,    Wilmer Sanchez MD

## 2023-08-31 NOTE — PROGRESS NOTES
[facility] : Progress Note    Date of visit: 8/31/2023    Chief Complaint   Patient presents with    Follow Up     For lower back pain         Interval history:  Rachel Fried is a 32 year old female  is known to me for chronic pain syndrome.  Patient has a medical history significant for seropositive non-erosive rheumatoid arthritis involving hands, wrist, ankles, shoulders, and feet.  She recently moved from [place] to be with her family.  She has taken multiple rheumatoid arthritis medications including Humira, methotrexate, and Plaquenil. Currently managing pain with Advil 600 mg as needed.  Occasionally she develops stiffness in her hands and joints but otherwise her rheumatoid arthritis symptoms are stable.  She is worried about generalized back pain.  She was diagnosed with kyphoscoliosis since her childhood.  She did not meet the criteria for the surgery.  But now notices increased pain on prolonged standing and sitting.  She has been doing physical therapy 3 times a week.  Wanted to get a opinion about other options available to manage her back pain.  She recently got certified with the medical cannabis.  Has been using THC at night, which is beneficial.  Her x-ray of the lumbosacral spine is reviewed.  She has normal vertebral height and disc spaces.  There is a dextroconvex curvature of the lumbar spine (rotatory dextroscoliosis approximately 25 degrees).      Since the last visit, Rachel Fried reports:    She came today with spine x-rays which was done in outside hospital.  X-ray showed scoliosis of the lumbar spine..She said her pain is tolerable most of the time but sometimes she developed pain flare which caused a lot of distress.  During pain flares she is unable to bend and have difficulty get up from the chair.  She denies radicular symptoms to the lower extremities.  She also denies bowel or bladder dysfunction.  During the  last clinic visit she was prescribed with methocarbamol but she has not tried yet.  She has been using medical cannabis, cold pack, Tylenol, ibuprofen, and lidocaine cream.  These therapies are not enough during pain flare.  Discussed about taking methocarbamol 500 mg 4 times daily as needed.  If methocarbamol l does not provide adequate pain relief will consider Lyrica therapy.      [place] Prescription Monitoring Program:   Reviewed. No concerns     Review of Systems:  The 14 system ROS was reviewed and was negative except what is documented above and as follows.  Any bowel or bladder problems: none  Mood: okay    Physical Exam:  Vitals:    08/31/23 0843   BP: 127/84   BP Location: Left arm   Patient Position: Chair   Cuff Size: Adult Large   Pulse: 92   SpO2: 98%       General: Awake in no apparent distress.   Eyes: Sclerae are anicteric. PERRLA, EOMI   Neck: supple, no masses.   Lungs: unlabored.   Heart: regular rate and rhythm   Abdomen: soft non tender.  Extremities: Pulses are well palpable, no peripheral edema.   Musculoskeletal: 5/5 muscle strength in all extremities.  Tenderness to palpation noted in the entire spine.  Neurologic exam:Sensation intact throughout all dermatomes bilateral upper extremities and lower extremities  Psychiatric; Normal affect.   Skin: Warm and Dry.    Medications:  Current Outpatient Medications   Medication Sig Dispense Refill    albuterol (PROAIR HFA/PROVENTIL HFA/VENTOLIN HFA) 108 (90 Base) MCG/ACT inhaler Inhale 2 puffs into the lungs as needed for shortness of breath / dyspnea or wheezing 16 g 1    amphetamine-dextroamphetamine (ADDERALL XR) 10 MG 24 hr capsule Take 1 capsule (10 mg) by mouth daily 30 capsule 0    [START ON 9/17/2023] amphetamine-dextroamphetamine (ADDERALL XR) 10 MG 24 hr capsule Take 1 capsule (10 mg) by mouth daily 30 capsule 0    [START ON 10/17/2023] amphetamine-dextroamphetamine (ADDERALL XR) 10 MG 24 hr capsule Take 1 capsule (10 mg) by mouth  daily 30 capsule 0    amphetamine-dextroamphetamine (ADDERALL XR) 10 MG 24 hr capsule Take 1 capsule (10 mg) by mouth daily 30 capsule 0    amphetamine-dextroamphetamine (ADDERALL XR) 15 MG 24 hr capsule Take 1 capsule (15 mg) by mouth daily 30 capsule 0    cetirizine HCl 10 MG CAPS       Cholecalciferol (VITAMIN D) 2000 units tablet Take 2,000 Units by mouth daily 1 tablet 0    drospirenone-ethinyl estradiol (VESTURA) 3-0.02 MG tablet Take 1 tablet by mouth daily 90 tablet 3    DULoxetine (CYMBALTA) 60 MG capsule Take 1 capsule (60 mg) by mouth daily 90 capsule 1    lidocaine (LMX4) 4 % external cream Apply topically once as needed for mild pain 30 g 0    methocarbamol (ROBAXIN) 500 MG tablet Take 1 tablet (500 mg) by mouth 4 times daily as needed for muscle spasms 50 tablet 0    predniSONE (DELTASONE) 5 MG tablet 4tab=20 mg qd x 3 days, 3tab=15 mg qd x 3 days, 2tab=10 mg qd x 3 days, 1 tab=5 mg qd x 3 days then stop 30 tablet 1    Tocilizumab (ACTEMRA ACTPEN) 162 MG/0.9ML SOAJ Inject 162 mg Subcutaneous every 7 days Hold for signs of infection, and seek medical attention. 3.6 mL 5       Analgesic Medications:   Medications related to Pain Management (From now, onward)      None               LABORATORY VALUES:   Recent Labs   Lab Test 06/05/23  1313 12/07/22  1303 12/04/18  0824 10/31/18  1505   NA  --   --   --  139   POTASSIUM  --   --   --  3.9   CHLORIDE  --   --   --  109   CO2  --   --   --  21   ANIONGAP  --   --   --  9   GLC  --   --   --  81   BUN  --   --   --  12   CR 0.71 0.71   < > 0.71   MELA  --   --   --  8.5    < > = values in this interval not displayed.       CBC RESULTS:   Recent Labs   Lab Test 06/05/23  1313   WBC 6.9   RBC 4.82   HGB 14.4   HCT 42.8   MCV 89   MCH 29.9   MCHC 33.6   RDW 11.8          Most Recent 3 INR's:No lab results found.        ASSESSMENT:       Diagnoses         Codes Comments    Rheumatoid arthritis of multiple sites with negative rheumatoid factor (H)    -   Primary M06.09     Myofascial pain     M79.18     Dextroscoliosis of lumbar spine     M41.86     Medical cannabis use     Z79.899     NSAID long-term use     Z79.1             PLAN:     Medications.      If you are experiencing moderate to severe pain, take 400mg of ibuprofen and 500mg of acetaminophen at the same time. You may take this combination every six hours as needed.  The maximum daily dose of ibuprofen is 3200 mg. The maximum daily dose of acetaminophen  is 3000 mg. I recommend monitoring labs every 4 months while taking these medication. The monitoring labs should include creatinine and UA to monitor the kidneys, CBC to monitor the GI tract, and AST or ALT to monitor the liver function.      Continue medical cannabis.  She she recently got certified by another provider.     Lidocaine cream 4% 4 times daily as needed     Methocarbamol 500 mg 4 times daily as needed.  30 tablets dispensed.  May consider Lyrica in the future.        - Interventional procedures:     None at this time.  We will discuss about trigger point injections in the future.        - Labs and imaging: None needed for pain management.      - Rehab: Advised her to continue physical therapy, 3 times a week.  Advised her to use TENS unit on a more regular basis.     - Psychology: We will consider health psychology referral in the future.     - Integrated medicine: Referral to acupuncture therapy     - Disposition: Follow-up in 4 to 6 months      Assessment will be ongoing with changes in treatment as indicated.  Benefits/risks/alternatives to treatment have been reviewed and the patient has been instructed to contact this office if they have any questions or concerns.  This plan of care has been discussed with the patient and the patient is in agreement.     Wilmer Sanchez MD, PHD    Answers submitted by the patient for this visit:  Symptoms you have experienced in the last 30 days (Submitted on 8/30/2023)  General Symptoms: Yes  Skin  Symptoms: No  HENT Symptoms: No  EYE SYMPTOMS: No  HEART SYMPTOMS: No  LUNG SYMPTOMS: No  INTESTINAL SYMPTOMS: No  URINARY SYMPTOMS: No  GYNECOLOGIC SYMPTOMS: No  BREAST SYMPTOMS: No  SKELETAL SYMPTOMS: Yes  BLOOD SYMPTOMS: No  NERVOUS SYSTEM SYMPTOMS: No  MENTAL HEALTH SYMPTOMS: Yes  Please answer the questions below to tell us what conditions you are experiencing: (Submitted on 8/30/2023)  Fever: No  Loss of appetite: No  Weight loss: No  Weight gain: No  Fatigue: Yes  Night sweats: No  Chills: No  Increased stress: No  Excessive hunger: No  Excessive thirst: Yes  Feeling hot or cold when others believe the temperature is normal: Yes  Loss of height: No  Post-operative complications: No  Surgical site pain: No  Hallucinations: No  Change in or Loss of Energy: No  Hyperactivity: No  Confusion: No  Please answer the questions below to tell us what condition you are experiencing: (Submitted on 8/30/2023)  Back pain: Yes  Muscle aches: No  Neck pain: No  Swollen joints: Yes  Joint pain: Yes  Bone pain: No  Muscle cramps: No  Muscle weakness: No  Joint stiffness: Yes  Bone fracture: No  Please answer the questions below to tell us what condition you are experiencing: (Submitted on 8/30/2023)  Nervous or Anxious: Yes  Depression: Yes  Trouble sleeping: No  Trouble thinking or concentrating: No  Mood changes: No  Panic attacks: No

## 2023-08-31 NOTE — NURSING NOTE
Patient presents with:  Follow Up: For lower back pain      Mild Pain (3)         What medications are you using for pain? Medical cannabis, lidocane, tylenol, advil    (New patients only) Have you been seen by another pain clinic/ provider? no    (Return Patients only) What refills are you needing today? no    Expectations: See if on the right track, and if there is anything else pt can be doing.    Brock Acosta, EMT

## 2023-09-01 DIAGNOSIS — F32.A DEPRESSION, UNSPECIFIED DEPRESSION TYPE: ICD-10-CM

## 2023-09-01 DIAGNOSIS — F41.9 ANXIETY: ICD-10-CM

## 2023-09-01 DIAGNOSIS — J45.909 ASTHMA, UNSPECIFIED ASTHMA SEVERITY, UNSPECIFIED WHETHER COMPLICATED, UNSPECIFIED WHETHER PERSISTENT: ICD-10-CM

## 2023-09-01 DIAGNOSIS — L70.0 ACNE VULGARIS: ICD-10-CM

## 2023-09-06 ENCOUNTER — MYC MEDICAL ADVICE (OUTPATIENT)
Dept: INTERNAL MEDICINE | Facility: CLINIC | Age: 33
End: 2023-09-06
Payer: COMMERCIAL

## 2023-09-06 RX ORDER — DROSPIRENONE AND ETHINYL ESTRADIOL 0.02-3(28)
1 KIT ORAL DAILY
Qty: 84 TABLET | Refills: 2 | Status: SHIPPED | OUTPATIENT
Start: 2023-09-06 | End: 2023-12-01

## 2023-09-06 NOTE — TELEPHONE ENCOUNTER
albuterol 108 (90 Base) inhaler, Last Written Prescription Date:  8/20/22  Last Fill Quantity: 16G,   # refills: 1  Last Office Visit : 5/16/23  Future Office visit:  NONE  Routing refill request to provider for review/approval because: 30 DAY RF PENDING OVER DUE AST    DULoxetine (CYMBALTA) 60 MG       Last Written Prescription Date:  8/20/22  Last Fill Quantity: 90,   # refills: 1  Routing refill request to provider for review/approval because:30 DAY RF PENDING OVER DUE PHQ 9

## 2023-09-07 RX ORDER — ALBUTEROL SULFATE 90 UG/1
2 AEROSOL, METERED RESPIRATORY (INHALATION) PRN
Qty: 16 G | Refills: 1 | Status: SHIPPED | OUTPATIENT
Start: 2023-09-07

## 2023-09-07 RX ORDER — DULOXETIN HYDROCHLORIDE 60 MG/1
60 CAPSULE, DELAYED RELEASE ORAL DAILY
Qty: 90 CAPSULE | Refills: 0 | Status: SHIPPED | OUTPATIENT
Start: 2023-09-07 | End: 2023-12-01

## 2023-09-07 NOTE — TELEPHONE ENCOUNTER
Refills sent. MyChart sent to patient regarding scheduling annual physical with Dr. Boo.    Gissel ([facility] Hospital[facility]ristina Molina RN

## 2023-09-19 ENCOUNTER — MYC MEDICAL ADVICE (OUTPATIENT)
Dept: RHEUMATOLOGY | Facility: CLINIC | Age: 33
End: 2023-09-19
Payer: COMMERCIAL

## 2023-10-05 ENCOUNTER — OFFICE VISIT (OUTPATIENT)
Dept: RHEUMATOLOGY | Facility: CLINIC | Age: 33
End: 2023-10-05
Attending: INTERNAL MEDICINE
Payer: COMMERCIAL

## 2023-10-05 VITALS
DIASTOLIC BLOOD PRESSURE: 81 MMHG | SYSTOLIC BLOOD PRESSURE: 121 MMHG | WEIGHT: 252 LBS | OXYGEN SATURATION: 99 % | HEART RATE: 88 BPM | BODY MASS INDEX: 36.4 KG/M2

## 2023-10-05 DIAGNOSIS — M06.09 RHEUMATOID ARTHRITIS OF MULTIPLE SITES WITH NEGATIVE RHEUMATOID FACTOR (H): Primary | ICD-10-CM

## 2023-10-05 DIAGNOSIS — Z51.81 ENCOUNTER FOR MEDICATION MONITORING: ICD-10-CM

## 2023-10-05 PROCEDURE — 99213 OFFICE O/P EST LOW 20 MIN: CPT | Performed by: INTERNAL MEDICINE

## 2023-10-05 PROCEDURE — 99214 OFFICE O/P EST MOD 30 MIN: CPT | Performed by: INTERNAL MEDICINE

## 2023-10-05 RX ORDER — LORATADINE 10 MG/1
10 TABLET ORAL DAILY
COMMUNITY

## 2023-10-05 ASSESSMENT — PAIN SCALES - GENERAL: PAINLEVEL: MILD PAIN (2)

## 2023-10-05 NOTE — NURSING NOTE
Chief Complaint   Patient presents with    RECHECK     f/u     /81 (BP Location: Left arm, Patient Position: Sitting, Cuff Size: Adult Large)   Pulse 88   Wt 114.3 kg (252 lb)   SpO2 99%   BMI 36.40 kg/m    Brigida JENNINGS

## 2023-10-05 NOTE — PROGRESS NOTES
Office Visit Details      Last seen:  3/24/2023 (virtual)  DOS: 10/5/2023    Reason for visit: RA    [facility] - Rheumatology Clinic Visit    Rachel Fried  is a 30 year old here for follow-up  RHEUMATOID ARTHRITIS (RA) involving hands, wrists, ankles, shoulders feet [ -RF and low CCP 25, -factor V -KAVITHA by IFA and -NADEEN panel -HLA B27] and pityriasis lichenoides chronica (seen [facility]-established here). XR  no erosions hands. Moved from [place] to be with family.       Past- hydroxychloroquine (plaquenil) since 1-2016 (2 tablet a day caused diarrhea, ok on 1 tablet) and methotrexate 10 mg week  to 4-2018 (mild fatigue day of) then restarted 10- (higher folic acid improved side-effects), naproxsyn; allergy sulfa, prednisone, advil       Initial visit copy forward  10-: Hydroxychloroquine (plaquenil) BID mild diarrhea; ocular eye exam 3-2016 baseline reports recent eye exam   EKG was recently Feb 2018 normal. Stopped methotrexate  In April as wanted to take a drug holiday and was moving here, also wanted to drink some alcohol, and discuss options. No infections. Diffuse joint pains in hands, wrists, shoulders, ankles and feet and the sides of her hips. Taking advil 600mg AM and 400 mg PM, tolerating but taking since April. Pain severe 5/10 or more. Stiffness all day. Hands are sore. No loss of function or ROM. Very sore and stiff all over. Knees are sore and hard to bend or squat. Mild diarrhea with hydroxychloroquine (plaquenil)     December 16, 2020  Denies any fever, chills, SOB, CANDELARIA, night sweats, or chest pain, high fever, cough, travel in last 14 days or exposure to covid-19 (coronavirus). Reports healthy. Follows CDC guidelines. Works from home.     Rheumatoid arthritis (RA) stiff with pain in in hands and wrists, stiff in mornings for few hours. Not able to do her massage or swim, does walk and not doing normal routine which is causing this as well. Sleep  is poor now and hip pain. Upper neck discomfort from stress.     Continues on methotrexate  12.5 mg every 7 days THURS, folic acid 2 mg day. Tolerating. Hydroxychloroquine (plaquenil) 200 mg every day  misses most days so takes <3 times a week. No GI side-effects, no hairloss or oral sores from methotrexate moreso from hydroxychloroquine (plaquenil).  Off celexa -lost 12 lbs. 197 lb last visit but this not now due to Covid-19 (coronavirus).   No longer using NSAIDs. Taking vitamin D 2000 international unit(s) day.  No EAS. No vision issues. Is exercising and doing yogo now. Goes to [facility] did last 3-2019 but was due this past Spring. Willing to come here if can do hydroxychloroquine (plaquenil) toxicity with glasses and contacts in 1 visit. Otherwise, will continue with hers.     4/23/21:  The patient states that she is doing ok with her RA. She states that her joint symptoms have slightly worsened during pandemic because she is not going swimming, which is traditionally very helpful for her.   The patient just got vaccinated and plans to start going swimming in May, which she thinks will help her joints. She has no active swelling now.  She is tolerating methotrexate at 12.5 mg qWeek and also on  mg every day (can not tolerate higher dose sec GI SEs).   The patient is taking ibuprofen 2-4 tabs for the last couple of weeks.  No plans to get pregnant. Not on birth control at this time, but states currently only dating women.  She otherwise feels well with no other concer     10/28/2021:    Doing ok for most part with her RA. AM stiffness is 1.5 hr. Knees and ankles hurt and swell up a little bit and back hurts with standing for too long, but has hard time to say if her joints are swollen. On MTX 5 tabs a wk, last time increased her MTX, got brain fog (has it with MTX current dose but it got worse) and migraines and nausea. It ruined her Sturdays. She takes NSAIDs rarely like going to a concert. Used to  be naproxen every day before RA Dx. Since stopping HCQ every other day is having more joint sx, but hard to tell, HCQ causes her diarrhea at higher dose, tried to get generic brand but was never able to get it from pharmacy.      Had covid booster vaccine, no SE.    1/19/2022: Rachel presents for follow up. Humira was added at last visit. Did 4 shots of humira, right before brendan, had last shot, now out. No SEs and it significantly helped with joint pain/stiffness, now symptoms are slowly returning.    Reports 1 hr of knee stiffness, 20 min of hand stiffness.    Reports brain fog 24-48 hours after taking methotrexate.      8/12/2022: Rachel presents for follow up. Did not tolerate MTX, HCQ in the past. Back on humira. Has ongoing arthralgia, did not realize how much pain she had tills he took the prednisone which made her feel normal, pain free. Now realized that humira was not helping her as much as she thought. She is hoping to try a med which works as well as prednisone. Wondering if she could try medical cannabis.    Most pain is localized to her hands, which limits how she uses her hands.    Am stiffness is >30 min. Can not tell if joints swell up or not. No rash, cough, SOB. Regained 50 lbs recently.    MTX caused her brain fog, headaches. Felt miserable on it.    3/24/2023: Humira was switched to actemra. She is doing better on actemra and thinks that it is helping her more with joint sx (pain and stiffness). No SEs.    Had hard time making herself doing anything, she was very stiff while she was off humira and waiting for actemra approval by [employer], worked with PT. Has back posture pain.    Hands ache today but that's from broken popsocket.    RA is overall is good.    No Actemra SEs.    Has scoliosis, was doing PT 2-3 times a week, now twice a wk.    Today 10/5/2023: Doing well on Actemra. Feels RA is well controlled. Some knee stiffness in the mornings, no current hand pain, stiffness, or swelling.  "Has diffuse mechanical back pain, improving with PT. Pt describes back pain as being related to scoliosis and muscle tension causing her back to \"freeze up\" for a few days at a time. Today her back pain is controlled, but present. She was told by her back pain doctor that this pain may not be from scoliosis but potentially fibromyalgia (this is not typical fibromyalgia presentation). She was recently approved for medical marijuana for pain as well.     PMH:  Injury-no  Medical-dermatitis, (bx 2-2016 GA vs LP vs cutaneous lupus--was not cutenaous lupus --focal interstitial lymphocytic inflammation, scattered lymphocytes), anxiety and depression, exercise induced asthma, scoliosis, pityriasis lichenoides chronica, chronic back pain, hypertension, seasonal allergies, acne, motion sickness, high cholesterol, chicken pox, headaches before    Surgical-None     FH:  No autoimmune disorders, psoriasis, UC, crohn's, RA, PsA, gout, autoimmune thyroid.  No MS, heart disease or cancer in family  Mother-factor V leiden carrier, rosea   Father-healthy  Siblings-younger bro healthy  Children-None   GF-lupus   Uncle cancer colon    SH:  Social Alcohol occasional week. Never Smoking. No IVDU. No Children. Right handed. Single--not sexually active, never.        PMS personally reviewed and updated by me.    ROS:  A comprehensive ROS was done. Positives are per HPI.      Ph. E:    /81 (BP Location: Left arm, Patient Position: Sitting, Cuff Size: Adult Large)   Pulse 88   Wt 114.3 kg (252 lb)   SpO2 99%   BMI 36.40 kg/m      Constitutional: NAD, pleasant  Eyes: nl EOM, conjunctiva, sclera  HEENT: no oral ulcers or thrush  CV: no M/R/G, RRR  Abdomen: soft, NT  Skin: no rash  Neuro: grossly non focal  Psych: nl affect  MSK: no active synovitis or joint tenderness        Labs/Imaging:  Normal G6PD  Neg MTB QG 2016 and hepatitis panel, negative HIV    XR hands and knees-negative 2016   Component      Latest Ref Rng & Units " 11/17/2020   WBC      4.0 - 11.0 10e9/L 9.6   RBC Count      3.8 - 5.2 10e12/L 4.72   Hemoglobin      11.7 - 15.7 g/dL 13.8   Hematocrit      35.0 - 47.0 % 42.9   MCV      78 - 100 fl 91   MCH      26.5 - 33.0 pg 29.2   MCHC      31.5 - 36.5 g/dL 32.2   RDW      10.0 - 15.0 % 12.9   Platelet Count      150 - 450 10e9/L 389   Creatinine      0.52 - 1.04 mg/dL 0.66   GFR Estimate      >60 mL/min/1.73:m2 >90   GFR Estimate If Black      >60 mL/min/1.73:m2 >90   CRP Inflammation      0.0 - 8.0 mg/L 9.0 (H)   Albumin      3.4 - 5.0 g/dL 3.4   ALT      0 - 50 U/L 16   AST      0 - 45 U/L 10     Impression/Plan:  1. Seropositive (pos anti-CCP, neg RF )rheumatoid arthritis (RA) non-erosive. Active RA on methotrexate 12.5 mg weekly, worse after stopping HCQ due to diarrhea (and never was successful to get generic brand HCQ, brand is too expensive). Did not tolerate MTX even at lowest effective dose of 10 mg qwk due to nausea, headaches, brain fog and nausea. Was placed on humira q2wk, initially thought it was helping but after feeling normal by taking prednisone taper ( 20 mg every day max) realized that humira was not helping much.    8/2022: Recommend to proceed with actemra as next step; risks were discussed.    I support her using medical cannabis, no interaction with RA meds and could be beneficial as non steroid agent to help managing pain, it might have some anti-inflammatory benefit and it is not addictive. I advsied her to reach out to her PCP jenny nichols for Rx.    3/24/2023: Last humira 8/13/2022. On actemra since 10/2022, it took >month to get it insurance approved. It is helping her more and will continue with that. No SEs.    Today 10/5/2023: Doing well on Actemra, plan to continue. Lact cholesterol in 6/2023 elevated (elevated at baseline), could potentially be impacted by Actemra. Labs due 12/2023 (including fasting lipid panel) and follow up video visit in 6 months.     2. Vitamin D deficeincy- take 2000  international unit(s) day.     3. Pityriasis lichenoids chronic-per derm     5. Chronic NSAID use. Will check labs q6-12 mo.    6. Diffuse Mechanical Back Pain- improving with PT. Symptoms not suggestive of fibromyalgia which usually causes diffuse pain throughout the body. Suspect mechanical vs autoimmune etiology. Might consider follow up with imaging.     Plan:    Continue actemra    Labs every 6 months, due in 12/2023    Return video visit in 6 months      I saw and examined the patient with Dr. Aldridge. I acted as scribe for Dr. Aldridge.    Lea Brown, Medical Student on 10/5/2023 at 11:43 AM    Attending Note: I saw and examined the patient with medical student Lea. This note was written by her who acted as scribe for me. I agree with findings and recommendations written in this note. The note reflects decisions made by me.    Naresh Aldridge MD

## 2023-10-08 ENCOUNTER — HEALTH MAINTENANCE LETTER (OUTPATIENT)
Age: 33
End: 2023-10-08

## 2023-10-19 ENCOUNTER — MYC MEDICAL ADVICE (OUTPATIENT)
Dept: INTERNAL MEDICINE | Facility: CLINIC | Age: 33
End: 2023-10-19
Payer: COMMERCIAL

## 2023-10-19 DIAGNOSIS — F90.2 ATTENTION DEFICIT HYPERACTIVITY DISORDER (ADHD), COMBINED TYPE: ICD-10-CM

## 2023-10-20 RX ORDER — DEXTROAMPHETAMINE SACCHARATE, AMPHETAMINE ASPARTATE MONOHYDRATE, DEXTROAMPHETAMINE SULFATE AND AMPHETAMINE SULFATE 3.75; 3.75; 3.75; 3.75 MG/1; MG/1; MG/1; MG/1
15 CAPSULE, EXTENDED RELEASE ORAL DAILY
Qty: 30 CAPSULE | Refills: 0 | Status: SHIPPED | OUTPATIENT
Start: 2023-11-19 | End: 2023-12-01

## 2023-10-20 RX ORDER — DEXTROAMPHETAMINE SACCHARATE, AMPHETAMINE ASPARTATE MONOHYDRATE, DEXTROAMPHETAMINE SULFATE AND AMPHETAMINE SULFATE 3.75; 3.75; 3.75; 3.75 MG/1; MG/1; MG/1; MG/1
15 CAPSULE, EXTENDED RELEASE ORAL DAILY
Qty: 30 CAPSULE | Refills: 0 | Status: SHIPPED | OUTPATIENT
Start: 2023-10-20 | End: 2023-11-20

## 2023-10-28 ENCOUNTER — MYC REFILL (OUTPATIENT)
Dept: ANESTHESIOLOGY | Facility: CLINIC | Age: 33
End: 2023-10-28
Payer: COMMERCIAL

## 2023-10-28 DIAGNOSIS — M06.09 RHEUMATOID ARTHRITIS OF MULTIPLE SITES WITH NEGATIVE RHEUMATOID FACTOR (H): ICD-10-CM

## 2023-10-28 DIAGNOSIS — M79.18 MYOFASCIAL PAIN: ICD-10-CM

## 2023-10-30 RX ORDER — METHOCARBAMOL 500 MG/1
500 TABLET, FILM COATED ORAL 4 TIMES DAILY PRN
Qty: 50 TABLET | Refills: 0 | Status: SHIPPED | OUTPATIENT
Start: 2023-10-30 | End: 2024-01-03

## 2023-10-30 NOTE — TELEPHONE ENCOUNTER
Refill request    Medication: methocarbamol (ROBAXIN) 500 MG tablet     Sig: Take 1 tablet (500 mg) by mouth 4 times daily as needed for muscle spasms - Oral     Dispensed: 50  Refills: 0  SOLD to the pt on: 3/9/23     Last clinic appointment: 8/31/23  Next clinic appointment: not scheduled    Last Drug Screen Collected: not on file  Controlled Substance Agreement signed: not on file       Preferred pharmacy:    [id number] - [facility] [address]       Refill request routed to the provider to review.

## 2023-12-01 ENCOUNTER — OFFICE VISIT (OUTPATIENT)
Dept: INTERNAL MEDICINE | Facility: CLINIC | Age: 33
End: 2023-12-01
Payer: COMMERCIAL

## 2023-12-01 ENCOUNTER — TELEPHONE (OUTPATIENT)
Dept: ANESTHESIOLOGY | Facility: CLINIC | Age: 33
End: 2023-12-01

## 2023-12-01 VITALS
OXYGEN SATURATION: 97 % | HEART RATE: 100 BPM | HEIGHT: 69 IN | DIASTOLIC BLOOD PRESSURE: 88 MMHG | WEIGHT: 252.8 LBS | BODY MASS INDEX: 37.44 KG/M2 | SYSTOLIC BLOOD PRESSURE: 130 MMHG

## 2023-12-01 DIAGNOSIS — M06.09 RHEUMATOID ARTHRITIS OF MULTIPLE SITES WITH NEGATIVE RHEUMATOID FACTOR (H): ICD-10-CM

## 2023-12-01 DIAGNOSIS — F90.2 ATTENTION DEFICIT HYPERACTIVITY DISORDER (ADHD), COMBINED TYPE: ICD-10-CM

## 2023-12-01 DIAGNOSIS — F41.9 ANXIETY: ICD-10-CM

## 2023-12-01 DIAGNOSIS — R21 RASH: ICD-10-CM

## 2023-12-01 DIAGNOSIS — Z00.00 ROUTINE GENERAL MEDICAL EXAMINATION AT A HEALTH CARE FACILITY: Primary | ICD-10-CM

## 2023-12-01 DIAGNOSIS — F32.A DEPRESSION, UNSPECIFIED DEPRESSION TYPE: ICD-10-CM

## 2023-12-01 DIAGNOSIS — L70.0 ACNE VULGARIS: ICD-10-CM

## 2023-12-01 PROCEDURE — 99395 PREV VISIT EST AGE 18-39: CPT | Mod: 25 | Performed by: INTERNAL MEDICINE

## 2023-12-01 PROCEDURE — 90677 PCV20 VACCINE IM: CPT | Performed by: INTERNAL MEDICINE

## 2023-12-01 PROCEDURE — 90471 IMMUNIZATION ADMIN: CPT | Performed by: INTERNAL MEDICINE

## 2023-12-01 PROCEDURE — 99214 OFFICE O/P EST MOD 30 MIN: CPT | Mod: 25 | Performed by: INTERNAL MEDICINE

## 2023-12-01 RX ORDER — DEXTROAMPHETAMINE SACCHARATE, AMPHETAMINE ASPARTATE MONOHYDRATE, DEXTROAMPHETAMINE SULFATE AND AMPHETAMINE SULFATE 2.5; 2.5; 2.5; 2.5 MG/1; MG/1; MG/1; MG/1
10 CAPSULE, EXTENDED RELEASE ORAL DAILY
Qty: 30 CAPSULE | Refills: 0 | Status: SHIPPED | OUTPATIENT
Start: 2023-12-01 | End: 2024-03-21

## 2023-12-01 RX ORDER — DEXTROAMPHETAMINE SACCHARATE, AMPHETAMINE ASPARTATE MONOHYDRATE, DEXTROAMPHETAMINE SULFATE AND AMPHETAMINE SULFATE 2.5; 2.5; 2.5; 2.5 MG/1; MG/1; MG/1; MG/1
10 CAPSULE, EXTENDED RELEASE ORAL DAILY
Qty: 30 CAPSULE | Refills: 0 | Status: SHIPPED | OUTPATIENT
Start: 2023-12-31 | End: 2024-03-11

## 2023-12-01 RX ORDER — DEXTROAMPHETAMINE SACCHARATE, AMPHETAMINE ASPARTATE, DEXTROAMPHETAMINE SULFATE AND AMPHETAMINE SULFATE 1.25; 1.25; 1.25; 1.25 MG/1; MG/1; MG/1; MG/1
2.5 TABLET ORAL DAILY
Qty: 15 TABLET | Refills: 0 | Status: SHIPPED | OUTPATIENT
Start: 2023-12-01 | End: 2023-12-31

## 2023-12-01 RX ORDER — DROSPIRENONE AND ETHINYL ESTRADIOL 0.02-3(28)
1 KIT ORAL DAILY
Qty: 84 TABLET | Refills: 4 | Status: SHIPPED | OUTPATIENT
Start: 2023-12-01

## 2023-12-01 RX ORDER — DEXTROAMPHETAMINE SACCHARATE, AMPHETAMINE ASPARTATE, DEXTROAMPHETAMINE SULFATE AND AMPHETAMINE SULFATE 1.25; 1.25; 1.25; 1.25 MG/1; MG/1; MG/1; MG/1
2.5 TABLET ORAL DAILY
Qty: 15 TABLET | Refills: 0 | Status: SHIPPED | OUTPATIENT
Start: 2023-12-31 | End: 2024-01-30

## 2023-12-01 RX ORDER — DULOXETIN HYDROCHLORIDE 60 MG/1
60 CAPSULE, DELAYED RELEASE ORAL DAILY
Qty: 90 CAPSULE | Refills: 3 | Status: SHIPPED | OUTPATIENT
Start: 2023-12-01 | End: 2024-02-16

## 2023-12-01 RX ORDER — DEXTROAMPHETAMINE SACCHARATE, AMPHETAMINE ASPARTATE, DEXTROAMPHETAMINE SULFATE AND AMPHETAMINE SULFATE 1.25; 1.25; 1.25; 1.25 MG/1; MG/1; MG/1; MG/1
2.5 TABLET ORAL DAILY PRN
Qty: 15 TABLET | Refills: 0 | Status: SHIPPED | OUTPATIENT
Start: 2024-01-30 | End: 2024-02-29

## 2023-12-01 RX ORDER — DEXTROAMPHETAMINE SACCHARATE, AMPHETAMINE ASPARTATE MONOHYDRATE, DEXTROAMPHETAMINE SULFATE AND AMPHETAMINE SULFATE 2.5; 2.5; 2.5; 2.5 MG/1; MG/1; MG/1; MG/1
10 CAPSULE, EXTENDED RELEASE ORAL DAILY
Qty: 30 CAPSULE | Refills: 0 | Status: SHIPPED | OUTPATIENT
Start: 2024-01-30 | End: 2024-03-21

## 2023-12-01 ASSESSMENT — ANXIETY QUESTIONNAIRES
GAD7 TOTAL SCORE: 12
6. BECOMING EASILY ANNOYED OR IRRITABLE: NEARLY EVERY DAY
IF YOU CHECKED OFF ANY PROBLEMS ON THIS QUESTIONNAIRE, HOW DIFFICULT HAVE THESE PROBLEMS MADE IT FOR YOU TO DO YOUR WORK, TAKE CARE OF THINGS AT HOME, OR GET ALONG WITH OTHER PEOPLE: VERY DIFFICULT
5. BEING SO RESTLESS THAT IT IS HARD TO SIT STILL: SEVERAL DAYS
2. NOT BEING ABLE TO STOP OR CONTROL WORRYING: MORE THAN HALF THE DAYS
3. WORRYING TOO MUCH ABOUT DIFFERENT THINGS: MORE THAN HALF THE DAYS
GAD7 TOTAL SCORE: 12
7. FEELING AFRAID AS IF SOMETHING AWFUL MIGHT HAPPEN: NOT AT ALL
1. FEELING NERVOUS, ANXIOUS, OR ON EDGE: MORE THAN HALF THE DAYS

## 2023-12-01 ASSESSMENT — PATIENT HEALTH QUESTIONNAIRE - PHQ9
SUM OF ALL RESPONSES TO PHQ QUESTIONS 1-9: 9
5. POOR APPETITE OR OVEREATING: MORE THAN HALF THE DAYS

## 2023-12-01 ASSESSMENT — ASTHMA QUESTIONNAIRES: ACT_TOTALSCORE: 21

## 2023-12-01 NOTE — LETTER
My Depression Action Plan  Name: Rachel Fried   Date of Birth 1990  Date: 12/1/2023    My doctor: Susana Boo   My clinic: [facility] [address] [id number] [phone]            GREEN    ZONE   Good Control    What it looks like:   Things are going generally well. You have normal ups and downs. You may even feel depressed from time to time, but bad moods usually last less than a day.   What you need to do:  Continue to care for yourself (see self care plan)  Check your depression survival kit and update it as needed  Follow your physician s recommendations including any medication.  Do not stop taking medication unless you consult with your physician first.             YELLOW         ZONE Getting Worse    What it looks like:   Depression is starting to interfere with your life.   It may be hard to get out of bed; you may be starting to isolate yourself from others.  Symptoms of depression are starting to last most all day and this has happened for several days.   You may have suicidal thoughts but they are not constant.   What you need to do:     Call your care team. Your response to treatment will improve if you keep your care team informed of your progress. Yellow periods are signs an adjustment may need to be made.     Continue your self-care.  Just get dressed and ready for the day.  Don't give yourself time to talk yourself out of it.    Talk to someone in your support network.    Open up your Depression Self-Care Plan/Wellness Kit.             RED    ZONE Medical Alert - Get Help    What it looks like:   Depression is seriously interfering with your life.   You may experience these or other symptoms: You can t get out of bed most days, can t work or engage in other necessary activities, you have trouble taking care of basic hygiene, or basic responsibilities, thoughts of suicide or death that will not go  away, self-injurious behavior.     What you need to do:  Call your care team and request a same-day appointment. If they are not available (weekends or after hours) call your local crisis line, emergency room or 911.          Depression Self-Care Plan / Wellness Kit    Many people find that medication and therapy are helpful treatments for managing depression. In addition, making small changes to your everyday life can help to boost your mood and improve your wellbeing. Below are some tips for you to consider. Be sure to talk with your medical provider and/or behavioral health consultant if your symptoms are worsening or not improving.     Sleep   Sleep hygiene  means all of the habits that support good, restful sleep. It includes maintaining a consistent bedtime and wake time, using your bedroom only for sleeping or sex, and keeping the bedroom dark and free of distractions like a computer, smartphone, or television.     Develop a Healthy Routine  Maintain good hygiene. Get out of bed in the morning, make your bed, brush your teeth, take a shower, and get dressed. Don t spend too much time viewing media that makes you feel stressed. Find time to relax each day.    Exercise  Get some form of exercise every day. This will help reduce pain and release endorphins, the  feel good  chemicals in your brain. It can be as simple as just going for a walk or doing some gardening, anything that will get you moving.      Diet  Strive to eat healthy foods, including fruits and vegetables. Drink plenty of water. Avoid excessive sugar, caffeine, alcohol, and other mood-altering substances.     Stay Connected with Others  Stay in touch with friends and family members.    Manage Your Mood  Try deep breathing, massage therapy, biofeedback, or meditation. Take part in fun activities when you can. Try to find something to smile about each day.     Psychotherapy  Be open to working with a therapist if your provider recommends it.      Medication  Be sure to take your medication as prescribed. Most anti-depressants need to be taken every day. It usually takes several weeks for medications to work. Not all medicines work for all people. It is important to follow-up with your provider to make sure you have a treatment plan that is working for you. Do not stop your medication abruptly without first discussing it with your provider.    Crisis Resources   These hotlines are for both adults and children. They and are open 24 hours a day, 7 days a week unless noted otherwise.    National Suicide Prevention Lifeline   988 or 2-091-435-TXZZ (6048)    Crisis Text Line    www.crisistextline.org  Text HOME to 920921 from anywhere in the [place], anytime, about any type of crisis. A live, trained crisis counselor will receive the text and respond quickly.    [facility] Lifeline for LGBTQ Youth  A national crisis intervention and suicide lifeline for LGBTQ youth under 25. Provides a safe place to talk without judgement. Call 1-218.347.5514; text START to 917704 or visit www.thetrevorproject.org to talk to a trained counselor.    For [facility] crisis numbers, visit the [facility] website at:  https://[place].gov/dhs/people-we-serve/adults/health-care/mental-health/resources/crisis-contacts.jsp

## 2023-12-01 NOTE — LETTER
My Asthma Action Plan    Name: Rachel Fried   YOB: 1990  Date: 12/1/2023   My doctor: Panfilo Solano MD   My clinic: [facility]IC[facility] [place]        My Rescue Medicine:   Albuterol inhaler (Proair/Ventolin/Proventil HFA)  2-4 puffs EVERY 4 HOURS as needed. Use a spacer if recommended by your provider.   My Asthma Severity:   Intermittent / Exercise Induced  Know your asthma triggers: exercise or sports             GREEN ZONE   Good Control  I feel good  No cough or wheeze  Can work, sleep and play without asthma symptoms       Take your asthma control medicine every day.     If exercise triggers your asthma, take your rescue medication  15 minutes before exercise or sports, and  During exercise if you have asthma symptoms  Spacer to use with inhaler: If you have a spacer, make sure to use it with your inhaler             YELLOW ZONE Getting Worse  I have ANY of these:  I do not feel good  Cough or wheeze  Chest feels tight  Wake up at night   Keep taking your Green Zone medications  Start taking your rescue medicine:  every 20 minutes for up to 1 hour. Then every 4 hours for 24-48 hours.  If you stay in the Yellow Zone for more than 12-24 hours, contact your doctor.  If you do not return to the Green Zone in 12-24 hours or you get worse, start taking your oral steroid medicine if prescribed by your provider.           RED ZONE Medical Alert - Get Help  I have ANY of these:  I feel awful  Medicine is not helping  Breathing getting harder  Trouble walking or talking  Nose opens wide to breathe       Take your rescue medicine NOW  If your provider has prescribed an oral steroid medicine, start taking it NOW  Call your doctor NOW  If you are still in the Red Zone after 20 minutes and you have not reached your doctor:  Take your rescue medicine again and  Call 911 or go to the emergency room right away    See your regular doctor within 2 weeks of an Emergency Room or Urgent  Care visit for follow-up treatment.          Annual Reminders:  Meet with Asthma Educator,  Flu Shot in the Fall, consider Pneumonia Vaccination for patients with asthma (aged 19 and older).    Pharmacy:    [id number] - [place], [place] - [address] [id number] - [place], [place] - [address] [id number] - [place], [place] - [address]  [facility] - [place], [place] - [address]  [facility] - [place] - [address] AT PORTAL TO [facility] [address]  [id number] - [place], [place] - [address]    Electronically signed by Panfilo Solano MD   Date: 12/01/23                    Asthma Triggers  How To Control Things That Make Your Asthma Worse    Triggers are things that make your asthma worse.  Look at the list below to help you find your triggers and   what you can do about them. You can help prevent asthma flare-ups by staying away from your triggers.      Trigger                                                          What you can do   Cigarette Smoke  Tobacco smoke can make asthma worse. Do not allow smoking in your home, car or around you.  Be sure no one smokes at a child s day care or school.  If you smoke, ask your health care provider for ways to help you quit.  Ask family members to quit too.  Ask your health care provider for a referral to Quit Plan to help you quit smoking, or call 6-923-622-PLAN.     Colds, Flu, Bronchitis  These are common triggers of asthma. Wash your hands often.  Don t touch your eyes, nose or mouth.  Get a flu shot every year.     Dust Mites  These are tiny bugs that live in cloth or carpet. They are too small to see. Wash sheets and blankets in hot water every week.   Encase pillows and mattress in dust mite proof covers.  Avoid  having carpet if you can. If you have carpet, vacuum weekly.   Use a dust mask and HEPA vacuum.   Pollen and Outdoor Mold  Some people are allergic to trees, grass, or weed pollen, or molds. Try to keep your windows closed.  Limit time out doors when pollen count is high.   Ask you health care provider about taking medicine during allergy season.     Animal Dander  Some people are allergic to skin flakes, urine or saliva from pets with fur or feathers. Keep pets with fur or feathers out of your home.    If you can t keep the pet outdoors, then keep the pet out of your bedroom.  Keep the bedroom door closed.  Keep pets off cloth furniture and away from stuffed toys.     Mice, Rats, and Cockroaches  Some people are allergic to the waste from these pests.   Cover food and garbage.  Clean up spills and food crumbs.  Store grease in the refrigerator.   Keep food out of the bedroom.   Indoor Mold  This can be a trigger if your home has high moisture. Fix leaking faucets, pipes, or other sources of water.   Clean moldy surfaces.  Dehumidify basement if it is damp and smelly.   Smoke, Strong Odors, and Sprays  These can reduce air quality. Stay away from strong odors and sprays, such as perfume, powder, hair spray, paints, smoke incense, paint, cleaning products, candles and new carpet.   Exercise or Sports  Some people with asthma have this trigger. Be active!  Ask your doctor about taking medicine before sports or exercise to prevent symptoms.    Warm up for 5-10 minutes before and after sports or exercise.     Other Triggers of Asthma  Cold air:  Cover your nose and mouth with a scarf.  Sometimes laughing or crying can be a trigger.  Some medicines and food can trigger asthma.

## 2023-12-01 NOTE — TELEPHONE ENCOUNTER
RN left voicemail regarding new referral with patient requesting to have a second opinion. Patient has previously seen Dr. Sanchez. Left call back number for patient to call back and discuss with a nurse.    Nellie Skaggs RNCC

## 2023-12-01 NOTE — PROGRESS NOTES
Rachel is a 32 year old that presents in clinic today for the following:     Chief Complaint   Patient presents with    Physical    Establish Care     Adderall medication at 15mg is giving her anxiety. Refill on her birth control pills.            12/1/2023     2:28 PM   Additional Questions   Roomed by YW   Accompanied by None       Screenings from encounters over the past 10 days    No data recorded       INGE Maurer at 2:33 PM on 12/1/2023          Administered Pneumococcal Prevnar 20 vaccine (see Immunizations in Chart Review). Patient tolerated well.       Pneumococcal Prevnar 20 vaccine  LOT: [id number]   Site: left deltoid       Dain Lennon CMA at 3:08 PM on 12/1/2023

## 2023-12-06 ENCOUNTER — LAB (OUTPATIENT)
Dept: LAB | Facility: CLINIC | Age: 33
End: 2023-12-06
Payer: COMMERCIAL

## 2023-12-06 DIAGNOSIS — M06.09 RHEUMATOID ARTHRITIS OF MULTIPLE SITES WITH NEGATIVE RHEUMATOID FACTOR (H): ICD-10-CM

## 2023-12-06 DIAGNOSIS — Z51.81 ENCOUNTER FOR MEDICATION MONITORING: ICD-10-CM

## 2023-12-06 LAB
ALBUMIN SERPL BCG-MCNC: 4.2 G/DL (ref 3.5–5.2)
ALT SERPL W P-5'-P-CCNC: 17 U/L (ref 0–50)
AST SERPL W P-5'-P-CCNC: 20 U/L (ref 0–45)
BASOPHILS # BLD AUTO: 0 10E3/UL (ref 0–0.2)
BASOPHILS NFR BLD AUTO: 0 %
CHOLEST SERPL-MCNC: 262 MG/DL
CREAT SERPL-MCNC: 0.85 MG/DL (ref 0.51–0.95)
CRP SERPL-MCNC: <3 MG/L
EGFRCR SERPLBLD CKD-EPI 2021: >90 ML/MIN/1.73M2
EOSINOPHIL # BLD AUTO: 0.1 10E3/UL (ref 0–0.7)
EOSINOPHIL NFR BLD AUTO: 2 %
ERYTHROCYTE [DISTWIDTH] IN BLOOD BY AUTOMATED COUNT: 11.7 % (ref 10–15)
ERYTHROCYTE [SEDIMENTATION RATE] IN BLOOD BY WESTERGREN METHOD: 6 MM/HR (ref 0–20)
FASTING STATUS PATIENT QL REPORTED: YES
HCT VFR BLD AUTO: 46.4 % (ref 35–47)
HDLC SERPL-MCNC: 63 MG/DL
HGB BLD-MCNC: 15 G/DL (ref 11.7–15.7)
IMM GRANULOCYTES # BLD: 0 10E3/UL
IMM GRANULOCYTES NFR BLD: 0 %
LDLC SERPL CALC-MCNC: 153 MG/DL
LYMPHOCYTES # BLD AUTO: 2.3 10E3/UL (ref 0.8–5.3)
LYMPHOCYTES NFR BLD AUTO: 37 %
MCH RBC QN AUTO: 30.1 PG (ref 26.5–33)
MCHC RBC AUTO-ENTMCNC: 32.3 G/DL (ref 31.5–36.5)
MCV RBC AUTO: 93 FL (ref 78–100)
MONOCYTES # BLD AUTO: 0.6 10E3/UL (ref 0–1.3)
MONOCYTES NFR BLD AUTO: 10 %
NEUTROPHILS # BLD AUTO: 3.2 10E3/UL (ref 1.6–8.3)
NEUTROPHILS NFR BLD AUTO: 52 %
NONHDLC SERPL-MCNC: 199 MG/DL
PLATELET # BLD AUTO: 289 10E3/UL (ref 150–450)
RBC # BLD AUTO: 4.98 10E6/UL (ref 3.8–5.2)
TRIGL SERPL-MCNC: 228 MG/DL
WBC # BLD AUTO: 6.1 10E3/UL (ref 4–11)

## 2023-12-06 PROCEDURE — 82040 ASSAY OF SERUM ALBUMIN: CPT

## 2023-12-06 PROCEDURE — 80061 LIPID PANEL: CPT

## 2023-12-06 PROCEDURE — 36415 COLL VENOUS BLD VENIPUNCTURE: CPT

## 2023-12-06 PROCEDURE — 86140 C-REACTIVE PROTEIN: CPT

## 2023-12-06 PROCEDURE — 85025 COMPLETE CBC W/AUTO DIFF WBC: CPT

## 2023-12-06 PROCEDURE — 85652 RBC SED RATE AUTOMATED: CPT

## 2023-12-06 PROCEDURE — 84460 ALANINE AMINO (ALT) (SGPT): CPT

## 2023-12-06 PROCEDURE — 84450 TRANSFERASE (AST) (SGOT): CPT

## 2023-12-06 PROCEDURE — 82565 ASSAY OF CREATININE: CPT

## 2024-01-02 DIAGNOSIS — M06.09 RHEUMATOID ARTHRITIS OF MULTIPLE SITES WITH NEGATIVE RHEUMATOID FACTOR (H): ICD-10-CM

## 2024-01-02 DIAGNOSIS — M79.18 MYOFASCIAL PAIN: ICD-10-CM

## 2024-01-02 NOTE — TELEPHONE ENCOUNTER
Refill request    Medication: methocarbamol (ROBAXIN) 500 MG tablet     Sig: Take 1 tablet (500 mg) by mouth 4 times daily as needed for muscle spasms - Oral     Dispensed: 50  Refills: 0  SOLD to the pt on: 10/30/23     Last clinic appointment: 8/31/23  Next clinic appointment: not scheduled     Last Drug Screen Collected: not on file  Controlled Substance Agreement signed: not on file      Preferred pharmacy:    [id number] - [facility] [address]         Refill request routed to the provider to review.

## 2024-01-03 RX ORDER — METHOCARBAMOL 500 MG/1
500 TABLET, FILM COATED ORAL 4 TIMES DAILY PRN
Qty: 50 TABLET | Refills: 0 | Status: SHIPPED | OUTPATIENT
Start: 2024-01-03

## 2024-01-22 NOTE — TELEPHONE ENCOUNTER
RN received transfer call from call center. Writer spoke with patient who is seeking a second opinion. Writer informed patient of our provider change policy, patient understood and agreed. She reports Dr. Sanchez was an ultimate professional and there were no issues, but reoprts their communication styles are different and would just like to seek a second opinion. Patient reports she would prefer a female provider and stated preference for Dr. Lackey. Writer scheduled patient for a new consult with Dr. Lackey at her next available. Writer will send provider change update to providers.    Nellie Skaggs, VERONICACC

## 2024-02-06 ENCOUNTER — MYC MEDICAL ADVICE (OUTPATIENT)
Dept: INTERNAL MEDICINE | Facility: CLINIC | Age: 34
End: 2024-02-06

## 2024-02-06 ENCOUNTER — VIRTUAL VISIT (OUTPATIENT)
Dept: INTERNAL MEDICINE | Facility: CLINIC | Age: 34
End: 2024-02-06
Payer: COMMERCIAL

## 2024-02-06 DIAGNOSIS — M79.642 BILATERAL HAND PAIN: Primary | ICD-10-CM

## 2024-02-06 DIAGNOSIS — M54.6 PAIN IN THORACIC SPINE: ICD-10-CM

## 2024-02-06 DIAGNOSIS — M54.2 NECK PAIN: ICD-10-CM

## 2024-02-06 DIAGNOSIS — M54.50 CHRONIC BILATERAL LOW BACK PAIN, UNSPECIFIED WHETHER SCIATICA PRESENT: ICD-10-CM

## 2024-02-06 DIAGNOSIS — M06.09 RHEUMATOID ARTHRITIS OF MULTIPLE SITES WITH NEGATIVE RHEUMATOID FACTOR (H): Primary | ICD-10-CM

## 2024-02-06 DIAGNOSIS — M06.09 RHEUMATOID ARTHRITIS OF MULTIPLE SITES WITH NEGATIVE RHEUMATOID FACTOR (H): ICD-10-CM

## 2024-02-06 DIAGNOSIS — Z79.899 MEDICAL CANNABIS USE: ICD-10-CM

## 2024-02-06 DIAGNOSIS — R76.8 CYCLIC CITRULLINATED PEPTIDE (CCP) ANTIBODY POSITIVE: ICD-10-CM

## 2024-02-06 DIAGNOSIS — G89.29 CHRONIC BILATERAL LOW BACK PAIN, UNSPECIFIED WHETHER SCIATICA PRESENT: ICD-10-CM

## 2024-02-06 DIAGNOSIS — M79.641 BILATERAL HAND PAIN: Primary | ICD-10-CM

## 2024-02-06 DIAGNOSIS — M43.9 WEDGE DEFORMITY ON X-RAY OF SPINE: ICD-10-CM

## 2024-02-06 PROCEDURE — 99214 OFFICE O/P EST MOD 30 MIN: CPT | Mod: 95 | Performed by: PEDIATRICS

## 2024-02-06 ASSESSMENT — ANXIETY QUESTIONNAIRES
1. FEELING NERVOUS, ANXIOUS, OR ON EDGE: MORE THAN HALF THE DAYS
GAD7 TOTAL SCORE: 9
2. NOT BEING ABLE TO STOP OR CONTROL WORRYING: MORE THAN HALF THE DAYS
3. WORRYING TOO MUCH ABOUT DIFFERENT THINGS: SEVERAL DAYS
GAD7 TOTAL SCORE: 9
6. BECOMING EASILY ANNOYED OR IRRITABLE: MORE THAN HALF THE DAYS
IF YOU CHECKED OFF ANY PROBLEMS ON THIS QUESTIONNAIRE, HOW DIFFICULT HAVE THESE PROBLEMS MADE IT FOR YOU TO DO YOUR WORK, TAKE CARE OF THINGS AT HOME, OR GET ALONG WITH OTHER PEOPLE: VERY DIFFICULT
7. FEELING AFRAID AS IF SOMETHING AWFUL MIGHT HAPPEN: NOT AT ALL
5. BEING SO RESTLESS THAT IT IS HARD TO SIT STILL: SEVERAL DAYS

## 2024-02-06 ASSESSMENT — PATIENT HEALTH QUESTIONNAIRE - PHQ9
5. POOR APPETITE OR OVEREATING: SEVERAL DAYS
SUM OF ALL RESPONSES TO PHQ QUESTIONS 1-9: 10

## 2024-02-06 NOTE — PROGRESS NOTES
"Virtual Visit Details    Type of service:  Video Visit     Originating Location (pt. Location): Home    Distant Location (provider location):  Off-site  Platform used for Video Visit: [employer]    Start Time: 0832  End Time:9:09 AM      Dear patient. Thank you for visiting with me. I want you to feel respected, understood, and empowered. \"Respect\" is valuing you as much as I would a close family member. \"Empowerment\" happens when you are fully informed, and can make the best possible decision for you.  Please ask me questions!  Challenge anything that is not clear.    Medical records are primarily used as memory aids for me and my colleagues. Things to know about my documentation style:  - The 'problem list' includes current symptoms or diagnoses, and some problems that are resolved but may return. I use the past medical history for problems not expected to return.  - I use single quotation marks for things that you or I said, when I want to clarify who was speaking.  - I use double quotation marks when copying a term from elsewhere in your records. Italics (besides here) may also denote a quotation.  If you have questions or concerns, please contact me; I will reply as soon as time allows.    Context    PCP: Panfilo Solano   Visit type: problem-oriented    Rachel Fried is a 33 year old woman, with concerns including:  Chief Complaint   Patient presents with    RECHECK    Medication Request       History, update, and/or problems      Rachel needs recertification for medical cannabis, which she uses to take the edge off of pain related to rheumatoid arthritis. Her PCP status has transitioned from Dr. Boo to Dr. Solano, it seems.  I reviewed rheum and anesthesia comments. She is somewhat controlled on Actemra. There is some question about muscle tension and/or fibromyalgia. Has a muscle relaxer. She has been to many PT appointments. Has an appt with a second opinion pain doctor.  Resting she is doing better than " "before. Has trouble standing without gritting teeth. Went to [employer] and used a scooter despite a steroid burst.  Had an incident where she was stuck at 90 degrees because of back problems.  Reports having had XRs at [facility].  Scoliosis as a child. Still visible on limited-quality disc-emailed. lumbar films.  Acupuncture referral not followed through because of many PT appts.  Educated about the fibromyalgia theory.  Advised about resuming swimming.    Goes to [employer].    Please answer the following 3 questions, about your pain.    Question 1.  What number best describes your pain on average in the past week, on a scale from 0 to 10 where 0 is  no pain  and 10 is  pain as bad as you can imagine ?  (Please reply, Question 1 = your number)  4-5    The following two questions ask you to describe how, during the past week, pain has interfered with your life on a  0 to 10  scale, where 0 is  does not interfere at all  and 10 is  completely interferes.     Question 2. What number best describes how, during the past week, pain has interfered with your enjoyment of life? (Please reply, Question 2 = your number)  8    Question 3. What number best describes how, during the past week, pain has interfered with your general activity? (Please reply, Question 3 = your number)  8    [The above questions are called the \"PEG3 item pain scale.\"  Documenting the PEG3 is necessary for ongoing certification of medical marijuana]           ASSESSMENT and PLAN: OK for recert medical cannabis.   I agree it sounds like she is having a broader type of back pain, but difficult to assess remotely or with RA. We discussed options. Plan:  1. In-person assessment by me or any available PCC provider  2. Continue with plan to see Dr. Lackey in pain for second opinoin  3. Plain films of c spine with flex/ext because of headache and neck pain with RA  4. MRI of T and L spines, which were planned several years ago but " cancelled      ADDENDUM:    MyC excerpt (in response to questions about imaging, after she read my note)    I ordered hand films. Someone will contact you soon, to help you schedule them.    Another follow-up item. A lot of my opinion (about RA) depends on whether you had a negative anti-CCP antibody. You have had this tested a few times, and the result was negative. HOWEVER, someone in 2018 added to the record that you had a positive CCP antibody. The 2018 date just tells us that it happened before 2018 ... this could have been based on a lab years before.  Can you please look through your records. Have you seen anyone besides at [facility], [place], [facility], or [facility]?    Regarding your RA diagnosis ... officially speaking, I'm not prepared to take this off your list. I agree with Dr. Aldridge, that you have a previous diagnosis based on symptoms, the RF test, and some nonspecific evidence of inflammation. And possibly (just guessing here) due to response to prednisone in the past.  I am (and it appears Dr. Aldridge may be) having second thoughts about the RA diagnosis, based in part on your failure to respond to meds. HOWEVER, this whole thing may take a while to figure out. Also, we definitely know that you have osteoarthritis, so it's not like I can say your whole problem is due to hypermobility (i.e., hypermobility can lead to osteoarthritis).    We should follow-up by video after you get  your hand images.      Time note (e4, 30'): The total time (on the date of service) for this service was 33 minutes, including discussion/face-to-face, chart review, interpretation not otherwise reported, documentation, and updating of the computerized record.

## 2024-02-06 NOTE — ASSESSMENT & PLAN NOTE
"Rachel needs recertification for medical cannabis, which she uses to take the edge off of pain related to rheumatoid arthritis. Her PCP status has transitioned from Dr. Boo to Dr. Solano, it seems.  I reviewed rheum and anesthesia comments. She is somewhat controlled on Actemra. There is some question about muscle tension and/or fibromyalgia. Has a muscle relaxer. She has been to many PT appointments. Has an appt with a second opinion pain doctor.  Resting she is doing better than before. Has trouble standing without gritting teeth. Went to [employer] and used a scooter despite a steroid burst.  Had an incident where she was stuck at 90 degrees because of back problems.  Reports having had XRs at PT [place].  Scoliosis as a child. Still visible on limited-quality disc-emailed. lumbar films.  Acupuncture referral not followed through because of many PT appts.  Educated about the fibromyalgia theory.  Advised about resuming swimming.    Goes to [employer].    Please answer the following 3 questions, about your pain.    Question 1.  What number best describes your pain on average in the past week, on a scale from 0 to 10 where 0 is  no pain  and 10 is  pain as bad as you can imagine ?  (Please reply, Question 1 = your number)  4-5    The following two questions ask you to describe how, during the past week, pain has interfered with your life on a  0 to 10  scale, where 0 is  does not interfere at all  and 10 is  completely interferes.     Question 2. What number best describes how, during the past week, pain has interfered with your enjoyment of life? (Please reply, Question 2 = your number)  8    Question 3. What number best describes how, during the past week, pain has interfered with your general activity? (Please reply, Question 3 = your number)  8    [The above questions are called the \"PEG3 item pain scale.\"  Documenting the PEG3 is necessary for ongoing certification of medical marijuana]           " ASSESSMENT and PLAN: OK for recert medical cannabis.   I agree it sounds like she is having a broader type of back pain, but difficult to assess remotely or with RA. We discussed options. Plan:  1. In-person assessment by me or any available PCC provider  2. Continue with plan to see Dr. Lackey in pain for second opinoin  3. Plain films of c spine with flex/ext because of headache and neck pain with RA  4. MRI of T and L spines, which were planned several years ago but cancelled

## 2024-02-06 NOTE — NURSING NOTE
Is the patient currently in the state of [place]? YES    Visit mode:VIDEO    If the visit is dropped, the patient can be reconnected by: VIDEO VISIT: Text to cell phone:   Telephone Information:   Mobile [phone]       Will anyone else be joining the visit? NO  (If patient encounters technical issues they should call [phone])    How would you like to obtain your AVS? MyChart    Are changes needed to the allergy or medication list? Pt stated no changes to allergies and Pt stated no med changes    Reason for visit: RECHECK and Medication Request      Depression Response    Patient completed the PHQ-9 assessment for depression and scored >9? Yes  Question 9 on the PHQ-9 was positive for suicidality? No  Does patient have current mental health provider? Yes    Is this a virtual visit? Yes   Does patient have suicidal ideation (positive question 9)? No - offer to place Mental Health Referral.  Patient declined referral/not needed    I personally notified the following: visit provider     Allen GARDNER

## 2024-02-06 NOTE — PATIENT INSTRUCTIONS
Thank you for visiting the Primary Care Center today at the [facility]! The following is some information about our clinic:     Primary Care Center Frequently-Asked Questions    (1) How do I schedule appointments at the [facility]?     Primary Care--to schedule or make changes to an existing appointment, please call our primary care line at [phone].    Labs--to schedule a lab appointment at the [facility] you can use [employer]Left or call [phone]. If you have a [place] location that is closer to home, you can reach out to that location for scheduling options.     Imaging--if you need to schedule a CT, X-ray, MRI, ultrasound, or other imaging study you can call [phone] to schedule at the [facility] or any other [facility] imaging location.     Referrals--if a referral to another specialty was ordered you can expect a phone call from their scheduling team. If you have not heard from them in a week, please call us or send us a MarginLeft message to check the status or get a scheduling number. Please note that this only applies to internal [facility] referrals. If the referral is external you would need to contact their office for scheduling.     (2) I have a question about my visit, who do I contact?     You can call us at the primary care line at [phone] to ask questions about your visit. You can also send a secure message through MarginLeft, which is reviewed by clinic staff. Please note that [employer] messages have a twenty-four to forty-eight business hour turnaround time and should not be used for urgent concerns.    (3) How will I get the results of my tests?    If you are signed up for [employer] all tests will be released to you within twenty-four hours of resulting. Please allow three to five days for your doctor to review your results and place a note interpreting the results. If you do not have [employer] you will receive your  results through mail seven to ten business days following the return of the tests. Please note that if there should be any urgent or concerning results that your doctor or their registered nurse will reach out to you the same day as the tests come back. If you have follow up questions about your results or would like to discuss the results in detail please schedule a follow up with your provider either in person or virtually.     (4) How do I get refills of my prescriptions?     You should always first contact your pharmacy for refills of your medications. If submitting a refill request on [employer], please be sure to submit the request only once--repeat requests can cause delays in refill. If you are requesting a NEW medication or a medication related to new symptoms you will need to schedule an appointment with a provider prior to approval. Please note: Routine medication refills have up to one to three business day turnaround whereas controlled substances refills have up to five to seven business day turnaround.    (5) I have new symptoms, what do I do?     If you are having an immediate medical emergency, you should dial 911 for assistance.   For anything urgent that needs to be seen within a few hours to one day you should visit a local urgent care for assistance.  For non-urgent symptoms that need to be seen within a few days to a week you can schedule with an available provider in primary care by going to [employer] or calling [phone].   If you are not sure how serious your symptoms are or you would like to receive medical advice you can always call [phone] to speak with a triage nurse.

## 2024-02-08 ENCOUNTER — ANCILLARY PROCEDURE (OUTPATIENT)
Dept: GENERAL RADIOLOGY | Facility: CLINIC | Age: 34
End: 2024-02-08
Attending: PEDIATRICS
Payer: COMMERCIAL

## 2024-02-08 DIAGNOSIS — M79.642 BILATERAL HAND PAIN: ICD-10-CM

## 2024-02-08 DIAGNOSIS — M54.2 NECK PAIN: ICD-10-CM

## 2024-02-08 DIAGNOSIS — M79.641 BILATERAL HAND PAIN: ICD-10-CM

## 2024-02-08 DIAGNOSIS — M06.09 RHEUMATOID ARTHRITIS OF MULTIPLE SITES WITH NEGATIVE RHEUMATOID FACTOR (H): ICD-10-CM

## 2024-02-08 PROCEDURE — 72052 X-RAY EXAM NECK SPINE 6/>VWS: CPT | Performed by: RADIOLOGY

## 2024-02-08 PROCEDURE — 73130 X-RAY EXAM OF HAND: CPT | Mod: GC | Performed by: RADIOLOGY

## 2024-02-08 NOTE — CONFIDENTIAL NOTE
Coordinators please contact patient, and:  -- mention the mychart I sent  -- help her schedule the hand Xrs  -- schedule her for a video appointment with me at least one day after the hand XR.

## 2024-02-09 NOTE — TELEPHONE ENCOUNTER
Left Voicemail (2nd Attempt) for the patient to call back and schedule the following:    Appointment type: VIDEO RETURN, RTN   Provider: Dr. Murphy  Return date: Soonest available   Specialty phone number: [phone]  Additional appointment(s) needed: n/a   Additonal Notes: XR's completed, Dr. Murphy would like video follow up visit.

## 2024-02-15 ENCOUNTER — MYC MEDICAL ADVICE (OUTPATIENT)
Dept: INTERNAL MEDICINE | Facility: CLINIC | Age: 34
End: 2024-02-15
Payer: COMMERCIAL

## 2024-02-16 ENCOUNTER — VIRTUAL VISIT (OUTPATIENT)
Dept: INTERNAL MEDICINE | Facility: CLINIC | Age: 34
End: 2024-02-16
Payer: COMMERCIAL

## 2024-02-16 DIAGNOSIS — M25.50 POLYARTHRALGIA: Primary | ICD-10-CM

## 2024-02-16 DIAGNOSIS — F41.9 ANXIETY: ICD-10-CM

## 2024-02-16 DIAGNOSIS — F32.A DEPRESSION, UNSPECIFIED DEPRESSION TYPE: ICD-10-CM

## 2024-02-16 DIAGNOSIS — M54.2 NECK PAIN: ICD-10-CM

## 2024-02-16 DIAGNOSIS — G62.9 PERIPHERAL POLYNEUROPATHY: ICD-10-CM

## 2024-02-16 PROCEDURE — 99214 OFFICE O/P EST MOD 30 MIN: CPT | Mod: 95 | Performed by: PEDIATRICS

## 2024-02-16 RX ORDER — DULOXETIN HYDROCHLORIDE 60 MG/1
60 CAPSULE, DELAYED RELEASE ORAL DAILY
Qty: 90 CAPSULE | Refills: 3 | Status: SHIPPED | OUTPATIENT
Start: 2024-02-16

## 2024-02-16 RX ORDER — DULOXETIN HYDROCHLORIDE 30 MG/1
30 CAPSULE, DELAYED RELEASE ORAL DAILY
Qty: 90 CAPSULE | Refills: 0 | Status: SHIPPED | OUTPATIENT
Start: 2024-02-16 | End: 2024-05-20

## 2024-02-16 ASSESSMENT — PATIENT HEALTH QUESTIONNAIRE - PHQ9: SUM OF ALL RESPONSES TO PHQ QUESTIONS 1-9: 9

## 2024-02-16 NOTE — PROGRESS NOTES
"Dear patient. Thank you for visiting with me. I want you to feel respected, understood, and empowered. \"Respect\" is valuing you as much as I would a close family member. \"Empowerment\" happens when you are fully informed, and can make the best possible decision for you.  Please ask me questions!  Challenge anything that is not clear.    Medical records are primarily used as memory aids for me and my colleagues. Things to know about my documentation style:  - The 'problem list' includes current symptoms or diagnoses, and some problems that are resolved but may return. I use the past medical history for problems not expected to return.  - I use single quotation marks for things that you or I said, when I want to clarify who was speaking.  - I use double quotation marks when copying a term from elsewhere in your records. Italics (besides here) may also denote a quotation.  If you have questions or concerns, please contact me; I will reply as soon as time allows.    Context    PCP: Panfilo Solano   Visit type: problem-oriented    Rachel Fried is a 33 year old woman, with concerns including:  Chief Complaint   Patient presents with    RECHECK    Results       History, update, and/or problems        Anxiety, depression  Had crying bouts recently. Had some bad work interactions, and some friend challenges. Also arthritis has been worse.  With how busy she has been, she has probably skipped at least a couple of duloxetine - and maybe this is why she was symptomatic (counseled about this). She has had trouble with mood and pain when temperature drops. Doesn't have trouble when weather is warmer.       ASSESSMENT and PLAN: We discussed options. She thinks she needs a bump, agreed to do 90 (60+30). Backup plan would be adding wellbutrin to the 60.      Has had more pain lately, gone to PT.  Has been wearing compression gloves all day, with benefit.  She feels the biologic meds did help at least somewhat - had a med " interruption during which she had a lot more pain.   When works more isn't walking, stretching - just at desk the whole time (counseled about this).  Reinforced swimming, she is working on a plan.  Discussed gabapentin but she is reluctant because of fatigue. She is cautious about sleep hygiene, and position in bed.  I will clarify my earlier comments about RA versus possibly not (which was based on response).      Discussed about prednisone history. Hasn't ever had cortisol. She tends to take prednisone as a preventive course when she is going to push her body harder. It doesn't sound like adrenal suppression is likely.            Time note (e4, 30'): The total time (on the date of service) for this service was 38 minutes, including discussion/face-to-face, chart review, interpretation not otherwise reported, documentation, and updating of the computerized record.    Virtual Visit Details    Type of service:  Video Visit   Start Time: 2:36 PM  End Time:3:08 PM    Originating Location (pt. Location): Home    Distant Location (provider location):  Off-site  Platform used for Video Visit: Onel

## 2024-02-16 NOTE — NURSING NOTE
Is the patient currently in the state of [place]? YES    Visit mode:VIDEO    If the visit is dropped, the patient can be reconnected by: VIDEO VISIT: Text to cell phone:   Telephone Information:   Mobile [phone]       Will anyone else be joining the visit? NO  (If patient encounters technical issues they should call [phone]    How would you like to obtain your AVS? MyChart    Are changes needed to the allergy or medication list? Pt stated no changes to allergies and Pt stated no med changes    Reason for visit: RECHECK and Results    AMADOR GARDNER

## 2024-02-20 ENCOUNTER — ANCILLARY PROCEDURE (OUTPATIENT)
Dept: MRI IMAGING | Facility: CLINIC | Age: 34
End: 2024-02-20
Attending: PEDIATRICS
Payer: COMMERCIAL

## 2024-02-20 ENCOUNTER — MYC MEDICAL ADVICE (OUTPATIENT)
Dept: INTERNAL MEDICINE | Facility: CLINIC | Age: 34
End: 2024-02-20

## 2024-02-20 DIAGNOSIS — M54.50 CHRONIC BILATERAL LOW BACK PAIN, UNSPECIFIED WHETHER SCIATICA PRESENT: ICD-10-CM

## 2024-02-20 DIAGNOSIS — M54.6 PAIN IN THORACIC SPINE: ICD-10-CM

## 2024-02-20 DIAGNOSIS — M06.09 RHEUMATOID ARTHRITIS OF MULTIPLE SITES WITH NEGATIVE RHEUMATOID FACTOR (H): ICD-10-CM

## 2024-02-20 DIAGNOSIS — G89.29 CHRONIC BILATERAL LOW BACK PAIN, UNSPECIFIED WHETHER SCIATICA PRESENT: ICD-10-CM

## 2024-02-20 PROCEDURE — 72148 MRI LUMBAR SPINE W/O DYE: CPT | Performed by: RADIOLOGY

## 2024-02-20 PROCEDURE — 72146 MRI CHEST SPINE W/O DYE: CPT | Performed by: RADIOLOGY

## 2024-02-23 ENCOUNTER — VIRTUAL VISIT (OUTPATIENT)
Dept: INTERNAL MEDICINE | Facility: CLINIC | Age: 34
End: 2024-02-23
Payer: COMMERCIAL

## 2024-02-23 DIAGNOSIS — M54.50 CHRONIC BILATERAL LOW BACK PAIN, UNSPECIFIED WHETHER SCIATICA PRESENT: ICD-10-CM

## 2024-02-23 DIAGNOSIS — M51.369 BULGING LUMBAR DISC: ICD-10-CM

## 2024-02-23 DIAGNOSIS — G89.29 CHRONIC BILATERAL LOW BACK PAIN, UNSPECIFIED WHETHER SCIATICA PRESENT: ICD-10-CM

## 2024-02-23 DIAGNOSIS — S22.000S CLOSED COMPRESSION FRACTURE OF THORACIC VERTEBRA, SEQUELA: Primary | ICD-10-CM

## 2024-02-23 PROCEDURE — 99214 OFFICE O/P EST MOD 30 MIN: CPT | Mod: 95 | Performed by: PEDIATRICS

## 2024-02-23 NOTE — PROGRESS NOTES
Rachel is a 33 year old who is being evaluated via a billable video visit. We are evaluating back pain, which arose incidentally during discussion around recertification for medical cannabis.    I had some technical problems with Epic, so I conducted this visit via Haiku (smartphone), while Epic came in and out.    Assessment & Plan     Closed compression fracture of thoracic vertebra, sequela  The wedge deformities of T6 and T8 (MRI 2/20/24) were surprising but explain her thoracic pain. Timing is unclear.   -- DEXA  -- labs  -- endocrine referral. Given likely timing we may need an econsult review after the DEXA.    Could be blamed on RA. Steroid quantity seems less than sufficient to blame for osteoporotic fracture, but will need to scan.    The vertebral hemangiomas were mentioned, but I am currently not sure about whether they could be related. Hemangiomas are very common on spinal MRIs, without any fragility of bone. May require secondary (spine) review for this, and any other vulnerabilities.    Bulging lumbar disc  Explained about this possibly being unrelated to the primary problem. Treatment is relatively straightforward, i.e., PT to improve strength and promote healing, with hopes of preventing the 'next' event.    Chronic bilateral low back pain, unspecified whether sciatica present  Explained by the disc bulging.    Based on history and available objective information, there seems to be no significant chance of more concerning differential diagnosis such as cord compression or impingement.          Additional issues:  Was a little worried about the findings, but volunteered feels better after our conversation today. Voiced gratitude, which I appreciated.    No LOS data to display   Time spent by me doing chart review, history and exam, documentation and further activities per the note      BMI  Estimated body mass index is 37.83 kg/m  as calculated from the following:    Height as of 12/1/23: 1.741 m  "(5' 8.54\").    Weight as of 12/1/23: 114.7 kg (252 lb 12.8 oz).           Ji Ramos is a 33 year old, presenting for the following health issues:  Video Visit (Recheck)    History of Present Illness       Back Pain:  She presents for follow up of back pain. Patient's back pain is a chronic problem.  Location of back pain:  Right lower back, left lower back, right middle of back, left middle of back, right upper back and left upper back  Description of back pain: dull ache and shooting  Back pain spreads: right side of neck and left side of neck    Since patient first noticed back pain, pain is: always present, but gets better and worse  Does back pain interfere with her job:  Yes       She eats 2-3 servings of fruits and vegetables daily.She consumes 0 sweetened beverage(s) daily.She exercises with enough effort to increase her heart rate 9 or less minutes per day.  She exercises with enough effort to increase her heart rate 3 or less days per week. She is missing 1 dose(s) of medications per week.  She is not taking prescribed medications regularly due to other.       Reviewed imaging results from earlier this week:   (1) lumbar disc protrusion (counseled about this),   (2) wedge deformities, which were more suprising.  Reviewed risk factors. No recalled trauma.  Has had five bursts of PDN but never been on longer-term treatment courses.   No Fhx bone problems.    Was a little worried about the findings         Objective           Vitals:  No vitals were obtained today due to virtual visit.    Physical Exam   GENERAL: alert and no distress  EYES: Eyes grossly normal to inspection.  No discharge or erythema, or obvious scleral/conjunctival abnormalities.  RESP: No audible wheeze, cough, or visible cyanosis.    SKIN: Visible skin clear. No significant rash, abnormal pigmentation or lesions.  NEURO: Cranial nerves grossly intact.  Mentation and speech appropriate for age.  PSYCH: Appropriate affect, tone, " and pace of words              Video-Visit Details    Type of service:  Video Visit     Start Time: 0808  End Time: 0828      Originating Location (pt. Location): Home    Distant Location (provider location):  Off-site  Platform used for Video Visit: Onel  Signed Electronically by: Cr Murphy MD

## 2024-02-23 NOTE — NURSING NOTE
Is the patient currently in the state of [place]? YES    Visit mode:VIDEO    If the visit is dropped, the patient can be reconnected by: VIDEO VISIT: Text to cell phone:   Telephone Information:   Mobile [phone]       Will anyone else be joining the visit? NO  (If patient encounters technical issues they should call [phone]    How would you like to obtain your AVS? MyChart    Are changes needed to the allergy or medication list? No    Reason for visit: Video Visit (Recheck)    Shadia GARDNER

## 2024-02-26 ENCOUNTER — TELEPHONE (OUTPATIENT)
Dept: INTERNAL MEDICINE | Facility: CLINIC | Age: 34
End: 2024-02-26
Payer: COMMERCIAL

## 2024-02-28 ENCOUNTER — HOSPITAL ENCOUNTER (OUTPATIENT)
Dept: BONE DENSITY | Facility: CLINIC | Age: 34
Discharge: HOME | End: 2024-02-28
Attending: PEDIATRICS | Admitting: PEDIATRICS
Payer: COMMERCIAL

## 2024-02-28 DIAGNOSIS — S22.000S CLOSED COMPRESSION FRACTURE OF THORACIC VERTEBRA, SEQUELA: ICD-10-CM

## 2024-02-28 DIAGNOSIS — M06.09 RHEUMATOID ARTHRITIS OF MULTIPLE SITES WITH NEGATIVE RHEUMATOID FACTOR (H): ICD-10-CM

## 2024-02-28 PROCEDURE — 77080 DXA BONE DENSITY AXIAL: CPT

## 2024-02-29 RX ORDER — TOCILIZUMAB 180 MG/ML
162 INJECTION, SOLUTION SUBCUTANEOUS
Qty: 3.6 ML | Refills: 5 | Status: SHIPPED | OUTPATIENT
Start: 2024-02-29 | End: 2024-08-07

## 2024-03-01 ENCOUNTER — LAB (OUTPATIENT)
Dept: LAB | Facility: CLINIC | Age: 34
End: 2024-03-01
Payer: COMMERCIAL

## 2024-03-01 ENCOUNTER — TELEPHONE (OUTPATIENT)
Dept: INTERNAL MEDICINE | Facility: CLINIC | Age: 34
End: 2024-03-01

## 2024-03-01 ENCOUNTER — MYC MEDICAL ADVICE (OUTPATIENT)
Dept: INTERNAL MEDICINE | Facility: CLINIC | Age: 34
End: 2024-03-01

## 2024-03-01 DIAGNOSIS — S22.000S CLOSED COMPRESSION FRACTURE OF THORACIC VERTEBRA, SEQUELA: ICD-10-CM

## 2024-03-01 LAB
ALBUMIN SERPL BCG-MCNC: 4.3 G/DL (ref 3.5–5.2)
ALBUMIN UR-MCNC: NEGATIVE MG/DL
ALP SERPL-CCNC: 47 U/L (ref 40–150)
ALT SERPL W P-5'-P-CCNC: 21 U/L (ref 0–50)
ANION GAP SERPL CALCULATED.3IONS-SCNC: 13 MMOL/L (ref 7–15)
APPEARANCE UR: CLEAR
AST SERPL W P-5'-P-CCNC: 22 U/L (ref 0–45)
BACTERIA #/AREA URNS HPF: ABNORMAL /HPF
BILIRUB SERPL-MCNC: 0.2 MG/DL
BILIRUB UR QL STRIP: NEGATIVE
BUN SERPL-MCNC: 13.3 MG/DL (ref 6–20)
CALCIUM SERPL-MCNC: 9.3 MG/DL (ref 8.6–10)
CALCIUM UR-MCNC: 3.6 MG/DL
CALCIUM/CREAT UR: 0.11 G/G CR (ref 0.05–0.27)
CHLORIDE SERPL-SCNC: 102 MMOL/L (ref 98–107)
COLOR UR AUTO: YELLOW
CREAT SERPL-MCNC: 0.72 MG/DL (ref 0.51–0.95)
CREAT UR-MCNC: 32.4 MG/DL
DEPRECATED HCO3 PLAS-SCNC: 22 MMOL/L (ref 22–29)
EGFRCR SERPLBLD CKD-EPI 2021: >90 ML/MIN/1.73M2
GLUCOSE SERPL-MCNC: 95 MG/DL (ref 70–99)
GLUCOSE UR STRIP-MCNC: NEGATIVE MG/DL
HGB UR QL STRIP: NEGATIVE
KETONES UR STRIP-MCNC: NEGATIVE MG/DL
LEUKOCYTE ESTERASE UR QL STRIP: NEGATIVE
NITRATE UR QL: NEGATIVE
PH UR STRIP: 6 [PH] (ref 5–7)
PHOSPHATE SERPL-MCNC: 3.2 MG/DL (ref 2.5–4.5)
POTASSIUM SERPL-SCNC: 4 MMOL/L (ref 3.4–5.3)
PROT SERPL-MCNC: 7.2 G/DL (ref 6.4–8.3)
PTH-INTACT SERPL-MCNC: 31 PG/ML (ref 15–65)
RBC #/AREA URNS AUTO: ABNORMAL /HPF
SODIUM SERPL-SCNC: 137 MMOL/L (ref 135–145)
SP GR UR STRIP: 1.01 (ref 1–1.03)
UROBILINOGEN UR STRIP-ACNC: 0.2 E.U./DL
VIT D+METAB SERPL-MCNC: 39 NG/ML (ref 20–50)
WBC #/AREA URNS AUTO: ABNORMAL /HPF

## 2024-03-01 PROCEDURE — 82306 VITAMIN D 25 HYDROXY: CPT

## 2024-03-01 PROCEDURE — 80053 COMPREHEN METABOLIC PANEL: CPT

## 2024-03-01 PROCEDURE — 82340 ASSAY OF CALCIUM IN URINE: CPT

## 2024-03-01 PROCEDURE — 84100 ASSAY OF PHOSPHORUS: CPT

## 2024-03-01 PROCEDURE — 83970 ASSAY OF PARATHORMONE: CPT

## 2024-03-01 PROCEDURE — 36415 COLL VENOUS BLD VENIPUNCTURE: CPT

## 2024-03-01 PROCEDURE — 81001 URINALYSIS AUTO W/SCOPE: CPT

## 2024-03-02 PROBLEM — R76.8 CYCLIC CITRULLINATED PEPTIDE (CCP) ANTIBODY POSITIVE: Status: ACTIVE | Noted: 2018-10-31

## 2024-03-02 PROBLEM — M06.09 RHEUMATOID ARTHRITIS OF MULTIPLE SITES WITH NEGATIVE RHEUMATOID FACTOR (H): Status: ACTIVE | Noted: 2018-10-31

## 2024-03-02 NOTE — CONFIDENTIAL NOTE
Coordinators please contact patient, and:  -- mention the mychart I sent  -- help her with endocrine appointment  -- offer a video appointment sooner with Dr. Solano or me, if she has questions      Further myChart questions need to be deferred to a visit.

## 2024-03-08 ENCOUNTER — OFFICE VISIT (OUTPATIENT)
Dept: ANESTHESIOLOGY | Facility: CLINIC | Age: 34
End: 2024-03-08
Attending: PEDIATRICS
Payer: COMMERCIAL

## 2024-03-08 VITALS — OXYGEN SATURATION: 98 % | DIASTOLIC BLOOD PRESSURE: 82 MMHG | HEART RATE: 97 BPM | SYSTOLIC BLOOD PRESSURE: 138 MMHG

## 2024-03-08 DIAGNOSIS — M79.18 MYOFASCIAL PAIN: Primary | ICD-10-CM

## 2024-03-08 DIAGNOSIS — M43.9 WEDGE DEFORMITY ON X-RAY OF SPINE: ICD-10-CM

## 2024-03-08 PROCEDURE — 99205 OFFICE O/P NEW HI 60 MIN: CPT | Performed by: STUDENT IN AN ORGANIZED HEALTH CARE EDUCATION/TRAINING PROGRAM

## 2024-03-08 RX ORDER — METHOCARBAMOL 500 MG/1
500 TABLET, FILM COATED ORAL 4 TIMES DAILY PRN
Qty: 120 TABLET | Refills: 3 | Status: SHIPPED | OUTPATIENT
Start: 2024-03-08

## 2024-03-08 ASSESSMENT — PAIN SCALES - GENERAL: PAINLEVEL: MODERATE PAIN (4)

## 2024-03-08 NOTE — PATIENT INSTRUCTIONS
Procedures:   TPI ordered  Physical Therapy:   Patient has been seen by PT in the past  Discussed engaging in a comprehensive, intensive program focused on lumbar and thoracic extension  Diagnostic Studies:   MRI Thoracic and Lumbar spine reviewed - significant atrophy observed throughout lumbar and thoracic musculature and this appears to best explain the patient's pain  Medication Management:   Reordered Methocarbamol 500mg up to QID PRN  Alternative: ok for acupuncture and heat/ice  Follow up: for TPI    Edmar Chew MD  Interventional Pain Medicine  [facility]

## 2024-03-08 NOTE — LETTER
"3/8/2024       RE: Rachel Fried  [address]  [facility] [address]     Dear Colleague,    Thank you for referring your patient, Rachel Fried, to the [facility] FOR [facility]HENSIVE PAIN MANAGEMENT [place] at [facility]. Please see a copy of my visit note below.                          [facility] [place] Med Spine Evaluation    Date of visit: 3/8/2024    Reason for consultation:    Rachel Fried is a 33 year old female who is seen in consultation today for medical spine evaluation.    PCP is Panfilo Solano.    Please see the [facility] health questionnaire which the patient completed and reviewed with me in detail.    Chief Complaint:    Chief Complaint   Patient presents with    Consult       Pain history:  Rachel Fried is a 33 year old female presenting with a chief pain complaint of back pain    Onset: 1.5 years of back pain, no known injury  Location and Radiation: bilateral low back, radiating locally, \"whole middle/upper back\" - no radiating down legs  Provoking: standing  Palliating: laying down supine with knees up, sleeping on side  Quality: sharp in low back, ache  Severity: 2-7/10  Timing: constant  Numbness: none  Weakness: none    Patient denies red flag symptoms of corresponding bowel/bladder symptoms, fever/chills, saddle anesthesia, profound motor loss, weight loss, or sudden unremitting increase in pain.    Current pain medications include:  Prednisone  Methocarbamol 500mg QID PRN - takes sometimes, not recently - was helpful in past  Tylenol and Advil - helping somewhat  Lido cream - helpful    Other relevant meds:  Cymbalta 90mg daily - for mood    Previous medication treatments included:  none    Other treatments have included:  Rachel Fried has been seen at a pain clinic in the past.  Dr. Sanchez in 2023  PT: Jan 2023 - helpful  Injections: none  Surgery: none    Past Medical " History:  Past Medical History:   Diagnosis Date    Allergic dermatitis     Anxiety     Depression     Pityriasis lichenoides     Rheumatoid arthritis (H)      Patient Active Problem List    Diagnosis Date Noted    Bulging lumbar disc 02/23/2024     Priority: Medium    Depression, unspecified depression type 02/16/2024     Priority: Medium    Anxiety 02/16/2024     Priority: Medium    Polyarthralgia 02/16/2024     Priority: Medium    Chronic bilateral low back pain, unspecified whether sciatica present 02/06/2024     Priority: Medium    Closed compression fracture of thoracic vertebra, sequela 02/06/2024     Priority: Medium     wedge deformities of T6 and T8 (MRI 2/20/24)       Neck pain 02/06/2024     Priority: Medium    Medical cannabis use 01/27/2023     Priority: Medium     re-Certified 2/6/2024 by Dr. Murphy  [employer]      Rheumatoid arthritis of multiple sites with negative rheumatoid factor (H) 10/31/2018     Priority: Medium     CCP has varied, but at least once elevated @ [facility] (2/2/2016, 25 RR 0-19)        Cyclic citrullinated peptide (CCP) antibody positive 10/31/2018     Priority: Medium     CCP seems to have varied over time. As of 2024 results are not visible in [facility]where but patient has an old letter from [facility] with a positive result.        Long-term use of Plaquenil 10/31/2018     Priority: Medium    NSAID long-term use 10/31/2018     Priority: Medium       Past Surgical History:  No past surgical history on file.  Medications:  Current Outpatient Medications   Medication Sig Dispense Refill    albuterol (PROAIR HFA/PROVENTIL HFA/VENTOLIN HFA) 108 (90 Base) MCG/ACT inhaler Inhale 2 puffs into the lungs as needed for shortness of breath or wheezing 16 g 1    amphetamine-dextroamphetamine (ADDERALL XR) 10 MG 24 hr capsule Take 1 capsule (10 mg) by mouth daily 30 capsule 0    amphetamine-dextroamphetamine (ADDERALL XR) 10 MG 24 hr capsule Take 1 capsule (10 mg) by mouth daily 30 capsule  0    amphetamine-dextroamphetamine (ADDERALL XR) 10 MG 24 hr capsule Take 1 capsule (10 mg) by mouth daily 30 capsule 0    Cholecalciferol (VITAMIN D) 2000 units tablet Take 2,000 Units by mouth daily 1 tablet 0    drospirenone-ethinyl estradiol (VESTURA) 3-0.02 MG tablet Take 1 tablet by mouth daily 84 tablet 4    DULoxetine (CYMBALTA) 30 MG capsule Take 1 capsule (30 mg) by mouth daily along with 60 mg (total 90 mg) 90 capsule 0    DULoxetine (CYMBALTA) 60 MG capsule Take 1 capsule (60 mg) by mouth daily along with 30 mg (total 90 mg) 90 capsule 3    lidocaine (LMX4) 4 % external cream Apply topically once as needed for mild pain 30 g 0    loratadine (CLARITIN) 10 MG tablet Take 10 mg by mouth daily      methocarbamol (ROBAXIN) 500 MG tablet TAKE 1 TABLET (500 MG) BY MOUTH 4 TIMES DAILY AS NEEDED FOR MUSCLE SPASMS 50 tablet 0    predniSONE (DELTASONE) 5 MG tablet 4tab=20 mg qd x 3 days, 3tab=15 mg qd x 3 days, 2tab=10 mg qd x 3 days, 1 tab=5 mg qd x 3 days then stop, as needed for flare up 30 tablet 1    Tocilizumab (ACTEMRA ACTPEN) 162 MG/0.9ML SOAJ Inject 162 mg Subcutaneous every 7 days Hold for signs of infection, and seek medical attention. 3.6 mL 5     Allergies:     Allergies   Allergen Reactions    Sulfa Antibiotics Hives and Itching         Family history:  Family History   Problem Relation Age of Onset    Lung Cancer Paternal Grandfather     Colon Cancer Maternal Uncle        Physical Exam:  Vitals:    03/08/24 1551   BP: 138/82   BP Location: Right arm   Patient Position: Sitting   Cuff Size: Adult Large   Pulse: 97   SpO2: 98%     Exam:  Constitutional: Well developed, NAD  Head: normocephalic. Atraumatic.   Eyes: no redness or jaundice noted   CV: warm, well perfused extremities   Skin: no suspicious lesions or rashes   Psychiatric: mentation appears normal and affect full    Neuromuscular Examination:  Normal ROM in lumbar flexion, extension, nonpainful  Nontender to palpation of the midline lumbar  spine or lumbar paraspinals bilaterally, mild TTP at bilateral lower thoracic paraspinals  Negative SI joint tenderness bilaterally  Normal 5/5 strength in BLE   Normal sensation to light touch in the L3-S1 distributions bilaterally   No ankle clonus bilaterally    PIETRO: negative bilaterally   FADIR: negative bilaterally   Scour: negative bilaterally   Straight leg raise: negative bilaterally       Diagnostic tests:  Thoracic and Lumbar spine MRI without contrast     History: Rheumatoid arthritis of multiple sites with negative  rheumatoid factor (H); Pain in thoracic spine.  ICD-10: Rheumatoid arthritis of multiple sites with negative  rheumatoid factor (H); Pain in thoracic spine (accession [id number]),  Rheumatoid arthritis of multiple sites with negative rheumatoid factor  (H); Chronic bilateral low back pain, unspecified whether sciatica  present; Chronic bilateral low back pain, unspecified whether sciatica  present (accession [id number]).     Comparison: None.     Technique:  Axial T2-weighted, and sagittal T1, STIR, and T2-weighted  images of the lumbar spine without intravenous contrast. Sagittal T1,  STIR, and T2-weighted images of the thoracic spine without contrast  were obtained, with axial T2-weighted images through levels of  interest in the thoracic spine.      Findings:  Thoracic Spine: There is no definite abnormal signal in the thoracic  spinal cord at any level.   Anterior wedge-shaped compression deformity of T6 and T8. Schmorl's  deformity at the T7 inferior endplate.  Alignment of the thoracic vertebra appears within normal limits. Bone  marrow signal intensity on noncontrast images appears unremarkable.  Multilevel vertebral body hemangiomas.     Lumbar Spine:  There are 5 type lumbar vertebra, used for the purposes of this  dictation.  The tip of the conus is at approximately L1. The alignment  of the lumbar spine is unremarkable. Multilevel vertebral body  hemangiomas.     On a level by  level basis, the findings are as follows:     L2-3:  There is no focal abnormality.     L3-4:  There is no focal abnormality.     L4-5:  There is no focal abnormality.     L5-S1: Central disc protrusion. No significant spinal canal or  neuroforamina stenosis.                                                                      Impression:   1. Chronic anterior wedge-shaped compression deformity of T6 and T8.  Schmorl's deformity at the T7 inferior at T9 superior endplate.  2. Central disc protrusion at L5-S1. No significant spinal canal or  neuroforamina stenosis at any level.     PLACIDO DE ANDA MD     Personally reviewed imaging: yes    Assessment:  Myofascial pain syndrome  Thoracic wedge deformity    Rachel Fried is a 33 year old female who presents with 1.5 years of bilateral mid and low back pain, typically nonradiating, not associate with numbness or weakness in the legs.  Pain is worse with standing, improved with laying down.  Patient has a history of RA and takes prednisone sporadically but not regularly.  She denies any alarm symptoms.  Examination shows tenderness to palpation at the lower thoracic paraspinals, but otherwise there is no tenderness on palpation today.  A review of the MRI of the thoracic spine shows no wedge deformity at T6 and T8.  We discussed that given the lack of inciting event, nonmidline pain, lack of acuity on MRI, and general chronicity of the pain, I do not suspect these wedge deformities to be the primary generator of her pain.  We discussed that on further review of the MRI, there is significant atrophy changes of the posterior thoracic and lumbar musculature with resultant fatty infiltration.  We discussed that this may be an appropriate target for the myofascial pain syndrome, focusing on exercises that target the posterior chain and to focus on extension based exercises and core strengthening.  Focus for her is true lean muscle growth in these areas to better support the  spine and hopefully work on the myofascial issue.  We will also proceed with TPI's of the musculature to support the exercise program.  I have reordered methocarbamol 500 mg 4 times daily as needed for her to use.  We will see her back for the TPI.    Plan:  Diagnosis reviewed, treatment option addressed, and risk/benefits discussed.     Procedures:   TPI ordered  Physical Therapy:   Patient has been seen by PT in the past  Discussed engaging in a comprehensive, intensive program focused on lumbar and thoracic extension  Diagnostic Studies:   MRI Thoracic and Lumbar spine reviewed - significant atrophy observed throughout lumbar and thoracic musculature and this appears to best explain the patient's pain  Medication Management:   Reordered Methocarbamol 500mg up to QID PRN  Alternative: ok for acupuncture and heat/ice  Follow up: for TPI        Again, thank you for allowing me to participate in the care of your patient.      Sincerely,    Edmar Chew MD

## 2024-03-08 NOTE — PROGRESS NOTES
"[facility] Med Spine Evaluation    Date of visit: 3/8/2024    Reason for consultation:    Rachel Fried is a 33 year old female who is seen in consultation today for medical spine evaluation.    PCP is Panfilo Solano.    Please see the [facility] [place] health questionnaire which the patient completed and reviewed with me in detail.    Chief Complaint:    Chief Complaint   Patient presents with    Consult       Pain history:  Rachel Fried is a 33 year old female presenting with a chief pain complaint of back pain    Onset: 1.5 years of back pain, no known injury  Location and Radiation: bilateral low back, radiating locally, \"whole middle/upper back\" - no radiating down legs  Provoking: standing  Palliating: laying down supine with knees up, sleeping on side  Quality: sharp in low back, ache  Severity: 2-7/10  Timing: constant  Numbness: none  Weakness: none    Patient denies red flag symptoms of corresponding bowel/bladder symptoms, fever/chills, saddle anesthesia, profound motor loss, weight loss, or sudden unremitting increase in pain.    Current pain medications include:  Prednisone  Methocarbamol 500mg QID PRN - takes sometimes, not recently - was helpful in past  Tylenol and Advil - helping somewhat  Lido cream - helpful    Other relevant meds:  Cymbalta 90mg daily - for mood    Previous medication treatments included:  none    Other treatments have included:  Rachel Fried has been seen at a pain clinic in the past.  Dr. Sanchez in 2023  PT: Jan 2023 - helpful  Injections: none  Surgery: none    Past Medical History:  Past Medical History:   Diagnosis Date    Allergic dermatitis     Anxiety     Depression     Pityriasis lichenoides     Rheumatoid arthritis (H)      Patient Active Problem List    Diagnosis Date Noted    Bulging lumbar disc 02/23/2024     Priority: Medium    Depression, unspecified depression type 02/16/2024     Priority: Medium "    Anxiety 02/16/2024     Priority: Medium    Polyarthralgia 02/16/2024     Priority: Medium    Chronic bilateral low back pain, unspecified whether sciatica present 02/06/2024     Priority: Medium    Closed compression fracture of thoracic vertebra, sequela 02/06/2024     Priority: Medium     wedge deformities of T6 and T8 (MRI 2/20/24)       Neck pain 02/06/2024     Priority: Medium    Medical cannabis use 01/27/2023     Priority: Medium     re-Certified 2/6/2024 by Dr. Murphy  [employer]      Rheumatoid arthritis of multiple sites with negative rheumatoid factor (H) 10/31/2018     Priority: Medium     CCP has varied, but at least once elevated @ [facility] (2/2/2016, 25 RR 0-19)        Cyclic citrullinated peptide (CCP) antibody positive 10/31/2018     Priority: Medium     CCP seems to have varied over time. As of 2024 results are not visible in CareEverywhere but patient has an old letter from [facility] with a positive result.        Long-term use of Plaquenil 10/31/2018     Priority: Medium    NSAID long-term use 10/31/2018     Priority: Medium       Past Surgical History:  No past surgical history on file.  Medications:  Current Outpatient Medications   Medication Sig Dispense Refill    albuterol (PROAIR HFA/PROVENTIL HFA/VENTOLIN HFA) 108 (90 Base) MCG/ACT inhaler Inhale 2 puffs into the lungs as needed for shortness of breath or wheezing 16 g 1    amphetamine-dextroamphetamine (ADDERALL XR) 10 MG 24 hr capsule Take 1 capsule (10 mg) by mouth daily 30 capsule 0    amphetamine-dextroamphetamine (ADDERALL XR) 10 MG 24 hr capsule Take 1 capsule (10 mg) by mouth daily 30 capsule 0    amphetamine-dextroamphetamine (ADDERALL XR) 10 MG 24 hr capsule Take 1 capsule (10 mg) by mouth daily 30 capsule 0    Cholecalciferol (VITAMIN D) 2000 units tablet Take 2,000 Units by mouth daily 1 tablet 0    drospirenone-ethinyl estradiol (VESTURA) 3-0.02 MG tablet Take 1 tablet by mouth daily 84 tablet 4    DULoxetine (CYMBALTA) 30  MG capsule Take 1 capsule (30 mg) by mouth daily along with 60 mg (total 90 mg) 90 capsule 0    DULoxetine (CYMBALTA) 60 MG capsule Take 1 capsule (60 mg) by mouth daily along with 30 mg (total 90 mg) 90 capsule 3    lidocaine (LMX4) 4 % external cream Apply topically once as needed for mild pain 30 g 0    loratadine (CLARITIN) 10 MG tablet Take 10 mg by mouth daily      methocarbamol (ROBAXIN) 500 MG tablet TAKE 1 TABLET (500 MG) BY MOUTH 4 TIMES DAILY AS NEEDED FOR MUSCLE SPASMS 50 tablet 0    predniSONE (DELTASONE) 5 MG tablet 4tab=20 mg qd x 3 days, 3tab=15 mg qd x 3 days, 2tab=10 mg qd x 3 days, 1 tab=5 mg qd x 3 days then stop, as needed for flare up 30 tablet 1    Tocilizumab (ACTEMRA ACTPEN) 162 MG/0.9ML SOAJ Inject 162 mg Subcutaneous every 7 days Hold for signs of infection, and seek medical attention. 3.6 mL 5     Allergies:     Allergies   Allergen Reactions    Sulfa Antibiotics Hives and Itching         Family history:  Family History   Problem Relation Age of Onset    Lung Cancer Paternal Grandfather     Colon Cancer Maternal Uncle        Physical Exam:  Vitals:    03/08/24 1551   BP: 138/82   BP Location: Right arm   Patient Position: Sitting   Cuff Size: Adult Large   Pulse: 97   SpO2: 98%     Exam:  Constitutional: Well developed, NAD  Head: normocephalic. Atraumatic.   Eyes: no redness or jaundice noted   CV: warm, well perfused extremities   Skin: no suspicious lesions or rashes   Psychiatric: mentation appears normal and affect full    Neuromuscular Examination:  Normal ROM in lumbar flexion, extension, nonpainful  Nontender to palpation of the midline lumbar spine or lumbar paraspinals bilaterally, mild TTP at bilateral lower thoracic paraspinals  Negative SI joint tenderness bilaterally  Normal 5/5 strength in BLE   Normal sensation to light touch in the L3-S1 distributions bilaterally   No ankle clonus bilaterally    PIETRO: negative bilaterally   FADIR: negative bilaterally   Scour: negative  bilaterally   Straight leg raise: negative bilaterally       Diagnostic tests:  Thoracic and Lumbar spine MRI without contrast     History: Rheumatoid arthritis of multiple sites with negative  rheumatoid factor (H); Pain in thoracic spine.  ICD-10: Rheumatoid arthritis of multiple sites with negative  rheumatoid factor (H); Pain in thoracic spine (accession [id number]),  Rheumatoid arthritis of multiple sites with negative rheumatoid factor  (H); Chronic bilateral low back pain, unspecified whether sciatica  present; Chronic bilateral low back pain, unspecified whether sciatica  present (accession [id number]).     Comparison: None.     Technique:  Axial T2-weighted, and sagittal T1, STIR, and T2-weighted  images of the lumbar spine without intravenous contrast. Sagittal T1,  STIR, and T2-weighted images of the thoracic spine without contrast  were obtained, with axial T2-weighted images through levels of  interest in the thoracic spine.      Findings:  Thoracic Spine: There is no definite abnormal signal in the thoracic  spinal cord at any level.   Anterior wedge-shaped compression deformity of T6 and T8. Schmorl's  deformity at the T7 inferior endplate.  Alignment of the thoracic vertebra appears within normal limits. Bone  marrow signal intensity on noncontrast images appears unremarkable.  Multilevel vertebral body hemangiomas.     Lumbar Spine:  There are 5 type lumbar vertebra, used for the purposes of this  dictation.  The tip of the conus is at approximately L1. The alignment  of the lumbar spine is unremarkable. Multilevel vertebral body  hemangiomas.     On a level by level basis, the findings are as follows:     L2-3:  There is no focal abnormality.     L3-4:  There is no focal abnormality.     L4-5:  There is no focal abnormality.     L5-S1: Central disc protrusion. No significant spinal canal or  neuroforamina stenosis.                                                                      Impression:    1. Chronic anterior wedge-shaped compression deformity of T6 and T8.  Schmorl's deformity at the T7 inferior at T9 superior endplate.  2. Central disc protrusion at L5-S1. No significant spinal canal or  neuroforamina stenosis at any level.     PLACIDO DE ANDA MD     Personally reviewed imaging: yes    Assessment:  Myofascial pain syndrome  Thoracic wedge deformity    Rachel Fried is a 33 year old female who presents with 1.5 years of bilateral mid and low back pain, typically nonradiating, not associate with numbness or weakness in the legs.  Pain is worse with standing, improved with laying down.  Patient has a history of RA and takes prednisone sporadically but not regularly.  She denies any alarm symptoms.  Examination shows tenderness to palpation at the lower thoracic paraspinals, but otherwise there is no tenderness on palpation today.  A review of the MRI of the thoracic spine shows no wedge deformity at T6 and T8.  We discussed that given the lack of inciting event, nonmidline pain, lack of acuity on MRI, and general chronicity of the pain, I do not suspect these wedge deformities to be the primary generator of her pain.  We discussed that on further review of the MRI, there is significant atrophy changes of the posterior thoracic and lumbar musculature with resultant fatty infiltration.  We discussed that this may be an appropriate target for the myofascial pain syndrome, focusing on exercises that target the posterior chain and to focus on extension based exercises and core strengthening.  Focus for her is true lean muscle growth in these areas to better support the spine and hopefully work on the myofascial issue.  We will also proceed with TPI's of the musculature to support the exercise program.  I have reordered methocarbamol 500 mg 4 times daily as needed for her to use.  We will see her back for the TPI.    Plan:  Diagnosis reviewed, treatment option addressed, and risk/benefits discussed.      Procedures:   TPI ordered  Physical Therapy:   Patient has been seen by PT in the past  Discussed engaging in a comprehensive, intensive program focused on lumbar and thoracic extension. Spent significant time counseling on this. Swimming ok.  Diagnostic Studies:   MRI Thoracic and Lumbar spine reviewed - significant atrophy observed throughout lumbar and thoracic musculature and this appears to best explain the patient's pain  Medication Management:   Reordered Methocarbamol 500mg up to QID PRN  Alternative: ok for acupuncture and heat/ice  Follow up: for TPI    60 minutes spent on the date of encounter doing chart review, history, and exam documentation and further activities as noted above.      Edmar Chew MD  Interventional Pain Medicine  [facility]

## 2024-03-08 NOTE — NURSING NOTE
Chief Complaint   Patient presents with    Consult     Pain meds? Advil, Tylenol, Lidocaine Topical Stick, Medical Cannabis for sleep  Refills? No  Pain Provider? Yes    Marcia Webb, EMT

## 2024-03-11 ENCOUNTER — MYC REFILL (OUTPATIENT)
Dept: INTERNAL MEDICINE | Facility: CLINIC | Age: 34
End: 2024-03-11
Payer: COMMERCIAL

## 2024-03-11 DIAGNOSIS — F90.2 ATTENTION DEFICIT HYPERACTIVITY DISORDER (ADHD), COMBINED TYPE: ICD-10-CM

## 2024-03-14 ENCOUNTER — MYC REFILL (OUTPATIENT)
Dept: INTERNAL MEDICINE | Facility: CLINIC | Age: 34
End: 2024-03-14
Payer: COMMERCIAL

## 2024-03-14 DIAGNOSIS — F90.2 ATTENTION DEFICIT HYPERACTIVITY DISORDER (ADHD), COMBINED TYPE: ICD-10-CM

## 2024-03-15 RX ORDER — DEXTROAMPHETAMINE SACCHARATE, AMPHETAMINE ASPARTATE MONOHYDRATE, DEXTROAMPHETAMINE SULFATE AND AMPHETAMINE SULFATE 2.5; 2.5; 2.5; 2.5 MG/1; MG/1; MG/1; MG/1
10 CAPSULE, EXTENDED RELEASE ORAL DAILY
Qty: 30 CAPSULE | Refills: 0 | OUTPATIENT
Start: 2024-03-15

## 2024-03-15 RX ORDER — DEXTROAMPHETAMINE SACCHARATE, AMPHETAMINE ASPARTATE MONOHYDRATE, DEXTROAMPHETAMINE SULFATE AND AMPHETAMINE SULFATE 2.5; 2.5; 2.5; 2.5 MG/1; MG/1; MG/1; MG/1
10 CAPSULE, EXTENDED RELEASE ORAL DAILY
Qty: 30 CAPSULE | Refills: 0 | Status: SHIPPED | OUTPATIENT
Start: 2024-03-15 | End: 2024-03-21

## 2024-03-20 NOTE — PROGRESS NOTES
Rachel is a 33 year old who is being evaluated via a billable video visit.    What phone number would you like to be contacted at? Number on file  How would you like to obtain your AVS? MyChart      Assessment & Plan     Myalgia    Rachel presents to follow-up on myofascial pain.  She has trigger point injections scheduled and is frustrated that she has otherwise been told to try harder at PT as she feels she is already doing so.  She wonders about additional work-up and possible myositis.  It has been awhile since TSH has been checked and I don't see a CK on file, so we'll check these with next blood draw.  EDS has been suggested but she doesn't feel she necessarily meets all the [place] hypermobility criteria.  We discussed medication options including a further increase in the duloxetine to 120mg, though she doesn't feel the 90mg dose has had any benefit for pain, and trying either low dose PO or a topical gabapentin as she is concerned about possible sedation. She is going to consider these options.  She is considering getting another opinion from outside rheum and/or pain clinics and will let me know if she needs referrals.     - CK total; Future  - TSH with free T4 reflex; Future    Attention deficit hyperactivity disorder (ADHD), combined type    Doing well on current regimen of 10mg XR daily with occasional additional 2.5mg IR as needed.  XR refills sent for 4/14, 5/14, 6/13.  Can call in 3 months for next set of refills and do follow-up visit in 6 months.     - amphetamine-dextroamphetamine (ADDERALL XR) 10 MG 24 hr capsule; Take 1 capsule (10 mg) by mouth daily  - amphetamine-dextroamphetamine (ADDERALL XR) 10 MG 24 hr capsule; Take 1 capsule (10 mg) by mouth daily  - amphetamine-dextroamphetamine (ADDERALL XR) 10 MG 24 hr capsule; Take 1 capsule (10 mg) by mouth daily  - amphetamine-dextroamphetamine (ADDERALL) 5 MG tablet; Take 0.5 tablets (2.5 mg) by mouth daily as needed (ADHD)      34 minutes spent  "by me on the date of the encounter doing chart review, history and exam, documentation and further activities per the note        Subjective   Rachel is a 33 year old, presenting for the following health issues:  RECHECK and Results    History of Present Illness       Reason for visit:  Discuss recent lab and imaging results    She eats 2-3 servings of fruits and vegetables daily.She consumes 2 sweetened beverage(s) daily.She exercises with enough effort to increase her heart rate 9 or less minutes per day.  She exercises with enough effort to increase her heart rate 3 or less days per week. She is missing 1 dose(s) of medications per week.  She is not taking prescribed medications regularly due to remembering to take.       I saw Rachel last on 12/1 for ADHD: \"Rachel feels like the 10mg dose was not sufficient for symptoms but the 15mg has caused too much anxiety. We'll try doing 10mg plus 2.5mg prn. Refills sent for 12/1, 12/31, and 1/30 if this regimen is working and can call in 3 months for next refill set and follow-up in 6 months. Follow-up earlier if this dosing isn't working out.\"    She recently saw the pain clinic on 3/8 for myofascial pain syndrome and back pain and trigger point injections are planned.    She has lab work-up earlier this month for further evaluation of thoracic vertebral wedge deformities, and labs all looked fine.  DEXA scan last month was normal.   She is scheduled with rheumatology on 4/5 and endo on 10/10.      [place] 5mg was last filled on 2/5 and ER 10mg last filled on 3/15.     Today, Rachel would like to follow-up on back pain.  She did literally 100 PT sessions last year with ongoing pain so pushed for more work-up this year as noted above.  She saw the pain clinic on 3/8 and they felt that the MRI spinal findings were not likely the cause of pain but that it was rather myofascial.  She is frustrated because they didn't have additional recommendations than " "the trigger point injections rather than try harder to push through the therapy, but she is already doing a lot of muscle work during PT.  She notes pain has been going on for years and became disabling two years ago.  Has some occasional numbness in the bilateral 5th fingers, but not elsewhere.      She is on duloxetine for anxiety and depression since 2020..  Dose was increased recently for mood and that did help, but she is not sure if this helps the pain.    The Adderall is working well at 10mg ER most days and occasional 2.5mg IR as needed.      Review of Systems  Constitutional, MSK systems are negative, except as otherwise noted.      Objective    Vitals - Patient Reported  Systolic (Patient Reported):  (Not recently)  Weight (Patient Reported): 113.4 kg (250 lb)  Height (Patient Reported): 175.3 cm (5' 9\")  BMI (Based on Pt Reported Ht/Wt): 36.92  Pain Score: Mild Pain (3)  Pain Loc: Other - see comment (Whole back and shoulders)      Vitals:  No vitals were obtained today due to virtual visit.    Physical Exam   GENERAL: alert and no distress  EYES: Eyes grossly normal to inspection.  No discharge or erythema, or obvious scleral/conjunctival abnormalities.  RESP: No audible wheeze, cough, or visible cyanosis.    SKIN: Visible skin clear. No significant rash, abnormal pigmentation or lesions.  NEURO: Cranial nerves grossly intact.  Mentation and speech appropriate for age.  PSYCH: Appropriate affect, tone, and pace of words          Video-Visit Details    Type of service:  Video Visit   Start time: 1028am  End time: 1054am  Originating Location (pt. Location): Home    Distant Location (provider location):  Off-site  Platform used for Video Visit: Onel  Signed Electronically by: Panfilo Solano MD    "

## 2024-03-21 ENCOUNTER — VIRTUAL VISIT (OUTPATIENT)
Dept: INTERNAL MEDICINE | Facility: CLINIC | Age: 34
End: 2024-03-21
Payer: COMMERCIAL

## 2024-03-21 DIAGNOSIS — M79.10 MYALGIA: Primary | ICD-10-CM

## 2024-03-21 DIAGNOSIS — F90.2 ATTENTION DEFICIT HYPERACTIVITY DISORDER (ADHD), COMBINED TYPE: ICD-10-CM

## 2024-03-21 PROCEDURE — 99214 OFFICE O/P EST MOD 30 MIN: CPT | Mod: 95 | Performed by: INTERNAL MEDICINE

## 2024-03-21 RX ORDER — DEXTROAMPHETAMINE SACCHARATE, AMPHETAMINE ASPARTATE MONOHYDRATE, DEXTROAMPHETAMINE SULFATE AND AMPHETAMINE SULFATE 2.5; 2.5; 2.5; 2.5 MG/1; MG/1; MG/1; MG/1
10 CAPSULE, EXTENDED RELEASE ORAL DAILY
Qty: 30 CAPSULE | Refills: 0 | Status: SHIPPED | OUTPATIENT
Start: 2024-06-13 | End: 2024-09-09

## 2024-03-21 RX ORDER — DEXTROAMPHETAMINE SACCHARATE, AMPHETAMINE ASPARTATE MONOHYDRATE, DEXTROAMPHETAMINE SULFATE AND AMPHETAMINE SULFATE 2.5; 2.5; 2.5; 2.5 MG/1; MG/1; MG/1; MG/1
10 CAPSULE, EXTENDED RELEASE ORAL DAILY
Qty: 30 CAPSULE | Refills: 0 | Status: SHIPPED | OUTPATIENT
Start: 2024-04-14 | End: 2024-06-13

## 2024-03-21 RX ORDER — DEXTROAMPHETAMINE SACCHARATE, AMPHETAMINE ASPARTATE MONOHYDRATE, DEXTROAMPHETAMINE SULFATE AND AMPHETAMINE SULFATE 2.5; 2.5; 2.5; 2.5 MG/1; MG/1; MG/1; MG/1
10 CAPSULE, EXTENDED RELEASE ORAL DAILY
Qty: 30 CAPSULE | Refills: 0 | Status: SHIPPED | OUTPATIENT
Start: 2024-05-14 | End: 2024-06-13

## 2024-03-21 RX ORDER — DEXTROAMPHETAMINE SACCHARATE, AMPHETAMINE ASPARTATE, DEXTROAMPHETAMINE SULFATE AND AMPHETAMINE SULFATE 1.25; 1.25; 1.25; 1.25 MG/1; MG/1; MG/1; MG/1
2.5 TABLET ORAL DAILY PRN
Qty: 15 TABLET | Refills: 0 | Status: SHIPPED | OUTPATIENT
Start: 2024-03-21 | End: 2024-06-13

## 2024-03-21 ASSESSMENT — PATIENT HEALTH QUESTIONNAIRE - PHQ9
SUM OF ALL RESPONSES TO PHQ QUESTIONS 1-9: 5
10. IF YOU CHECKED OFF ANY PROBLEMS, HOW DIFFICULT HAVE THESE PROBLEMS MADE IT FOR YOU TO DO YOUR WORK, TAKE CARE OF THINGS AT HOME, OR GET ALONG WITH OTHER PEOPLE: SOMEWHAT DIFFICULT
SUM OF ALL RESPONSES TO PHQ QUESTIONS 1-9: 5

## 2024-03-21 NOTE — NURSING NOTE
Is the patient currently in the state of [place]? YES    Visit mode:VIDEO    If the visit is dropped, the patient can be reconnected by: VIDEO VISIT: Text to cell phone:   Telephone Information:   Mobile [phone]       Will anyone else be joining the visit? NO  (If patient encounters technical issues they should call [phone])    How would you like to obtain your AVS? MyChart    Are changes needed to the allergy or medication list? Pt stated no changes to allergies and Pt stated no med changes    Reason for visit: RECHECK and Results    AMADOR GARDNER

## 2024-03-21 NOTE — PATIENT INSTRUCTIONS
We could try increasing duloxetine to 120mg or try either oral or topical gabapentin.      Let us know where to send referrals for rheumatology and pain clinic.      *    Thank you for visiting the Primary Care Center today at the [facility]! The following is some information about our clinic:     Primary Care Center Frequently-Asked Questions    (1) How do I schedule appointments at the [facility]?     Primary Care--to schedule or make changes to an existing appointment, please call our primary care line at [phone].    Labs--to schedule a lab appointment at the [facility] you can use [employer] or call [phone]. If you have a [place] location that is closer to home, you can reach out to that location for scheduling options.     Imaging--if you need to schedule a CT, X-ray, MRI, ultrasound, or other imaging study you can call [phone] to schedule at the [facility] or any other [facility] imaging location.     Referrals--if a referral to another specialty was ordered you can expect a phone call from their scheduling team. If you have not heard from them in a week, please call us or send us a [employer] message to check the status or get a scheduling number. Please note that this only applies to internal [facility] referrals. If the referral is external you would need to contact their office for scheduling.     (2) I have a question about my visit, who do I contact?     You can call us at the primary care line at [phone] to ask questions about your visit. You can also send a secure message through [employer], which is reviewed by clinic staff. Please note that [employer] messages have a twenty-four to forty-eight business hour turnaround time and should not be used for urgent concerns.    (3) How will I get the results of my tests?    If you are signed up for [employer] all tests will be released to you within twenty-four hours of  resulting. Please allow three to five days for your doctor to review your results and place a note interpreting the results. If you do not have [employer] you will receive your results through mail seven to ten business days following the return of the tests. Please note that if there should be any urgent or concerning results that your doctor or their registered nurse will reach out to you the same day as the tests come back. If you have follow up questions about your results or would like to discuss the results in detail please schedule a follow up with your provider either in person or virtually.     (4) How do I get refills of my prescriptions?     You should always first contact your pharmacy for refills of your medications. If submitting a refill request on [employer], please be sure to submit the request only once--repeat requests can cause delays in refill. If you are requesting a NEW medication or a medication related to new symptoms you will need to schedule an appointment with a provider prior to approval. Please note: Routine medication refills have up to one to three business day turnaround whereas controlled substances refills have up to five to seven business day turnaround.    (5) I have new symptoms, what do I do?     If you are having an immediate medical emergency, you should dial 911 for assistance.   For anything urgent that needs to be seen within a few hours to one day you should visit a local urgent care for assistance.  For non-urgent symptoms that need to be seen within a few days to a week you can schedule with an available provider in primary care by going to [facility] or calling [phone].   If you are not sure how serious your symptoms are or you would like to receive medical advice you can always call [phone] to speak with a triage nurse.

## 2024-03-30 ASSESSMENT — PAIN SCALES - PAIN ENJOYMENT GENERAL ACTIVITY SCALE (PEG)
AVG_PAIN_PASTWEEK: 5
INTERFERED_GENERAL_ACTIVITY: 8
PEG_TOTALSCORE: 7
INTERFERED_ENJOYMENT_LIFE: 8

## 2024-04-02 ENCOUNTER — OFFICE VISIT (OUTPATIENT)
Dept: PALLIATIVE MEDICINE | Facility: OTHER | Age: 34
End: 2024-04-02
Attending: STUDENT IN AN ORGANIZED HEALTH CARE EDUCATION/TRAINING PROGRAM
Payer: COMMERCIAL

## 2024-04-02 VITALS — HEART RATE: 102 BPM | DIASTOLIC BLOOD PRESSURE: 74 MMHG | SYSTOLIC BLOOD PRESSURE: 142 MMHG | OXYGEN SATURATION: 100 %

## 2024-04-02 DIAGNOSIS — M79.18 MYOFASCIAL PAIN: Primary | ICD-10-CM

## 2024-04-02 PROCEDURE — 250N000011 HC RX IP 250 OP 636: Performed by: STUDENT IN AN ORGANIZED HEALTH CARE EDUCATION/TRAINING PROGRAM

## 2024-04-02 PROCEDURE — 250N000009 HC RX 250: Performed by: STUDENT IN AN ORGANIZED HEALTH CARE EDUCATION/TRAINING PROGRAM

## 2024-04-02 PROCEDURE — 96372 THER/PROPH/DIAG INJ SC/IM: CPT | Performed by: STUDENT IN AN ORGANIZED HEALTH CARE EDUCATION/TRAINING PROGRAM

## 2024-04-02 PROCEDURE — 20553 NJX 1/MLT TRIGGER POINTS 3/>: CPT | Performed by: STUDENT IN AN ORGANIZED HEALTH CARE EDUCATION/TRAINING PROGRAM

## 2024-04-02 RX ORDER — TRIAMCINOLONE ACETONIDE 40 MG/ML
40 INJECTION, SUSPENSION INTRA-ARTICULAR; INTRAMUSCULAR ONCE
Status: COMPLETED | OUTPATIENT
Start: 2024-04-02 | End: 2024-04-02

## 2024-04-02 RX ORDER — LIDOCAINE HYDROCHLORIDE 10 MG/ML
4.5 INJECTION, SOLUTION EPIDURAL; INFILTRATION; INTRACAUDAL; PERINEURAL ONCE
Status: COMPLETED | OUTPATIENT
Start: 2024-04-02 | End: 2024-04-02

## 2024-04-02 RX ORDER — BUPIVACAINE HYDROCHLORIDE 5 MG/ML
4.5 INJECTION, SOLUTION EPIDURAL; INTRACAUDAL ONCE
Status: COMPLETED | OUTPATIENT
Start: 2024-04-02 | End: 2024-04-02

## 2024-04-02 RX ADMIN — LIDOCAINE HYDROCHLORIDE 4.5 ML: 10 SOLUTION INTRAVENOUS at 09:39

## 2024-04-02 RX ADMIN — BUPIVACAINE HYDROCHLORIDE 22.5 MG: 5 INJECTION, SOLUTION EPIDURAL; INTRACAUDAL; PERINEURAL at 09:39

## 2024-04-02 RX ADMIN — TRIAMCINOLONE ACETONIDE 40 MG: 40 INJECTION, SUSPENSION INTRA-ARTICULAR; INTRAMUSCULAR at 09:39

## 2024-04-02 ASSESSMENT — PAIN SCALES - GENERAL
PAINLEVEL: MILD PAIN (3)
PAINLEVEL: MODERATE PAIN (4)

## 2024-04-02 NOTE — PROGRESS NOTES
S: Patient endorses ongoing pain in bilateral mid and low back  O: BP (!) 142/74   Pulse 102   SpO2 100%    MSK - +TTP at following:  Bilateral thoracic paraspinals  Bilateral lumbar paraspinals  A/P:  Myofascial pain: proceed with trigger point injections today in clinic    Pre procedure Diagnosis: myofascial pain   Post procedure Diagnosis: Same  Procedure performed: trigger point injections, CPT code 71785, [id number]  Anesthesia: none  Complications: none  Operators: Edmar Chew MD    Indications:   Rachel Fried is a 33 year old female with a history of myofascial pain.  Exam shows myofascial pain of the muscle groups listed below, and they have tried conservative treatment including medications.    Options/alternatives, benefits and risks were discussed with the patient including bleeding, infection, tissue trauma and pneumothorax.  Questions were answered to her satisfaction and she agrees to proceed. Voluntary informed consent was obtained and signed.     Vitals were reviewed: Yes  Allergies were reviewed:  Yes   Medications were reviewed:  Yes   Pre-procedure pain score: 4/10    Procedure:  After getting informed consent, a Pause for the Cause was performed.    Trigger points were identified by patient, and marked when appropriate.  The area was prepped with Chloroprep.    Using clean technique, injections were completed using a 25G, 1.5 inch needle.  After negative aspiration, injection was completed.  A total of 9 locations were injected.  When possible, tissue was retracted from the chest wall to avoid lung injury.    Muscle groups injected:  Bilateral thoracic paraspinals x2 + left thoracic paraspinal x1 for total 5 thoracic injections  Bilateral lumbar paraspinals x2    Injection solution contained:  4.5ml of 1% lidocaine, 4.5ml of 0.5% bupivacaine, and 40mg kenalog .    Hemostasis was achieved, the area was cleaned, and bandaids were placed when appropriate.  The patient tolerated the procedure  "well.    Post-procedure pain score: 3/10  Follow-up includes:   -repeat boo Chew MD  Interventional Pain Medicine  [facility]      NOTE: CORRECTION TO 3/8/24 NOTE - \"A review of the MRI of the thoracic spine shows no wedge deformity at T6 and T8\" should read \"A review of the MRI of the thoracic spine shows wedge deformity at T6 and T8\"      "

## 2024-04-02 NOTE — PROGRESS NOTES
Pre-procedure Intake      Are you taking any any blood thinners such as Coumadin, Warfarin, Jantoven, Pradaxa Xarelto, Eliquis, Edoxaban, Enoxaparin, Lovenox, Heparin, Arixtra, Fondaparinux, or Fragmin? OR Antiplatelet medication such as Plavix, Brilinta, or Effient?   No   If yes, when did you take your last dose? na    Do you take aspirin?  No  If cervical procedure, have you held aspirin for 6 days?   NA    Do you have any allergies to contrast dye, iodine, steroid and/or numbing medications?  NO    Are you currently taking antibiotics or have an active infection?  NO    Have you had a fever/elevated temperature within the past week? NO    Are you currently taking oral steroids? NO    Have you had any vaccinations in the past seven days? NO    Are you pregnant or breastfeeding?  NO    Have you received the COVID-19 vaccine? Yes  If yes, was it your 1st, 2nd or only dose needed? 6  Date of most recent vaccine: 09.23.2023    Notify provider and RNs if systolic BP >170, diastolic BP >100, P >100 or O2 sats < 90%

## 2024-04-02 NOTE — PATIENT INSTRUCTIONS
[facility]  Post Procedure Instructions    Today you saw:  Dr. Chew    Today you had:  trigger point injections                              Medications used:  lidocaine   bupivicaine   kenolog         Go to the emergency room if you develop any shortness of breath  Monitor the injection sites for signs and symptoms of infection-fever, chills, redness, swelling, warmth, or drainage to areas.  You may have soreness at injection sites for up to 24 hours.  It may take up to 14 days for the steroid medication to start working although you may feel the effect as early as a few days after the procedure.     You may apply ice to the painful areas to help minimize the discomfort of the needle pokes.  Do not apply heat to sites for at least 12 hours.  You may use anti-inflammatory medications or Tylenol for pain control if necessary    Pain Clinic phone number:  [phone]

## 2024-04-04 ENCOUNTER — TELEPHONE (OUTPATIENT)
Dept: ENDOCRINOLOGY | Facility: CLINIC | Age: 34
End: 2024-04-04
Payer: COMMERCIAL

## 2024-04-04 NOTE — TELEPHONE ENCOUNTER
Provider template change on 10/10 with Dr. Corea.   LVM and sent myc x1 to change appt time from 9:30 to 10:30 am     Francesco Blum on 4/4/2024 at 3:49 PM

## 2024-04-05 ENCOUNTER — TELEPHONE (OUTPATIENT)
Dept: RHEUMATOLOGY | Facility: CLINIC | Age: 34
End: 2024-04-05

## 2024-04-05 ENCOUNTER — VIRTUAL VISIT (OUTPATIENT)
Dept: RHEUMATOLOGY | Facility: CLINIC | Age: 34
End: 2024-04-05
Attending: INTERNAL MEDICINE
Payer: COMMERCIAL

## 2024-04-05 VITALS — HEIGHT: 69 IN | BODY MASS INDEX: 37.33 KG/M2 | WEIGHT: 252 LBS

## 2024-04-05 DIAGNOSIS — Z51.81 ENCOUNTER FOR MEDICATION MONITORING: ICD-10-CM

## 2024-04-05 DIAGNOSIS — M54.89 OTHER CHRONIC BACK PAIN: ICD-10-CM

## 2024-04-05 DIAGNOSIS — G89.29 OTHER CHRONIC BACK PAIN: ICD-10-CM

## 2024-04-05 DIAGNOSIS — M06.09 RHEUMATOID ARTHRITIS OF MULTIPLE SITES WITH NEGATIVE RHEUMATOID FACTOR (H): Primary | ICD-10-CM

## 2024-04-05 PROCEDURE — 99214 OFFICE O/P EST MOD 30 MIN: CPT | Mod: 95 | Performed by: INTERNAL MEDICINE

## 2024-04-05 ASSESSMENT — PAIN SCALES - GENERAL: PAINLEVEL: MILD PAIN (3)

## 2024-04-05 NOTE — NURSING NOTE
Is the patient currently in the state of [place]? YES    Visit mode:VIDEO    If the visit is dropped, the patient can be reconnected by: VIDEO VISIT: Text to cell phone:   Telephone Information:   Mobile [phone]       Will anyone else be joining the visit? NO  (If patient encounters technical issues they should call [phone]    How would you like to obtain your AVS? MyChart    Are changes needed to the allergy or medication list? No    Reason for visit: RECHECK    Medications and allergies have been reviewed.      Paul GARDNER

## 2024-04-05 NOTE — TELEPHONE ENCOUNTER
Message left for patient regarding referrals, to determine if patient would like them mailed or faxed to a particular location. Will also send [employer] message.    Refer to accupuncture     Refer to [facility] pain clinic    YOUNG Muñoz, RN  RN Care Coordinator Rheumatology

## 2024-04-05 NOTE — PATIENT INSTRUCTIONS
Fasting labs every 6 months, due in 6/2024    Refer to accupuncture    Refer to [facility] clinic    Return video visit in 6 months

## 2024-04-05 NOTE — LETTER
4/5/2024       RE: Rachel Fried  [address]  [facility] [address]     Dear Colleague,    Thank you for referring your patient, Rachel Fried, to the [facility] RHEUMATOLOGY CLINIC [place] at [facility]. Please see a copy of my visit note below.    Virtual Visit Details    Type of service:  Video Visit     Joined the call at 4/5/2024, 9:39:28 am.  Left the call at 4/5/2024, 10:07:18 am.  You were on the call for 27 minutes 50 seconds .    Originating Location (pt. Location): Home  Distant Location (provider location):  On-site  Platform used for Video Visit: AmCaterna    Office Visit Details      Last seen: 10/5/2023    DOS: 4/5/2024    Reason for visit: RA    [facility] Rheumatology Clinic Visit    Rachel Fried  is a 30 year old here for follow-up  RHEUMATOID ARTHRITIS (RA) involving hands, wrists, ankles, shoulders feet [ -RF and low CCP 25, -factor V -KAVITHA by IFA and -NADEEN panel -HLA B27] and pityriasis lichenoides chronica (seen [facility]-established here). XR  no erosions hands. Moved from [place] to be with family.       Past- hydroxychloroquine (plaquenil) since 1-2016 (2 tablet a day caused diarrhea, ok on 1 tablet) and methotrexate 10 mg week  to 4-2018 (mild fatigue day of) then restarted 10- (higher folic acid improved side-effects), naproxsyn; allergy sulfa, prednisone, advil       Initial visit copy forward  10-: Hydroxychloroquine (plaquenil) BID mild diarrhea; ocular eye exam 3-2016 baseline reports recent eye exam   EKG was recently Feb 2018 normal. Stopped methotrexate  In April as wanted to take a drug holiday and was moving here, also wanted to drink some alcohol, and discuss options. No infections. Diffuse joint pains in hands, wrists, shoulders, ankles and feet and the sides of her hips. Taking advil 600mg AM and 400 mg PM, tolerating but taking since April. Pain severe 5/10 or more.  Stiffness all day. Hands are sore. No loss of function or ROM. Very sore and stiff all over. Knees are sore and hard to bend or squat. Mild diarrhea with hydroxychloroquine (plaquenil)     December 16, 2020  Denies any fever, chills, SOB, CANDELARIA, night sweats, or chest pain, high fever, cough, travel in last 14 days or exposure to covid-19 (coronavirus). Reports healthy. Follows CDC guidelines. Works from home.     Rheumatoid arthritis (RA) stiff with pain in in hands and wrists, stiff in mornings for few hours. Not able to do her massage or swim, does walk and not doing normal routine which is causing this as well. Sleep is poor now and hip pain. Upper neck discomfort from stress.     Continues on methotrexate  12.5 mg every 7 days THURS, folic acid 2 mg day. Tolerating. Hydroxychloroquine (plaquenil) 200 mg every day  misses most days so takes <3 times a week. No GI side-effects, no hairloss or oral sores from methotrexate moreso from hydroxychloroquine (plaquenil).  Off celexa -lost 12 lbs. 197 lb last visit but this not now due to Covid-19 (coronavirus).   No longer using NSAIDs. Taking vitamin D 2000 international unit(s) day.  No EAS. No vision issues. Is exercising and doing yogo now. Goes to [facility] eye did last 3-2019 but was due this past Spring. Willing to come here if can do hydroxychloroquine (plaquenil) toxicity with glasses and contacts in 1 visit. Otherwise, will continue with hers.     4/23/21:  The patient states that she is doing ok with her RA. She states that her joint symptoms have slightly worsened during pandemic because she is not going swimming, which is traditionally very helpful for her.   The patient just got vaccinated and plans to start going swimming in May, which she thinks will help her joints. She has no active swelling now.  She is tolerating methotrexate at 12.5 mg qWeek and also on  mg every day (can not tolerate higher dose sec GI SEs).   The patient is taking ibuprofen  2-4 tabs for the last couple of weeks.  No plans to get pregnant. Not on birth control at this time, but states currently only dating women.  She otherwise feels well with no other concer     10/28/2021:    Doing ok for most part with her RA. AM stiffness is 1.5 hr. Knees and ankles hurt and swell up a little bit and back hurts with standing for too long, but has hard time to say if her joints are swollen. On MTX 5 tabs a wk, last time increased her MTX, got brain fog (has it with MTX current dose but it got worse) and migraines and nausea. It ruined her Sturdays. She takes NSAIDs rarely like going to a concert. Used to be naproxen every day before RA Dx. Since stopping HCQ every other day is having more joint sx, but hard to tell, HCQ causes her diarrhea at higher dose, tried to get generic brand but was never able to get it from pharmacy.      Had covid booster vaccine, no SE.    1/19/2022: Rachel presents for follow up. Humira was added at last visit. Did 4 shots of humira, right before brendan, had last shot, now out. No SEs and it significantly helped with joint pain/stiffness, now symptoms are slowly returning.    Reports 1 hr of knee stiffness, 20 min of hand stiffness.    Reports brain fog 24-48 hours after taking methotrexate.      8/12/2022: Rachel presents for follow up. Did not tolerate MTX, HCQ in the past. Back on humira. Has ongoing arthralgia, did not realize how much pain she had tills he took the prednisone which made her feel normal, pain free. Now realized that humira was not helping her as much as she thought. She is hoping to try a med which works as well as prednisone. Wondering if she could try medical cannabis.    Most pain is localized to her hands, which limits how she uses her hands.    Am stiffness is >30 min. Can not tell if joints swell up or not. No rash, cough, SOB. Regained 50 lbs recently.    MTX caused her brain fog, headaches. Felt miserable on it.    3/24/2023: Humira was  "switched to actemra. She is doing better on actemra and thinks that it is helping her more with joint sx (pain and stiffness). No SEs.    Had hard time making herself doing anything, she was very stiff while she was off humira and waiting for actemra approval by [employer], worked with PT. Has back posture pain.    Hands ache today but that's from broken popsocket.    RA is overall is good.    No Actemra SEs.    Has scoliosis, was doing PT 2-3 times a week, now twice a wk.    10/5/2023: Doing well on Actemra. Feels RA is well controlled. Some knee stiffness in the mornings, no current hand pain, stiffness, or swelling. Has diffuse mechanical back pain, improving with PT. Pt describes back pain as being related to scoliosis and muscle tension causing her back to \"freeze up\" for a few days at a time. Today her back pain is controlled, but present. She was told by her back pain doctor that this pain may not be from scoliosis but potentially fibromyalgia (this is not typical fibromyalgia presentation). She was recently approved for medical marijuana for pain as well.     Today 4/5/2024:    -she still has muscle pain, it is pretty bad, had trigger point inj with steroid in Tue, hopeful that it helps her  -going to see an endocrinologist in 10/2024  -arthritis pain is getting better but not back pain  -has back pain for longtime, x years, got worse 2 years ago  -went to 100 PT sessions last year, worried about muscle atrophy in her back on MRIs      PMH:  Injury-no  Medical-dermatitis, (bx 2-2016 GA vs LP vs cutaneous lupus--was not cutenaous lupus --focal interstitial lymphocytic inflammation, scattered lymphocytes), anxiety and depression, exercise induced asthma, scoliosis, pityriasis lichenoides chronica, chronic back pain, hypertension, seasonal allergies, acne, motion sickness, high cholesterol, chicken pox, headaches before    Surgical-None     FH:  No autoimmune disorders, psoriasis, UC, crohn's, RA, PsA, gout, " "autoimmune thyroid.  No MS, heart disease or cancer in family  Mother-factor V leiden carrier, rosea   Father-healthy  Siblings-younger bro healthy  Children-None   GF-lupus   Uncle cancer colon    SH:  Social Alcohol occasional week. Never Smoking. No IVDU. No Children. Right handed. Single--not sexually active, never.        [facility] personally reviewed and updated by me.    ROS:  A comprehensive ROS was done. Positives are per HPI.      Ph. E:    Ht 1.753 m (5' 9\")   Wt 114.3 kg (252 lb)   BMI 37.21 kg/m      Constitutional: NAD, pleasant  Eyes: nl EOM, conjunctiva, sclera  Skin: no rash  Neuro: grossly non focal  Psych: tearful, has severe back pain  MSK: no active synovitis       Labs/Imaging:  Normal G6PD  Neg MTB QG 2016 and hepatitis panel, negative HIV    XR hands and knees-negative 2016   Component      Latest Ref Rng & Units 11/17/2020   WBC      4.0 - 11.0 10e9/L 9.6   RBC Count      3.8 - 5.2 10e12/L 4.72   Hemoglobin      11.7 - 15.7 g/dL 13.8   Hematocrit      35.0 - 47.0 % 42.9   MCV      78 - 100 fl 91   MCH      26.5 - 33.0 pg 29.2   MCHC      31.5 - 36.5 g/dL 32.2   RDW      10.0 - 15.0 % 12.9   Platelet Count      150 - 450 10e9/L 389   Creatinine      0.52 - 1.04 mg/dL 0.66   GFR Estimate      >60 mL/min/1.73:m2 >90   GFR Estimate If Black      >60 mL/min/1.73:m2 >90   CRP Inflammation      0.0 - 8.0 mg/L 9.0 (H)   Albumin      3.4 - 5.0 g/dL 3.4   ALT      0 - 50 U/L 16   AST      0 - 45 U/L 10     Component      Latest Ref Rng 12/6/2023  8:20 AM   WBC      4.0 - 11.0 10e3/uL 6.1    RBC Count      3.80 - 5.20 10e6/uL 4.98    Hemoglobin      11.7 - 15.7 g/dL 15.0    Hematocrit      35.0 - 47.0 % 46.4    MCV      78 - 100 fL 93    MCH      26.5 - 33.0 pg 30.1    MCHC      31.5 - 36.5 g/dL 32.3    RDW      10.0 - 15.0 % 11.7    Platelet Count      150 - 450 10e3/uL 289    % Neutrophils      % 52    % Lymphocytes      % 37    % Monocytes      % 10    % Eosinophils      % 2    % Basophils      % " 0    % Immature Granulocytes      % 0    Absolute Neutrophils      1.6 - 8.3 10e3/uL 3.2    Absolute Lymphocytes      0.8 - 5.3 10e3/uL 2.3    Absolute Monocytes      0.0 - 1.3 10e3/uL 0.6    Absolute Eosinophils      0.0 - 0.7 10e3/uL 0.1    Absolute Basophils      0.0 - 0.2 10e3/uL 0.0    Absolute Immature Granulocytes      <=0.4 10e3/uL 0.0    Cholesterol      <200 mg/dL 262 (H)    Triglycerides      <150 mg/dL 228 (H)    HDL Cholesterol      >=50 mg/dL 63    LDL Cholesterol Calculated      <=100 mg/dL 153 (H)    Non HDL Cholesterol      <130 mg/dL 199 (H)    Patient Fasting? Yes    Creatinine      0.51 - 0.95 mg/dL 0.85    GFR Estimate      >60 mL/min/1.73m2 >90    CRP Inflammation      <5.00 mg/L <3.00    Sed Rate      0 - 20 mm/hr 6    ALT      0 - 50 U/L 17    Albumin      3.5 - 5.2 g/dL 4.2    AST      0 - 45 U/L 20       Legend:  (H) High        Study Result    Narrative & Impression   Thoracic and Lumbar spine MRI without contrast     History: Rheumatoid arthritis of multiple sites with negative  rheumatoid factor (H); Pain in thoracic spine.  ICD-10: Rheumatoid arthritis of multiple sites with negative  rheumatoid factor (H); Pain in thoracic spine (accession [id number]),  Rheumatoid arthritis of multiple sites with negative rheumatoid factor  (H); Chronic bilateral low back pain, unspecified whether sciatica  present; Chronic bilateral low back pain, unspecified whether sciatica  present (accession [id number]).     Comparison: None.     Technique:  Axial T2-weighted, and sagittal T1, STIR, and T2-weighted  images of the lumbar spine without intravenous contrast. Sagittal T1,  STIR, and T2-weighted images of the thoracic spine without contrast  were obtained, with axial T2-weighted images through levels of  interest in the thoracic spine.      Findings:  Thoracic Spine: There is no definite abnormal signal in the thoracic  spinal cord at any level.   Anterior wedge-shaped compression deformity of T6 and  T8. Schmorl's  deformity at the T7 inferior endplate.  Alignment of the thoracic vertebra appears within normal limits. Bone  marrow signal intensity on noncontrast images appears unremarkable.  Multilevel vertebral body hemangiomas.     Lumbar Spine:  There are 5 type lumbar vertebra, used for the purposes of this  dictation.  The tip of the conus is at approximately L1. The alignment  of the lumbar spine is unremarkable. Multilevel vertebral body  hemangiomas.     On a level by level basis, the findings are as follows:     L2-3:  There is no focal abnormality.     L3-4:  There is no focal abnormality.     L4-5:  There is no focal abnormality.     L5-S1: Central disc protrusion. No significant spinal canal or  neuroforamina stenosis.                                                                      Impression:   1. Chronic anterior wedge-shaped compression deformity of T6 and T8.  Schmorl's deformity at the T7 inferior at T9 superior endplate.  2. Central disc protrusion at L5-S1. No significant spinal canal or  neuroforamina stenosis at any level.     PLACIDO DE ANDA MD         SYSTEM ID:  [id number]     Impression/Plan:  1. Seropositive (pos anti-CCP, neg RF )rheumatoid arthritis (RA) non-erosive. Active RA on methotrexate 12.5 mg weekly, worse after stopping HCQ due to diarrhea (and never was successful to get generic brand HCQ, brand is too expensive). Did not tolerate MTX even at lowest effective dose of 10 mg qwk due to nausea, headaches, brain fog and nausea. Was placed on humira q2wk, initially thought it was helping but after feeling normal by taking prednisone taper ( 20 mg every day max) realized that humira was not helping much.    8/2022: Recommend to proceed with actemra as next step; risks were discussed.    I support her using medical cannabis, no interaction with RA meds and could be beneficial as non steroid agent to help managing pain, it might have some anti-inflammatory benefit and it is not  addictive. I advsied her to reach out to her PCP jenny nichols for Rx.    3/24/2023: Last humira 8/13/2022. On actemra since 10/2022, it took >month to get it insurance approved. It is helping her more and will continue with that. No SEs.    10/5/2023: Doing well on Actemra, plan to continue. Lact cholesterol in 6/2023 elevated (elevated at baseline), could potentially be impacted by Actemra. Labs due 12/2023 (including fasting lipid panel) and follow up video visit in 6 months.     Today 4/5/2024: RA is well controlled on actemra, main issue is back pain.      2. Vitamin D deficeincy- take 2000 international unit(s) day.     3. Pityriasis lichenoids chronic-per derm     5. Chronic NSAID use. Will check labs q6-12 mo.    6. Worsening back pain. NL ESR/CRP, neg HLA-B27. No signs of inflammatory arthritis on T/L spine MRI 2/2024. Just had trigger point inj, PCP ordered CK. She is going to seek 2nd opinion for pain management. Robaxin helps some. Pain significantly interfering with her life, saw 2 different spine doctor. I advised her to discuss LDN with pain management.  Used TENS units 2-3 times/ wk x past year, sometimes it helps.   Worried about trying gabapentin and she is worried about potential SE of fatigue/brain fog. Worried about potential SE of weight gain on lyrica.    7. Actemra monitoring labs. Labs every 6 months, due 6/2024    Plan:    Continue actemra    Fasting labs every 6 months, due in 6/2024    Refer to acupuncture    Refer to [facility] pain clinic, consider LDN    Return video visit in 6 months      Naresh Aldridge MD    Orders Placed This Encounter   Procedures    ALT    Albumin level    AST    Creatinine    Lipid Profile    Pain Management  Referral    Acupuncture Referral    CBC with Platelets & Differential

## 2024-04-05 NOTE — PROGRESS NOTES
Virtual Visit Details    Type of service:  Video Visit     Joined the call at 4/5/2024, 9:39:28 am.  Left the call at 4/5/2024, 10:07:18 am.  You were on the call for 27 minutes 50 seconds .    Originating Location (pt. Location): Home  Distant Location (provider location):  On-site  Platform used for Video Visit: AmWell    Office Visit Details      Last seen: 10/5/2023    DOS: 4/5/2024    Reason for visit: RA    [facility] - Rheumatology Clinic Visit    Rachel Fried  is a 30 year old here for follow-up  RHEUMATOID ARTHRITIS (RA) involving hands, wrists, ankles, shoulders feet [ -RF and low CCP 25, -factor V -KAVITHA by IFA and -NADEEN panel -HLA B27] and pityriasis lichenoides chronica (seen [facility]-established here). XR  no erosions hands. Moved from [place] to be with family.       Past- hydroxychloroquine (plaquenil) since 1-2016 (2 tablet a day caused diarrhea, ok on 1 tablet) and methotrexate 10 mg week  to 4-2018 (mild fatigue day of) then restarted 10- (higher folic acid improved side-effects), naproxsyn; allergy sulfa, prednisone, advil       Initial visit copy forward  10-: Hydroxychloroquine (plaquenil) BID mild diarrhea; ocular eye exam 3-2016 baseline reports recent eye exam   EKG was recently Feb 2018 normal. Stopped methotrexate  In April as wanted to take a drug holiday and was moving here, also wanted to drink some alcohol, and discuss options. No infections. Diffuse joint pains in hands, wrists, shoulders, ankles and feet and the sides of her hips. Taking advil 600mg AM and 400 mg PM, tolerating but taking since April. Pain severe 5/10 or more. Stiffness all day. Hands are sore. No loss of function or ROM. Very sore and stiff all over. Knees are sore and hard to bend or squat. Mild diarrhea with hydroxychloroquine (plaquenil)     December 16, 2020  Denies any fever, chills, SOB, CANDELARIA, night sweats, or chest pain, high fever, cough, travel in last 14  days or exposure to covid-19 (coronavirus). Reports healthy. Follows CDC guidelines. Works from home.     Rheumatoid arthritis (RA) stiff with pain in in hands and wrists, stiff in mornings for few hours. Not able to do her massage or swim, does walk and not doing normal routine which is causing this as well. Sleep is poor now and hip pain. Upper neck discomfort from stress.     Continues on methotrexate  12.5 mg every 7 days THURS, folic acid 2 mg day. Tolerating. Hydroxychloroquine (plaquenil) 200 mg every day  misses most days so takes <3 times a week. No GI side-effects, no hairloss or oral sores from methotrexate moreso from hydroxychloroquine (plaquenil).  Off celexa -lost 12 lbs. 197 lb last visit but this not now due to Covid-19 (coronavirus).   No longer using NSAIDs. Taking vitamin D 2000 international unit(s) day.  No EAS. No vision issues. Is exercising and doing yogo now. Goes to [facility] did last 3-2019 but was due this past Spring. Willing to come here if can do hydroxychloroquine (plaquenil) toxicity with glasses and contacts in 1 visit. Otherwise, will continue with hers.     4/23/21:  The patient states that she is doing ok with her RA. She states that her joint symptoms have slightly worsened during pandemic because she is not going swimming, which is traditionally very helpful for her.   The patient just got vaccinated and plans to start going swimming in May, which she thinks will help her joints. She has no active swelling now.  She is tolerating methotrexate at 12.5 mg qWeek and also on  mg every day (can not tolerate higher dose sec GI SEs).   The patient is taking ibuprofen 2-4 tabs for the last couple of weeks.  No plans to get pregnant. Not on birth control at this time, but states currently only dating women.  She otherwise feels well with no other concer     10/28/2021:    Doing ok for most part with her RA. AM stiffness is 1.5 hr. Knees and ankles hurt and swell up a little  bit and back hurts with standing for too long, but has hard time to say if her joints are swollen. On MTX 5 tabs a wk, last time increased her MTX, got brain fog (has it with MTX current dose but it got worse) and migraines and nausea. It ruined her Sturdays. She takes NSAIDs rarely like going to a concert. Used to be naproxen every day before RA Dx. Since stopping HCQ every other day is having more joint sx, but hard to tell, HCQ causes her diarrhea at higher dose, tried to get generic brand but was never able to get it from pharmacy.      Had covid booster vaccine, no SE.    1/19/2022: Rachel presents for follow up. Humira was added at last visit. Did 4 shots of humira, right before brendan, had last shot, now out. No SEs and it significantly helped with joint pain/stiffness, now symptoms are slowly returning.    Reports 1 hr of knee stiffness, 20 min of hand stiffness.    Reports brain fog 24-48 hours after taking methotrexate.      8/12/2022: Rachel presents for follow up. Did not tolerate MTX, HCQ in the past. Back on humira. Has ongoing arthralgia, did not realize how much pain she had tills he took the prednisone which made her feel normal, pain free. Now realized that humira was not helping her as much as she thought. She is hoping to try a med which works as well as prednisone. Wondering if she could try medical cannabis.    Most pain is localized to her hands, which limits how she uses her hands.    Am stiffness is >30 min. Can not tell if joints swell up or not. No rash, cough, SOB. Regained 50 lbs recently.    MTX caused her brain fog, headaches. Felt miserable on it.    3/24/2023: Humira was switched to actemra. She is doing better on actemra and thinks that it is helping her more with joint sx (pain and stiffness). No SEs.    Had hard time making herself doing anything, she was very stiff while she was off humira and waiting for actemra approval by [employer], worked with PT. Has back posture  "pain.    Hands ache today but that's from broken popsocket.    RA is overall is good.    No Actemra SEs.    Has scoliosis, was doing PT 2-3 times a week, now twice a wk.    10/5/2023: Doing well on Actemra. Feels RA is well controlled. Some knee stiffness in the mornings, no current hand pain, stiffness, or swelling. Has diffuse mechanical back pain, improving with PT. Pt describes back pain as being related to scoliosis and muscle tension causing her back to \"freeze up\" for a few days at a time. Today her back pain is controlled, but present. She was told by her back pain doctor that this pain may not be from scoliosis but potentially fibromyalgia (this is not typical fibromyalgia presentation). She was recently approved for medical marijuana for pain as well.     Today 4/5/2024:    -she still has muscle pain, it is pretty bad, had trigger point inj with steroid in Tue, hopeful that it helps her  -going to see an endocrinologist in 10/2024  -arthritis pain is getting better but not back pain  -has back pain for longtime, x years, got worse 2 years ago  -went to 100 PT sessions last year, worried about muscle atrophy in her back on MRIs      PMH:  Injury-no  Medical-dermatitis, (bx 2-2016 GA vs LP vs cutaneous lupus--was not cutenaous lupus --focal interstitial lymphocytic inflammation, scattered lymphocytes), anxiety and depression, exercise induced asthma, scoliosis, pityriasis lichenoides chronica, chronic back pain, hypertension, seasonal allergies, acne, motion sickness, high cholesterol, chicken pox, headaches before    Surgical-None     FH:  No autoimmune disorders, psoriasis, UC, crohn's, RA, PsA, gout, autoimmune thyroid.  No MS, heart disease or cancer in family  Mother-factor V leiden carrier, rosea   Father-healthy  Siblings-younger bro healthy  Children-None   GF-lupus   Uncle cancer colon    SH:  Social Alcohol occasional week. Never Smoking. No IVDU. No Children. Right handed. Single--not sexually " "active, never.        [facility] personally reviewed and updated by me.    ROS:  A comprehensive ROS was done. Positives are per HPI.      Ph. E:    Ht 1.753 m (5' 9\")   Wt 114.3 kg (252 lb)   BMI 37.21 kg/m      Constitutional: NAD, pleasant  Eyes: nl EOM, conjunctiva, sclera  Skin: no rash  Neuro: grossly non focal  Psych: tearful, has severe back pain  MSK: no active synovitis       Labs/Imaging:  Normal G6PD  Neg MTB QG 2016 and hepatitis panel, negative HIV    XR hands and knees-negative 2016   Component      Latest Ref Rng & Units 11/17/2020   WBC      4.0 - 11.0 10e9/L 9.6   RBC Count      3.8 - 5.2 10e12/L 4.72   Hemoglobin      11.7 - 15.7 g/dL 13.8   Hematocrit      35.0 - 47.0 % 42.9   MCV      78 - 100 fl 91   MCH      26.5 - 33.0 pg 29.2   MCHC      31.5 - 36.5 g/dL 32.2   RDW      10.0 - 15.0 % 12.9   Platelet Count      150 - 450 10e9/L 389   Creatinine      0.52 - 1.04 mg/dL 0.66   GFR Estimate      >60 mL/min/1.73:m2 >90   GFR Estimate If Black      >60 mL/min/1.73:m2 >90   CRP Inflammation      0.0 - 8.0 mg/L 9.0 (H)   Albumin      3.4 - 5.0 g/dL 3.4   ALT      0 - 50 U/L 16   AST      0 - 45 U/L 10     Component      Latest Ref Rng 12/6/2023  8:20 AM   WBC      4.0 - 11.0 10e3/uL 6.1    RBC Count      3.80 - 5.20 10e6/uL 4.98    Hemoglobin      11.7 - 15.7 g/dL 15.0    Hematocrit      35.0 - 47.0 % 46.4    MCV      78 - 100 fL 93    MCH      26.5 - 33.0 pg 30.1    MCHC      31.5 - 36.5 g/dL 32.3    RDW      10.0 - 15.0 % 11.7    Platelet Count      150 - 450 10e3/uL 289    % Neutrophils      % 52    % Lymphocytes      % 37    % Monocytes      % 10    % Eosinophils      % 2    % Basophils      % 0    % Immature Granulocytes      % 0    Absolute Neutrophils      1.6 - 8.3 10e3/uL 3.2    Absolute Lymphocytes      0.8 - 5.3 10e3/uL 2.3    Absolute Monocytes      0.0 - 1.3 10e3/uL 0.6    Absolute Eosinophils      0.0 - 0.7 10e3/uL 0.1    Absolute Basophils      0.0 - 0.2 10e3/uL 0.0    Absolute " Immature Granulocytes      <=0.4 10e3/uL 0.0    Cholesterol      <200 mg/dL 262 (H)    Triglycerides      <150 mg/dL 228 (H)    HDL Cholesterol      >=50 mg/dL 63    LDL Cholesterol Calculated      <=100 mg/dL 153 (H)    Non HDL Cholesterol      <130 mg/dL 199 (H)    Patient Fasting? Yes    Creatinine      0.51 - 0.95 mg/dL 0.85    GFR Estimate      >60 mL/min/1.73m2 >90    CRP Inflammation      <5.00 mg/L <3.00    Sed Rate      0 - 20 mm/hr 6    ALT      0 - 50 U/L 17    Albumin      3.5 - 5.2 g/dL 4.2    AST      0 - 45 U/L 20       Legend:  (H) High        Study Result    Narrative & Impression   Thoracic and Lumbar spine MRI without contrast     History: Rheumatoid arthritis of multiple sites with negative  rheumatoid factor (H); Pain in thoracic spine.  ICD-10: Rheumatoid arthritis of multiple sites with negative  rheumatoid factor (H); Pain in thoracic spine (accession [id number]),  Rheumatoid arthritis of multiple sites with negative rheumatoid factor  (H); Chronic bilateral low back pain, unspecified whether sciatica  present; Chronic bilateral low back pain, unspecified whether sciatica  present (accession [id number]).     Comparison: None.     Technique:  Axial T2-weighted, and sagittal T1, STIR, and T2-weighted  images of the lumbar spine without intravenous contrast. Sagittal T1,  STIR, and T2-weighted images of the thoracic spine without contrast  were obtained, with axial T2-weighted images through levels of  interest in the thoracic spine.      Findings:  Thoracic Spine: There is no definite abnormal signal in the thoracic  spinal cord at any level.   Anterior wedge-shaped compression deformity of T6 and T8. Schmorl's  deformity at the T7 inferior endplate.  Alignment of the thoracic vertebra appears within normal limits. Bone  marrow signal intensity on noncontrast images appears unremarkable.  Multilevel vertebral body hemangiomas.     Lumbar Spine:  There are 5 type lumbar vertebra, used for the  purposes of this  dictation.  The tip of the conus is at approximately L1. The alignment  of the lumbar spine is unremarkable. Multilevel vertebral body  hemangiomas.     On a level by level basis, the findings are as follows:     L2-3:  There is no focal abnormality.     L3-4:  There is no focal abnormality.     L4-5:  There is no focal abnormality.     L5-S1: Central disc protrusion. No significant spinal canal or  neuroforamina stenosis.                                                                      Impression:   1. Chronic anterior wedge-shaped compression deformity of T6 and T8.  Schmorl's deformity at the T7 inferior at T9 superior endplate.  2. Central disc protrusion at L5-S1. No significant spinal canal or  neuroforamina stenosis at any level.     PLACIDO DE ANDA MD         SYSTEM ID:  [id number]     Impression/Plan:  1. Seropositive (pos anti-CCP, neg RF )rheumatoid arthritis (RA) non-erosive. Active RA on methotrexate 12.5 mg weekly, worse after stopping HCQ due to diarrhea (and never was successful to get generic brand HCQ, brand is too expensive). Did not tolerate MTX even at lowest effective dose of 10 mg qwk due to nausea, headaches, brain fog and nausea. Was placed on humira q2wk, initially thought it was helping but after feeling normal by taking prednisone taper ( 20 mg every day max) realized that humira was not helping much.    8/2022: Recommend to proceed with actemra as next step; risks were discussed.    I support her using medical cannabis, no interaction with RA meds and could be beneficial as non steroid agent to help managing pain, it might have some anti-inflammatory benefit and it is not addictive. I advsied her to reach out to her PCP jenny nichols for Rx.    3/24/2023: Last humira 8/13/2022. On actemra since 10/2022, it took >month to get it insurance approved. It is helping her more and will continue with that. No SEs.    10/5/2023: Doing well on Actemra, plan to continue. Lact  cholesterol in 6/2023 elevated (elevated at baseline), could potentially be impacted by Actemra. Labs due 12/2023 (including fasting lipid panel) and follow up video visit in 6 months.     Today 4/5/2024: RA is well controlled on actemra, main issue is back pain.      2. Vitamin D deficeincy- take 2000 international unit(s) day.     3. Pityriasis lichenoids chronic-per derm     5. Chronic NSAID use. Will check labs q6-12 mo.    6. Worsening back pain. NL ESR/CRP, neg HLA-B27. No signs of inflammatory arthritis on T/L spine MRI 2/2024. Just had trigger point inj, PCP ordered CK. She is going to seek 2nd opinion for pain management. Robaxin helps some. Pain significantly interfering with her life, saw 2 different spine doctor. I advised her to discuss LDN with pain management.  Used TENS units 2-3 times/ wk x past year, sometimes it helps.   Worried about trying gabapentin and she is worried about potential SE of fatigue/brain fog. Worried about potential SE of weight gain on lyrica.    7. Actemra monitoring labs. Labs every 6 months, due 6/2024    Plan:    Continue actemra    Fasting labs every 6 months, due in 6/2024    Refer to acupuncture    Refer to [facility], consider LDN    Return video visit in 6 months      Naresh Aldridge MD    Orders Placed This Encounter   Procedures    ALT    Albumin level    AST    Creatinine    Lipid Profile    Pain Management  Referral    Acupuncture Referral    CBC with Platelets & Differential

## 2024-04-09 ENCOUNTER — TELEPHONE (OUTPATIENT)
Dept: RHEUMATOLOGY | Facility: CLINIC | Age: 34
End: 2024-04-09
Payer: COMMERCIAL

## 2024-04-09 NOTE — TELEPHONE ENCOUNTER
Pain management referral faxed to [facility] per direction of Dr. Aldridge, fax- [phone].  Confirmed receipt of fax.  Will send [employer] message to patient.    ASA MuñozN, RN  RN Care Coordinator Rheumatology

## 2024-04-09 NOTE — TELEPHONE ENCOUNTER
Additional message left for patient , referencing mychart and message from last week-    I have the acupuncture and pain referrals printed off.     Do you want me to mail them to you?     I am also happy to fax them if you have identified specific locations for these referrals.     Thank you for your reply-     ASA MuñozN, RN  RN Care Coordinator Rheumatology

## 2024-04-19 ENCOUNTER — TRANSFERRED RECORDS (OUTPATIENT)
Dept: HEALTH INFORMATION MANAGEMENT | Facility: CLINIC | Age: 34
End: 2024-04-19
Payer: COMMERCIAL

## 2024-05-03 ENCOUNTER — TELEPHONE (OUTPATIENT)
Dept: RHEUMATOLOGY | Facility: CLINIC | Age: 34
End: 2024-05-03
Payer: COMMERCIAL

## 2024-05-03 NOTE — TELEPHONE ENCOUNTER
Left Voicemail (1st Attempt) for the patient to call back and schedule the following:    Appointment type: Return video + lab  Provider: Dr alonzo  Return date: October for video, June for lab  Specialty phone number: [phone]  Additonal Notes: LVM regarding scheduling October video visit and labs in June. Contact info provdied

## 2024-05-09 ENCOUNTER — TELEPHONE (OUTPATIENT)
Dept: RHEUMATOLOGY | Facility: CLINIC | Age: 34
End: 2024-05-09
Payer: COMMERCIAL

## 2024-05-09 NOTE — TELEPHONE ENCOUNTER
Left Voicemail (1st Attempt) for the patient to call back and schedule the following:    Appointment type: Return rhuem + labs  Provider: Dr Aldridge  Return date: Labs in June + video follow up in October  Specialty phone number: [phone]  Additonal Notes: LVM regarding scheduling labs and follow up. Contact info provided

## 2024-05-20 DIAGNOSIS — G62.9 PERIPHERAL POLYNEUROPATHY: ICD-10-CM

## 2024-05-20 DIAGNOSIS — F32.A DEPRESSION, UNSPECIFIED DEPRESSION TYPE: ICD-10-CM

## 2024-05-20 DIAGNOSIS — F41.9 ANXIETY: ICD-10-CM

## 2024-05-20 DIAGNOSIS — M54.2 NECK PAIN: ICD-10-CM

## 2024-05-24 NOTE — TELEPHONE ENCOUNTER
DULoxetine (CYMBALTA) 30 MG   Last Written Prescription Date:  2/16/24  Last Fill Quantity: 90,   # refills: 0  Last Office Visit : 3/21/24  Future Office visit:  6/13/24  Routing refill request to provider for review/approval because:  30 DAY REFILL PENDING   * PHQ-9 score of less than 5 in past 6 months

## 2024-05-25 RX ORDER — DULOXETIN HYDROCHLORIDE 30 MG/1
30 CAPSULE, DELAYED RELEASE ORAL DAILY
Qty: 90 CAPSULE | Refills: 0 | Status: SHIPPED | OUTPATIENT
Start: 2024-05-25 | End: 2024-06-13

## 2024-06-10 NOTE — PROGRESS NOTES
Rachel is a 33 year old who is being evaluated via a billable video visit.    How would you like to obtain your AVS? MyChart  If the video visit is dropped, the invitation should be resent by: Send to e-mail at: [email or web address]  Will anyone else be joining your video visit? No      Are refills needed on medications prescribed by this physician? NO    Ginger Figueredo VVF     Assessment & Plan     Attention deficit hyperactivity disorder (ADHD), combined type    Doing well on current medication.  Looks like there should still be a refill on file for today.  Two additional months of XR sent dated 7/13 and 8/12.  Can call in 3 months for next set of refills and do follow-up visit in 6 months.     - amphetamine-dextroamphetamine (ADDERALL XR) 10 MG 24 hr capsule; Take 1 capsule (10 mg) by mouth daily  - amphetamine-dextroamphetamine (ADDERALL XR) 10 MG 24 hr capsule; Take 1 capsule (10 mg) by mouth daily  - amphetamine-dextroamphetamine (ADDERALL) 5 MG tablet; Take 0.5 tablets (2.5 mg) by mouth daily as needed (ADHD)    Anxiety  and  Depression, unspecified depression type    She reports doing well on 90mg of duloxetine.  Wonders about dropping down to 60mg over the summer but ultimately, she decided not to chance worsening symptoms and decided to stay at 90mg.     - DULoxetine (CYMBALTA) 30 MG capsule; Take 1 capsule (30 mg) by mouth daily along with 60 mg (total 90 mg)    Peripheral polyneuropathy  and  Neck pain  And  Polyarthralgia    Rachel is following with a new pain clinic at [facility], which has been going well.  She is doing pool PT.  We discussed LDN that was suggested by rheumatology.  She has not discussed this with the pain clinic; she want to try to avoid adding new medication at this time since the trigger point injections are helping.  She is planning on asking her rheumatologist for a referral to [facility] for further evaluation of possible underlying causes of her symptoms.     - DULoxetine (CYMBALTA) 30 MG  "capsule; Take 1 capsule (30 mg) by mouth daily along with 60 mg (total 90 mg)    Polydipsia    We'll recheck for diabetes due to weight gain and polydipsia.  She has TSH, CK, and rheum lab orders pending as well and will schedule a lab for Saturday.     - Hemoglobin A1c; Future      32 minutes spent by me on the date of the encounter doing chart review, history and exam, documentation and further activities per the note      Subjective   Rachel is a 33 year old, presenting for the following health issues:  RECHECK and ADHD    History of Present Illness       Reason for visit:  Adhd    She eats 2-3 servings of fruits and vegetables daily.She consumes 2 sweetened beverage(s) daily.She exercises with enough effort to increase her heart rate 10 to 19 minutes per day.  She exercises with enough effort to increase her heart rate 3 or less days per week. She is missing 1 dose(s) of medications per week.  She is not taking prescribed medications regularly due to other.       I saw Rachel last on 3/21 for myalgias: \"Rachel presents to follow-up on myofascial pain. She has trigger point injections scheduled and is frustrated that she has otherwise been told to try harder at PT as she feels she is already doing so. She wonders about additional work-up and possible myositis. It has been awhile since TSH has been checked and I don't see a CK on file, so we'll check these with next blood draw. EDS has been suggested but she doesn't feel she necessarily meets all the [place] hypermobility criteria. We discussed medication options including a further increase in the duloxetine to 120mg, though she doesn't feel the 90mg dose has had any benefit for pain, and trying either low dose PO or a topical gabapentin as she is concerned about possible sedation. She is going to consider these options. She is considering getting another opinion from outside rheum and/or pain clinics and will let me know if she needs referrals.\"    Since then, " she had trigger point injections on 4/2, she saw rheum on 4/5 who noted that RA was well controlled but was having worsening back pain and she was advised to discuss LDN with pain management and was referred to [facility] as well as acupuncture.  She did see [facility] later in April, and they planned to continue trigger point injections and discussed trying pool therapy     She still has pending orders to check TSH and CK as well as labs for rheumatology- she is going to schedule these this week.      We also discussed ADHD at last visit and she was doing well on current medication.      [place]  reviewed: Adderall ER last filled on 5/16 and IR on 4/16.      Today, Rachel reports something are better and something worse since last visit.  She says that the new spine specialist is going well, finds them respectful.  Has another round of trigger point injections soon; first round was very helpful but they seems to be wearing off sooner now. She has PT there and is doing pool PT.  She plans to ask her rheumatologist for a referral to [facility].  She has had much worse fatigue the past few weeks, sleep is only maybe slightly worse due to back pain but she mostly feels refreshed when she wakes.  Goes to be almost right after getting home after work.  She took a brain break this weekend and did some yard work instead of reading and feels somewhat better.       The ADHD is well controlled.  Her mood is doing better on the 90mg of duloxetine, she wonders about maybe dropping down the dose for the summer.      She wonders about Cushings or diabetes from the steroid injections.  She is gaining weight, drinking a lot of water, has a lot of body temp fluctuations.            2/16/2024     2:12 PM 3/21/2024     7:43 AM 6/12/2024     9:07 PM   PHQ   PHQ-9 Total Score 9 5 9   Q9: Thoughts of better off dead/self-harm past 2 weeks Not at all Not at all Not at all       Constitutional, MSK and psych systems are negative, except as otherwise noted.     "   Objective    Vitals - Patient Reported  Weight (Patient Reported): 115.7 kg (255 lb)  Height (Patient Reported): 175.3 cm (5' 9\")  BMI (Based on Pt Reported Ht/Wt): 37.66  Pain Score: Moderate Pain (5)  Pain Loc: Mid Back      Vitals:  No vitals were obtained today due to virtual visit.    Physical Exam   GENERAL: alert and no distress  EYES: Eyes grossly normal to inspection.  No discharge or erythema, or obvious scleral/conjunctival abnormalities.  RESP: No audible wheeze, cough, or visible cyanosis.    SKIN: Visible skin clear. No significant rash, abnormal pigmentation or lesions.  NEURO: Cranial nerves grossly intact.  Mentation and speech appropriate for age.  PSYCH: Appropriate affect, tone, and pace of words        Video-Visit Details    Type of service:  Video Visit   Start time: 854am  End time: 916am  Originating Location (pt. Location): Home    Distant Location (provider location):  Off-site  Platform used for Video Visit: Onel  Signed Electronically by: Panfilo Solano MD    "

## 2024-06-12 ASSESSMENT — ASTHMA QUESTIONNAIRES
QUESTION_3 LAST FOUR WEEKS HOW OFTEN DID YOUR ASTHMA SYMPTOMS (WHEEZING, COUGHING, SHORTNESS OF BREATH, CHEST TIGHTNESS OR PAIN) WAKE YOU UP AT NIGHT OR EARLIER THAN USUAL IN THE MORNING: NOT AT ALL
ACT_TOTALSCORE: 19
ACT_TOTALSCORE: 19
QUESTION_2 LAST FOUR WEEKS HOW OFTEN HAVE YOU HAD SHORTNESS OF BREATH: MORE THAN ONCE A DAY
QUESTION_5 LAST FOUR WEEKS HOW WOULD YOU RATE YOUR ASTHMA CONTROL: WELL CONTROLLED
QUESTION_1 LAST FOUR WEEKS HOW MUCH OF THE TIME DID YOUR ASTHMA KEEP YOU FROM GETTING AS MUCH DONE AT WORK, SCHOOL OR AT HOME: NONE OF THE TIME
QUESTION_4 LAST FOUR WEEKS HOW OFTEN HAVE YOU USED YOUR RESCUE INHALER OR NEBULIZER MEDICATION (SUCH AS ALBUTEROL): ONCE A WEEK OR LESS

## 2024-06-13 ENCOUNTER — TELEPHONE (OUTPATIENT)
Dept: INTERNAL MEDICINE | Facility: CLINIC | Age: 34
End: 2024-06-13

## 2024-06-13 ENCOUNTER — VIRTUAL VISIT (OUTPATIENT)
Dept: INTERNAL MEDICINE | Facility: CLINIC | Age: 34
End: 2024-06-13
Payer: COMMERCIAL

## 2024-06-13 DIAGNOSIS — F90.2 ATTENTION DEFICIT HYPERACTIVITY DISORDER (ADHD), COMBINED TYPE: ICD-10-CM

## 2024-06-13 DIAGNOSIS — M54.2 NECK PAIN: ICD-10-CM

## 2024-06-13 DIAGNOSIS — M25.50 POLYARTHRALGIA: Primary | ICD-10-CM

## 2024-06-13 DIAGNOSIS — F41.9 ANXIETY: ICD-10-CM

## 2024-06-13 DIAGNOSIS — G62.9 PERIPHERAL POLYNEUROPATHY: ICD-10-CM

## 2024-06-13 DIAGNOSIS — F32.A DEPRESSION, UNSPECIFIED DEPRESSION TYPE: ICD-10-CM

## 2024-06-13 DIAGNOSIS — R63.1 POLYDIPSIA: ICD-10-CM

## 2024-06-13 PROCEDURE — 99214 OFFICE O/P EST MOD 30 MIN: CPT | Mod: 95 | Performed by: INTERNAL MEDICINE

## 2024-06-13 RX ORDER — DEXTROAMPHETAMINE SACCHARATE, AMPHETAMINE ASPARTATE MONOHYDRATE, DEXTROAMPHETAMINE SULFATE AND AMPHETAMINE SULFATE 2.5; 2.5; 2.5; 2.5 MG/1; MG/1; MG/1; MG/1
10 CAPSULE, EXTENDED RELEASE ORAL DAILY
Qty: 30 CAPSULE | Refills: 0 | Status: SHIPPED | OUTPATIENT
Start: 2024-07-13 | End: 2024-09-09

## 2024-06-13 RX ORDER — DULOXETIN HYDROCHLORIDE 30 MG/1
30 CAPSULE, DELAYED RELEASE ORAL DAILY
Qty: 90 CAPSULE | Refills: 3 | Status: SHIPPED | OUTPATIENT
Start: 2024-06-13

## 2024-06-13 RX ORDER — DEXTROAMPHETAMINE SACCHARATE, AMPHETAMINE ASPARTATE, DEXTROAMPHETAMINE SULFATE AND AMPHETAMINE SULFATE 1.25; 1.25; 1.25; 1.25 MG/1; MG/1; MG/1; MG/1
2.5 TABLET ORAL DAILY PRN
Qty: 15 TABLET | Refills: 0 | Status: SHIPPED | OUTPATIENT
Start: 2024-08-12

## 2024-06-13 RX ORDER — DEXTROAMPHETAMINE SACCHARATE, AMPHETAMINE ASPARTATE MONOHYDRATE, DEXTROAMPHETAMINE SULFATE AND AMPHETAMINE SULFATE 2.5; 2.5; 2.5; 2.5 MG/1; MG/1; MG/1; MG/1
10 CAPSULE, EXTENDED RELEASE ORAL DAILY
Qty: 30 CAPSULE | Refills: 0 | Status: SHIPPED | OUTPATIENT
Start: 2024-06-13 | End: 2024-09-02

## 2024-06-13 NOTE — NURSING NOTE
Depression Response    Patient completed the PHQ-9 assessment for depression and scored >9? Yes  Question 9 on the PHQ-9 was positive for suicidality? No  Does patient have current mental health provider? Yes    Is this a virtual visit? Yes   Does patient have suicidal ideation (positive question 9)? No - offer to place Mental Health Referral.  Patient declined referral/not needed    Pt declined NAWAF-7 as she manages mental health with a provider already.    I personally notified the following: visit provider    Ginger GARDNER

## 2024-06-13 NOTE — PATIENT INSTRUCTIONS
Can call in 3 months for next set of refills and do follow-up visit in 6 months.     *    Thank you for visiting the Primary Care Center today at the [facility]! The following is some information about our clinic:     Primary Care Center Frequently-Asked Questions    (1) How do I schedule appointments at the [facility]?     Primary Care--to schedule or make changes to an existing appointment, please call our primary care line at [phone].    Labs--to schedule a lab appointment at the [facility] you can use [employer] or call [phone]. If you have a [place] location that is closer to home, you can reach out to that location for scheduling options.     Imaging--if you need to schedule a CT, X-ray, MRI, ultrasound, or other imaging study you can call [phone] to schedule at the [facility] or any other [facility] imaging location.     Referrals--if a referral to another specialty was ordered you can expect a phone call from their scheduling team. If you have not heard from them in a week, please call us or send us a [employer] message to check the status or get a scheduling number. Please note that this only applies to internal [facility] referrals. If the referral is external you would need to contact their office for scheduling.     (2) I have a question about my visit, who do I contact?     You can call us at the primary care line at [phone] to ask questions about your visit. You can also send a secure message through [employer], which is reviewed by clinic staff. Please note that [employer] messages have a twenty-four to forty-eight business hour turnaround time and should not be used for urgent concerns.    (3) How will I get the results of my tests?    If you are signed up for [employer] all tests will be released to you within twenty-four hours of resulting. Please allow three to five days for your doctor to review your results and  place a note interpreting the results. If you do not have [employer] you will receive your results through mail seven to ten business days following the return of the tests. Please note that if there should be any urgent or concerning results that your doctor or their registered nurse will reach out to you the same day as the tests come back. If you have follow up questions about your results or would like to discuss the results in detail please schedule a follow up with your provider either in person or virtually.     (4) How do I get refills of my prescriptions?     You should always first contact your pharmacy for refills of your medications. If submitting a refill request on [employer], please be sure to submit the request only once--repeat requests can cause delays in refill. If you are requesting a NEW medication or a medication related to new symptoms you will need to schedule an appointment with a provider prior to approval. Please note: Routine medication refills have up to one to three business day turnaround whereas controlled substances refills have up to five to seven business day turnaround.    (5) I have new symptoms, what do I do?     If you are having an immediate medical emergency, you should dial 911 for assistance.   For anything urgent that needs to be seen within a few hours to one day you should visit a local urgent care for assistance.  For non-urgent symptoms that need to be seen within a few days to a week you can schedule with an available provider in primary care by going to [employer] or calling [phone].   If you are not sure how serious your symptoms are or you would like to receive medical advice you can always call [phone] to speak with a triage nurse.

## 2024-06-15 ENCOUNTER — LAB (OUTPATIENT)
Dept: LAB | Facility: CLINIC | Age: 34
End: 2024-06-15
Payer: COMMERCIAL

## 2024-06-15 DIAGNOSIS — Z51.81 ENCOUNTER FOR MEDICATION MONITORING: ICD-10-CM

## 2024-06-15 DIAGNOSIS — M06.09 RHEUMATOID ARTHRITIS OF MULTIPLE SITES WITH NEGATIVE RHEUMATOID FACTOR (H): ICD-10-CM

## 2024-06-15 DIAGNOSIS — M79.10 MYALGIA: ICD-10-CM

## 2024-06-15 DIAGNOSIS — R63.1 POLYDIPSIA: ICD-10-CM

## 2024-06-15 LAB
ALBUMIN SERPL BCG-MCNC: 4.3 G/DL (ref 3.5–5.2)
ALT SERPL W P-5'-P-CCNC: 15 U/L (ref 0–50)
AST SERPL W P-5'-P-CCNC: 18 U/L (ref 0–45)
BASOPHILS # BLD AUTO: 0 10E3/UL (ref 0–0.2)
BASOPHILS NFR BLD AUTO: 1 %
CHOLEST SERPL-MCNC: 246 MG/DL
CK SERPL-CCNC: 51 U/L (ref 26–192)
CREAT SERPL-MCNC: 0.81 MG/DL (ref 0.51–0.95)
EGFRCR SERPLBLD CKD-EPI 2021: >90 ML/MIN/1.73M2
EOSINOPHIL # BLD AUTO: 0.1 10E3/UL (ref 0–0.7)
EOSINOPHIL NFR BLD AUTO: 1 %
ERYTHROCYTE [DISTWIDTH] IN BLOOD BY AUTOMATED COUNT: 11.8 % (ref 10–15)
FASTING STATUS PATIENT QL REPORTED: ABNORMAL
HBA1C MFR BLD: 5.7 %
HCT VFR BLD AUTO: 45.7 % (ref 35–47)
HDLC SERPL-MCNC: 77 MG/DL
HGB BLD-MCNC: 15.5 G/DL (ref 11.7–15.7)
IMM GRANULOCYTES # BLD: 0 10E3/UL
IMM GRANULOCYTES NFR BLD: 0 %
LDLC SERPL CALC-MCNC: 142 MG/DL
LYMPHOCYTES # BLD AUTO: 2.2 10E3/UL (ref 0.8–5.3)
LYMPHOCYTES NFR BLD AUTO: 43 %
MCH RBC QN AUTO: 31.1 PG (ref 26.5–33)
MCHC RBC AUTO-ENTMCNC: 33.9 G/DL (ref 31.5–36.5)
MCV RBC AUTO: 92 FL (ref 78–100)
MONOCYTES # BLD AUTO: 0.5 10E3/UL (ref 0–1.3)
MONOCYTES NFR BLD AUTO: 10 %
NEUTROPHILS # BLD AUTO: 2.3 10E3/UL (ref 1.6–8.3)
NEUTROPHILS NFR BLD AUTO: 45 %
NONHDLC SERPL-MCNC: 169 MG/DL
NRBC # BLD AUTO: 0 10E3/UL
NRBC BLD AUTO-RTO: 0 /100
PLATELET # BLD AUTO: 298 10E3/UL (ref 150–450)
RBC # BLD AUTO: 4.98 10E6/UL (ref 3.8–5.2)
TRIGL SERPL-MCNC: 133 MG/DL
TSH SERPL DL<=0.005 MIU/L-ACNC: 2.03 UIU/ML (ref 0.3–4.2)
WBC # BLD AUTO: 5.1 10E3/UL (ref 4–11)

## 2024-06-15 PROCEDURE — 82565 ASSAY OF CREATININE: CPT | Performed by: PATHOLOGY

## 2024-06-15 PROCEDURE — 83036 HEMOGLOBIN GLYCOSYLATED A1C: CPT | Performed by: INTERNAL MEDICINE

## 2024-06-15 PROCEDURE — 99000 SPECIMEN HANDLING OFFICE-LAB: CPT | Performed by: PATHOLOGY

## 2024-06-15 PROCEDURE — 82550 ASSAY OF CK (CPK): CPT | Performed by: PATHOLOGY

## 2024-06-15 PROCEDURE — 84443 ASSAY THYROID STIM HORMONE: CPT | Performed by: PATHOLOGY

## 2024-06-15 PROCEDURE — 80061 LIPID PANEL: CPT | Performed by: PATHOLOGY

## 2024-06-15 PROCEDURE — 85025 COMPLETE CBC W/AUTO DIFF WBC: CPT | Performed by: PATHOLOGY

## 2024-06-15 PROCEDURE — 84460 ALANINE AMINO (ALT) (SGPT): CPT | Performed by: PATHOLOGY

## 2024-06-15 PROCEDURE — 82040 ASSAY OF SERUM ALBUMIN: CPT | Performed by: PATHOLOGY

## 2024-06-15 PROCEDURE — 84450 TRANSFERASE (AST) (SGOT): CPT | Performed by: PATHOLOGY

## 2024-06-15 PROCEDURE — 36415 COLL VENOUS BLD VENIPUNCTURE: CPT | Performed by: PATHOLOGY

## 2024-06-16 ENCOUNTER — MYC MEDICAL ADVICE (OUTPATIENT)
Dept: RHEUMATOLOGY | Facility: CLINIC | Age: 34
End: 2024-06-16
Payer: COMMERCIAL

## 2024-06-16 DIAGNOSIS — M54.89 OTHER CHRONIC BACK PAIN: Primary | ICD-10-CM

## 2024-06-16 DIAGNOSIS — G89.29 OTHER CHRONIC BACK PAIN: Primary | ICD-10-CM

## 2024-06-17 PROBLEM — R73.03 PRE-DIABETES: Status: ACTIVE | Noted: 2024-06-17

## 2024-06-19 ENCOUNTER — TELEPHONE (OUTPATIENT)
Dept: INTERNAL MEDICINE | Facility: CLINIC | Age: 34
End: 2024-06-19
Payer: COMMERCIAL

## 2024-06-19 NOTE — TELEPHONE ENCOUNTER
Left Voicemail (2nd Attempt) for the patient to call back and schedule the following:    Appointment type: VIDEO RTN  Provider: PCP  Return date: Dec 2024  Specialty phone number: [phone]  Additional appointment(s) needed: -  Additonal Notes: 6mo follow up w PCP

## 2024-06-20 NOTE — TELEPHONE ENCOUNTER
Per information given to Patient by [facility]-I called [facility] regarding Spine referral. Dayna stated Patient can call:[phone] to schedule her initial visit.     Patient Informed via My Chart and Phone call.

## 2024-07-31 DIAGNOSIS — M06.09 RHEUMATOID ARTHRITIS OF MULTIPLE SITES WITH NEGATIVE RHEUMATOID FACTOR (H): ICD-10-CM

## 2024-08-07 RX ORDER — TOCILIZUMAB 180 MG/ML
162 INJECTION, SOLUTION SUBCUTANEOUS
Qty: 3.6 ML | Refills: 3 | Status: SHIPPED | OUTPATIENT
Start: 2024-08-07 | End: 2024-10-02

## 2024-08-07 NOTE — TELEPHONE ENCOUNTER
ACTEMRA ACTPEN 162MG/0.9ML SOAJ     Last Written Prescription Date:  2/29/24  Last Fill Quantity: 3.6,   # refills: 5  Last Office Visit: 4/5/24  Future Office visit:  none    CBC RESULTS:   Recent Labs   Lab Test 06/15/24  1046   WBC 5.1   RBC 4.98   HGB 15.5   HCT 45.7   MCV 92   MCH 31.1   MCHC 33.9   RDW 11.8          Creatinine   Date Value Ref Range Status   06/15/2024 0.81 0.51 - 0.95 mg/dL Final   07/05/2021 0.71 0.52 - 1.04 mg/dL Final   ]    Liver Function Studies -   Recent Labs   Lab Test 06/15/24  1046 03/01/24  1043   PROTTOTAL  --  7.2   ALBUMIN 4.3 4.3   BILITOTAL  --  0.2   ALKPHOS  --  47   AST 18 22   ALT 15 21       Routing refill request to provider for review/approval because:  Drug not on the [facility] or [facility] refill protocol

## 2024-09-02 ENCOUNTER — MYC REFILL (OUTPATIENT)
Dept: INTERNAL MEDICINE | Facility: CLINIC | Age: 34
End: 2024-09-02
Payer: COMMERCIAL

## 2024-09-02 DIAGNOSIS — F90.2 ATTENTION DEFICIT HYPERACTIVITY DISORDER (ADHD), COMBINED TYPE: ICD-10-CM

## 2024-09-04 ENCOUNTER — TELEPHONE (OUTPATIENT)
Dept: RHEUMATOLOGY | Facility: CLINIC | Age: 34
End: 2024-09-04
Payer: COMMERCIAL

## 2024-09-04 NOTE — TELEPHONE ENCOUNTER
PA Initiation    Medication: ACTEMRA ACTPEN 162 MG/0.9ML SC SOAJ  Insurance Company: [employer] - Phone [phone] Fax [phone]  Pharmacy Filling the Rx: [facility] - [place], [place] - [address]  Filling Pharmacy Phone:    Filling Pharmacy Fax:    Start Date: 9/4/2024    [id number]

## 2024-09-06 NOTE — TELEPHONE ENCOUNTER
Prior Authorization Approval    Medication: ACTEMRA ACTPEN 162 MG/0.9ML SC SOAJ  Authorization Effective Date: 9/6/2024  Authorization Expiration Date: 9/4/2025  Approved Dose/Quantity: 3.6  Reference #: [id number]   Insurance Company: [employer] - Phone [phone] Fax [phone]  Expected CoPay: $    CoPay Card Available: No    Financial Assistance Needed: no  Which Pharmacy is filling the prescription: [facility] - [place], [place] - [address]  Pharmacy Notified: yes  Patient Notified: yes

## 2024-09-09 RX ORDER — DEXTROAMPHETAMINE SACCHARATE, AMPHETAMINE ASPARTATE MONOHYDRATE, DEXTROAMPHETAMINE SULFATE AND AMPHETAMINE SULFATE 2.5; 2.5; 2.5; 2.5 MG/1; MG/1; MG/1; MG/1
10 CAPSULE, EXTENDED RELEASE ORAL DAILY
Qty: 30 CAPSULE | Refills: 0 | Status: SHIPPED | OUTPATIENT
Start: 2024-09-09

## 2024-09-09 RX ORDER — DEXTROAMPHETAMINE SACCHARATE, AMPHETAMINE ASPARTATE MONOHYDRATE, DEXTROAMPHETAMINE SULFATE AND AMPHETAMINE SULFATE 2.5; 2.5; 2.5; 2.5 MG/1; MG/1; MG/1; MG/1
10 CAPSULE, EXTENDED RELEASE ORAL DAILY
Qty: 30 CAPSULE | Refills: 0 | Status: SHIPPED | OUTPATIENT
Start: 2024-10-09

## 2024-09-09 RX ORDER — DEXTROAMPHETAMINE SACCHARATE, AMPHETAMINE ASPARTATE MONOHYDRATE, DEXTROAMPHETAMINE SULFATE AND AMPHETAMINE SULFATE 2.5; 2.5; 2.5; 2.5 MG/1; MG/1; MG/1; MG/1
10 CAPSULE, EXTENDED RELEASE ORAL DAILY
Qty: 30 CAPSULE | Refills: 0 | Status: SHIPPED | OUTPATIENT
Start: 2024-11-08

## 2024-09-09 NOTE — TELEPHONE ENCOUNTER
The RN called and spoke with the patient's pharmacy. Pharmacy staff verified that the patient has 1 prescription still active on file at the patient's pharmacy for Adderall XR 10 mg, Q=30. However, pharmacy staff shared that this medication is currently on backorder and there is no estimate on when this medication might be back in stock.    The RN called and spoke with the [facility], who stated that they have brand name and generic (ADDERALL XR) 10 MG 24 hr capsule in stock.

## 2024-09-09 NOTE — TELEPHONE ENCOUNTER
Patient confirmed scheduled appointment:  Date: 12/13/2024  Time: 1:30pm  Visit type: video  Provider: PCP  Location: virtual

## 2024-09-23 ASSESSMENT — ASTHMA QUESTIONNAIRES
QUESTION_2 LAST FOUR WEEKS HOW OFTEN HAVE YOU HAD SHORTNESS OF BREATH: NOT AT ALL
QUESTION_4 LAST FOUR WEEKS HOW OFTEN HAVE YOU USED YOUR RESCUE INHALER OR NEBULIZER MEDICATION (SUCH AS ALBUTEROL): ONCE A WEEK OR LESS
ACT_TOTALSCORE: 24
QUESTION_5 LAST FOUR WEEKS HOW WOULD YOU RATE YOUR ASTHMA CONTROL: COMPLETELY CONTROLLED
ACT_TOTALSCORE: 24
QUESTION_3 LAST FOUR WEEKS HOW OFTEN DID YOUR ASTHMA SYMPTOMS (WHEEZING, COUGHING, SHORTNESS OF BREATH, CHEST TIGHTNESS OR PAIN) WAKE YOU UP AT NIGHT OR EARLIER THAN USUAL IN THE MORNING: NOT AT ALL
QUESTION_1 LAST FOUR WEEKS HOW MUCH OF THE TIME DID YOUR ASTHMA KEEP YOU FROM GETTING AS MUCH DONE AT WORK, SCHOOL OR AT HOME: NONE OF THE TIME

## 2024-09-23 ASSESSMENT — PATIENT HEALTH QUESTIONNAIRE - PHQ9
10. IF YOU CHECKED OFF ANY PROBLEMS, HOW DIFFICULT HAVE THESE PROBLEMS MADE IT FOR YOU TO DO YOUR WORK, TAKE CARE OF THINGS AT HOME, OR GET ALONG WITH OTHER PEOPLE: SOMEWHAT DIFFICULT
SUM OF ALL RESPONSES TO PHQ QUESTIONS 1-9: 8
SUM OF ALL RESPONSES TO PHQ QUESTIONS 1-9: 8

## 2024-09-24 ENCOUNTER — OFFICE VISIT (OUTPATIENT)
Dept: INTERNAL MEDICINE | Facility: CLINIC | Age: 34
End: 2024-09-24
Payer: COMMERCIAL

## 2024-09-24 VITALS
HEART RATE: 91 BPM | HEIGHT: 69 IN | OXYGEN SATURATION: 97 % | DIASTOLIC BLOOD PRESSURE: 81 MMHG | SYSTOLIC BLOOD PRESSURE: 118 MMHG | BODY MASS INDEX: 37.46 KG/M2 | WEIGHT: 252.9 LBS

## 2024-09-24 DIAGNOSIS — M25.50 HYPERMOBILITY ARTHRALGIA: ICD-10-CM

## 2024-09-24 DIAGNOSIS — Q79.60 EDS (EHLERS-DANLOS SYNDROME): Primary | ICD-10-CM

## 2024-09-24 PROCEDURE — 99214 OFFICE O/P EST MOD 30 MIN: CPT | Performed by: PEDIATRICS

## 2024-09-24 NOTE — PATIENT INSTRUCTIONS
"-----------------------------------------------------------------------------  Dr. Murphy's comments about Hypermobility and  Nicol-Danlos syndrome (EDS)                          -----------------------------------------------------------------------------         Nicol-Danlos syndrome (EDS) is a term used for a collection of inborn conditions that may not be closely related, but all have hypermobility of joints and some other tissues (\"Hypermobility\" means that the joint or connective tissue is more flexible or stretchy than for other people).       Hypermobility is traditionally assessed by the Beighton score, but most providers with EDS experience will allow some leeway for partial hypermobility because the Beighton score only accounts for 8 joints plus a single type of bending for the spine.          In years past, the EDS diagnosis was given to lots of people with mild joint hypermobility but no other symptoms. In 2017, experts rewrote guidelines for terms so that hypermobile-type EDS (hEDS) would only be used for patients with several other findings besides joint hypermobility. Criteria can be found by doing an Internet search for \"hypermobile Nicol Danlos criteria.\"       The 2017 criteria can be a bit frustrating because they vastly restrict the EDS term for patients who were previously referred to as having EDS. In fact, the criteria are seemingly designed such that it is very difficult for youth without obvious malformations to qualify. Instead, we now use these terms:       - \"Hypermobility Syndrome\" is the term for pain symptoms AND            either global hypermobility OR partial hypermobility plus certain            additional features.        - \"Joint hypermobility\" or \"hypermobile joint(s)\" is the term for            hypermobility without joint symptoms. These people may             have other conditions, however.    Of course, the official label is not terribly important because the treatment " "approach for hypermobile EDS and hypermobility syndrome is essentially the same: strengthen the areas around problem joints, and be aware of symptoms and syndromes that may develop.         Technically, EDS and hypermobility should not be regarded as the \"cause\" of pain, but rather a variant in connective tissue that increases the risk for certain types of pain or dysautonomia. Anybody with hypermobility should have their joints assessed, and ensure that they have enough strength around the problem joints to prevent future problems.     What should I do, about my hypermobility?  I have several suggestions:      -- Most important: work on muscles around your problem joints. Pay special attention to your lower back and knees. Muscles will help your joints to stay together. You may want to work with a physical therapist or  - but make sure they know about hypermobility.      -- Subluxing or even dislocations may occur. You may be able to get these back in on your own. If the joint stays stuck, then seek medical help.      -- Warm up carefully before physical activity.      -- Keep active physically. For some reason, hypermobility-associated pain gets worse with inactivity. If activity makes you hurt, talk with me about finding the \"sweet spot\" between overuse and under-use.      -- Please don't show off your \"hypermobility party tricks.\"      -- What about joint cracking?  Never crack your neck intentionally (this can lead to pinched nerves, numbness, or pain). Try to avoid cracking your other joints.      -- For back strength, I recommend: swim laps at a gentle speed, 20-30 minutes, 3-4 times per week. Use a snorkel, don't worry about swimming form. The point is to be horizontal in the water, and move forward slowly. If you have autonomic symptoms (dizziness, heart-racing, etc), you will need additional exercise besides swimming.      -- Work on posture, by (a) increasing abdominal muscle tone, and (b) " "try holding your elbows parallel to your body.      -- What about braces?  Train without braces or tapes as much as possible. Avoid braces or taping except (a) when injured, (b) when doing unusually strenuous activity (e.g. trip to [place]), or (c) for the second half of moderate activity that lasts a long time (hiking).       -- Ice or heat? In general, ice is good for inflammation or injury in the past 24 hours. Heat is only good for tight muscles. Heat will not help most pain in your joints themselves.      -- What about anti-inflammatory medications?  Talk with me about this. Ibuprofen or naproxen can reduce pain after injury. Ideally, you should avoid taking them regularly (they lose effectiveness over time). If you have daily pain, your doctor will recommend a different approach.      -- Headache medicines? If you have headaches, check with me for a diagnosis and medicine options. Ibuprofen or naproxen shouldn't be needed for headaches more often than twice per month. Acetaminophen shouldn't be needed for headaches more often than twice per week.      -- Read \"The Hypermobility Handbook\" by Dr. Dragan Gallegos (just keep in mind that the entire book may not apply to you).  A variety of autonomic symptoms can occur in people with hypermobility, although technically they are not a result of the hypermobility itself. if you have other symptoms not related to joint or muscle problems, check with your health care providers.      Answers to some other questions       What about heart or aorta complications?The risk for cardiovascular disease remains incompletely defined in hypermobile EDS, but is not thought to be predictably elevated compared to the general population. Some patients will have valve or aortic abnormalities, but these patients may turn out to be qualitatively different than their other hypermobile peers. My personal recommendation about this is to (a) keep a longitudinal relationship with a primary care " provider who is competent at detecting new murmurs, (b) through age 40 at least, have an annual well exam to ensure that murmurs are assessed, (c) have a heart exam when being evaluated for any illnesses or health problems, and (d) have an evaluation by a cardiologist and/or echocardiogram for murmurs or other unexplainable changes in exam.         Aren't there genetic blood tests for EDS? Technically there are some tests that can be done, but these tests are not very sensitive (many people with EDS or hypermobility are missed by the tests). Also, a positive or negative test doesn't affect our management. I was part of a group that was planning a much broader research program with hypermobility, but unfortunately federal funding for all research was cut and the project was stopped.         Should I see a ? You are welcome to see a  if you wish, but the geneticists don't do anything different than what I do. Also, the geneticists are a lot harder to see! The key thing is to be assessed by somebody with lots of EDS and hypermobility experience. I highly recommend that you establish with an EDS / hypermobility center close to your home, so that you can have ongoing care at an organization that knows you and your history. An important component of such a center is either Physical Therapy or Physical Medicine and Rehabilitation (PM&R).    Useful medical references regarding Nicol-Danlos Syndrome   An overview of the different types of can be found at https://www.[employer].com/eds-types/  Criteria for hypermobile type EDS (hEDS) can be found at https://[employer] games/wp-content/uploads/gIMJ-Ax-Oawmzffr-checklist-1.pdf  (the main reference for criteria is Katy forrester al, Am J Med Maria Alejandra 2017, 175C:8-26)  For a nice article about hEDS versus other hypermobility conditions, see Alireza et al, Am J Med Maria Alejandra 2017, 175c:148-157.

## 2024-09-24 NOTE — PROGRESS NOTES
"Dear patient. Thank you for visiting with me. I want you to feel respected, understood, and empowered. \"Respect\" is valuing you as much as I would a close family member. \"Empowerment\" happens when you are fully informed, and can make the best possible decision for you.  Please ask me questions!  Challenge anything that is not clear.    Medical records are primarily used as memory aids for me and my colleagues. Things to know about my documentation style:  - The 'problem list' includes current symptoms or diagnoses, and some problems that are resolved but may return. I use the past medical history for problems not expected to return.  - I use single quotation marks for things that you or I said, when I want to clarify who was speaking.  - I use double quotation marks when copying a term from elsewhere in your records. Italics (besides here) may also denote a quotation.  If you have questions or concerns, please contact me; I will reply as soon as time allows.    Rachel Fried is a 33 year old woman, with concerns including:  Patient presents with:  Follow Up: Pt here to discuss hypermobility      PCP: Panfilo Solano   Visit type: consult patient, seen at the request of Dr. Panfilo Solano    Disposition comment:        Rachel is a patient at the primary care center, managed by my partner Dr. Solano. However, this visit was done as part of my role with the [facility] Rare Disease program.   She was scheduled for a short visit today, so we will resume our conversation at a video follow-up.    I specialize in complex care for young adults, and have substantial experience with EDS (as well as the sometimes correlated conditions of autonomic dysfunction and/or the urticaria/edema condition sometimes known as \"mast cell\" disorder). Unfortunately, my current clinic is insufficiently organized for me to take on additional patients with EDS and other related conditions. Therefore it will be important for Rachel to " continue with her regular primary care provider. I am available to this provider to answer questions or help guide ongoing care.     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 Explained about this condition, what it is, and what to do about it. We will resume this conversation at the video follow-up.    We also discussed about how the official designation of EDS is no longer particularly important. Some of this information is provided in the patient instruction section of this encounter. The keys for HMS are to (a) work on muscle strength around problem joints, (b) protect problem joints in unusual circumstances of strain or effort, (c) be aware of additional conditions that may develop such as autonomic dysfunction, and (d) ensure that additional conditions do not become worse because of inactivity or other pitfalls.     A diagnosis of hypermobility polyarthralgia syndrome reflects joint pain and cracking/subluxing in the setting of joint hypermobility      I recommended swimming 2-4 times per week for back and neck health, 20-30 minutes at a time, gentle laps, use a snorkel if needed.    Regarding rheumatoid arthritis ... I reviewed Dr. Aldridge's helpful notes. I occasionally run into patients with hypermobility who were misdiagnosed with RA, but there are plenty of data to support the RA diagnosis in this patient. Patients who have both hypermobility and inflammatory arthritis can have especially uncomfortable situations and treatments. I am glad that Rachel is being treated and followed so closely.     Regarding PT. Rachel lewruth pointed out that she has had 110 sessions of PT. We talked about whether she might benefit from PT assessment, now that the EDS diagnosis. We can talk more about this at the upcoming video follow-up.    Regarding fibromyalgia ... I was thinking that the trigger points and muscle issues could be consistent with fibromyalgia, and  I showed a framework about overlap and differences between fibromyalgia and Hypermobility polyarthralgia syndrome.    Time note (e4, 30'): The total of my time (on the date of service) for this service was 34 minutes, including discussion/face-to-face, chart review, interpretation not otherwise reported, documentation, and updating of the computerized record.        Ji Ramos is a 33 year old, presenting for the following health issues:  Follow Up (Pt here to discuss hypermobility)      9/24/2024     4:55 PM   Additional Questions   Roomed by Alysha IBANEZ   Accompanied by N/A     History of Present Illness       Reason for visit:  Evaluation for hypermobility She is missing 1 dose(s) of medications per week.  She is not taking prescribed medications regularly due to remembering to take and other.       Wondering about hypermobility, based on previous history of pain.    She has had pain since age 13, was  and able to play for hours per day. She was diagnosed with RA, but also was having pain before that.   She also had minor scoliosis diagnosed as a child, but it was said to be modest and didn't require bracing or surgery. People complained about her posture during adolescence.  Through middle school also played basketball, golf, swim team (breaststroke and freestyle), 3 forms of dance (ballet/point, jazz, tap).  In middle adolescence she lost physical prowess because of knees and back.  She had a history of knee problem that improved with strips that 'held kneecap in place.'  Rolled ankles frequently with running.  Has tight muscles in general. Has had trigger point injections but was told she doesn't have fibromyalgia, the Tps are due to muscle problems related to RA.    RA was eventually diagnosed young adulthood with worsened pain and blood work, and rashes that weren't explained.  She is being treated with Actemra - the first thing that has helped her stiffness. She had a 2 month gap of  "treatment before the Actemra during which was in a lot of pain.    Weight varied with adulthood, including swimming, but worsened during COVID when less active. She occasionally a brief burst of steroids for trips or activities.     She has concern for osteopenia and has two compression fractures in the thoracic spine.    Did water aerobics with good results.            Objective    /81 (BP Location: Right arm, Patient Position: Sitting, Cuff Size: Adult Large)   Pulse 91   Ht 1.741 m (5' 8.54\")   Wt 114.7 kg (252 lb 14.4 oz)   SpO2 97%   BMI 37.85 kg/m    Body mass index is 37.85 kg/m .  arm span 174  Physical Exam  Constitutional:       General: She is not in acute distress.     Appearance: Normal appearance. She is not ill-appearing.   HENT:      Head: Normocephalic.      Nose: Nose normal.   Eyes:      General: No scleral icterus.        Right eye: No discharge.         Left eye: No discharge.      Extraocular Movements: Extraocular movements intact.   Cardiovascular:      Heart sounds: Normal heart sounds. No murmur heard.  Pulmonary:      Effort: Pulmonary effort is normal. No respiratory distress.   Neurological:      Mental Status: She is alert.   Psychiatric:         Mood and Affect: Mood normal.         Behavior: Behavior normal.          A Beighton exam was performed, based on published recommendations. The findings:   - Passive apposition of the thumbs to the flexor aspect of the forearm:Absent (0 points) but close, masked by musculature   - Passive dorsiflexion of the little fingers beyond 90 degrees: Both sides (2 point)   - Hyperextension of the elbows beyond 10 degrees: Both sides (2 point)   - Hyperextension of the knees beyond 10 degrees: Both sides (2 point)   - Forward flexion of the trunk with knees fully extended so that the palms of the hand rest flat on the floor: Absent (0 points) but close  Total points: 6/9    The following additional signs of hypermobility were noted:   - " unusually soft or velvety skin   - mild skin hyperextensibilityat jaw   - bilateral piezogenic papules of the heel   - atrophic scarring involving at least two sites and without the formation of truly papyraceous and/or hemosideric scars as would be seen in classical Nicol-Danlos syndrome   - dental crowding and high and narrow palate   - arachnodactyly, as defined by...       ... positive thumb sign (Anna sign) on both sides    I also observed:   - excessive lateral flexion of the neckin both directions                Signed Electronically by: Cr Murphy MD

## 2024-09-27 ENCOUNTER — VIRTUAL VISIT (OUTPATIENT)
Dept: INTERNAL MEDICINE | Facility: CLINIC | Age: 34
End: 2024-09-27
Payer: COMMERCIAL

## 2024-09-27 DIAGNOSIS — M79.641 PAIN IN BOTH HANDS: ICD-10-CM

## 2024-09-27 DIAGNOSIS — Q79.60 EDS (EHLERS-DANLOS SYNDROME): Primary | ICD-10-CM

## 2024-09-27 DIAGNOSIS — R61 GENERALIZED HYPERHIDROSIS: ICD-10-CM

## 2024-09-27 DIAGNOSIS — L41.1 PITYRIASIS LICHENOIDES CHRONICA: ICD-10-CM

## 2024-09-27 DIAGNOSIS — E66.01 CLASS 2 SEVERE OBESITY DUE TO EXCESS CALORIES WITH SERIOUS COMORBIDITY AND BODY MASS INDEX (BMI) OF 37.0 TO 37.9 IN ADULT (H): ICD-10-CM

## 2024-09-27 DIAGNOSIS — M79.642 PAIN IN BOTH HANDS: ICD-10-CM

## 2024-09-27 DIAGNOSIS — E66.812 CLASS 2 SEVERE OBESITY DUE TO EXCESS CALORIES WITH SERIOUS COMORBIDITY AND BODY MASS INDEX (BMI) OF 37.0 TO 37.9 IN ADULT (H): ICD-10-CM

## 2024-09-27 PROCEDURE — 99214 OFFICE O/P EST MOD 30 MIN: CPT | Mod: 95 | Performed by: PEDIATRICS

## 2024-09-27 ASSESSMENT — PATIENT HEALTH QUESTIONNAIRE - PHQ9: SUM OF ALL RESPONSES TO PHQ QUESTIONS 1-9: 8

## 2024-09-27 NOTE — PATIENT INSTRUCTIONS
Thank you for visiting the [facility] today at the [facility]! The following is some information about our clinic:     Primary Care Center Frequently-Asked Questions    (1) How do I schedule appointments at the [facility]?     Primary Care--to schedule or make changes to an existing appointment, please call our primary care line at [phone].    Labs--to schedule a lab appointment at the [facility] you can use [employer] or call [phone]. If you have a [place] location that is closer to home, you can reach out to that location for scheduling options.     Imaging--if you need to schedule a CT, X-ray, MRI, ultrasound, or other imaging study you can call [phone] to schedule at the [facility] or any other [facility] imaging location.     Referrals--if a referral to another specialty was ordered you can expect a phone call from their scheduling team. If you have not heard from them in a week, please call us or send us a [employer] message to check the status or get a scheduling number. Please note that this only applies to internal [facility] referrals. If the referral is external you would need to contact their office for scheduling.     (2) I have a question about my visit, who do I contact?     You can call us at the primary care line at [phone] to ask questions about your visit. You can also send a secure message through [employer], which is reviewed by clinic staff. Please note that [employer] messages have a twenty-four to forty-eight business hour turnaround time and should not be used for urgent concerns.    (3) How will I get the results of my tests?    If you are signed up for [employer] all tests will be released to you within twenty-four hours of resulting. Please allow three to five days for your doctor to review your results and place a note interpreting the results. If you do not have [employer] you will receive your  results through mail seven to ten business days following the return of the tests. Please note that if there should be any urgent or concerning results that your doctor or their registered nurse will reach out to you the same day as the tests come back. If you have follow up questions about your results or would like to discuss the results in detail please schedule a follow up with your provider either in person or virtually.     (4) How do I get refills of my prescriptions?     You should always first contact your pharmacy for refills of your medications. If submitting a refill request on [employer], please be sure to submit the request only once--repeat requests can cause delays in refill. If you are requesting a NEW medication or a medication related to new symptoms you will need to schedule an appointment with a provider prior to approval. Please note: Routine medication refills have up to one to three business day turnaround whereas controlled substances refills have up to five to seven business day turnaround.    (5) I have new symptoms, what do I do?     If you are having an immediate medical emergency, you should dial 911 for assistance.   For anything urgent that needs to be seen within a few hours to one day you should visit a local urgent care for assistance.  For non-urgent symptoms that need to be seen within a few days to a week you can schedule with an available provider in primary care by going to [employer] or calling [phone].   If you are not sure how serious your symptoms are or you would like to receive medical advice you can always call [phone] to speak with a triage nurse.

## 2024-09-27 NOTE — PROGRESS NOTES
"Dear patient. Thank you for visiting with me. I want you to feel respected, understood, and empowered. \"Respect\" is valuing you as much as I would a close family member. \"Empowerment\" happens when you are fully informed, and can make the best possible decision for you.  Please ask me questions!  Challenge anything that is not clear.    Medical records are primarily used as memory aids for me and my colleagues. Things to know about my documentation style:  - The 'problem list' includes current symptoms or diagnoses, and some problems that are resolved but may return. I use the past medical history for problems not expected to return.  - I use single quotation marks for things that you or I said, when I want to clarify who was speaking.  - I use double quotation marks when copying a term from elsewhere in your records. Italics (besides here) may also denote a quotation.  If you have questions or concerns, please contact me; I will reply as soon as time allows.    Context    PCP: Panfilo Solano   Visit type: consult patient, seen at the request of Dr. Panfilo Solano    Rachel Fried is a 33 year old woman, with concerns including:  Chief Complaint   Patient presents with    RECHECK    Pain       History, update, and/or problems    Nicol-Danlos syndrome   this was the continuation of her short-scheduled consult for the [facility] rare disease program, requested by her PCP and my clinical partner, Dr. Solano.   Discussed various questions about EDS, exercise, braces/compression.    She is having finger symptoms related to typing. I took the liberty of writing for hand OT on behalf of her PCP at my clinic. She might benefit from ring splints. I would sign for them if so, on behalf of Dr. Solano.    I had wondered about fibromyalgia also being present. She gets trigger point injections, but she tells me that she has been told this isn't because of fibromyalgia, but 'muscle weakness.'  Regardless of the mechanism I have no " hypermobility-related objections. Trigger point injections that are beneficial don't really have a downside.    Pityriasis lichen chronica  Reminded about derm question, regarding the spots that sometimes become sore. She commented to me that she has ingrown hairs and picks. I'm sympathetic, and glad also there are no labs to suggest (for example) vasculitis.  She commented that she is very rule oriented and would benefit from an unequivocal statement from me about not picking, especially given my encouragement to swim.       ASSESSMENT and PLAN:   I strongly encouraged her to not pick any skin lesions. Covering will likely help. Some people apply local anesthetic gel, assuming there isn't a local reaction.  I still wonder if this could be impacted by her RA.  I suggested she go to a community dermatologist, since (a) our derm program is so back-scheduled, and (b) community derm will likely include an .      Question about tension headaches being related to EDS. We talked about options. I wonder about the possibility of migraines. So-called cervicocranial instability headaches are commonly speculated about, but are probably somewhat more rare than tension headaches.  She should check with Dr. Solano, who can do more of an H&P about this, and consider whether neuro/headache evaluation might be useful.        Temperature symptoms and hyperhidrosis - hotter than others, has hot flashes and hyperhidrosis. She has always sweat more than others, but the flashes and intolerance are worse lately.       ASSESSMENT and PLAN: could be autonomic dysfunction. I recommend she discuss sweating and heat intolerance with her PCP and consider meds (clonidine). I can help with this if needed.    Obesity  We talked about her weight (I believe she brought this up in the context of physical activity and temp symptoms). We elected to have her meet with the weight clinic, likely for health coaching.      Time note (e4, 30'): The  total time (on the date of service) for this service was 35 minutes, including discussion/face-to-face, chart review, interpretation not otherwise reported, documentation, and updating of the computerized record.    Start Time: 10:25 AM  End Time:1052      Rachel is a 33 year old who is being evaluated via a billable video visit.    How would you like to obtain your AVS? MyChart  If the video visit is dropped, the invitation should be resent by: Text to cell phone: [phone]  Will anyone else be joining your video visit? No      Are refills needed on medications prescribed by this physician? NO        Subjective   Rachel is a 33 year old, presenting for the following health issues:  RECHECK    HPI             Objective           Vitals:  No vitals were obtained today due to virtual visit.    Physical Exam             Video-Visit Details    Type of service:  Video Visit   Originating Location (pt. Location): Home    Distant Location (provider location):  Off-site  Platform used for Video Visit: Onel  Signed Electronically by: Cr Murphy MD

## 2024-10-02 ENCOUNTER — VIRTUAL VISIT (OUTPATIENT)
Dept: RHEUMATOLOGY | Facility: CLINIC | Age: 34
End: 2024-10-02
Attending: INTERNAL MEDICINE
Payer: COMMERCIAL

## 2024-10-02 VITALS — HEIGHT: 69 IN | WEIGHT: 252 LBS | BODY MASS INDEX: 37.33 KG/M2

## 2024-10-02 DIAGNOSIS — Z51.81 ENCOUNTER FOR MEDICATION MONITORING: Primary | ICD-10-CM

## 2024-10-02 DIAGNOSIS — M06.09 RHEUMATOID ARTHRITIS OF MULTIPLE SITES WITH NEGATIVE RHEUMATOID FACTOR (H): ICD-10-CM

## 2024-10-02 PROCEDURE — G2211 COMPLEX E/M VISIT ADD ON: HCPCS | Performed by: INTERNAL MEDICINE

## 2024-10-02 PROCEDURE — 99214 OFFICE O/P EST MOD 30 MIN: CPT | Mod: 95 | Performed by: INTERNAL MEDICINE

## 2024-10-02 RX ORDER — TOCILIZUMAB 180 MG/ML
162 INJECTION, SOLUTION SUBCUTANEOUS
Qty: 3.6 ML | Refills: 5 | Status: SHIPPED | OUTPATIENT
Start: 2024-10-02

## 2024-10-02 ASSESSMENT — PAIN SCALES - GENERAL: PAINLEVEL: MILD PAIN (3)

## 2024-10-02 NOTE — PROGRESS NOTES
Virtual Visit Details    Type of service:  Video Visit   Joined the call at 10/2/2024, 9:33:00 am.  Left the call at 10/2/2024, 9:51:02 am.  Originating Location (pt. Location): Home  Distant Location (provider location):  On-site  Platform used for Video Visit: Oesia    Office Visit Details      Last seen: 4/5/2024    DOS: 10/2/2024    Reason for visit: RA, high risk med use    [facility] - Rheumatology Clinic Visit    Rachel Fried  is a 30 year old here for follow-up  RHEUMATOID ARTHRITIS (RA) involving hands, wrists, ankles, shoulders feet [ -RF and low CCP 25, -factor V -KAVITHA by IFA and -NADEEN panel -HLA B27] and pityriasis lichenoides chronica (seen [facility]-established here). XR  no erosions hands. Moved from [place] to be with family.       Past- hydroxychloroquine (plaquenil) since 1-2016 (2 tablet a day caused diarrhea, ok on 1 tablet) and methotrexate 10 mg week  to 4-2018 (mild fatigue day of) then restarted 10- (higher folic acid improved side-effects), naproxsyn; allergy sulfa, prednisone, advil       Initial visit copy forward  10-: Hydroxychloroquine (plaquenil) BID mild diarrhea; ocular eye exam 3-2016 baseline reports recent eye exam   EKG was recently Feb 2018 normal. Stopped methotrexate  In April as wanted to take a drug holiday and was moving here, also wanted to drink some alcohol, and discuss options. No infections. Diffuse joint pains in hands, wrists, shoulders, ankles and feet and the sides of her hips. Taking advil 600mg AM and 400 mg PM, tolerating but taking since April. Pain severe 5/10 or more. Stiffness all day. Hands are sore. No loss of function or ROM. Very sore and stiff all over. Knees are sore and hard to bend or squat. Mild diarrhea with hydroxychloroquine (plaquenil)     December 16, 2020  Denies any fever, chills, SOB, CANDELARIA, night sweats, or chest pain, high fever, cough, travel in last 14 days or exposure to covid-19  (coronavirus). Reports healthy. Follows CDC guidelines. Works from home.     Rheumatoid arthritis (RA) stiff with pain in in hands and wrists, stiff in mornings for few hours. Not able to do her massage or swim, does walk and not doing normal routine which is causing this as well. Sleep is poor now and hip pain. Upper neck discomfort from stress.     Continues on methotrexate  12.5 mg every 7 days THURS, folic acid 2 mg day. Tolerating. Hydroxychloroquine (plaquenil) 200 mg every day  misses most days so takes <3 times a week. No GI side-effects, no hairloss or oral sores from methotrexate moreso from hydroxychloroquine (plaquenil).  Off celexa -lost 12 lbs. 197 lb last visit but this not now due to Covid-19 (coronavirus).   No longer using NSAIDs. Taking vitamin D 2000 international unit(s) day.  No EAS. No vision issues. Is exercising and doing yogo now. Goes to [facility] eye did last 3-2019 but was due this past Spring. Willing to come here if can do hydroxychloroquine (plaquenil) toxicity with glasses and contacts in 1 visit. Otherwise, will continue with hers.     4/23/21:  The patient states that she is doing ok with her RA. She states that her joint symptoms have slightly worsened during pandemic because she is not going swimming, which is traditionally very helpful for her.   The patient just got vaccinated and plans to start going swimming in May, which she thinks will help her joints. She has no active swelling now.  She is tolerating methotrexate at 12.5 mg qWeek and also on  mg every day (can not tolerate higher dose sec GI SEs).   The patient is taking ibuprofen 2-4 tabs for the last couple of weeks.  No plans to get pregnant. Not on birth control at this time, but states currently only dating women.  She otherwise feels well with no other concer     10/28/2021:    Doing ok for most part with her RA. AM stiffness is 1.5 hr. Knees and ankles hurt and swell up a little bit and back hurts with  standing for too long, but has hard time to say if her joints are swollen. On MTX 5 tabs a wk, last time increased her MTX, got brain fog (has it with MTX current dose but it got worse) and migraines and nausea. It ruined her Sturdays. She takes NSAIDs rarely like going to a concert. Used to be naproxen every day before RA Dx. Since stopping HCQ every other day is having more joint sx, but hard to tell, HCQ causes her diarrhea at higher dose, tried to get generic brand but was never able to get it from pharmacy.      Had covid booster vaccine, no SE.    1/19/2022: Rachel presents for follow up. Humira was added at last visit. Did 4 shots of humira, right before brendan, had last shot, now out. No SEs and it significantly helped with joint pain/stiffness, now symptoms are slowly returning.    Reports 1 hr of knee stiffness, 20 min of hand stiffness.    Reports brain fog 24-48 hours after taking methotrexate.      8/12/2022: Rachel presents for follow up. Did not tolerate MTX, HCQ in the past. Back on humira. Has ongoing arthralgia, did not realize how much pain she had tills he took the prednisone which made her feel normal, pain free. Now realized that humira was not helping her as much as she thought. She is hoping to try a med which works as well as prednisone. Wondering if she could try medical cannabis.    Most pain is localized to her hands, which limits how she uses her hands.    Am stiffness is >30 min. Can not tell if joints swell up or not. No rash, cough, SOB. Regained 50 lbs recently.    MTX caused her brain fog, headaches. Felt miserable on it.    3/24/2023: Humira was switched to actemra. She is doing better on actemra and thinks that it is helping her more with joint sx (pain and stiffness). No SEs.    Had hard time making herself doing anything, she was very stiff while she was off humira and waiting for actemra approval by [employer], worked with PT. Has back posture pain.    Hands ache today but  "that's from broken popsocket.    RA is overall is good.    No Actemra SEs.    Has scoliosis, was doing PT 2-3 times a week, now twice a wk.    10/5/2023: Doing well on Actemra. Feels RA is well controlled. Some knee stiffness in the mornings, no current hand pain, stiffness, or swelling. Has diffuse mechanical back pain, improving with PT. Pt describes back pain as being related to scoliosis and muscle tension causing her back to \"freeze up\" for a few days at a time. Today her back pain is controlled, but present. She was told by her back pain doctor that this pain may not be from scoliosis but potentially fibromyalgia (this is not typical fibromyalgia presentation). She was recently approved for medical marijuana for pain as well.     4/5/2024:    -she still has muscle pain, it is pretty bad, had trigger point inj with steroid in Tue, hopeful that it helps her  -going to see an endocrinologist in 10/2024  -arthritis pain is getting better but not back pain  -has back pain for longtime, x years, got worse 2 years ago  -went to 100 PT sessions last year, worried about muscle atrophy in her back on MRIs    Today 10/2/204:    -was diagnosed with EDS, she thinks that could explain some of her pain, had trigger point inj with steroid last wk, it is night and day difference in quality of her life, sleep, pain    -pain level is 3/10    -was offered epidural steroid inj for chronic LBP, something to consider in future    -uses lidocaine stick on back, uses ice, has TENS unit    -still alittle stiff since she woke up, making stiff makes her feel her hands    -on actemra, no SE, works well for her      PMH:  Injury-no  Medical-dermatitis, (bx 2-2016 GA vs LP vs cutaneous lupus--was not cutenaous lupus --focal interstitial lymphocytic inflammation, scattered lymphocytes), anxiety and depression, exercise induced asthma, scoliosis, pityriasis lichenoides chronica, chronic back pain, hypertension, seasonal allergies, acne, " "motion sickness, high cholesterol, chicken pox, headaches before    Surgical-None     FH:  No autoimmune disorders, psoriasis, UC, crohn's, RA, PsA, gout, autoimmune thyroid.  No MS, heart disease or cancer in family  Mother-factor V leiden carrier, rosea   Father-healthy  Siblings-younger bro healthy  Children-None   GF-lupus   Uncle cancer colon    SH:  Social Alcohol occasional week. Never Smoking. No IVDU. No Children. Right handed. Single--not sexually active, never.        [facility] personally reviewed and updated by me.    ROS:  A comprehensive ROS was done. Positives are per HPI.      Ph. E:    Ht 1.753 m (5' 9\")   Wt 114.3 kg (252 lb)   BMI 37.21 kg/m      Constitutional: NAD, pleasant  Eyes: nl EOM, conjunctiva, sclera  Skin: no rash  Neuro: grossly non focal  Psych: nl affect  MSK: no active synovitis       Labs/Imaging:  Normal G6PD  Neg MTB QG 2016 and hepatitis panel, negative HIV    XR hands and knees-negative 2016   Component      Latest Ref Rng & Units 11/17/2020   WBC      4.0 - 11.0 10e9/L 9.6   RBC Count      3.8 - 5.2 10e12/L 4.72   Hemoglobin      11.7 - 15.7 g/dL 13.8   Hematocrit      35.0 - 47.0 % 42.9   MCV      78 - 100 fl 91   MCH      26.5 - 33.0 pg 29.2   MCHC      31.5 - 36.5 g/dL 32.2   RDW      10.0 - 15.0 % 12.9   Platelet Count      150 - 450 10e9/L 389   Creatinine      0.52 - 1.04 mg/dL 0.66   GFR Estimate      >60 mL/min/1.73:m2 >90   GFR Estimate If Black      >60 mL/min/1.73:m2 >90   CRP Inflammation      0.0 - 8.0 mg/L 9.0 (H)   Albumin      3.4 - 5.0 g/dL 3.4   ALT      0 - 50 U/L 16   AST      0 - 45 U/L 10     Component      Latest Ref Rng 12/6/2023  8:20 AM   WBC      4.0 - 11.0 10e3/uL 6.1    RBC Count      3.80 - 5.20 10e6/uL 4.98    Hemoglobin      11.7 - 15.7 g/dL 15.0    Hematocrit      35.0 - 47.0 % 46.4    MCV      78 - 100 fL 93    MCH      26.5 - 33.0 pg 30.1    MCHC      31.5 - 36.5 g/dL 32.3    RDW      10.0 - 15.0 % 11.7    Platelet Count      150 - 450 " 10e3/uL 289    % Neutrophils      % 52    % Lymphocytes      % 37    % Monocytes      % 10    % Eosinophils      % 2    % Basophils      % 0    % Immature Granulocytes      % 0    Absolute Neutrophils      1.6 - 8.3 10e3/uL 3.2    Absolute Lymphocytes      0.8 - 5.3 10e3/uL 2.3    Absolute Monocytes      0.0 - 1.3 10e3/uL 0.6    Absolute Eosinophils      0.0 - 0.7 10e3/uL 0.1    Absolute Basophils      0.0 - 0.2 10e3/uL 0.0    Absolute Immature Granulocytes      <=0.4 10e3/uL 0.0    Cholesterol      <200 mg/dL 262 (H)    Triglycerides      <150 mg/dL 228 (H)    HDL Cholesterol      >=50 mg/dL 63    LDL Cholesterol Calculated      <=100 mg/dL 153 (H)    Non HDL Cholesterol      <130 mg/dL 199 (H)    Patient Fasting? Yes    Creatinine      0.51 - 0.95 mg/dL 0.85    GFR Estimate      >60 mL/min/1.73m2 >90    CRP Inflammation      <5.00 mg/L <3.00    Sed Rate      0 - 20 mm/hr 6    ALT      0 - 50 U/L 17    Albumin      3.5 - 5.2 g/dL 4.2    AST      0 - 45 U/L 20       Legend:  (H) High        Study Result    Narrative & Impression   Thoracic and Lumbar spine MRI without contrast     History: Rheumatoid arthritis of multiple sites with negative  rheumatoid factor (H); Pain in thoracic spine.  ICD-10: Rheumatoid arthritis of multiple sites with negative  rheumatoid factor (H); Pain in thoracic spine (accession [id number]),  Rheumatoid arthritis of multiple sites with negative rheumatoid factor  (H); Chronic bilateral low back pain, unspecified whether sciatica  present; Chronic bilateral low back pain, unspecified whether sciatica  present (accession [id number]).     Comparison: None.     Technique:  Axial T2-weighted, and sagittal T1, STIR, and T2-weighted  images of the lumbar spine without intravenous contrast. Sagittal T1,  STIR, and T2-weighted images of the thoracic spine without contrast  were obtained, with axial T2-weighted images through levels of  interest in the thoracic spine.      Findings:  Thoracic  Spine: There is no definite abnormal signal in the thoracic  spinal cord at any level.   Anterior wedge-shaped compression deformity of T6 and T8. Schmorl's  deformity at the T7 inferior endplate.  Alignment of the thoracic vertebra appears within normal limits. Bone  marrow signal intensity on noncontrast images appears unremarkable.  Multilevel vertebral body hemangiomas.     Lumbar Spine:  There are 5 type lumbar vertebra, used for the purposes of this  dictation.  The tip of the conus is at approximately L1. The alignment  of the lumbar spine is unremarkable. Multilevel vertebral body  hemangiomas.     On a level by level basis, the findings are as follows:     L2-3:  There is no focal abnormality.     L3-4:  There is no focal abnormality.     L4-5:  There is no focal abnormality.     L5-S1: Central disc protrusion. No significant spinal canal or  neuroforamina stenosis.                                                                      Impression:   1. Chronic anterior wedge-shaped compression deformity of T6 and T8.  Schmorl's deformity at the T7 inferior at T9 superior endplate.  2. Central disc protrusion at L5-S1. No significant spinal canal or  neuroforamina stenosis at any level.     PLACIDO DE ANDA MD         SYSTEM ID:  [id number]     Impression/Plan:  1. Seropositive (pos anti-CCP, neg RF )rheumatoid arthritis (RA) non-erosive. Active RA on methotrexate 12.5 mg weekly, worse after stopping HCQ due to diarrhea (and never was successful to get generic brand HCQ, brand is too expensive). Did not tolerate MTX even at lowest effective dose of 10 mg qwk due to nausea, headaches, brain fog and nausea. Was placed on humira q2wk, initially thought it was helping but after feeling normal by taking prednisone taper ( 20 mg every day max) realized that humira was not helping much.    8/2022: Recommend to proceed with actemra as next step; risks were discussed.    I support her using medical cannabis, no  interaction with RA meds and could be beneficial as non steroid agent to help managing pain, it might have some anti-inflammatory benefit and it is not addictive. I advsied her to reach out to her PCP jenny nichols for Rx.    3/24/2023: Last humira 8/13/2022. On actemra since 10/2022, it took >month to get it insurance approved. It is helping her more and will continue with that. No SEs.    10/5/2023: Doing well on Actemra, plan to continue. Lact cholesterol in 6/2023 elevated (elevated at baseline), could potentially be impacted by Actemra. Labs due 12/2023 (including fasting lipid panel) and follow up video visit in 6 months.     4/5/2024: RA is well controlled on actemra, main issue is back pain.    10/2/2024; Stable RA on actemra    2. Vitamin D deficeincy- take 2000 international unit(s) day.     3. Pityriasis lichenoids chronic-per derm     5. Chronic NSAID use. Will check labs q6-12 mo.    6. Worsening back pain. NL ESR/CRP, neg HLA-B27. No signs of inflammatory arthritis on T/L spine MRI 2/2024. Just had trigger point inj, PCP ordered CK. She is going to seek 2nd opinion for pain management. Robaxin helps some. Pain significantly interfering with her life, saw 2 different spine doctor. I advised her to discuss LDN with pain management.  Used TENS units 2-3 times/ wk x past year, sometimes it helps.   Worried about trying gabapentin and she is worried about potential SE of fatigue/brain fog. Worried about potential SE of weight gain on lyrica.    10/2/2024: improved with trigger point inj    7. Actemra monitoring labs. Labs every 6 months, due 12/2024      Today 10/2/2024: New Dx of EDS, trigger point inj has helped a lot with pain, has good experience at [facility], water aerobic has helped as well. Overall, better pain control today, which is good news.    RA is well controlled n actemra, will continue. No SE, will follow up with PCP for high cholesterol.    Plan 10/2/2024:    Continue actemra qwk inj    Labs  every 6 months, due in 12/2024    Return in a year in person      TT 30 min was spent on date of the encounter doing chart review, history and exam, documentation and further activities as noted above. Any prior notes, outside records, laboratory results, and imaging studies were reviewed if relevant.    The longitudinal plan of care for the diagnosis(es)/condition(s) as documented were addressed during this visit. Due to the added complexity in care, I will continue to support Rachel in the subsequent management and with ongoing continuity of care.      Naresh Aldridge MD    Orders Placed This Encounter   Procedures    ALT    Albumin level    AST    Creatinine    CBC with Platelets & Differential

## 2024-10-02 NOTE — CONFIDENTIAL NOTE
RECORDS RECEIVED FROM: internal    DATE RECEIVED: 10.10.24   NOTES (FOR ALL VISITS) STATUS DETAILS   OFFICE NOTES from referring provider internal  Cr Murphy MD    OFFICE NOTES from other specialist internal  2.23.24 Jeffrey   Derived visits   MEDICATION LIST internal     IMAGING      DEXASCAN internal  2.27.24   MRI (BRAIN) internal  Spine- 2.20.24   XR (Chest) internal  Spine- 2.28.24    LABS     DIABETES: HBGA1C, CREATININE, FASTING LIPIDS, MICROALBUMIN URINE, POTASSIUM, TSH, T4    THYROID: TSH, T4, CBC, THYRODLONULIN, TOTAL T3, FREE T4, CALCITONIN, CEA internal  Lipid- 6.15.24  Creatinine- 6.15.24  Cbc- 6.15.24  HBGA1C- 6.15.24   TSH/T4- 6.15.24  Vitamin D- 3.1.24

## 2024-10-02 NOTE — LETTER
10/2/2024       RE: Rachel Fried  [address]  [facility] [address]     Dear Colleague,    Thank you for referring your patient, Rachel Fried, to the [facility] RHEUMATOLOG[facility] [place] at [facility]. Please see a copy of my visit note below.    Virtual Visit Details    Type of service:  Video Visit   Joined the call at 10/2/2024, 9:33:00 am.  Left the call at 10/2/2024, 9:51:02 am.  Originating Location (pt. Location): Home  Distant Location (provider location):  On-site  Platform used for Video Visit: Sponduu    Office Visit Details      Last seen: 4/5/2024    DOS: 10/2/2024    Reason for visit: RA, high risk med use    [facility] Visit    Rachel Fried  is a 30 year old here for follow-up  RHEUMATOID ARTHRITIS (RA) involving hands, wrists, ankles, shoulders feet [ -RF and low CCP 25, -factor V -KAVITHA by IFA and -NADEEN panel -HLA B27] and pityriasis lichenoides chronica (seen [facility]-established here). XR  no erosions hands. Moved from [place] to be with family.       Past- hydroxychloroquine (plaquenil) since 1-2016 (2 tablet a day caused diarrhea, ok on 1 tablet) and methotrexate 10 mg week  to 4-2018 (mild fatigue day of) then restarted 10- (higher folic acid improved side-effects), naproxsyn; allergy sulfa, prednisone, advil       Initial visit copy forward  10-: Hydroxychloroquine (plaquenil) BID mild diarrhea; ocular eye exam 3-2016 baseline reports recent eye exam   EKG was recently Feb 2018 normal. Stopped methotrexate  In April as wanted to take a drug holiday and was moving here, also wanted to drink some alcohol, and discuss options. No infections. Diffuse joint pains in hands, wrists, shoulders, ankles and feet and the sides of her hips. Taking advil 600mg AM and 400 mg PM, tolerating but taking since April. Pain severe 5/10 or more. Stiffness all day. Hands are  sore. No loss of function or ROM. Very sore and stiff all over. Knees are sore and hard to bend or squat. Mild diarrhea with hydroxychloroquine (plaquenil)     December 16, 2020  Denies any fever, chills, SOB, CANDELARIA, night sweats, or chest pain, high fever, cough, travel in last 14 days or exposure to covid-19 (coronavirus). Reports healthy. Follows CDC guidelines. Works from home.     Rheumatoid arthritis (RA) stiff with pain in in hands and wrists, stiff in mornings for few hours. Not able to do her massage or swim, does walk and not doing normal routine which is causing this as well. Sleep is poor now and hip pain. Upper neck discomfort from stress.     Continues on methotrexate  12.5 mg every 7 days THURS, folic acid 2 mg day. Tolerating. Hydroxychloroquine (plaquenil) 200 mg every day  misses most days so takes <3 times a week. No GI side-effects, no hairloss or oral sores from methotrexate moreso from hydroxychloroquine (plaquenil).  Off celexa -lost 12 lbs. 197 lb last visit but this not now due to Covid-19 (coronavirus).   No longer using NSAIDs. Taking vitamin D 2000 international unit(s) day.  No EAS. No vision issues. Is exercising and doing yogo now. Goes to [facility] did last 3-2019 but was due this past Spring. Willing to come here if can do hydroxychloroquine (plaquenil) toxicity with glasses and contacts in 1 visit. Otherwise, will continue with hers.     4/23/21:  The patient states that she is doing ok with her RA. She states that her joint symptoms have slightly worsened during pandemic because she is not going swimming, which is traditionally very helpful for her.   The patient just got vaccinated and plans to start going swimming in May, which she thinks will help her joints. She has no active swelling now.  She is tolerating methotrexate at 12.5 mg qWeek and also on  mg every day (can not tolerate higher dose sec GI SEs).   The patient is taking ibuprofen 2-4 tabs for the last couple of  weeks.  No plans to get pregnant. Not on birth control at this time, but states currently only dating women.  She otherwise feels well with no other concer     10/28/2021:    Doing ok for most part with her RA. AM stiffness is 1.5 hr. Knees and ankles hurt and swell up a little bit and back hurts with standing for too long, but has hard time to say if her joints are swollen. On MTX 5 tabs a wk, last time increased her MTX, got brain fog (has it with MTX current dose but it got worse) and migraines and nausea. It ruined her Sturdays. She takes NSAIDs rarely like going to a concert. Used to be naproxen every day before RA Dx. Since stopping HCQ every other day is having more joint sx, but hard to tell, HCQ causes her diarrhea at higher dose, tried to get generic brand but was never able to get it from pharmacy.      Had covid booster vaccine, no SE.    1/19/2022: Rachel presents for follow up. Humira was added at last visit. Did 4 shots of humira, right before brendan, had last shot, now out. No SEs and it significantly helped with joint pain/stiffness, now symptoms are slowly returning.    Reports 1 hr of knee stiffness, 20 min of hand stiffness.    Reports brain fog 24-48 hours after taking methotrexate.      8/12/2022: Rachel presents for follow up. Did not tolerate MTX, HCQ in the past. Back on humira. Has ongoing arthralgia, did not realize how much pain she had tills he took the prednisone which made her feel normal, pain free. Now realized that humira was not helping her as much as she thought. She is hoping to try a med which works as well as prednisone. Wondering if she could try medical cannabis.    Most pain is localized to her hands, which limits how she uses her hands.    Am stiffness is >30 min. Can not tell if joints swell up or not. No rash, cough, SOB. Regained 50 lbs recently.    MTX caused her brain fog, headaches. Felt miserable on it.    3/24/2023: Humira was switched to actemra. She is doing  "better on actemra and thinks that it is helping her more with joint sx (pain and stiffness). No SEs.    Had hard time making herself doing anything, she was very stiff while she was off humira and waiting for actemra approval by [employer], worked with PT. Has back posture pain.    Hands ache today but that's from broken popsocket.    RA is overall is good.    No Actemra SEs.    Has scoliosis, was doing PT 2-3 times a week, now twice a wk.    10/5/2023: Doing well on Actemra. Feels RA is well controlled. Some knee stiffness in the mornings, no current hand pain, stiffness, or swelling. Has diffuse mechanical back pain, improving with PT. Pt describes back pain as being related to scoliosis and muscle tension causing her back to \"freeze up\" for a few days at a time. Today her back pain is controlled, but present. She was told by her back pain doctor that this pain may not be from scoliosis but potentially fibromyalgia (this is not typical fibromyalgia presentation). She was recently approved for medical marijuana for pain as well.     4/5/2024:    -she still has muscle pain, it is pretty bad, had trigger point inj with steroid in Tue, hopeful that it helps her  -going to see an endocrinologist in 10/2024  -arthritis pain is getting better but not back pain  -has back pain for longtime, x years, got worse 2 years ago  -went to 100 PT sessions last year, worried about muscle atrophy in her back on MRIs    Today 10/2/204:    -was diagnosed with EDS, she thinks that could explain some of her pain, had trigger point inj with steroid last wk, it is night and day difference in quality of her life, sleep, pain    -pain level is 3/10    -was offered epidural steroid inj for chronic LBP, something to consider in future    -uses lidocaine stick on back, uses ice, has TENS unit    -still alittle stiff since she woke up, making stiff makes her feel her hands    -on actemra, no SE, works well for " "her      PMH:  Injury-no  Medical-dermatitis, (bx 2-2016 GA vs LP vs cutaneous lupus--was not cutenaous lupus --focal interstitial lymphocytic inflammation, scattered lymphocytes), anxiety and depression, exercise induced asthma, scoliosis, pityriasis lichenoides chronica, chronic back pain, hypertension, seasonal allergies, acne, motion sickness, high cholesterol, chicken pox, headaches before    Surgical-None     FH:  No autoimmune disorders, psoriasis, UC, crohn's, RA, PsA, gout, autoimmune thyroid.  No MS, heart disease or cancer in family  Mother-factor V leiden carrier, rosea   Father-healthy  Siblings-younger bro healthy  Children-None   GF-lupus   Uncle cancer colon    SH:  Social Alcohol occasional week. Never Smoking. No IVDU. No Children. Right handed. Single--not sexually active, never.        PMSH personally reviewed and updated by me.    ROS:  A comprehensive ROS was done. Positives are per HPI.      Ph. E:    Ht 1.753 m (5' 9\")   Wt 114.3 kg (252 lb)   BMI 37.21 kg/m      Constitutional: NAD, pleasant  Eyes: nl EOM, conjunctiva, sclera  Skin: no rash  Neuro: grossly non focal  Psych: nl affect  MSK: no active synovitis       Labs/Imaging:  Normal G6PD  Neg MTB QG 2016 and hepatitis panel, negative HIV    XR hands and knees-negative 2016   Component      Latest Ref Rng & Units 11/17/2020   WBC      4.0 - 11.0 10e9/L 9.6   RBC Count      3.8 - 5.2 10e12/L 4.72   Hemoglobin      11.7 - 15.7 g/dL 13.8   Hematocrit      35.0 - 47.0 % 42.9   MCV      78 - 100 fl 91   MCH      26.5 - 33.0 pg 29.2   MCHC      31.5 - 36.5 g/dL 32.2   RDW      10.0 - 15.0 % 12.9   Platelet Count      150 - 450 10e9/L 389   Creatinine      0.52 - 1.04 mg/dL 0.66   GFR Estimate      >60 mL/min/1.73:m2 >90   GFR Estimate If Black      >60 mL/min/1.73:m2 >90   CRP Inflammation      0.0 - 8.0 mg/L 9.0 (H)   Albumin      3.4 - 5.0 g/dL 3.4   ALT      0 - 50 U/L 16   AST      0 - 45 U/L 10     Component      Latest Ref Rng " 12/6/2023  8:20 AM   WBC      4.0 - 11.0 10e3/uL 6.1    RBC Count      3.80 - 5.20 10e6/uL 4.98    Hemoglobin      11.7 - 15.7 g/dL 15.0    Hematocrit      35.0 - 47.0 % 46.4    MCV      78 - 100 fL 93    MCH      26.5 - 33.0 pg 30.1    MCHC      31.5 - 36.5 g/dL 32.3    RDW      10.0 - 15.0 % 11.7    Platelet Count      150 - 450 10e3/uL 289    % Neutrophils      % 52    % Lymphocytes      % 37    % Monocytes      % 10    % Eosinophils      % 2    % Basophils      % 0    % Immature Granulocytes      % 0    Absolute Neutrophils      1.6 - 8.3 10e3/uL 3.2    Absolute Lymphocytes      0.8 - 5.3 10e3/uL 2.3    Absolute Monocytes      0.0 - 1.3 10e3/uL 0.6    Absolute Eosinophils      0.0 - 0.7 10e3/uL 0.1    Absolute Basophils      0.0 - 0.2 10e3/uL 0.0    Absolute Immature Granulocytes      <=0.4 10e3/uL 0.0    Cholesterol      <200 mg/dL 262 (H)    Triglycerides      <150 mg/dL 228 (H)    HDL Cholesterol      >=50 mg/dL 63    LDL Cholesterol Calculated      <=100 mg/dL 153 (H)    Non HDL Cholesterol      <130 mg/dL 199 (H)    Patient Fasting? Yes    Creatinine      0.51 - 0.95 mg/dL 0.85    GFR Estimate      >60 mL/min/1.73m2 >90    CRP Inflammation      <5.00 mg/L <3.00    Sed Rate      0 - 20 mm/hr 6    ALT      0 - 50 U/L 17    Albumin      3.5 - 5.2 g/dL 4.2    AST      0 - 45 U/L 20       Legend:  (H) High        Study Result    Narrative & Impression   Thoracic and Lumbar spine MRI without contrast     History: Rheumatoid arthritis of multiple sites with negative  rheumatoid factor (H); Pain in thoracic spine.  ICD-10: Rheumatoid arthritis of multiple sites with negative  rheumatoid factor (H); Pain in thoracic spine (accession [id number]),  Rheumatoid arthritis of multiple sites with negative rheumatoid factor  (H); Chronic bilateral low back pain, unspecified whether sciatica  present; Chronic bilateral low back pain, unspecified whether sciatica  present (accession [id number]).     Comparison: None.      Technique:  Axial T2-weighted, and sagittal T1, STIR, and T2-weighted  images of the lumbar spine without intravenous contrast. Sagittal T1,  STIR, and T2-weighted images of the thoracic spine without contrast  were obtained, with axial T2-weighted images through levels of  interest in the thoracic spine.      Findings:  Thoracic Spine: There is no definite abnormal signal in the thoracic  spinal cord at any level.   Anterior wedge-shaped compression deformity of T6 and T8. Schmorl's  deformity at the T7 inferior endplate.  Alignment of the thoracic vertebra appears within normal limits. Bone  marrow signal intensity on noncontrast images appears unremarkable.  Multilevel vertebral body hemangiomas.     Lumbar Spine:  There are 5 type lumbar vertebra, used for the purposes of this  dictation.  The tip of the conus is at approximately L1. The alignment  of the lumbar spine is unremarkable. Multilevel vertebral body  hemangiomas.     On a level by level basis, the findings are as follows:     L2-3:  There is no focal abnormality.     L3-4:  There is no focal abnormality.     L4-5:  There is no focal abnormality.     L5-S1: Central disc protrusion. No significant spinal canal or  neuroforamina stenosis.                                                                      Impression:   1. Chronic anterior wedge-shaped compression deformity of T6 and T8.  Schmorl's deformity at the T7 inferior at T9 superior endplate.  2. Central disc protrusion at L5-S1. No significant spinal canal or  neuroforamina stenosis at any level.     PLACIDO DE ANDA MD         SYSTEM ID:  [id number]     Impression/Plan:  1. Seropositive (pos anti-CCP, neg RF )rheumatoid arthritis (RA) non-erosive. Active RA on methotrexate 12.5 mg weekly, worse after stopping HCQ due to diarrhea (and never was successful to get generic brand HCQ, brand is too expensive). Did not tolerate MTX even at lowest effective dose of 10 mg qwk due to nausea, headaches,  brain fog and nausea. Was placed on humira q2wk, initially thought it was helping but after feeling normal by taking prednisone taper ( 20 mg every day max) realized that humira was not helping much.    8/2022: Recommend to proceed with actemra as next step; risks were discussed.    I support her using medical cannabis, no interaction with RA meds and could be beneficial as non steroid agent to help managing pain, it might have some anti-inflammatory benefit and it is not addictive. I advsied her to reach out to her PCP jenny nichols for Rx.    3/24/2023: Last humira 8/13/2022. On actemra since 10/2022, it took >month to get it insurance approved. It is helping her more and will continue with that. No SEs.    10/5/2023: Doing well on Actemra, plan to continue. Lact cholesterol in 6/2023 elevated (elevated at baseline), could potentially be impacted by Actemra. Labs due 12/2023 (including fasting lipid panel) and follow up video visit in 6 months.     4/5/2024: RA is well controlled on actemra, main issue is back pain.    10/2/2024; Stable RA on actemra    2. Vitamin D deficeincy- take 2000 international unit(s) day.     3. Pityriasis lichenoids chronic-per derm     5. Chronic NSAID use. Will check labs q6-12 mo.    6. Worsening back pain. NL ESR/CRP, neg HLA-B27. No signs of inflammatory arthritis on T/L spine MRI 2/2024. Just had trigger point inj, PCP ordered CK. She is going to seek 2nd opinion for pain management. Robaxin helps some. Pain significantly interfering with her life, saw 2 different spine doctor. I advised her to discuss LDN with pain management.  Used TENS units 2-3 times/ wk x past year, sometimes it helps.   Worried about trying gabapentin and she is worried about potential SE of fatigue/brain fog. Worried about potential SE of weight gain on lyrica.    10/2/2024: improved with trigger point inj    7. Actemra monitoring labs. Labs every 6 months, due 12/2024      Today 10/2/2024: New Dx of EDS, trigger  point inj has helped a lot with pain, has good experience at [facility], water aerobic has helped as well. Overall, better pain control today, which is good news.    RA is well controlled n actemra, will continue. No SE, will follow up with PCP for high cholesterol.    Plan 10/2/2024:    Continue actemra qwk inj    Labs every 6 months, due in 12/2024    Return in a year in person      TT 30 min was spent on date of the encounter doing chart review, history and exam, documentation and further activities as noted above. Any prior notes, outside records, laboratory results, and imaging studies were reviewed if relevant.    The longitudinal plan of care for the diagnosis(es)/condition(s) as documented were addressed during this visit. Due to the added complexity in care, I will continue to support Rachel in the subsequent management and with ongoing continuity of care.      Naresh Aldridge MD    Orders Placed This Encounter   Procedures     ALT     Albumin level     AST     Creatinine     CBC with Platelets & Differential            Again, thank you for allowing me to participate in the care of your patient.      Sincerely,    Naresh Aldridge MD

## 2024-10-02 NOTE — NURSING NOTE
Current patient location:  at work, [place]     Is the patient currently in the state of [place]? YES    Visit mode:VIDEO    If the visit is dropped, the patient can be reconnected by: VIDEO VISIT: Text to cell phone:   Telephone Information:   Mobile [phone]    and VIDEO VISIT: Send to e-mail at: [email or web address]    Will anyone else be joining the visit? NO  (If patient encounters technical issues they should call [phone])    Are changes needed to the allergy or medication list? No    Patient denies any changes and states that all information remains accurate since last reviewed/verified.     Are refills needed on medications prescribed by this physician? NO    Rooming Documentation:  PHQ declined, completed last week per pt     Vitals were checked in clinic last week per pt     Reason for visit: RECHECK    John Tristan MA VVF

## 2024-10-09 ENCOUNTER — TELEPHONE (OUTPATIENT)
Dept: RHEUMATOLOGY | Facility: CLINIC | Age: 34
End: 2024-10-09
Payer: COMMERCIAL

## 2024-10-09 NOTE — PROGRESS NOTES
10/09/24 9:30 AM : Appointment reminder phone call made to patient.Pt verbalized understanding  Sophia Noyola CMA

## 2024-10-09 NOTE — TELEPHONE ENCOUNTER
Left Voicemail (1st Attempt) and Sent Mychart (1st Attempt) for the patient to call back and schedule the following:    Appointment type: Return Rheumatology  Provider: Dr. Aldridge  Return date: October 2025  Specialty phone number: [phone]  Additional appointment(s) needed: NA  Additonal Notes: NA

## 2024-10-10 ENCOUNTER — MYC REFILL (OUTPATIENT)
Dept: INTERNAL MEDICINE | Facility: CLINIC | Age: 34
End: 2024-10-10

## 2024-10-10 ENCOUNTER — OFFICE VISIT (OUTPATIENT)
Dept: ENDOCRINOLOGY | Facility: CLINIC | Age: 34
End: 2024-10-10
Attending: PEDIATRICS
Payer: COMMERCIAL

## 2024-10-10 ENCOUNTER — PRE VISIT (OUTPATIENT)
Dept: ENDOCRINOLOGY | Facility: CLINIC | Age: 34
End: 2024-10-10

## 2024-10-10 VITALS
WEIGHT: 251 LBS | SYSTOLIC BLOOD PRESSURE: 130 MMHG | BODY MASS INDEX: 37.18 KG/M2 | DIASTOLIC BLOOD PRESSURE: 82 MMHG | HEART RATE: 88 BPM | OXYGEN SATURATION: 99 % | HEIGHT: 69 IN

## 2024-10-10 DIAGNOSIS — S22.000S CLOSED COMPRESSION FRACTURE OF THORACIC VERTEBRA, SEQUELA: ICD-10-CM

## 2024-10-10 DIAGNOSIS — S22.000A COMPRESSION FRACTURE OF THORACIC VERTEBRA, UNSPECIFIED THORACIC VERTEBRAL LEVEL, INITIAL ENCOUNTER (H): Primary | ICD-10-CM

## 2024-10-10 DIAGNOSIS — F90.2 ATTENTION DEFICIT HYPERACTIVITY DISORDER (ADHD), COMBINED TYPE: ICD-10-CM

## 2024-10-10 DIAGNOSIS — T38.0X5A STEROID-INDUCED OSTEOPOROSIS: ICD-10-CM

## 2024-10-10 DIAGNOSIS — M81.8 STEROID-INDUCED OSTEOPOROSIS: ICD-10-CM

## 2024-10-10 PROCEDURE — 99204 OFFICE O/P NEW MOD 45 MIN: CPT | Mod: GC | Performed by: INTERNAL MEDICINE

## 2024-10-10 RX ORDER — DEXAMETHASONE 1 MG
1 TABLET ORAL ONCE
Qty: 1 TABLET | Refills: 0 | Status: SHIPPED | OUTPATIENT
Start: 2024-10-10 | End: 2024-10-10

## 2024-10-10 ASSESSMENT — ENCOUNTER SYMPTOMS
BACK PAIN: 1
UNEXPECTED WEIGHT CHANGE: 1

## 2024-10-10 ASSESSMENT — PAIN SCALES - GENERAL: PAINLEVEL: MILD PAIN (3)

## 2024-10-10 NOTE — PATIENT INSTRUCTIONS
Bisphosphonates  - Oral Alendronate  - Zoledronic Acid (Reclast)     - Teriparatide / Abaloparatide for 2 years and then followed by Zoledronic Acid

## 2024-10-10 NOTE — LETTER
10/10/2024       RE: Rachel Fried  [address]  [facility] [address]     Dear Colleague,    Thank you for referring your patient, Rachel Fried, to the [facility] ENDOCRINOLOGY [facility] [place] at [facility]. Please see a copy of my visit note below.    10/09/24 9:30 AM : Appointment reminder phone call made to patient.Pt verbalized understanding  Sophia Noyola, DEVIN      Endocrinology and Diabetes      Rachel Fried is a 33 year old yo female who is being referred for: Thoracic Fracture    Medical History Pertinent to this consultation includes: Rheumatoid Arthritis since at age 25, ADHD and Depression    She has had pain in her back since age 13. This worsened around 2022 where the intensity was increasing and more disabling.     On review of her chart, she has been prescribed with Prednisone tapers since 2019. She has gotten about 8 prescribed tapers since then starting with either 15-20 mg.     April 2024 she started with glucocorticoid injections, she is receiving steroid injections in her back every 3 months and has received injections in her thighs just once.     She tells me that she has had problems with regulating her body temperature. Heat intolerance. This has been ongoing since 2022.     She also had had increasing weight.  Pre pandemic she was more active, she was weighing about 200 lbs. Around 2022  she started to gain weight to around 230 and then it further increased to 250-260.     She has not noticed any changes in facial appearance other than her face being keane. No changes in size of hands or feet. She will notice that sometimes her rings do not fit.     Vitamin D 2,000 international units daily  Calcium intake. Maybe 2-3 servings of dairy products daily.     She has been on OCP since age 15. She had been of OCP for about 6 months and noticed increasing acnei and periods were heavier and longer.     2 weeks ago was her last steroid  injection.     Prior laboratory studies   Latest Ref Rn 3/1/2024  10:43 AM 6/15/2024  10:46 AM   ENDO CALCIUM LABS-[facility]      Vitamin D, Total (25-Hydroxy) 20 - 50 ng/mL 39     Albumin 3.4 - 5.0 g/dL     Albumin 3.5 - 5.2 g/dL 4.3  4.3    Alkaline Phosphatase 40 - 150 U/L 47     Calcium 8.6 - 10.0 mg/dL 9.3     Calcium Urine g/g Cr 0.05 - 0.27 g/g Cr     Calcium Urine mg/dL mg/dL     CK Total 26 - 192 U/L  51    Urea Nitrogen 7 - 30 mg/dL     Urea Nitrogen 6.0 - 20.0 mg/dL 13.3     Creatinine 0.51 - 0.95 mg/dL 0.72  0.81    Parathyroid Hormone Intact 15 - 65 pg/mL 31          Latest Ref Rn 3/1/2024  10:43 AM 6/15/2024  10:46 AM   ENDO CALCIUM LABS-UMP      Vitamin D, Total (25-Hydroxy) 20 - 50 ng/mL 39     Albumin 3.4 - 5.0 g/dL     Albumin 3.5 - 5.2 g/dL 4.3  4.3    Alkaline Phosphatase 40 - 150 U/L 47     Calcium 8.6 - 10.0 mg/dL 9.3     Calcium Urine g/g Cr 0.05 - 0.27 g/g Cr     Calcium Urine mg/dL mg/dL     CK Total 26 - 192 U/L  51    Urea Nitrogen 7 - 30 mg/dL     Urea Nitrogen 6.0 - 20.0 mg/dL 13.3     Creatinine 0.51 - 0.95 mg/dL 0.72  0.81    Parathyroid Hormone Intact 15 - 65 pg/mL 31           Prior imaging studies    Narrative & Impression   EXAM: DX HIP/PELVIS/SPINE  LOCATION: [facility]  DATE: 2/28/2024     INDICATION: BMD screening, baseline. History of steroid use. History of thoracic compression fracture.  DEMOGRAPHICS: Age- 33 years. Gender- Female. Menopausal status- Premenopausal.  COMPARISON: No prior studies available on the current scanner.  TECHNIQUE: Dual-energy x-ray absorptiometry (DXA) performed with routine technique.      FINDINGS:     DXA RESULTS  -Lumbar Spine: L1-L4:       BMD: 1.158 g/cm2. T-score: -0.3. Z-score: -1.5.  -RIGHT Hip Total:               BMD: 0.995 g/cm2. T-score: -0.1. Z-score: -0.8.  -RIGHT Hip Femoral neck: BMD: 1.033 g/cm2. T-score: 0.0. Z-score: -0.5.  -LEFT Hip Total:                  BMD: 0.971 g/cm2. T-score: -0.3. Z-score:  "-1.0.  -LEFT Hip Femoral neck:    BMD: 1.008 g/cm2. T-score: -0.2. Z-score: -0.7.     MRI Thoracic Spine 2/20/2024   Anterior wedge-shaped compression deformity of T6 and T8.     Allergies   Allergen Reactions     Sulfa Antibiotics Hives and Itching       Past Medical History:   Diagnosis Date     Allergic dermatitis      Anxiety      Depression      Pityriasis lichenoides      Rheumatoid arthritis (H)        History reviewed. No pertinent surgical history.    Social History     Tobacco Use     Smoking status: Never     Passive exposure: Never     Smokeless tobacco: Never   Vaping Use     Vaping status: Never Used   Substance Use Topics     Alcohol use: Yes     Alcohol/week: 1.0 standard drink of alcohol     Types: 1 Glasses of wine per week     Comment: 3 drinks per month     Drug use: No         Review of Systems   Constitutional:  Positive for unexpected weight change.   Musculoskeletal:  Positive for back pain.   All other systems reviewed and are negative.       Objective    /82   Pulse 88   Ht 1.74 m (5' 8.5\")   Wt 113.9 kg (251 lb)   SpO2 99%   BMI 37.60 kg/m       Wt Readings from Last 3 Encounters:   10/10/24 113.9 kg (251 lb)   10/02/24 114.3 kg (252 lb)   09/24/24 114.7 kg (252 lb 14.4 oz)        Body mass index is 37.6 kg/m .    Physical Exam  No facial plethora  No supraclavicular or dorsocervical fat pads  No wide violaceous striae in arms or abdomen      Assessment.       ICD-10-CM    1. Compression fracture of thoracic vertebra, unspecified thoracic vertebral level, initial encounter (H)  S22.000A Protein electrophoresis     IgA     Tissue transglutaminase nancy IgA and IgG     Cortisol, Dexamethasone Suppression     dexAMETHasone (DECADRON) 1 MG tablet     Pharmacy Liaison for Medication Coverage      2. Closed compression fracture of thoracic vertebra, sequela  S22.000S Adult Endocrinology  Referral           Plan    Closed compression fracture of thoracic vertebra, sequela  She " has developed spontaneous thoracic spine fractures. DXA did not reveal low bone density for age, but clinical diagnosis would be that of osteoporosis.     She has had a normal TSH and negative evaluation for Primary Hyperparathyroidism. Reviewed on doing additional testing to include testing for celiac and monoclonal plasma cell disorders with an SPEP.     Main risk factor here for osteoporosis would be exogenous steroid use. She is concerned about the possibility of endogenous hypercortisolism. We discussed we can do a 1 mg DST as a rule out test, knowing that her use of OCP could be associated with false positives, in which case additional testing would be warranted.     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 We discussed on treatment with either upfront zoledronic acid vs PTH/PHTrp analogs followed by zoledronic acid.              Nestor Morrison MD  Endocrinology Fellow      Again, thank you for allowing me to participate in the care of your patient.      Sincerely,    Nestor Caballero MD

## 2024-10-10 NOTE — NURSING NOTE
"Chief Complaint   Patient presents with    New Patient     Bone and calcium, chronic back pain, compression demformities     Vital signs:      BP: 130/82 Pulse: 88     SpO2: 99 %     Height: 174 cm (5' 8.5\") Weight: 113.9 kg (251 lb)  Estimated body mass index is 37.6 kg/m  as calculated from the following:    Height as of this encounter: 1.74 m (5' 8.5\").    Weight as of this encounter: 113.9 kg (251 lb).        "

## 2024-10-10 NOTE — ASSESSMENT & PLAN NOTE
She has developed spontaneous thoracic spine fractures. DXA did not reveal low bone density for age, but clinical diagnosis would be that of osteoporosis.     She has had a normal TSH and negative evaluation for Primary Hyperparathyroidism. Reviewed on doing additional testing to include testing for celiac and monoclonal plasma cell disorders with an SPEP.     Main risk factor here for osteoporosis would be exogenous steroid use. She is concerned about the possibility of endogenous hypercortisolism. We discussed we can do a 1 mg DST as a rule out test, knowing that her use of OCP could be associated with false positives, in which case additional testing would be warranted.     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 We discussed on treatment with either upfront zoledronic acid vs PTH/PHTrp analogs followed by zoledronic acid.

## 2024-10-10 NOTE — PROGRESS NOTES
Endocrinology and Diabetes      Rachel Fried is a 33 year old yo female who is being referred for: Thoracic Fracture    Medical History Pertinent to this consultation includes: Rheumatoid Arthritis since at age 25, ADHD and Depression    She has had pain in her back since age 13. This worsened around 2022 where the intensity was increasing and more disabling.     On review of her chart, she has been prescribed with Prednisone tapers since 2019. She has gotten about 8 prescribed tapers since then starting with either 15-20 mg.     April 2024 she started with glucocorticoid injections, she is receiving steroid injections in her back every 3 months and has received injections in her thighs just once.     She tells me that she has had problems with regulating her body temperature. Heat intolerance. This has been ongoing since 2022.     She also had had increasing weight.  Pre pandemic she was more active, she was weighing about 200 lbs. Around 2022  she started to gain weight to around 230 and then it further increased to 250-260.     She has not noticed any changes in facial appearance other than her face being keane. No changes in size of hands or feet. She will notice that sometimes her rings do not fit.     Vitamin D 2,000 international units daily  Calcium intake. Maybe 2-3 servings of dairy products daily.     She has been on OCP since age 15. She had been of OCP for about 6 months and noticed increasing acnei and periods were heavier and longer.     2 weeks ago was her last steroid injection.     Prior laboratory studies   Latest Ref Rng 3/1/2024  10:43 AM 6/15/2024  10:46 AM   ENDO CALCIUM LABS-[facility]      Vitamin D, Total (25-Hydroxy) 20 - 50 ng/mL 39     Albumin 3.4 - 5.0 g/dL     Albumin 3.5 - 5.2 g/dL 4.3  4.3    Alkaline Phosphatase 40 - 150 U/L 47     Calcium 8.6 - 10.0 mg/dL 9.3     Calcium Urine g/g Cr 0.05 - 0.27 g/g Cr     Calcium Urine mg/dL mg/dL     CK Total 26 - 192 U/L  51    Urea Nitrogen 7 - 30  mg/dL     Urea Nitrogen 6.0 - 20.0 mg/dL 13.3     Creatinine 0.51 - 0.95 mg/dL 0.72  0.81    Parathyroid Hormone Intact 15 - 65 pg/mL 31          Latest Ref Rng 3/1/2024  10:43 AM 6/15/2024  10:46 AM   ENDO CALCIUM LABS-[facility]      Vitamin D, Total (25-Hydroxy) 20 - 50 ng/mL 39     Albumin 3.4 - 5.0 g/dL     Albumin 3.5 - 5.2 g/dL 4.3  4.3    Alkaline Phosphatase 40 - 150 U/L 47     Calcium 8.6 - 10.0 mg/dL 9.3     Calcium Urine g/g Cr 0.05 - 0.27 g/g Cr     Calcium Urine mg/dL mg/dL     CK Total 26 - 192 U/L  51    Urea Nitrogen 7 - 30 mg/dL     Urea Nitrogen 6.0 - 20.0 mg/dL 13.3     Creatinine 0.51 - 0.95 mg/dL 0.72  0.81    Parathyroid Hormone Intact 15 - 65 pg/mL 31           Prior imaging studies    Narrative & Impression   EXAM: DX HIP/PELVIS/SPINE  LOCATION: [facility]  DATE: 2/28/2024     INDICATION: BMD screening, baseline. History of steroid use. History of thoracic compression fracture.  DEMOGRAPHICS: Age- 33 years. Gender- Female. Menopausal status- Premenopausal.  COMPARISON: No prior studies available on the current scanner.  TECHNIQUE: Dual-energy x-ray absorptiometry (DXA) performed with routine technique.      FINDINGS:     DXA RESULTS  -Lumbar Spine: L1-L4:       BMD: 1.158 g/cm2. T-score: -0.3. Z-score: -1.5.  -RIGHT Hip Total:               BMD: 0.995 g/cm2. T-score: -0.1. Z-score: -0.8.  -RIGHT Hip Femoral neck: BMD: 1.033 g/cm2. T-score: 0.0. Z-score: -0.5.  -LEFT Hip Total:                  BMD: 0.971 g/cm2. T-score: -0.3. Z-score: -1.0.  -LEFT Hip Femoral neck:    BMD: 1.008 g/cm2. T-score: -0.2. Z-score: -0.7.     MRI Thoracic Spine 2/20/2024   Anterior wedge-shaped compression deformity of T6 and T8.     Allergies   Allergen Reactions    Sulfa Antibiotics Hives and Itching       Past Medical History:   Diagnosis Date    Allergic dermatitis     Anxiety     Depression     Pityriasis lichenoides     Rheumatoid arthritis (H)        History reviewed. No pertinent surgical  "history.    Social History     Tobacco Use    Smoking status: Never     Passive exposure: Never    Smokeless tobacco: Never   Vaping Use    Vaping status: Never Used   Substance Use Topics    Alcohol use: Yes     Alcohol/week: 1.0 standard drink of alcohol     Types: 1 Glasses of wine per week     Comment: 3 drinks per month    Drug use: No         Review of Systems   Constitutional:  Positive for unexpected weight change.   Musculoskeletal:  Positive for back pain.   All other systems reviewed and are negative.       Objective    /82   Pulse 88   Ht 1.74 m (5' 8.5\")   Wt 113.9 kg (251 lb)   SpO2 99%   BMI 37.60 kg/m       Wt Readings from Last 3 Encounters:   10/10/24 113.9 kg (251 lb)   10/02/24 114.3 kg (252 lb)   09/24/24 114.7 kg (252 lb 14.4 oz)        Body mass index is 37.6 kg/m .    Physical Exam  No facial plethora  No supraclavicular or dorsocervical fat pads  No wide violaceous striae in arms or abdomen      Assessment.       ICD-10-CM    1. Compression fracture of thoracic vertebra, unspecified thoracic vertebral level, initial encounter (H)  S22.000A Protein electrophoresis     IgA     Tissue transglutaminase nancy IgA and IgG     Cortisol, Dexamethasone Suppression     dexAMETHasone (DECADRON) 1 MG tablet     Pharmacy Liaison for Medication Coverage      2. Closed compression fracture of thoracic vertebra, sequela  S22.000S Adult Endocrinology  Referral           Plan    Closed compression fracture of thoracic vertebra, sequela  She has developed spontaneous thoracic spine fractures. DXA did not reveal low bone density for age, but clinical diagnosis would be that of osteoporosis.     She has had a normal TSH and negative evaluation for Primary Hyperparathyroidism. Reviewed on doing additional testing to include testing for celiac and monoclonal plasma cell disorders with an SPEP.     Main risk factor here for osteoporosis would be exogenous steroid use. She is concerned about the " possibility of endogenous hypercortisolism. We discussed we can do a 1 mg DST as a rule out test, knowing that her use of OCP could be associated with false positives, in which case additional testing would be warranted.     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 We discussed on treatment with either upfront zoledronic acid vs PTH/PHTrp analogs followed by zoledronic acid.              Nestor Morrison MD  Endocrinology Fellow

## 2024-10-11 RX ORDER — DEXTROAMPHETAMINE SACCHARATE, AMPHETAMINE ASPARTATE, DEXTROAMPHETAMINE SULFATE AND AMPHETAMINE SULFATE 1.25; 1.25; 1.25; 1.25 MG/1; MG/1; MG/1; MG/1
2.5 TABLET ORAL DAILY PRN
Qty: 15 TABLET | Refills: 0 | Status: SHIPPED | OUTPATIENT
Start: 2024-10-11

## 2024-10-11 NOTE — TELEPHONE ENCOUNTER
Patient confirmed scheduled appointment:  Date: October 2nd 2025  Time: 10am  Visit type: Return Rheumatology  Provider: Dr. Aldridge  Location: [facility]  Testing/imaging: NA  Additional notes: NA

## 2024-10-12 ENCOUNTER — LAB (OUTPATIENT)
Dept: LAB | Facility: CLINIC | Age: 34
End: 2024-10-12
Payer: COMMERCIAL

## 2024-10-12 DIAGNOSIS — S22.000A COMPRESSION FRACTURE OF THORACIC VERTEBRA, UNSPECIFIED THORACIC VERTEBRAL LEVEL, INITIAL ENCOUNTER (H): ICD-10-CM

## 2024-10-12 LAB
CORTIS 8H P 1 MG DEX SERPL-MCNC: 0.6 UG/DL
TOTAL PROTEIN SERUM FOR ELP: 7.6 G/DL (ref 6.4–8.3)

## 2024-10-12 PROCEDURE — 99000 SPECIMEN HANDLING OFFICE-LAB: CPT | Performed by: PATHOLOGY

## 2024-10-12 PROCEDURE — 84165 PROTEIN E-PHORESIS SERUM: CPT | Mod: TC | Performed by: PATHOLOGY

## 2024-10-12 PROCEDURE — 84165 PROTEIN E-PHORESIS SERUM: CPT | Mod: 26 | Performed by: PATHOLOGY

## 2024-10-12 PROCEDURE — 86364 TISS TRNSGLTMNASE EA IG CLAS: CPT | Performed by: INTERNAL MEDICINE

## 2024-10-12 PROCEDURE — 84155 ASSAY OF PROTEIN SERUM: CPT | Performed by: PATHOLOGY

## 2024-10-12 PROCEDURE — 82533 TOTAL CORTISOL: CPT | Performed by: INTERNAL MEDICINE

## 2024-10-12 PROCEDURE — 82784 ASSAY IGA/IGD/IGG/IGM EACH: CPT | Performed by: INTERNAL MEDICINE

## 2024-10-12 PROCEDURE — 36415 COLL VENOUS BLD VENIPUNCTURE: CPT | Performed by: PATHOLOGY

## 2024-10-14 LAB
ALBUMIN SERPL ELPH-MCNC: 4.6 G/DL (ref 3.7–5.1)
ALPHA1 GLOB SERPL ELPH-MCNC: 0.3 G/DL (ref 0.2–0.4)
ALPHA2 GLOB SERPL ELPH-MCNC: 0.7 G/DL (ref 0.5–0.9)
B-GLOBULIN SERPL ELPH-MCNC: 1 G/DL (ref 0.6–1)
GAMMA GLOB SERPL ELPH-MCNC: 1 G/DL (ref 0.7–1.6)
IGA SERPL-MCNC: 151 MG/DL (ref 84–499)
M PROTEIN SERPL ELPH-MCNC: 0 G/DL
PROT PATTERN SERPL ELPH-IMP: NORMAL
TTG IGA SER-ACNC: 0.2 U/ML
TTG IGG SER-ACNC: <0.6 U/ML

## 2024-10-16 NOTE — PROGRESS NOTES
Results reviewed.     1 mg DST was negative as was SPEP and Celiac Screening.     Given history of spontaneous vertebral fractures and history of intermittent glucocorticoid exposure, working diagnosis is     Glucocorticoid Induced Osteoporosis.     In clinic we had discussed the intention of treatment with anabolic therapy with PTH/PTHrp analogs followed by zoledronic acid.     Will submit prescription for Abaloparatide.     Nestor Morrison MD  Endocrinology Fellow

## 2024-10-16 NOTE — TELEPHONE ENCOUNTER
How Severe Are Your Spot(S)?: moderate Per Dr Mcmullen patient needs to be seen for form completion. Our forms coordinator called patient this morning and left a message to call our office.    What Is The Reason For Today's Visit?: Skin Lesions What Is The Reason For Today's Visit? (Being Monitored For X): concerning skin lesions on an annual basis

## 2024-10-17 ENCOUNTER — TELEPHONE (OUTPATIENT)
Dept: ENDOCRINOLOGY | Facility: CLINIC | Age: 34
End: 2024-10-17
Payer: COMMERCIAL

## 2024-10-17 NOTE — TELEPHONE ENCOUNTER
Prior Authorization Approval    Medication: TYMLOS 3120 MCG/1.56ML SC SOPN  Authorization Effective Date: 10/17/2024  Authorization Expiration Date: 10/17/2026  Approved Dose/Quantity: 1.56 monthly for 2 years  Reference #: [id number]   Insurance Company: [employer] - Phone [phone] Fax [phone]  Expected CoPay: $    CoPay Card Available:      Financial Assistance Needed: n  [facility] [address]  Pharmacy Notified: yes  Patient Notified: yes

## 2024-10-17 NOTE — TELEPHONE ENCOUNTER
PA Initiation    Medication: TYMLOS 3120 MCG/1.56ML SC SOPN  Insurance Company: [employer] - Phone [phone] Fax [phone]  Pharmacy Filling the Rx:    Filling Pharmacy Phone:    Filling Pharmacy Fax:    Start Date: 10/17/2024

## 2024-10-17 NOTE — TELEPHONE ENCOUNTER
Called patient and offered [facility], she agreed as she gets other medications from [facility].  Patient has a zero copay at this time but after the new year we can help her with a copay card.  Tymlos is approved for 2 years. Until 10/17/2026

## 2024-10-23 PROBLEM — Q79.60 EDS (EHLERS-DANLOS SYNDROME): Status: ACTIVE | Noted: 2024-10-23

## 2024-10-23 PROBLEM — M25.50 HYPERMOBILITY ARTHRALGIA: Status: ACTIVE | Noted: 2024-10-23

## 2024-10-23 PROBLEM — M35.7 HYPERMOBILITY SYNDROME: Status: ACTIVE | Noted: 2024-02-16

## 2024-11-06 ENCOUNTER — THERAPY VISIT (OUTPATIENT)
Dept: OCCUPATIONAL THERAPY | Facility: CLINIC | Age: 34
End: 2024-11-06
Payer: COMMERCIAL

## 2024-11-06 DIAGNOSIS — Q79.60 EDS (EHLERS-DANLOS SYNDROME): ICD-10-CM

## 2024-11-06 DIAGNOSIS — M79.642 PAIN IN BOTH HANDS: ICD-10-CM

## 2024-11-06 DIAGNOSIS — M79.641 PAIN IN BOTH HANDS: ICD-10-CM

## 2024-11-06 PROCEDURE — 97166 OT EVAL MOD COMPLEX 45 MIN: CPT | Mod: GO | Performed by: OCCUPATIONAL THERAPIST

## 2024-11-06 PROCEDURE — 97760 ORTHOTIC MGMT&TRAING 1ST ENC: CPT | Mod: GO | Performed by: OCCUPATIONAL THERAPIST

## 2024-11-06 NOTE — PROGRESS NOTES
OCCUPATIONAL THERAPY EVALUATION  Type of Visit: Evaluation    See electronic medical record for Abuse and Falls Screening details.    Diagnosis: hypermobility polyarthralgia syndrome; EDS; B hand pain    Precautions: back pain, hypermobile C-spine  Patient is Right hand dominant    Subjective        Presenting condition or subjective complaint: Fingers are overextending.  Bilateral hand pain.  A big concern is the hands.  Sleeping positions potentially to discuss.  Date of onset: 09/27/24 (Orders)    Relevant medical history: (Patient-Rptd) Migraines or headaches; Pain at night or rest; Rheumatoid arthritis RA - well controlled at this time.     Dates & types of surgery: (Patient-Rptd) None    Prior diagnostic imaging/testing results: (Patient-Rptd) MRI; X-ray; Bone scan     Prior therapy history for the same diagnosis, illness or injury: (Patient-Rptd) No      Prior Level of Function  Transfers: Independent  Ambulation: Independent  ADL: Independent  IADL: IND in all IADLs    Living Environment:   Patient is independent at home and there are no concerns about accessibility and mobility in the home.    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 Otherwise at office.   Hobbies/Interests: (Patient-Rptd) Swimming, reading, crafts, walking my dogs  Crafting - all types. Scooby, painting, cross stitch. Reads a lot.  Has 2 small dogs. One of them does not tug much. The other - pt has a waist leash.  Does water aerobics and does hottub after that.    Adaptations: giant pens at work, scooby hook has foam. Has compression gloves which helps (has a wrist strap).     Patient goals for therapy: (Patient-Rptd) Type/write/ craft with less pain    Objective     Pt comments on function in activities:   Pt notes there was a bad day, at the end of the day, it can be hard to hold a fork.  Sleeping position - mainly a side sleeper. Bought a pregnancy pillow, and has many squishmallows - very precise  about alignment.  Note: Finger DIP joints hyperextend when patient is holding a pen.    OBJECTIVE:    Pain Level (Scale 0-10)  Operates at 4-5/10 level pretty consistently. Can be up to 7 (will still work).    Pain Description  Date 11/6/2024   Location B hands   Relieved by Uses ice, compression gloves     Edema:   Pt comments on edema: Ha some swelling in the hands sometimes. Only notices when putting on rings and they don't always fit.     Sensation   Will have some numbness and tingling in the pinky sometimes.     RANGE OF MOTION:    (If pain present, noted as mild +, moderate ++, severe +++)    Shoulders/Neck  Pt comments: shoulders are not as much of an issue/not as mobile.  Cervical spine is hypermobile.    Elbows    AROM Hypermobility level   per observation Pain   hyperextension Mild to moderate     Flexion WFL    Supination none     Pronation none       Wrists  Pt comments: will have some dorsal distal FA discomfort - level of DRUJ  Inspection:  ulnar sag, non prominent ulnar head, no gross deformities.  DRUJ stability testing: possible mild hypermobility  AROM  Hypermobility level   per observation Pain / comments   Extension  none     Flexion mild     RD mild     UD mild      Thumbs    AROM  L R   Pain / comments   MPj hyperextension mild  mild  Tends to pop/click on the right.  Will try to stop this   IP hyperextension severe  severe     CMC hypermobility mild  mild      Fingers    AROM and gentle PROM MPj PIPj DIPj    hyperextension   moderate  minimal  moderate     Instability in lateral<>medial plane mild mild  mild       Strength:  Pain-free /Pinch Test   11/6/2024    Trials L R   1   60 67     Lat Pinch 11/6/2024    Trials L R   1   15 14     3 Pt Pinch 11/6/2024    Trials L R   1   11 14      Measures for Finger 3 point splints (brand: [employer])  *=customized orthosis  11/6/2024 R L   Index PIP: 7*  DIP 4* (? Maybe 5) PIP: 7*      Photo:      Assessment & Plan   CLINICAL  IMPRESSIONS  Medical Diagnosis: ypermobility polyarthralgia syndrome; EDS; B hand pain    Treatment Diagnosis: Bilateral hand pain and hypermobility    Impression/Assessment: Pt is a 33 year old female presenting to Occupational Therapy due to condition as noted above.  The following significant findings have been identified: Pain and joint instability .  These identified deficits interfere with their ability to perform self care tasks, work tasks, recreational activities, household chores, and meal planning and preparation as compared to previous level of function.     Clinical Decision Making (Complexity):  Assessment of Occupational Performance: 5 or more Performance Deficits  Occupational Performance Limitations: dressing, hygiene and grooming, care of pets, home establishment and management, meal preparation and cleanup, work, and leisure activities  Clinical Decision Making (Complexity): Moderate complexity    PLAN OF CARE  Treatment Interventions:  Modalities:  US and Paraffin  Therapeutic Exercise:  AROM, Isometrics, and Stabilization  Neuromuscular re-education:  Proprioceptive Training and Kinesiotaping  Manual Techniques:  Myofascial release  Orthotic Fabrication:  Static, Finger based, Hand based, and Forearm based  Self Care:  Self Care Tasks, Ergonomic Considerations, and Work Tasks    Long Term Goals   OT Goal 1  Goal Description: Pt will be IND in a comprehensive program to manage pain of the B hands, thumbs, wrists and elbows, including 2 new custom orthoses, incorporating 2 new joint protection techniques and performing a pain free exercise program, in order to optimize self management of pain and joint instability during ADLs.  Target Date: 01/15/25      Frequency of Treatment: 6 visits  Duration of Treatment: In 10 weeks     Recommended Referrals to Other Professionals:  None at this time.  Education Assessment: Learner/Method: Patient;No Barriers to  Learning;Listening;Reading;Demonstration;Pictures/Video     Risks and benefits of evaluation/treatment have been explained.   Patient/Family/caregiver agrees with Plan of Care.     Evaluation Time:    OT Ami, Moderate Complexity Minutes (52344): 40       Signing Clinician: Christin Clayton OT

## 2024-11-11 ENCOUNTER — MYC REFILL (OUTPATIENT)
Dept: INTERNAL MEDICINE | Facility: CLINIC | Age: 34
End: 2024-11-11
Payer: COMMERCIAL

## 2024-11-11 DIAGNOSIS — F90.2 ATTENTION DEFICIT HYPERACTIVITY DISORDER (ADHD), COMBINED TYPE: ICD-10-CM

## 2024-11-11 RX ORDER — DEXTROAMPHETAMINE SACCHARATE, AMPHETAMINE ASPARTATE, DEXTROAMPHETAMINE SULFATE AND AMPHETAMINE SULFATE 1.25; 1.25; 1.25; 1.25 MG/1; MG/1; MG/1; MG/1
2.5 TABLET ORAL DAILY PRN
Qty: 15 TABLET | Refills: 0 | Status: CANCELLED | OUTPATIENT
Start: 2024-11-11

## 2024-11-27 ENCOUNTER — THERAPY VISIT (OUTPATIENT)
Dept: OCCUPATIONAL THERAPY | Facility: CLINIC | Age: 34
End: 2024-11-27
Payer: COMMERCIAL

## 2024-11-27 DIAGNOSIS — M79.642 BILATERAL HAND PAIN: Primary | ICD-10-CM

## 2024-11-27 DIAGNOSIS — M79.641 BILATERAL HAND PAIN: Primary | ICD-10-CM

## 2024-11-27 PROCEDURE — 97535 SELF CARE MNGMENT TRAINING: CPT | Mod: GO | Performed by: OCCUPATIONAL THERAPIST

## 2024-11-27 NOTE — PROGRESS NOTES
SOAP note information for 11/27/2024.  Please refer to the daily flowsheet for treatment today, total treatment time and time spent performing 1:1 timed codes.         Measures for THUMB SILVER RING orthoses:  11/27/2024 Right     Type of splint  ST1   Ring size(s) 11   PVX (U or spoon) U   Bracelet length  (Standard or long) standard   Bracelet clasp type Magnet      Measures for Finger SILVER RING orthoses:  Finger sizes:  11/27/2024 Right  proximal   distal   Index PIP 11 7.5   Middle PIP 11 8      Finger sizes:  11/27/2024 Right  proximal   distal   Index DIP 6.5 4   Middle DIP 6 5      Finger sizes:  11/27/2024 Left  proximal   distal   Index PIP 10.5 7             11/27/2024  Emailed form to orthotics and prosthetics

## 2024-12-13 DIAGNOSIS — J45.909 ASTHMA, UNSPECIFIED ASTHMA SEVERITY, UNSPECIFIED WHETHER COMPLICATED, UNSPECIFIED WHETHER PERSISTENT: ICD-10-CM

## 2024-12-18 RX ORDER — ALBUTEROL SULFATE 90 UG/1
2 INHALANT RESPIRATORY (INHALATION) EVERY 4 HOURS PRN
Qty: 6.7 G | Refills: 5 | Status: SHIPPED | OUTPATIENT
Start: 2024-12-18

## 2024-12-26 ENCOUNTER — MYC MEDICAL ADVICE (OUTPATIENT)
Dept: INTERNAL MEDICINE | Facility: CLINIC | Age: 34
End: 2024-12-26
Payer: COMMERCIAL

## 2024-12-26 DIAGNOSIS — M79.641 PAIN IN BOTH HANDS: ICD-10-CM

## 2024-12-26 DIAGNOSIS — M79.642 PAIN IN BOTH HANDS: ICD-10-CM

## 2024-12-26 DIAGNOSIS — Q79.60 EDS (EHLERS-DANLOS SYNDROME): Primary | ICD-10-CM

## 2024-12-26 NOTE — CONFIDENTIAL NOTE
"Received    Christin Clayton OT Farrell, Michael Henry, MD  Could you please enter an order in Epic under:    \"Orthotics, Mastectomy and Custom Compression Order for DME/Only for DME\"    Comments: custom silver ring splints for the fingers and thumbs of bilateral hands.    * For insurance reasons, this order needs to be electronically signed by the physician, not staff.         Thanks  Tory"

## 2024-12-30 ENCOUNTER — MYC MEDICAL ADVICE (OUTPATIENT)
Dept: OCCUPATIONAL THERAPY | Facility: CLINIC | Age: 34
End: 2024-12-30
Payer: COMMERCIAL

## 2024-12-30 DIAGNOSIS — M79.641 BILATERAL HAND PAIN: Primary | ICD-10-CM

## 2024-12-30 DIAGNOSIS — M79.642 BILATERAL HAND PAIN: Primary | ICD-10-CM

## 2025-01-11 ENCOUNTER — LAB (OUTPATIENT)
Dept: LAB | Facility: CLINIC | Age: 35
End: 2025-01-11
Payer: COMMERCIAL

## 2025-01-11 DIAGNOSIS — M06.09 RHEUMATOID ARTHRITIS OF MULTIPLE SITES WITH NEGATIVE RHEUMATOID FACTOR (H): ICD-10-CM

## 2025-01-11 DIAGNOSIS — S22.000S CLOSED COMPRESSION FRACTURE OF THORACIC VERTEBRA, SEQUELA: ICD-10-CM

## 2025-01-11 DIAGNOSIS — Z51.81 ENCOUNTER FOR MEDICATION MONITORING: ICD-10-CM

## 2025-01-11 LAB
ALBUMIN SERPL BCG-MCNC: 4.3 G/DL (ref 3.5–5.2)
ALT SERPL W P-5'-P-CCNC: 23 U/L (ref 0–50)
AST SERPL W P-5'-P-CCNC: 23 U/L (ref 0–45)
BASOPHILS # BLD AUTO: 0 10E3/UL (ref 0–0.2)
BASOPHILS NFR BLD AUTO: 1 %
CHOLEST SERPL-MCNC: 254 MG/DL
CREAT SERPL-MCNC: 0.77 MG/DL (ref 0.51–0.95)
EGFRCR SERPLBLD CKD-EPI 2021: >90 ML/MIN/1.73M2
EOSINOPHIL # BLD AUTO: 0.1 10E3/UL (ref 0–0.7)
EOSINOPHIL NFR BLD AUTO: 2 %
ERYTHROCYTE [DISTWIDTH] IN BLOOD BY AUTOMATED COUNT: 11.6 % (ref 10–15)
FASTING STATUS PATIENT QL REPORTED: ABNORMAL
FSH SERPL IRP2-ACNC: 3 MIU/ML
HCT VFR BLD AUTO: 43.9 % (ref 35–47)
HDLC SERPL-MCNC: 67 MG/DL
HGB BLD-MCNC: 15 G/DL (ref 11.7–15.7)
IMM GRANULOCYTES # BLD: 0 10E3/UL
IMM GRANULOCYTES NFR BLD: 0 %
LDLC SERPL CALC-MCNC: 157 MG/DL
LYMPHOCYTES # BLD AUTO: 2.2 10E3/UL (ref 0.8–5.3)
LYMPHOCYTES NFR BLD AUTO: 42 %
MCH RBC QN AUTO: 30.9 PG (ref 26.5–33)
MCHC RBC AUTO-ENTMCNC: 34.2 G/DL (ref 31.5–36.5)
MCV RBC AUTO: 91 FL (ref 78–100)
MONOCYTES # BLD AUTO: 0.6 10E3/UL (ref 0–1.3)
MONOCYTES NFR BLD AUTO: 10 %
NEUTROPHILS # BLD AUTO: 2.4 10E3/UL (ref 1.6–8.3)
NEUTROPHILS NFR BLD AUTO: 45 %
NONHDLC SERPL-MCNC: 187 MG/DL
NRBC # BLD AUTO: 0 10E3/UL
NRBC BLD AUTO-RTO: 0 /100
PLATELET # BLD AUTO: 263 10E3/UL (ref 150–450)
RBC # BLD AUTO: 4.85 10E6/UL (ref 3.8–5.2)
TRIGL SERPL-MCNC: 148 MG/DL
WBC # BLD AUTO: 5.3 10E3/UL (ref 4–11)

## 2025-01-11 PROCEDURE — 85025 COMPLETE CBC W/AUTO DIFF WBC: CPT | Performed by: PATHOLOGY

## 2025-01-11 PROCEDURE — 82040 ASSAY OF SERUM ALBUMIN: CPT | Performed by: PATHOLOGY

## 2025-01-11 PROCEDURE — 84460 ALANINE AMINO (ALT) (SGPT): CPT | Performed by: PATHOLOGY

## 2025-01-11 PROCEDURE — 83001 ASSAY OF GONADOTROPIN (FSH): CPT | Performed by: INTERNAL MEDICINE

## 2025-01-11 PROCEDURE — 36415 COLL VENOUS BLD VENIPUNCTURE: CPT | Performed by: PATHOLOGY

## 2025-01-11 PROCEDURE — 84450 TRANSFERASE (AST) (SGOT): CPT | Performed by: PATHOLOGY

## 2025-01-11 PROCEDURE — 80061 LIPID PANEL: CPT | Performed by: PATHOLOGY

## 2025-01-11 PROCEDURE — 99000 SPECIMEN HANDLING OFFICE-LAB: CPT | Performed by: PATHOLOGY

## 2025-01-11 PROCEDURE — 82565 ASSAY OF CREATININE: CPT | Performed by: PATHOLOGY

## 2025-01-12 ENCOUNTER — HEALTH MAINTENANCE LETTER (OUTPATIENT)
Age: 35
End: 2025-01-12

## 2025-01-20 ENCOUNTER — THERAPY VISIT (OUTPATIENT)
Dept: OCCUPATIONAL THERAPY | Facility: CLINIC | Age: 35
End: 2025-01-20
Payer: COMMERCIAL

## 2025-01-20 DIAGNOSIS — M79.642 BILATERAL HAND PAIN: Primary | ICD-10-CM

## 2025-01-20 DIAGNOSIS — M79.641 BILATERAL HAND PAIN: Primary | ICD-10-CM

## 2025-01-20 PROCEDURE — 97535 SELF CARE MNGMENT TRAINING: CPT | Mod: GO | Performed by: OCCUPATIONAL THERAPIST

## 2025-01-22 NOTE — PROGRESS NOTES
Rachel is a 34 year old who is being evaluated via a billable video visit.    How would you like to obtain your AVS? MyChart  If the video visit is dropped, the invitation should be resent by: Send to e-mail at: [email or web address]  Will anyone else be joining your video visit? No      Are refills needed on medications prescribed by this physician? NO    Ginger Figueredo VVF      Assessment & Plan     Class 2 severe obesity due to excess calories with serious comorbidity and body mass index (BMI) of 37.0 to 37.9 in adult (H)  and  Pre-diabetes    Rachel has not had success with achieving weight loss despite over 6 months of trying diet and exercise, and they would like to try medication at this point.  Rachel would like to focus on medication that addresses insulin resistance and not just appetite suppression so we discussed metformin and GLP-1s, the latter of which would likely be more effective. We'll see if Wegovy will be covered.  She may consider paying OOP if not.     - semaglutide-weight management (WEGOVY) 0.25 MG/0.5ML pen; Inject 0.5 mLs (0.25 mg) subcutaneously once a week.    Non-binary gender identity    Rachel would like to start testosterone for gender affirming hormone therapy to achieve a more androgynous appearance.  Risks and expected of effects of testosterone were reviewed via handout and Rachel verbally consented to proceed with therapy.  As their goal is androgynous rather than masculine appearance, we will start with a low dose topical.  Check labs in 3 months and follow-up after that to discuss.     - testosterone (ANDROGEL 1 % PUMP) 12.5 MG/ACT (1%) gel; Place 1 Pump (12.5 mg) onto the skin daily. Apply from dispenser to clean, dry, intact skin of the shoulders, upper arms, or abdomen.  - Testosterone total; Future  - Hemoglobin; Future    Generalized hyperhidrosis    Rachel continues to experience hot flashes and easy sweating for the past couple of years.  They are not having drenching  "night sweats, and CBC, TSH, and FSH have been normal, so no concerning causes found in work-up.  Autonomic dysfunction has been suggested as a possible etiology.  Rachel agrees with just monitoring this for now.       49 minutes spent by me on the date of the encounter doing chart review, history and exam, documentation and further activities per the note      Subjective   Rachel is a 34 year old, presenting for the following health issues:  RECHECK and Discuss medication    History of Present Illness       Reason for visit:  Gender affirming care & weight management She is missing 1 dose(s) of medications per week.  She is not taking prescribed medications regularly due to remembering to take and other.     Rachel has been wondering if symptoms of hot flashes over the past two years may be related to perimenopausal symptoms.  They note easy sweating with light exertion and generally running at a warmer temperature.  No drenching sweats at night.  FSH was checked and was not in the post-menopausal range.  Rachel started Adderall after the hot flashes started.  They have been on duloxetine for many years and the dose was just increased last year also after hot flashes started.  Autonomic dysfunction has been suggested as a possible cause.     Rachel would like to discuss gender affirming therapy with low dose testosterone.  They would like to achieve a more androgynous appearance with some voice and muscle changes.  They would like to avoid bottom growth and hair loss.  Rachel does not have plans for future fertility.    Rachel has been having trouble with weight management but would like to work on this to help with pain management.  They would like to focus more on reducing insulin resistance than appetite suppression per se.       Objective    Vitals - Patient Reported  Weight (Patient Reported): 115.7 kg (255 lb)  Height (Patient Reported): 175.3 cm (5' 9\")  BMI (Based on Pt Reported Ht/Wt): 37.66  Pain Score: " Moderate Pain (4)  Pain Loc: Other - see comment (chronic pain)      Vitals:  No vitals were obtained today due to virtual visit.    Physical Exam   GENERAL: alert and no distress  EYES: Eyes grossly normal to inspection.  No discharge or erythema, or obvious scleral/conjunctival abnormalities.  RESP: No audible wheeze, cough, or visible cyanosis.    SKIN: Visible skin clear. No significant rash, abnormal pigmentation or lesions.  NEURO: Cranial nerves grossly intact.  Mentation and speech appropriate for age.  PSYCH: Appropriate affect, tone, and pace of words          Video-Visit Details    Type of service:  Video Visit   Originating Location (pt. Location): Home    Distant Location (provider location):  Off-site  Platform used for Video Visit: Onel  Signed Electronically by: Panfilo Solano MD

## 2025-01-23 ENCOUNTER — VIRTUAL VISIT (OUTPATIENT)
Dept: INTERNAL MEDICINE | Facility: CLINIC | Age: 35
End: 2025-01-23
Payer: COMMERCIAL

## 2025-01-23 DIAGNOSIS — E66.01 CLASS 2 SEVERE OBESITY DUE TO EXCESS CALORIES WITH SERIOUS COMORBIDITY AND BODY MASS INDEX (BMI) OF 37.0 TO 37.9 IN ADULT (H): Primary | ICD-10-CM

## 2025-01-23 DIAGNOSIS — Z78.9 TRANSGENDER: ICD-10-CM

## 2025-01-23 DIAGNOSIS — R73.03 PRE-DIABETES: ICD-10-CM

## 2025-01-23 DIAGNOSIS — R61 GENERALIZED HYPERHIDROSIS: ICD-10-CM

## 2025-01-23 DIAGNOSIS — E66.812 CLASS 2 SEVERE OBESITY DUE TO EXCESS CALORIES WITH SERIOUS COMORBIDITY AND BODY MASS INDEX (BMI) OF 37.0 TO 37.9 IN ADULT (H): Primary | ICD-10-CM

## 2025-01-23 RX ORDER — TESTOSTERONE GEL, 1% 10 MG/G
1 GEL TRANSDERMAL DAILY
Qty: 75 G | Refills: 1 | Status: SHIPPED | OUTPATIENT
Start: 2025-01-23

## 2025-01-23 NOTE — PATIENT INSTRUCTIONS
Thank you for visiting the Primary Care Center today at the [facility]! The following is some information about our clinic:     Primary Care Center Frequently-Asked Questions    (1) How do I schedule appointments at the [facility]?     Primary Care--to schedule or make changes to an existing appointment, please call our primary care line at [phone].    Labs--to schedule a lab appointment at the [facility] you can use [employer] or call [phone]. If you have a [place] location that is closer to home, you can reach out to that location for scheduling options.     Imaging--if you need to schedule a CT, X-ray, MRI, ultrasound, or other imaging study you can call [phone] to schedule at the [facility] or any other [facility] imaging location.     Referrals--if a referral to another specialty was ordered you can expect a phone call from their scheduling team. If you have not heard from them in a week, please call us or send us a [employer] message to check the status or get a scheduling number. Please note that this only applies to internal [facility] referrals. If the referral is external you would need to contact their office for scheduling.     (2) I have a question about my visit, who do I contact?     You can call us at the primary care line at [phone] to ask questions about your visit. You can also send a secure message through [employer], which is reviewed by clinic staff. Please note that [employer] messages have a twenty-four to forty-eight business hour turnaround time and should not be used for urgent concerns.    (3) How will I get the results of my tests?    If you are signed up for [employer] all tests will be released to you within twenty-four hours of resulting. Please allow three to five days for your doctor to review your results and place a note interpreting the results. If you do not have [employer] you will receive your  results through mail seven to ten business days following the return of the tests. Please note that if there should be any urgent or concerning results that your doctor or their registered nurse will reach out to you the same day as the tests come back. If you have follow up questions about your results or would like to discuss the results in detail please schedule a follow up with your provider either in person or virtually.     (4) How do I get refills of my prescriptions?     You should always first contact your pharmacy for refills of your medications. If submitting a refill request on [employer], please be sure to submit the request only once--repeat requests can cause delays in refill. If you are requesting a NEW medication or a medication related to new symptoms you will need to schedule an appointment with a provider prior to approval. Please note: Routine medication refills have up to one to three business day turnaround whereas controlled substances refills have up to five to seven business day turnaround.    (5) I have new symptoms, what do I do?     If you are having an immediate medical emergency, you should dial 911 for assistance.   For anything urgent that needs to be seen within a few hours to one day you should visit a local urgent care for assistance.  For non-urgent symptoms that need to be seen within a few days to a week you can schedule with an available provider in primary care by going to [employer] or calling [phone].   If you are not sure how serious your symptoms are or you would like to receive medical advice you can always call [phone] to speak with a triage nurse.

## 2025-01-24 ENCOUNTER — TELEPHONE (OUTPATIENT)
Dept: INTERNAL MEDICINE | Facility: CLINIC | Age: 35
End: 2025-01-24
Payer: COMMERCIAL

## 2025-01-24 NOTE — TELEPHONE ENCOUNTER
She did not mention sending it to a different pharmacy if it was not covered.    Panfilo Solano MD

## 2025-01-24 NOTE — TELEPHONE ENCOUNTER
PRIOR AUTHORIZATION DENIED    Medication: WEGOVY 0.25 MG/0.5ML SC SOAJ  Insurance Company: [employer] - Phone [phone] Fax [phone]  Denial Date: 1/24/2025  Denial Reason(s): Plan exclusion      Appeal Information: NA  Patient Notified: No

## 2025-02-10 ENCOUNTER — MYC MEDICAL ADVICE (OUTPATIENT)
Dept: INTERNAL MEDICINE | Facility: CLINIC | Age: 35
End: 2025-02-10
Payer: COMMERCIAL

## 2025-03-13 ENCOUNTER — TELEPHONE (OUTPATIENT)
Dept: INTERNAL MEDICINE | Facility: CLINIC | Age: 35
End: 2025-03-13
Payer: COMMERCIAL

## 2025-03-13 NOTE — TELEPHONE ENCOUNTER
Spoke with pt.    Pt will self schedule labs for 4/23.    Writer rescheduled previous June appt to occur on 5/1/2025 1 week following pt's labs per PCP's checkout note.

## 2025-04-03 ENCOUNTER — TRANSCRIBE ORDERS (OUTPATIENT)
Dept: OTHER | Age: 35
End: 2025-04-03

## 2025-04-03 DIAGNOSIS — M35.7 HYPERMOBILITY SYNDROME: ICD-10-CM

## 2025-04-03 DIAGNOSIS — M06.9 RHEUMATOID ARTHRITIS (H): ICD-10-CM

## 2025-04-03 DIAGNOSIS — M25.50 CHRONIC JOINT PAIN: Primary | ICD-10-CM

## 2025-04-03 DIAGNOSIS — G89.29 CHRONIC JOINT PAIN: Primary | ICD-10-CM

## 2025-04-03 DIAGNOSIS — Q79.62 HYPERMOBILE EHLERS-DANLOS SYNDROME: ICD-10-CM

## 2025-04-07 NOTE — PROGRESS NOTES
04/07/25 11:50 AM  PATIENT LAB/IMAGING STATUS : Orders completed / No further action needed   04/23/25 9:36 AM : Appointment reminder phone call made to patient. Pt verbalized understanding

## 2025-04-23 ENCOUNTER — LAB (OUTPATIENT)
Dept: LAB | Facility: CLINIC | Age: 35
End: 2025-04-23
Payer: COMMERCIAL

## 2025-04-23 DIAGNOSIS — Z78.9 TRANSGENDER: ICD-10-CM

## 2025-04-23 LAB — HGB BLD-MCNC: 14.7 G/DL (ref 11.7–15.7)

## 2025-04-23 PROCEDURE — 36415 COLL VENOUS BLD VENIPUNCTURE: CPT

## 2025-04-23 PROCEDURE — 85018 HEMOGLOBIN: CPT

## 2025-04-24 ENCOUNTER — OFFICE VISIT (OUTPATIENT)
Dept: ENDOCRINOLOGY | Facility: CLINIC | Age: 35
End: 2025-04-24
Payer: COMMERCIAL

## 2025-04-24 VITALS
DIASTOLIC BLOOD PRESSURE: 74 MMHG | SYSTOLIC BLOOD PRESSURE: 110 MMHG | HEIGHT: 68 IN | BODY MASS INDEX: 36.22 KG/M2 | WEIGHT: 239 LBS | OXYGEN SATURATION: 100 % | HEART RATE: 98 BPM

## 2025-04-24 DIAGNOSIS — S22.000S CLOSED COMPRESSION FRACTURE OF THORACIC VERTEBRA, SEQUELA: Primary | ICD-10-CM

## 2025-04-24 DIAGNOSIS — R61 HYPERHIDROSIS: ICD-10-CM

## 2025-04-24 RX ORDER — MILK THISTLE 150 MG
500 CAPSULE ORAL 2 TIMES DAILY
COMMUNITY

## 2025-04-24 RX ORDER — VITAMIN E (DL,TOCOPHERYL ACET) 180 MG
500 CAPSULE ORAL DAILY
COMMUNITY

## 2025-04-24 RX ORDER — MULTIVIT WITH MINERALS/LUTEIN
1000 TABLET ORAL DAILY
COMMUNITY

## 2025-04-24 ASSESSMENT — ENCOUNTER SYMPTOMS
BACK PAIN: 1
UNEXPECTED WEIGHT CHANGE: 1

## 2025-04-24 ASSESSMENT — PAIN SCALES - GENERAL: PAINLEVEL_OUTOF10: MODERATE PAIN (5)

## 2025-04-24 NOTE — NURSING NOTE
"Chief Complaint   Patient presents with    Endocrine Problem     Vital signs:      BP: 110/74 Pulse: 98     SpO2: 100 %     Height: 173.4 cm (5' 8.25\") Weight: 108.4 kg (239 lb)  Estimated body mass index is 36.07 kg/m  as calculated from the following:    Height as of this encounter: 1.734 m (5' 8.25\").    Weight as of this encounter: 108.4 kg (239 lb).        "

## 2025-04-24 NOTE — ASSESSMENT & PLAN NOTE
She has been on abaloparatide for 6 months and tolerating well. We reviewed again on intention of treatment for 2 years and then transitioning to zoledronic acid.     We discussed on expanding workup for fragility fractures by checking an IGF-1, Tryptase and Genetic Testing to look for other more rare causes such as Osteogenesis Imperfect among others.     Given ongoing low back pain we discussed on obtaining an X Ray of her Lumbar Spine.

## 2025-04-24 NOTE — LETTER
4/24/2025       RE: Rachel Fried  [address]  [place] [address]     Dear Colleague,    Thank you for referring your patient, Rachel Fried, to the [facility]. Please see a copy of my visit note below.    04/07/25 11:50 AM  PATIENT LAB/IMAGING STATUS : Orders completed / No further action needed   04/23/25 9:36 AM : Appointment reminder phone call made to patient. Pt verbalized understanding      Endocrinology and Diabetes      Rachel Fried is a 33 year old yo female who is being referred for: Thoracic Fracture    Medical History Pertinent to this consultation includes: Rheumatoid Arthritis since at age 25, ADHD and Depression    Interval History  She has been started on Wegovy for weight management with the expectation that weight loss would help with pain and mobility.    She continues with excessive sweating that is persistent, not episodic.     She uses she/they pronouns. 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    At the end of the workday she is still feeling tired. She does not feel well rested all the time. She has not been tested for sleep apnea.     Continues to have arthralgias which are diffuse. Yesterday she noticed an increase in intensity in lumbar spine pain, today pain intensity is similar to prior.     She has not had any problems with abaloparatide. Has completed 6 months of treatment so far.       History of Problem  She has had pain in her back since age 13. This worsened around 2022 where the intensity was increasing and more disabling.     On review of her chart, she has been prescribed with Prednisone tapers since 2019. She has gotten about 8 prescribed tapers since then starting with either 15-20 mg.     April 2024 she started with  glucocorticoid injections, she is receiving steroid injections in her back every 3 months and has received injections in her thighs just once.     Treatment History  Abaloparatide 10/2024    Vitamin D 2,000 international units daily  Calcium intake. Maybe 2-3 servings of dairy products daily.     She has been on OCP since age 15. She had been of OCP for about 6 months and noticed increasing acnei and periods were heavier and longer.       Prior laboratory studies   Latest Ref [facility] 3/1/2024  10:43 AM 6/15/2024  10:46 AM   ENDO CALCIUM LABS-[facility]      Vitamin D, Total (25-Hydroxy) 20 - 50 ng/mL 39     Albumin 3.4 - 5.0 g/dL     Albumin 3.5 - 5.2 g/dL 4.3  4.3    Alkaline Phosphatase 40 - 150 U/L 47     Calcium 8.6 - 10.0 mg/dL 9.3     Calcium Urine g/g Cr 0.05 - 0.27 g/g Cr     Calcium Urine mg/dL mg/dL     CK Total 26 - 192 U/L  51    Urea Nitrogen 7 - 30 mg/dL     Urea Nitrogen 6.0 - 20.0 mg/dL 13.3     Creatinine 0.51 - 0.95 mg/dL 0.72  0.81    Parathyroid Hormone Intact 15 - 65 pg/mL 31          Latest Ref Rn 3/1/2024  10:43 AM 6/15/2024  10:46 AM   ENDO CALCIUM LABS-[facility]      Vitamin D, Total (25-Hydroxy) 20 - 50 ng/mL 39     Albumin 3.4 - 5.0 g/dL     Albumin 3.5 - 5.2 g/dL 4.3  4.3    Alkaline Phosphatase 40 - 150 U/L 47     Calcium 8.6 - 10.0 mg/dL 9.3     Calcium Urine g/g Cr 0.05 - 0.27 g/g Cr     Calcium Urine mg/dL mg/dL     CK Total 26 - 192 U/L  51    Urea Nitrogen 7 - 30 mg/dL     Urea Nitrogen 6.0 - 20.0 mg/dL 13.3     Creatinine 0.51 - 0.95 mg/dL 0.72  0.81    Parathyroid Hormone Intact 15 - 65 pg/mL 31           Prior imaging studies    Narrative & Impression   EXAM: DX HIP/PELVIS/SPINE  LOCATION: [facility]  DATE: 2/28/2024     INDICATION: BMD screening, baseline. History of steroid use. History of thoracic compression fracture.  DEMOGRAPHICS: Age- 33 years. Gender- Female. Menopausal status- Premenopausal.  COMPARISON: No prior studies available on the current  "scanner.  TECHNIQUE: Dual-energy x-ray absorptiometry (DXA) performed with routine technique.      FINDINGS:     DXA RESULTS  -Lumbar Spine: L1-L4:       BMD: 1.158 g/cm2. T-score: -0.3. Z-score: -1.5.  -RIGHT Hip Total:               BMD: 0.995 g/cm2. T-score: -0.1. Z-score: -0.8.  -RIGHT Hip Femoral neck: BMD: 1.033 g/cm2. T-score: 0.0. Z-score: -0.5.  -LEFT Hip Total:                  BMD: 0.971 g/cm2. T-score: -0.3. Z-score: -1.0.  -LEFT Hip Femoral neck:    BMD: 1.008 g/cm2. T-score: -0.2. Z-score: -0.7.     MRI Thoracic Spine 2/20/2024  Anterior wedge-shaped compression deformity of T6 and T8.     Allergies   Allergen Reactions     Sulfa Antibiotics Hives and Itching       Past Medical History:   Diagnosis Date     Allergic dermatitis      Anxiety      Depression      Pityriasis lichenoides      Rheumatoid arthritis (H)        History reviewed. No pertinent surgical history.    Social History     Tobacco Use     Smoking status: Never     Passive exposure: Never     Smokeless tobacco: Never   Vaping Use     Vaping status: Never Used   Substance Use Topics     Alcohol use: Yes     Alcohol/week: 1.0 standard drink of alcohol     Types: 1 Glasses of wine per week     Comment: 3 drinks per month     Drug use: No         Review of Systems   Constitutional:  Positive for unexpected weight change.   Musculoskeletal:  Positive for back pain.   All other systems reviewed and are negative.       Objective    /74 (BP Location: Right arm)   Pulse 98   Ht 1.734 m (5' 8.25\")   Wt 108.4 kg (239 lb)   SpO2 100%   BMI 36.07 kg/m       Wt Readings from Last 3 Encounters:   04/24/25 108.4 kg (239 lb)   10/10/24 113.9 kg (251 lb)   10/02/24 114.3 kg (252 lb)        Body mass index is 36.07 kg/m .    Physical Exam  No facial features of Acromegaly  No edema in hands. No synovitis on hand exam.   Tenderness in midline in lumbar spine      Assessment.       ICD-10-CM    1. Closed compression fracture of thoracic vertebra, " sequela  S22.000S X-ray lumbar spine 2-3 views     Tryptase     Adult Genetics & Metabolism  Referral     CANCELED: Adult Genetics & Metabolism  Referral      2. Hyperhidrosis  R61 Insulin Growth Factor 1 by Immunoassay             Plan    Closed compression fracture of thoracic vertebra, sequela  She has been on abaloparatide for 6 months and tolerating well. We reviewed again on intention of treatment for 2 years and then transitioning to zoledronic acid.     We discussed on expanding workup for fragility fractures by checking an IGF-1, Tryptase and Genetic Testing to look for other more rare causes such as Osteogenesis Imperfect among others.     Given ongoing low back pain we discussed on obtaining an X Ray of her Lumbar Spine.         Hyperhidrosis  Will check IGF-1 for expanded workup for fragility fractures and also due to ongoing hyper hydrosis.           Nestor Morrison MD  Endocrinology Fellow      Attestation signed by Carmela Cox MD at 4/24/2025 11:41 AM:  Physician Attestation  I, Carmela Cox MD, saw this patient with the fellow and agree with the fellow's findings and plan of care as documented in the resident s note.           Carmela Cox MD  Date of Service (when I saw the patient): 04/24/25       Again, thank you for allowing me to participate in the care of your patient.      Sincerely,    Nestor Caballero MD

## 2025-04-24 NOTE — ASSESSMENT & PLAN NOTE
Will check IGF-1 for expanded workup for fragility fractures and also due to ongoing hyper hydrosis.

## 2025-04-24 NOTE — PROGRESS NOTES
Endocrinology and Diabetes      Rachel Fried is a 33 year old yo female who is being referred for: Thoracic Fracture    Medical History Pertinent to this consultation includes: Rheumatoid Arthritis since at age 25, ADHD and Depression    Interval History  She has been started on Wegovy for weight management with the expectation that weight loss would help with pain and mobility.    She continues with excessive sweating that is persistent, not episodic.     She uses she/they pronouns. 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    At the end of the workday she is still feeling tired. She does not feel well rested all the time. She has not been tested for sleep apnea.     Continues to have arthralgias which are diffuse. Yesterday she noticed an increase in intensity in lumbar spine pain, today pain intensity is similar to prior.     She has not had any problems with abaloparatide. Has completed 6 months of treatment so far.       History of Problem  She has had pain in her back since age 13. This worsened around 2022 where the intensity was increasing and more disabling.     On review of her chart, she has been prescribed with Prednisone tapers since 2019. She has gotten about 8 prescribed tapers since then starting with either 15-20 mg.     April 2024 she started with glucocorticoid injections, she is receiving steroid injections in her back every 3 months and has received injections in her thighs just once.     Treatment History  Abaloparatide 10/2024    Vitamin D 2,000 international units daily  Calcium intake. Maybe 2-3 servings of dairy products daily.     She has been on OCP since age 15. She had been of OCP for about 6 months and noticed increasing acnei and periods were heavier and longer.       Prior laboratory studies   Latest Ref Rng 3/1/2024  10:43 AM  6/15/2024  10:46 AM   [facility]      Vitamin D, Total (25-Hydroxy) 20 - 50 ng/mL 39     Albumin 3.4 - 5.0 g/dL     Albumin 3.5 - 5.2 g/dL 4.3  4.3    Alkaline Phosphatase 40 - 150 U/L 47     Calcium 8.6 - 10.0 mg/dL 9.3     Calcium Urine g/g Cr 0.05 - 0.27 g/g Cr     Calcium Urine mg/dL mg/dL     CK Total 26 - 192 U/L  51    Urea Nitrogen 7 - 30 mg/dL     Urea Nitrogen 6.0 - 20.0 mg/dL 13.3     Creatinine 0.51 - 0.95 mg/dL 0.72  0.81    Parathyroid Hormone Intact 15 - 65 pg/mL 31          Latest Ref Rng 3/1/2024  10:43 AM 6/15/2024  10:46 AM   [facility]      Vitamin D, Total (25-Hydroxy) 20 - 50 ng/mL 39     Albumin 3.4 - 5.0 g/dL     Albumin 3.5 - 5.2 g/dL 4.3  4.3    Alkaline Phosphatase 40 - 150 U/L 47     Calcium 8.6 - 10.0 mg/dL 9.3     Calcium Urine g/g Cr 0.05 - 0.27 g/g Cr     Calcium Urine mg/dL mg/dL     CK Total 26 - 192 U/L  51    Urea Nitrogen 7 - 30 mg/dL     Urea Nitrogen 6.0 - 20.0 mg/dL 13.3     Creatinine 0.51 - 0.95 mg/dL 0.72  0.81    Parathyroid Hormone Intact 15 - 65 pg/mL 31           Prior imaging studies    Narrative & Impression   EXAM: DX HIP/PELVIS/SPINE  LOCATION: [facility]  DATE: 2/28/2024     INDICATION: BMD screening, baseline. History of steroid use. History of thoracic compression fracture.  DEMOGRAPHICS: Age- 33 years. Gender- Female. Menopausal status- Premenopausal.  COMPARISON: No prior studies available on the current scanner.  TECHNIQUE: Dual-energy x-ray absorptiometry (DXA) performed with routine technique.      FINDINGS:     DXA RESULTS  -Lumbar Spine: L1-L4:       BMD: 1.158 g/cm2. T-score: -0.3. Z-score: -1.5.  -RIGHT Hip Total:               BMD: 0.995 g/cm2. T-score: -0.1. Z-score: -0.8.  -RIGHT Hip Femoral neck: BMD: 1.033 g/cm2. T-score: 0.0. Z-score: -0.5.  -LEFT Hip Total:                  BMD: 0.971 g/cm2. T-score: -0.3. Z-score: -1.0.  -LEFT Hip Femoral neck:    BMD: 1.008 g/cm2. T-score: -0.2. Z-score: -0.7.  "    MRI Thoracic Spine 2/20/2024  Anterior wedge-shaped compression deformity of T6 and T8.     Allergies   Allergen Reactions    Sulfa Antibiotics Hives and Itching       Past Medical History:   Diagnosis Date    Allergic dermatitis     Anxiety     Depression     Pityriasis lichenoides     Rheumatoid arthritis (H)        History reviewed. No pertinent surgical history.    Social History     Tobacco Use    Smoking status: Never     Passive exposure: Never    Smokeless tobacco: Never   Vaping Use    Vaping status: Never Used   Substance Use Topics    Alcohol use: Yes     Alcohol/week: 1.0 standard drink of alcohol     Types: 1 Glasses of wine per week     Comment: 3 drinks per month    Drug use: No         Review of Systems   Constitutional:  Positive for unexpected weight change.   Musculoskeletal:  Positive for back pain.   All other systems reviewed and are negative.       Objective    /74 (BP Location: Right arm)   Pulse 98   Ht 1.734 m (5' 8.25\")   Wt 108.4 kg (239 lb)   SpO2 100%   BMI 36.07 kg/m       Wt Readings from Last 3 Encounters:   04/24/25 108.4 kg (239 lb)   10/10/24 113.9 kg (251 lb)   10/02/24 114.3 kg (252 lb)        Body mass index is 36.07 kg/m .    Physical Exam  No facial features of Acromegaly  No edema in hands. No synovitis on hand exam.   Tenderness in midline in lumbar spine      Assessment.       ICD-10-CM    1. Closed compression fracture of thoracic vertebra, sequela  S22.000S X-ray lumbar spine 2-3 views     Tryptase     Adult Genetics & Metabolism  Referral     CANCELED: Adult Genetics & Metabolism  Referral      2. Hyperhidrosis  R61 Insulin Growth Factor 1 by Immunoassay             Plan    Closed compression fracture of thoracic vertebra, sequela  She has been on abaloparatide for 6 months and tolerating well. We reviewed again on intention of treatment for 2 years and then transitioning to zoledronic acid.     We discussed on expanding workup for " fragility fractures by checking an IGF-1, Tryptase and Genetic Testing to look for other more rare causes such as Osteogenesis Imperfect among others.     Given ongoing low back pain we discussed on obtaining an X Ray of her Lumbar Spine.         Hyperhidrosis  Will check IGF-1 for expanded workup for fragility fractures and also due to ongoing hyper hydrosis.           Nestor Morrison MD  Endocrinology Fellow

## 2025-04-25 PROBLEM — M79.642 BILATERAL HAND PAIN: Status: RESOLVED | Noted: 2024-11-27 | Resolved: 2025-04-25

## 2025-04-25 PROBLEM — M79.641 BILATERAL HAND PAIN: Status: RESOLVED | Noted: 2024-11-27 | Resolved: 2025-04-25

## 2025-04-27 LAB — TESTOST SERPL-MCNC: 144 NG/DL (ref 8–60)

## 2025-05-01 ENCOUNTER — VIRTUAL VISIT (OUTPATIENT)
Dept: INTERNAL MEDICINE | Facility: CLINIC | Age: 35
End: 2025-05-01
Payer: COMMERCIAL

## 2025-05-01 DIAGNOSIS — E66.812 OBESITY, CLASS II, BMI 35-39.9: ICD-10-CM

## 2025-05-01 DIAGNOSIS — F41.9 ANXIETY: ICD-10-CM

## 2025-05-01 DIAGNOSIS — F90.2 ATTENTION DEFICIT HYPERACTIVITY DISORDER (ADHD), COMBINED TYPE: ICD-10-CM

## 2025-05-01 DIAGNOSIS — F32.A DEPRESSION, UNSPECIFIED DEPRESSION TYPE: ICD-10-CM

## 2025-05-01 DIAGNOSIS — Z78.9 TRANSGENDER: ICD-10-CM

## 2025-05-01 DIAGNOSIS — R51.9 FREQUENT HEADACHES: Primary | ICD-10-CM

## 2025-05-01 DIAGNOSIS — R73.03 PRE-DIABETES: ICD-10-CM

## 2025-05-01 DIAGNOSIS — G62.9 PERIPHERAL POLYNEUROPATHY: ICD-10-CM

## 2025-05-01 DIAGNOSIS — M54.2 NECK PAIN: ICD-10-CM

## 2025-05-01 RX ORDER — DEXTROAMPHETAMINE SACCHARATE, AMPHETAMINE ASPARTATE, DEXTROAMPHETAMINE SULFATE AND AMPHETAMINE SULFATE 1.25; 1.25; 1.25; 1.25 MG/1; MG/1; MG/1; MG/1
2.5 TABLET ORAL 2 TIMES DAILY PRN
Qty: 30 TABLET | Refills: 0 | Status: SHIPPED | OUTPATIENT
Start: 2025-06-27

## 2025-05-01 RX ORDER — DEXTROAMPHETAMINE SACCHARATE, AMPHETAMINE ASPARTATE MONOHYDRATE, DEXTROAMPHETAMINE SULFATE AND AMPHETAMINE SULFATE 2.5; 2.5; 2.5; 2.5 MG/1; MG/1; MG/1; MG/1
10 CAPSULE, EXTENDED RELEASE ORAL DAILY
Qty: 30 CAPSULE | Refills: 0 | Status: SHIPPED | OUTPATIENT
Start: 2025-06-27

## 2025-05-01 RX ORDER — DEXTROAMPHETAMINE SACCHARATE, AMPHETAMINE ASPARTATE MONOHYDRATE, DEXTROAMPHETAMINE SULFATE AND AMPHETAMINE SULFATE 2.5; 2.5; 2.5; 2.5 MG/1; MG/1; MG/1; MG/1
10 CAPSULE, EXTENDED RELEASE ORAL DAILY
Qty: 30 CAPSULE | Refills: 0 | Status: SHIPPED | OUTPATIENT
Start: 2025-05-28

## 2025-05-01 RX ORDER — DEXTROAMPHETAMINE SACCHARATE, AMPHETAMINE ASPARTATE, DEXTROAMPHETAMINE SULFATE AND AMPHETAMINE SULFATE 1.25; 1.25; 1.25; 1.25 MG/1; MG/1; MG/1; MG/1
2.5 TABLET ORAL 2 TIMES DAILY PRN
Qty: 30 TABLET | Refills: 0 | Status: SHIPPED | OUTPATIENT
Start: 2025-05-28

## 2025-05-01 RX ORDER — DULOXETIN HYDROCHLORIDE 30 MG/1
30 CAPSULE, DELAYED RELEASE ORAL DAILY
Qty: 90 CAPSULE | Refills: 3 | Status: SHIPPED | OUTPATIENT
Start: 2025-05-01

## 2025-05-01 RX ORDER — TESTOSTERONE GEL, 1% 10 MG/G
3 GEL TRANSDERMAL DAILY
Qty: 375 G | Refills: 1 | Status: SHIPPED | OUTPATIENT
Start: 2025-05-01

## 2025-05-01 RX ORDER — DULOXETIN HYDROCHLORIDE 60 MG/1
60 CAPSULE, DELAYED RELEASE ORAL DAILY
Qty: 90 CAPSULE | Refills: 3 | Status: SHIPPED | OUTPATIENT
Start: 2025-05-01

## 2025-05-01 NOTE — PROGRESS NOTES
Rachel is a 34 year old who is being evaluated via a billable video visit.    How would you like to obtain your AVS? MyChart  If the video visit is dropped, the invitation should be resent by: Text to cell phone: [phone]  Will anyone else be joining your video visit? No      Are refills needed on medications prescribed by this physician? Discuss with provider    Ginger GARDNER    Assessment & Plan     Attention deficit hyperactivity disorder (ADHD), combined type    Doing well on current meds, just recently picked up refills.  Additional refills sent for 4/28 and 5/27 and we'll follow-up in about 3 months.     - amphetamine-dextroamphetamine (ADDERALL) 5 MG tablet; Take 0.5 tablets (2.5 mg) by mouth 2 times daily as needed (ADHD).  - amphetamine-dextroamphetamine (ADDERALL) 5 MG tablet; Take 0.5 tablets (2.5 mg) by mouth 2 times daily as needed (ADHD).  - amphetamine-dextroamphetamine (ADDERALL XR) 10 MG 24 hr capsule; Take 1 capsule (10 mg) by mouth daily.  - amphetamine-dextroamphetamine (ADDERALL XR) 10 MG 24 hr capsule; Take 1 capsule (10 mg) by mouth daily.    Non-binary gender identity    Recent testosterone level was low and Rachel would like to increase the dose.  We'll increase from 2 pumps to 3 and they will check in via MyC in a month with how that is going.  Check labs in 3 months.     - testosterone (ANDROGEL 1 % PUMP) 12.5 MG/ACT (1%) gel; Place 3 Pump (37.5 mg of testosterone) onto the skin daily. Apply from dispenser to clean, dry, intact skin of the shoulders, upper arms, or abdomen.  - Testosterone total; Future  - Hemoglobin; Future    Anxiety  and  Depression, unspecified depression type    Mood has been slightly worse recent, but Rachel is not sure if that is a Wegovy side effects or related to world stressors.  We'll continue current medication for nwo.    - DULoxetine (CYMBALTA) 30 MG capsule; Take 1 capsule (30 mg) by mouth daily. along with 60 mg (total 90 mg)  - DULoxetine (CYMBALTA)  "60 MG capsule; Take 1 capsule (60 mg) by mouth daily. along with 30 mg (total 90 mg)    Peripheral polyneuropathy  and  Neck pain    Med refilled    - DULoxetine (CYMBALTA) 30 MG capsule; Take 1 capsule (30 mg) by mouth daily. along with 60 mg (total 90 mg)    Frequent headaches    Rachel wonders if frequent headaches may be cranial cervical instability- x-rays done last year showed some mild anterolisthesis of C3 on C4 and C4 on C5. Rachel is starting PT soon so advised discussing these findings with them to see if they have specific recommendations.     Pre-diabetes    Due for recheck soon, ordered for next lab draw    - HEMOGLOBIN A1C; Future    Obesity, Class II, BMI 35-39.9    Rachel would like to stay on the 0.5mg dose of Wegovy for the next month and will message on how things are going.     - Semaglutide-Weight Management (WEGOVY) 0.5 MG/0.5ML pen; Inject 0.5 mg subcutaneously once a week.          BMI  Estimated body mass index is 36.07 kg/m  as calculated from the following:    Height as of 4/24/25: 1.734 m (5' 8.25\").    Weight as of 4/24/25: 108.4 kg (239 lb).   Weight management plan: Wegovy          Ji Ramos is a 34 year old, presenting for the following health issues:  RECHECK and Gender affirming care, weight loss meds, adhd    History of Present Illness       Reason for visit:  Gender affirming care, weight loss meds, adhd    She eats 2-3 servings of fruits and vegetables daily.She consumes 1 sweetened beverage(s) daily.She exercises with enough effort to increase her heart rate 9 or less minutes per day.  She exercises with enough effort to increase her heart rate 3 or less days per week. She is missing 1 dose(s) of medications per week.  She is not taking prescribed medications regularly due to remembering to take.        I saw Rachel last on 1/23 and we discussed weight loss which had not been successful after 6 months of trying diet and exercise, so prescription was sent for Wegovy.  " "This was not covered by insurance but they were able to get a savings card to reduce the white some.  Rachel also started testosterone for gender affirming hormone therapy.  We started a low dose of 1 pump of topical per day, and increased this to 2 pumps on 2/10.      Rachel had labs drawn on 4/23 and testosterone level was 144.     [place]  reviewed: Adderall IR and ER both refilled on 4/28.      Today, Rachel reports that things are going fairly good since last visit.  The Wegovy caused some side effects the first week but has been going well since then.  Having slow weight loss. Depression does seem works but they are not sure if this is due to the medication or world events.     The testosterone caused some physiological changes for the first couple of months but nothing much since then.  They are interested in increasing the dose.      Since our last visit, they started taking LDN for pain, currently taking 3mg.      Adderall dosing is still working well.     They are wondering if frequent headaches are due to craniocervical instability.  They are starting PT in June.            Objective    Vitals - Patient Reported  Weight (Patient Reported): 107.5 kg (237 lb)  Height (Patient Reported): 175.3 cm (5' 9\")  BMI (Based on Pt Reported Ht/Wt): 35  Pain Score: Moderate Pain (5)  Pain Loc: Mid Back      Vitals:  No vitals were obtained today due to virtual visit.    Physical Exam   GENERAL: alert and no distress  EYES: Eyes grossly normal to inspection.  No discharge or erythema, or obvious scleral/conjunctival abnormalities.  RESP: No audible wheeze, cough, or visible cyanosis.    SKIN: Visible skin clear. No significant rash, abnormal pigmentation or lesions.  NEURO: Cranial nerves grossly intact.  Mentation and speech appropriate for age.  PSYCH: Appropriate affect, tone, and pace of words        Video-Visit Details    Type of service:  Video Visit   Originating Location (pt. Location): Home    Distant Location " (provider location):  Off-site  Platform used for Video Visit: Onel  Signed Electronically by: Paniflo Solano MD

## 2025-05-01 NOTE — PATIENT INSTRUCTIONS
Thank you for visiting the Primary Care Center today at the [facility]! The following is some information about our clinic:     Primary Care Center Frequently-Asked Questions    (1) How do I schedule appointments at the [facility]?     Primary Care--to schedule or make changes to an existing appointment, please call our primary care line at [phone].    Labs--to schedule a lab appointment at the [facility] you can use Wizeline or call [phone]. If you have a [place] location that is closer to home, you can reach out to that location for scheduling options.     Imaging--if you need to schedule a CT, X-ray, MRI, ultrasound, or other imaging study you can call [phone] to schedule at the [facility] or any other [facility] imaging location.     Referrals--if a referral to another specialty was ordered you can expect a phone call from their scheduling team. If you have not heard from them in a week, please call us or send us a Wizeline message to check the status or get a scheduling number. Please note that this only applies to internal [facility] referrals. If the referral is external you would need to contact their office for scheduling.     (2) I have a question about my visit, who do I contact?     You can call us at the primary care line at [phone] to ask questions about your visit. You can also send a secure message through Wizeline, which is reviewed by clinic staff. Please note that Wizeline messages have a twenty-four to forty-eight business hour turnaround time and should not be used for urgent concerns.    (3) How will I get the results of my tests?    If you are signed up for Pogoappt all tests will be released to you within twenty-four hours of resulting. Please allow three to five days for your doctor to review your results and place a note interpreting the results. If you do not have Shoutlethart you will receive your  results through mail seven to ten business days following the return of the tests. Please note that if there should be any urgent or concerning results that your doctor or their registered nurse will reach out to you the same day as the tests come back. If you have follow up questions about your results or would like to discuss the results in detail please schedule a follow up with your provider either in person or virtually.     (4) How do I get refills of my prescriptions?     You should always first contact your pharmacy for refills of your medications. If submitting a refill request on Repair Report, please be sure to submit the request only once--repeat requests can cause delays in refill. If you are requesting a NEW medication or a medication related to new symptoms you will need to schedule an appointment with a provider prior to approval. Please note: Routine medication refills have up to one to three business day turnaround whereas controlled substances refills have up to five to seven business day turnaround.    (5) I have new symptoms, what do I do?     If you are having an immediate medical emergency, you should dial 911 for assistance.   For anything urgent that needs to be seen within a few hours to one day you should visit a local urgent care for assistance.  For non-urgent symptoms that need to be seen within a few days to a week you can schedule with an available provider in primary care by going to 1Cast or calling [phone].   If you are not sure how serious your symptoms are or you would like to receive medical advice you can always call [phone] to speak with a triage nurse.

## 2025-05-30 ENCOUNTER — MYC MEDICAL ADVICE (OUTPATIENT)
Dept: INTERNAL MEDICINE | Facility: CLINIC | Age: 35
End: 2025-05-30
Payer: COMMERCIAL

## 2025-05-30 DIAGNOSIS — F90.2 ATTENTION DEFICIT HYPERACTIVITY DISORDER (ADHD), COMBINED TYPE: ICD-10-CM

## 2025-06-02 RX ORDER — DEXTROAMPHETAMINE SACCHARATE, AMPHETAMINE ASPARTATE, DEXTROAMPHETAMINE SULFATE AND AMPHETAMINE SULFATE 1.25; 1.25; 1.25; 1.25 MG/1; MG/1; MG/1; MG/1
2.5 TABLET ORAL 2 TIMES DAILY PRN
Qty: 30 TABLET | Refills: 0 | Status: SHIPPED | OUTPATIENT
Start: 2025-07-02

## 2025-06-02 RX ORDER — DEXTROAMPHETAMINE SACCHARATE, AMPHETAMINE ASPARTATE MONOHYDRATE, DEXTROAMPHETAMINE SULFATE AND AMPHETAMINE SULFATE 2.5; 2.5; 2.5; 2.5 MG/1; MG/1; MG/1; MG/1
10 CAPSULE, EXTENDED RELEASE ORAL DAILY
Qty: 30 CAPSULE | Refills: 0 | Status: SHIPPED | OUTPATIENT
Start: 2025-07-02

## 2025-06-02 RX ORDER — DEXTROAMPHETAMINE SACCHARATE, AMPHETAMINE ASPARTATE MONOHYDRATE, DEXTROAMPHETAMINE SULFATE AND AMPHETAMINE SULFATE 2.5; 2.5; 2.5; 2.5 MG/1; MG/1; MG/1; MG/1
10 CAPSULE, EXTENDED RELEASE ORAL DAILY
Qty: 30 CAPSULE | Refills: 0 | Status: SHIPPED | OUTPATIENT
Start: 2025-06-02

## 2025-06-02 RX ORDER — DEXTROAMPHETAMINE SACCHARATE, AMPHETAMINE ASPARTATE, DEXTROAMPHETAMINE SULFATE AND AMPHETAMINE SULFATE 1.25; 1.25; 1.25; 1.25 MG/1; MG/1; MG/1; MG/1
2.5 TABLET ORAL 2 TIMES DAILY PRN
Qty: 30 TABLET | Refills: 0 | Status: SHIPPED | OUTPATIENT
Start: 2025-06-02

## 2025-06-02 NOTE — TELEPHONE ENCOUNTER
Dextroamp-Amphet Er 10 Mg Cap, Q=30.00 last sold on 04/30/2025 per [facility].     Dextroamp-Amphetamine 5 Mg Tab, Q=30.00 last sold on 04/30/2025 per [facility].

## 2025-06-05 ENCOUNTER — THERAPY VISIT (OUTPATIENT)
Dept: PHYSICAL THERAPY | Facility: CLINIC | Age: 35
End: 2025-06-05
Attending: OPHTHALMOLOGY
Payer: COMMERCIAL

## 2025-06-05 DIAGNOSIS — Q79.62 HYPERMOBILE EHLERS-DANLOS SYNDROME: ICD-10-CM

## 2025-06-05 DIAGNOSIS — M25.50 CHRONIC JOINT PAIN: Primary | ICD-10-CM

## 2025-06-05 DIAGNOSIS — G89.29 CHRONIC JOINT PAIN: Primary | ICD-10-CM

## 2025-06-05 NOTE — PROGRESS NOTES
PHYSICAL THERAPY EVALUATION  Type of Visit: Evaluation        Fall Risk Screen:  Have you fallen 2 or more times in the past year?: No  Have you fallen and had an injury in the past year?: No    Subjective         Presenting condition or subjective complaint: Disabling back pain  Reports she had an interruption in RA medication for 3 months and had debilitating painthroughout her spine as a result. She spent these 3 months very debilitated and was unable to tolerate daily activities. This resulted in weakness.  In 2023, she participated in extensive PT following and felt limited improvement in strength but pain and functional improvements remained very limited.  Date of onset: 04/03/25    Relevant medical history: Asthma; Depression; Overweight; Pain at night or rest; Rheumatoid arthritis; Severe headaches   Dates & types of surgery: None    Prior diagnostic imaging/testing results: MRI; X-ray; Bone scan     Prior therapy history for the same diagnosis, illness or injury: Yes 110 sessions Physical therapy january 2023-march 2023         Living Environment  Social support: Alone   Type of home: House; 2-story   Stairs to enter the home: Yes 10 Is there a railing: Yes     Ramp: No   Stairs inside the home: Yes 15 Is there a railing: Yes     Help at home: None  Equipment owned: Power wheelchair or scooter     Employment: Yes   Hobbies/Interests: Walk dogs, reading, crafts, swimming, concerts    Patient goals for therapy: Stand for 30 minutes. Not be so tired at the end of the day so i can walk my dogs    Pain assessment: See objective evaluation for additional pain details     Objective     Beighton scale: 6/9     LUMBAR SPINE EVALUATION  PAIN:   Pain Level at Rest: 4/10  Pain Level with Use: 7/10  Symptom Location: central low back pain extending in a band across low back in L4-5 pattern.  Pain extending out along thoracolumbar junction.  Had several instances when pushing herself too far and got stuck at a 90  degree bend in low back.   Symptoms are Exacerbated By:  standing greater than 30 minutes, walking the dogs, swimming  Symptoms are Relieved By: exercises, trigger point injections        ROM:     AROM: (Major, Moderate, Minimal or Nil loss)  Movement Loss Moses Mod Min Nil Pain   Flexion   X fingers to toes     Extension  x   Hinging at L4-5 and thoracolumbar junction   Side Gliding L   x     Side Gliding R   x        CERVICAL SPINE EVALUATION  PAIN:   Pain Level at Rest: 4/10  Pain Level with Use: 7/10  Symptom Location: bilateral lateral neck pain extending to bilateral shoulder  Pain Quality: Aching and thightness   Symptoms are Exacerbated By: walking the dogs more than 10 minutes, not shopping in stores, swimming  Symptoms are Relieved By: trigger point injections        ROM:   (Degrees) Left AROM Right AROM    Cervical Flexion WNL    Cervical Extension 75    Cervical Side bend 45 50    Cervical Rotation 75 82    Cervical Protrusion WNL    Cervical Retraction WNL              Assessment & Plan   CLINICAL IMPRESSIONS  Medical Diagnosis: chronic joint pain, hypermobility syndrome, hypermobile Nicol-Danlos syndrome, Rheumatoid arthritis    Treatment Diagnosis: chronic joint pain, hypermobility syndrome, hypermobile Nicol-Danlos syndrome, Rheumatoid arthritis   Impression/Assessment: Patient is a 34 year old female with low back, neck and upper back complaints.  The following significant findings have been identified: Pain, Decreased ROM/flexibility, Decreased joint mobility, Decreased strength, Impaired balance, Decreased proprioception, Impaired muscle performance, Decreased activity tolerance, and Impaired posture. These impairments interfere with their ability to perform self care tasks, work tasks, recreational activities, household chores, driving , household mobility, community mobility, and ADL's as compared to previous level of function.     Clinical Decision Making (Complexity):  Clinical Presentation:  Stable/Uncomplicated  Clinical Presentation Rationale: based on medical and personal factors listed in PT evaluation  Clinical Decision Making (Complexity): Low complexity    PLAN OF CARE  Treatment Interventions:  Interventions: Gait Training, Manual Therapy, Neuromuscular Re-education, Therapeutic Activity, Therapeutic Exercise, Self-Care/Home Management    Long Term Goals     PT Goal 1  Goal Identifier: walking  Goal Description: pt will be able to walk up to 30 minutes  Rationale: to maximize safety and independence with self cares;to maximize safety and independence within the community;to maximize safety and independence with performance of ADLs and functional tasks (to walk her dog, community mobility)  Goal Progress: currently limited to 10 minute walks  Target Date: 08/28/25  PT Goal 2  Goal Identifier: standing  Goal Description: pt will be able to stand up to 30 minutes  Rationale: to maximize safety and independence with performance of ADLs and functional tasks (showering, cooking, cleaning, waiting in line)  Goal Progress: currently limited to 10 minutes at a time with significant pain and fatigue  Target Date: 08/28/25      Frequency of Treatment: 1 x per week  Duration of Treatment: 4 weeks tapering to 2 x per month for 2 months    Recommended Referrals to Other Professionals:   Education Assessment:   Learner/Method: No Barriers to Learning;Patient;Listening;Reading;Demonstration;Pictures/Video  Education Comments: Discussed pathoanatomy and plan of care.    Risks and benefits of evaluation/treatment have been explained.   Patient/Family/caregiver agrees with Plan of Care.     Evaluation Time:             Signing Clinician: Wilda Perez, PT

## 2025-06-16 ENCOUNTER — MYC MEDICAL ADVICE (OUTPATIENT)
Dept: INTERNAL MEDICINE | Facility: CLINIC | Age: 35
End: 2025-06-16

## 2025-06-17 NOTE — TELEPHONE ENCOUNTER
The RN attempted to call to speak with the patient, but the RN was not able to reach the patient. The RN LVM with the [facility] phone number for the patient to call back and the recommendation to speak with a Triage Nurse or seek care at Urgent Care/ER for new symptoms not previously discussed with a provider.

## 2025-06-21 ENCOUNTER — OFFICE VISIT (OUTPATIENT)
Dept: URGENT CARE | Facility: URGENT CARE | Age: 35
End: 2025-06-21
Payer: COMMERCIAL

## 2025-06-21 VITALS
DIASTOLIC BLOOD PRESSURE: 107 MMHG | RESPIRATION RATE: 18 BRPM | BODY MASS INDEX: 33.67 KG/M2 | WEIGHT: 227.3 LBS | HEIGHT: 69 IN | HEART RATE: 97 BPM | TEMPERATURE: 97.8 F | SYSTOLIC BLOOD PRESSURE: 143 MMHG | OXYGEN SATURATION: 100 %

## 2025-06-21 DIAGNOSIS — K60.30 ANAL FISTULA: Primary | ICD-10-CM

## 2025-06-21 PROCEDURE — 99213 OFFICE O/P EST LOW 20 MIN: CPT | Performed by: NURSE PRACTITIONER

## 2025-06-21 PROCEDURE — 3077F SYST BP >= 140 MM HG: CPT | Performed by: NURSE PRACTITIONER

## 2025-06-21 PROCEDURE — 3080F DIAST BP >= 90 MM HG: CPT | Performed by: NURSE PRACTITIONER

## 2025-06-21 NOTE — PROGRESS NOTES
"Chief Complaint   Patient presents with    Urgent Care    Hemorrhoids     Hemorrhoid   Firm cyst in rectal area that is draining onset 1.5 weeks   Pt states had syncope episode twice after applying hemorrhoid cream, in a lot of pain   Cyst popped on Tuesday, blood in discharge with poss feces   Concerned about abscess          ICD-10-CM    1. Anal fistula  K60.30 Adult Colorectal Surgery  Referral      Patient is referred to colorectal surgery for further assessment.  Suggested the use of warm sitz bath's.    Subjective     Rachel Fried is an 34 year old female who presents to clinic today for drainage coming from the rectal area.  Symptoms started about 10 days ago when she felt some pain around the active rectal area.  She does have a history of constipation so thought it might just be a hemorrhoid.  Eventually became more painful and she went to an urgent care or in [place] where she was visiting family.  They told her it was just a hemorrhoid and so she started using over-the-counter hemorrhoid treatments.  After several days the pain increased significantly the area became very hot and since slightly swollen and after few days it started spontaneously draining.  The pain significantly decreased but it is still draining now.      ROS: 10 point ROS neg other than the symptoms noted above in the HPI.       Objective    BP (!) 143/107   Pulse 97   Temp 97.8  F (36.6  C) (Tympanic)   Resp 18   Ht 1.753 m (5' 9\")   Wt 103.1 kg (227 lb 4.8 oz)   SpO2 100%   Breastfeeding No   BMI 33.57 kg/m    Nurses notes and VS have been reviewed.    Physical Exam       GENERAL APPEARANCE: alert and mild distress     Rectal exam: Residual hemorrhoidal tag is present, at the 7 o'clock position there is a open area that is draining serosanguineous fluid, fluid increases with palpation, no tenderness around the area, no foul smell.     MS: extremities normal- no gross deformities noted; normal muscle tone.     " SKIN: no suspicious lesions or rashes      GIL Valadez, CNP  [facility] Provider    The use of Dragon/[employer] dictation services may have been used to construct the content in this note; any grammatical or spelling errors are non-intentional. Please contact the author of this note directly if you are in need of any clarification.

## 2025-06-21 NOTE — PROGRESS NOTES
Urgent Care Clinic Visit    Chief Complaint   Patient presents with    Urgent Care    Hemorrhoids     Hemorrhoid   Firm cyst in rectal area that is draining onset 1.5 weeks   Pt states had syncope episode twice after applying hemorrhoid cream, in a lot of pain   Cyst popped on Tuesday, blood in discharge with poss feces   Concerned about abscess               6/21/2025     9:27 AM   Additional Questions   Roomed by Paola   Accompanied by self

## 2025-06-23 ENCOUNTER — PRE VISIT (OUTPATIENT)
Dept: SURGERY | Facility: CLINIC | Age: 35
End: 2025-06-23
Payer: COMMERCIAL

## 2025-06-23 NOTE — CONFIDENTIAL NOTE
COLON AND RECTAL SURGERY PRE-VISIT:  Diagnosis, Referred by & from: Possible abscess. Yanira.   Appt date: 7/7/2025 with RADU Timmons at [facility]     Records Requested       Record  Facility Outcome:   None   06/23/25 at 4:14 PM:  All relevant records are bookmarked under [facility] EMT.  No records to request at this time. PRE-VISIT COMPLETE.    Complete [x]       FLOYD YorkT  Colon and Rectal Surgery

## 2025-06-25 ENCOUNTER — THERAPY VISIT (OUTPATIENT)
Dept: PHYSICAL THERAPY | Facility: CLINIC | Age: 35
End: 2025-06-25
Attending: OPHTHALMOLOGY
Payer: COMMERCIAL

## 2025-06-25 DIAGNOSIS — M06.09 RHEUMATOID ARTHRITIS OF MULTIPLE SITES WITH NEGATIVE RHEUMATOID FACTOR (H): Primary | ICD-10-CM

## 2025-06-25 DIAGNOSIS — M35.7 HYPERMOBILITY SYNDROME: ICD-10-CM

## 2025-06-25 DIAGNOSIS — Q79.62 HYPERMOBILE EHLERS-DANLOS SYNDROME: ICD-10-CM

## 2025-06-25 DIAGNOSIS — G89.29 CHRONIC JOINT PAIN: ICD-10-CM

## 2025-06-25 DIAGNOSIS — M25.50 CHRONIC JOINT PAIN: ICD-10-CM

## 2025-06-25 PROCEDURE — 97110 THERAPEUTIC EXERCISES: CPT | Mod: GP | Performed by: PHYSICAL THERAPIST

## 2025-06-25 PROCEDURE — 97112 NEUROMUSCULAR REEDUCATION: CPT | Mod: GP | Performed by: PHYSICAL THERAPIST

## 2025-06-28 ENCOUNTER — HEALTH MAINTENANCE LETTER (OUTPATIENT)
Age: 35
End: 2025-06-28

## 2025-07-02 ENCOUNTER — THERAPY VISIT (OUTPATIENT)
Dept: PHYSICAL THERAPY | Facility: CLINIC | Age: 35
End: 2025-07-02
Attending: OPHTHALMOLOGY
Payer: COMMERCIAL

## 2025-07-02 DIAGNOSIS — M25.50 CHRONIC JOINT PAIN: ICD-10-CM

## 2025-07-02 DIAGNOSIS — Q79.62 HYPERMOBILE EHLERS-DANLOS SYNDROME: ICD-10-CM

## 2025-07-02 DIAGNOSIS — M06.09 RHEUMATOID ARTHRITIS OF MULTIPLE SITES WITH NEGATIVE RHEUMATOID FACTOR (H): Primary | ICD-10-CM

## 2025-07-02 DIAGNOSIS — G89.29 CHRONIC JOINT PAIN: ICD-10-CM

## 2025-07-02 DIAGNOSIS — M35.7 HYPERMOBILITY SYNDROME: ICD-10-CM

## 2025-07-02 PROCEDURE — 97112 NEUROMUSCULAR REEDUCATION: CPT | Mod: GP | Performed by: PHYSICAL THERAPIST

## 2025-07-02 PROCEDURE — 97110 THERAPEUTIC EXERCISES: CPT | Mod: GP | Performed by: PHYSICAL THERAPIST

## 2025-07-07 ENCOUNTER — OFFICE VISIT (OUTPATIENT)
Dept: SURGERY | Facility: CLINIC | Age: 35
End: 2025-07-07
Attending: NURSE PRACTITIONER
Payer: COMMERCIAL

## 2025-07-07 VITALS
BODY MASS INDEX: 33.92 KG/M2 | WEIGHT: 229.7 LBS | OXYGEN SATURATION: 99 % | HEART RATE: 104 BPM | SYSTOLIC BLOOD PRESSURE: 122 MMHG | DIASTOLIC BLOOD PRESSURE: 84 MMHG

## 2025-07-07 DIAGNOSIS — K60.30 ANAL FISTULA: ICD-10-CM

## 2025-07-07 RX ORDER — METRONIDAZOLE 500 MG/1
500 TABLET ORAL 2 TIMES DAILY
Qty: 14 TABLET | Refills: 0 | Status: SHIPPED | OUTPATIENT
Start: 2025-07-07 | End: 2025-07-14

## 2025-07-07 ASSESSMENT — PAIN SCALES - GENERAL: PAINLEVEL_OUTOF10: NO PAIN (0)

## 2025-07-07 NOTE — LETTER
7/7/2025      Rachel Fried  [address]  [facility] [address]      Dear Colleague,    Thank you for referring your patient, Rachel Fried, to the [facility] [place]. Please see a copy of my visit note below.    [facility] Colon and Rectal Surgery Consultation Clinic Note    Patient:   Rachel Fride  YOB: 1990  Date of Visit:  7/7/2025  Referred by:  Missy Grossman    Assessment & Plan   Patient is a 34-year-old female who presents to clinic with concerns for perianal fistula.  On exam today, she does appear to have a healing external fistula opening.  I am unable to probe this at this time.  I also do not appreciate an obvious internal opening on anoscopy today.  I am hopeful that this has started to heal for her she will not require any surgical intervention.  Will plan to continue with current fiber supplement and routine sitz bath's, ideally 2-4 times daily.  Given she is on an immunomodulator for rheumatoid arthritis, we will send a short course of antibiotics to ensure more complete healing and anti-inflammatory effect.  Will plan for follow-up in about 1 month, though she may cancel this visit if she is feeling better at that time.  Patient expressed understanding and agrees with this plan.    I spent a total of 30 minutes on the day of the visit.  Time spent on date of encounter doing chart review, history and exam, documentation, and further activities in this note. An additional 2 minutes was spent for anoscopy.    Daniela Timmons PA-C  Colon and Rectal Surgery  [facility]    History of Present Illness    Rachel Fried is a very pleasant 34 year old female here with concerns for perianal fistula.    Rachel reports onset of perianal drainage in early June.  She did note some associated pain and discomfort with this.  She has a history of constipation, and thought this was secondary to hemorrhoidal symptoms.  Eventually, the area did become  more painful and she presented to an urgent care in [place] where she was visiting a family member.  They informed her it was a hemorrhoid and she then treated with over-the-counter hemorrhoid cream.  After several days, paining further increased and the area became hot and had increased swelling.  It subsequently opened and drained with improvement in her pain.      She saw her primary care provider on 6/21 for the symptoms.  At that visit, exam concerning for opening near the anal opening that was draining serosanguineous fluid, which increased with manipulation around the area.  She was recommended for follow-up with us given concern for perianal fistula.    Today, patient continues to feel well overall without concerns for recurrent abscess.  She is currently not having any pain or swelling.  She is concerned for a possible new area of drainage closer to her anal opening than the previous site.  She has never had a colonoscopy.  She does not have first-degree relative with colon or rectal cancer, however her maternal uncle was diagnosed in his late 40s or possibly around age 50 with colon cancer.  No known family history of IBD.  Patient does have rheumatoid arthritis, which is currently managed with Actemra (Tocilizumab IL-6 inhibitor).       No  was used for this encounter.    Physical Exam    Vital signs:  /84   Pulse 104   Wt 104.2 kg (229 lb 11.2 oz)   SpO2 99%   BMI 33.92 kg/m      General: alert, oriented, in no acute distress, sitting comfortably  Respiratory: non-labored breathing on RA  Chaperone: Deb Page MA  Perianal External Examination:  General exam with generally healthy perianal skin without erythema, excoriation or ulceration.  No external hemorrhoids noted.  No obvious anal fissure.  No tenderness to external palpation.  In the right anterolateral position, there is a small lesion that appears consistent with external fistula opening.  I am unable to probe this  with a fistula probe.  I also do not palpate any surrounding scar tissue or obvious fistula tract.  No other external masses or lesions noted.    Digital Rectal Examination: Was performed.   Good resting sphincter tone and mild circumferential internal swelling.  There is a palpable defect in the anterior midline position however this is nontender to palpation and I do not get return of any pus or blood with manipulation of this area.  No other palpable masses or lesions.    Anoscopy: Was performed.   Circumferential internal hemorrhoids.  In the anterior midline position, there is an area that is particularly erythematous.  No obvious defect or internal fistula opening.  No active bleeding or purulent drainage noted.  No obvious wound.        Past Medical History:   Diagnosis Date     Allergic dermatitis      Anxiety      Depression      Pityriasis lichenoides      Rheumatoid arthritis (H)      No past surgical history on file.  Current Outpatient Medications   Medication Sig Dispense Refill     Abaloparatide 3120 MCG/1.56ML SOPN injection Inject 0.04 mLs (80 mcg) subcutaneously daily. 1.56 mL 11     ACTEMRA ACTPEN 162 MG/0.9ML SOAJ INJECT THE CONTENTS OF ONE AUTOINJECTOR PEN UNDER THE SKIN EVERY 7 DAYS. 3.6 mL 5     albuterol (PROAIR HFA/PROVENTIL HFA/VENTOLIN HFA) 108 (90 Base) MCG/ACT inhaler Inhale 2 puffs into the lungs every 4 hours as needed for shortness of breath or wheezing (Max daily dose  of  ? puffs). 6.7 g 5     amphetamine-dextroamphetamine (ADDERALL XR) 10 MG 24 hr capsule Take 1 capsule (10 mg) by mouth daily. 30 capsule 0     amphetamine-dextroamphetamine (ADDERALL XR) 10 MG 24 hr capsule Take 1 capsule (10 mg) by mouth daily. 30 capsule 0     amphetamine-dextroamphetamine (ADDERALL) 5 MG tablet Take 0.5 tablets (2.5 mg) by mouth 2 times daily as needed (ADHD). 30 tablet 0     amphetamine-dextroamphetamine (ADDERALL) 5 MG tablet Take 0.5 tablets (2.5 mg) by mouth 2 times daily as needed (ADHD). 30  tablet 0     Cholecalciferol (VITAMIN D) 2000 units tablet Take 2,000 Units by mouth daily 1 tablet 0     drospirenone-ethinyl estradiol (VESTURA) 3-0.02 MG tablet Take 1 tablet by mouth daily. 84 tablet 4     DULoxetine (CYMBALTA) 30 MG capsule Take 1 capsule (30 mg) by mouth daily. along with 60 mg (total 90 mg) 90 capsule 3     DULoxetine (CYMBALTA) 60 MG capsule Take 1 capsule (60 mg) by mouth daily. along with 30 mg (total 90 mg) 90 capsule 3     lidocaine (LMX4) 4 % external cream Apply topically once as needed for mild pain 30 g 0     loratadine (CLARITIN) 10 MG tablet Take 10 mg by mouth daily       Magnesium Oxide -Mg Supplement 500 MG CAPS Take 500 mg by mouth daily.       methocarbamol (ROBAXIN) 500 MG tablet Take 1 tablet (500 mg) by mouth 4 times daily as needed for muscle spasms 120 tablet 3     methocarbamol (ROBAXIN) 500 MG tablet TAKE 1 TABLET (500 MG) BY MOUTH 4 TIMES DAILY AS NEEDED FOR MUSCLE SPASMS 50 tablet 0     metroNIDAZOLE (FLAGYL) 500 MG tablet Take 1 tablet (500 mg) by mouth 2 times daily for 7 days. 14 tablet 0     naltrexone (REVIA) 1 mg/mL SOLN Take 1 mg by mouth 2 times daily. (Patient taking differently: Take 4 mg by mouth once. 4 MG ONCE DAILY)       predniSONE (DELTASONE) 5 MG tablet 4tab=20 mg qd x 3 days, 3tab=15 mg qd x 3 days, 2tab=10 mg qd x 3 days, 1 tab=5 mg qd x 3 days then stop, as needed for flare up 30 tablet 1     Quercetin 500 MG CAPS Take 500 mg by mouth 2 times daily.       Semaglutide-Weight Management (WEGOVY) 1 MG/0.5ML pen Inject 1 mg subcutaneously once a week. 2 mL 3     testosterone (ANDROGEL 1 % PUMP) 12.5 MG/ACT (1%) gel Place 3 Pump (37.5 mg of testosterone) onto the skin daily. Apply from dispenser to clean, dry, intact skin of the shoulders, upper arms, or abdomen. 375 g 1     vitamin C (ASCORBIC ACID) 1000 MG TABS Take 1,000 mg by mouth daily.       Allergies   Allergen Reactions     Sulfa Antibiotics Hives and Itching     Family History   Problem  Relation Age of Onset     Eczema Mother      Osteoarthritis Father      Other - See Comments Brother         Fidel COVID     Lung Cancer Paternal Grandfather      Colon Cancer Maternal Uncle      Social History     Tobacco Use     Smoking status: Never     Passive exposure: Never     Smokeless tobacco: Never   Substance Use Topics     Alcohol use: Yes     Alcohol/week: 1.0 standard drink of alcohol     Types: 1 Glasses of wine per week     Comment: 3 drinks per month    Marital status: single.    This note was created using speech recognition software and may contain unintended word substitutions.      Again, thank you for allowing me to participate in the care of your patient.        Sincerely,        Daniela Timmons PA-C    Electronically signed

## 2025-07-07 NOTE — PROGRESS NOTES
[facility] Colon and Rectal Surgery Consultation Clinic Note    Patient:   Rachel Fried  YOB: 1990  Date of Visit:  7/7/2025  Referred by:  Missy Grossman    Assessment & Plan    Patient is a 34-year-old female who presents to clinic with concerns for perianal fistula.  On exam today, she does appear to have a healing external fistula opening.  I am unable to probe this at this time.  I also do not appreciate an obvious internal opening on anoscopy today.  I am hopeful that this has started to heal for her she will not require any surgical intervention.  Will plan to continue with current fiber supplement and routine sitz bath's, ideally 2-4 times daily.  Given she is on an immunomodulator for rheumatoid arthritis, we will send a short course of antibiotics to ensure more complete healing and anti-inflammatory effect.  Will plan for follow-up in about 1 month, though she may cancel this visit if she is feeling better at that time.  Patient expressed understanding and agrees with this plan.    I spent a total of 30 minutes on the day of the visit.  Time spent on date of encounter doing chart review, history and exam, documentation, and further activities in this note. An additional 2 minutes was spent for anoscopy.    Daniela Timomns PA-C  Colon and Rectal Surgery  [facility]    History of Present Illness     Rachel Fried is a very pleasant 34 year old female here with concerns for perianal fistula.    Rachel reports onset of perianal drainage in early June.  She did note some associated pain and discomfort with this.  She has a history of constipation, and thought this was secondary to hemorrhoidal symptoms.  Eventually, the area did become more painful and she presented to an urgent care in [place] where she was visiting a family member.  They informed her it was a hemorrhoid and she then treated with over-the-counter hemorrhoid cream.  After several days, paining  further increased and the area became hot and had increased swelling.  It subsequently opened and drained with improvement in her pain.      She saw her primary care provider on 6/21 for the symptoms.  At that visit, exam concerning for opening near the anal opening that was draining serosanguineous fluid, which increased with manipulation around the area.  She was recommended for follow-up with us given concern for perianal fistula.    Today, patient continues to feel well overall without concerns for recurrent abscess.  She is currently not having any pain or swelling.  She is concerned for a possible new area of drainage closer to her anal opening than the previous site.  She has never had a colonoscopy.  She does not have first-degree relative with colon or rectal cancer, however her maternal uncle was diagnosed in his late 40s or possibly around age 50 with colon cancer.  No known family history of IBD.  Patient does have rheumatoid arthritis, which is currently managed with Actemra (Tocilizumab IL-6 inhibitor).       No  was used for this encounter.    Physical Exam     Vital signs:  /84   Pulse 104   Wt 104.2 kg (229 lb 11.2 oz)   SpO2 99%   BMI 33.92 kg/m      General: alert, oriented, in no acute distress, sitting comfortably  Respiratory: non-labored breathing on RA  Chaperone: Deb Page MA  Perianal External Examination:  General exam with generally healthy perianal skin without erythema, excoriation or ulceration.  No external hemorrhoids noted.  No obvious anal fissure.  No tenderness to external palpation.  In the right anterolateral position, there is a small lesion that appears consistent with external fistula opening.  I am unable to probe this with a fistula probe.  I also do not palpate any surrounding scar tissue or obvious fistula tract.  No other external masses or lesions noted.    Digital Rectal Examination: Was performed.   Good resting sphincter tone and mild  circumferential internal swelling.  There is a palpable defect in the anterior midline position however this is nontender to palpation and I do not get return of any pus or blood with manipulation of this area.  No other palpable masses or lesions.    Anoscopy: Was performed.   Circumferential internal hemorrhoids.  In the anterior midline position, there is an area that is particularly erythematous.  No obvious defect or internal fistula opening.  No active bleeding or purulent drainage noted.  No obvious wound.        Past Medical History:   Diagnosis Date    Allergic dermatitis     Anxiety     Depression     Pityriasis lichenoides     Rheumatoid arthritis (H)      No past surgical history on file.  Current Outpatient Medications   Medication Sig Dispense Refill    Abaloparatide 3120 MCG/1.56ML SOPN injection Inject 0.04 mLs (80 mcg) subcutaneously daily. 1.56 mL 11    ACTEMRA ACTPEN 162 MG/0.9ML SOAJ INJECT THE CONTENTS OF ONE AUTOINJECTOR PEN UNDER THE SKIN EVERY 7 DAYS. 3.6 mL 5    albuterol (PROAIR HFA/PROVENTIL HFA/VENTOLIN HFA) 108 (90 Base) MCG/ACT inhaler Inhale 2 puffs into the lungs every 4 hours as needed for shortness of breath or wheezing (Max daily dose  of  ? puffs). 6.7 g 5    amphetamine-dextroamphetamine (ADDERALL XR) 10 MG 24 hr capsule Take 1 capsule (10 mg) by mouth daily. 30 capsule 0    amphetamine-dextroamphetamine (ADDERALL XR) 10 MG 24 hr capsule Take 1 capsule (10 mg) by mouth daily. 30 capsule 0    amphetamine-dextroamphetamine (ADDERALL) 5 MG tablet Take 0.5 tablets (2.5 mg) by mouth 2 times daily as needed (ADHD). 30 tablet 0    amphetamine-dextroamphetamine (ADDERALL) 5 MG tablet Take 0.5 tablets (2.5 mg) by mouth 2 times daily as needed (ADHD). 30 tablet 0    Cholecalciferol (VITAMIN D) 2000 units tablet Take 2,000 Units by mouth daily 1 tablet 0    drospirenone-ethinyl estradiol (VESTURA) 3-0.02 MG tablet Take 1 tablet by mouth daily. 84 tablet 4    DULoxetine (CYMBALTA) 30 MG  capsule Take 1 capsule (30 mg) by mouth daily. along with 60 mg (total 90 mg) 90 capsule 3    DULoxetine (CYMBALTA) 60 MG capsule Take 1 capsule (60 mg) by mouth daily. along with 30 mg (total 90 mg) 90 capsule 3    lidocaine (LMX4) 4 % external cream Apply topically once as needed for mild pain 30 g 0    loratadine (CLARITIN) 10 MG tablet Take 10 mg by mouth daily      Magnesium Oxide -Mg Supplement 500 MG CAPS Take 500 mg by mouth daily.      methocarbamol (ROBAXIN) 500 MG tablet Take 1 tablet (500 mg) by mouth 4 times daily as needed for muscle spasms 120 tablet 3    methocarbamol (ROBAXIN) 500 MG tablet TAKE 1 TABLET (500 MG) BY MOUTH 4 TIMES DAILY AS NEEDED FOR MUSCLE SPASMS 50 tablet 0    metroNIDAZOLE (FLAGYL) 500 MG tablet Take 1 tablet (500 mg) by mouth 2 times daily for 7 days. 14 tablet 0    naltrexone (REVIA) 1 mg/mL SOLN Take 1 mg by mouth 2 times daily. (Patient taking differently: Take 4 mg by mouth once. 4 MG ONCE DAILY)      predniSONE (DELTASONE) 5 MG tablet 4tab=20 mg qd x 3 days, 3tab=15 mg qd x 3 days, 2tab=10 mg qd x 3 days, 1 tab=5 mg qd x 3 days then stop, as needed for flare up 30 tablet 1    Quercetin 500 MG CAPS Take 500 mg by mouth 2 times daily.      Semaglutide-Weight Management (WEGOVY) 1 MG/0.5ML pen Inject 1 mg subcutaneously once a week. 2 mL 3    testosterone (ANDROGEL 1 % PUMP) 12.5 MG/ACT (1%) gel Place 3 Pump (37.5 mg of testosterone) onto the skin daily. Apply from dispenser to clean, dry, intact skin of the shoulders, upper arms, or abdomen. 375 g 1    vitamin C (ASCORBIC ACID) 1000 MG TABS Take 1,000 mg by mouth daily.       Allergies   Allergen Reactions    Sulfa Antibiotics Hives and Itching     Family History   Problem Relation Age of Onset    Eczema Mother     Osteoarthritis Father     Other - See Comments Brother         Long COVID    Lung Cancer Paternal Grandfather     Colon Cancer Maternal Uncle      Social History     Tobacco Use    Smoking status: Never     Passive  exposure: Never    Smokeless tobacco: Never   Substance Use Topics    Alcohol use: Yes     Alcohol/week: 1.0 standard drink of alcohol     Types: 1 Glasses of wine per week     Comment: 3 drinks per month    Marital status: single.    This note was created using speech recognition software and may contain unintended word substitutions.

## 2025-07-07 NOTE — PATIENT INSTRUCTIONS
"It was a pleasure meeting you today! Here is what we discussed to help heal your fistula and hopefully prevent recurrence in the future.     Continue fiber supplement and good hydration to keep stools soft but formed.  Will will send a short course of antibiotics, primarily to help promote healing and to help with inflammation (not to treat a current \"infection\").   Perform Sitz baths, ideally 2-4 times daily. Soak your bottom in warm water for 10-20 minutes.   We'll plan to follow up in about 1 month. If you are feeling better at that time, you can always call and cancel this visit.   "

## 2025-07-08 ENCOUNTER — MYC MEDICAL ADVICE (OUTPATIENT)
Dept: RHEUMATOLOGY | Facility: CLINIC | Age: 35
End: 2025-07-08
Payer: COMMERCIAL

## 2025-07-08 NOTE — TELEPHONE ENCOUNTER
Left Voicemail (1st Attempt) and Sent Mychart (1st Attempt) for the patient to call back and schedule the following:    Appointment type: Return Rheum   Provider: Dr. Aldridge   Return date: Reschedule October 2nd   Specialty phone number: [phone]  Additonal Notes: Dr. Aldridge not in clinic 10/2, pt will need to reschedule next available

## 2025-07-10 NOTE — TELEPHONE ENCOUNTER
Left Voicemail (2nd Attempt) for the patient to call back and schedule the following:    Appointment type: Return Rheum   Provider: Dr. Aldridge   Return date: Reschedule October 2nd   Specialty phone number: [phone]  Additonal Notes: Dr. Aldridge not in clinic 10/2, pt will need to reschedule next available    Advised appt is cancelled

## 2025-07-29 ENCOUNTER — VIRTUAL VISIT (OUTPATIENT)
Dept: CONSULT | Facility: CLINIC | Age: 35
End: 2025-07-29
Attending: INTERNAL MEDICINE
Payer: COMMERCIAL

## 2025-07-29 DIAGNOSIS — S22.000S CLOSED COMPRESSION FRACTURE OF THORACIC VERTEBRA, SEQUELA: Primary | ICD-10-CM

## 2025-07-29 DIAGNOSIS — M41.9 SCOLIOSIS: ICD-10-CM

## 2025-07-29 DIAGNOSIS — M35.7 HYPERMOBILITY SYNDROME: ICD-10-CM

## 2025-07-29 DIAGNOSIS — H40.059: ICD-10-CM

## 2025-07-29 DIAGNOSIS — H52.10 HIGH MYOPIA: ICD-10-CM

## 2025-07-29 PROCEDURE — 999N000069 HC STATISTIC GENETIC COUNSELING, < 16 MIN: Mod: GT,95 | Performed by: GENETIC COUNSELOR, MS

## 2025-07-29 PROCEDURE — 96041 GENETIC COUNSELING SVC EA 30: CPT | Mod: GT,95 | Performed by: GENETIC COUNSELOR, MS

## 2025-07-29 NOTE — LETTER
"2025      RE: Rachel Fried  [address]  [facility] [address]     Dear Colleague,    Thank you for the opportunity to participate in the care of your patient, Rachel Fried, at the [facility] at [facility]. Please see a copy of my visit note below.    Name:  Rachel Fried \"Rachel\"  :   1990  MRN:   [id number]  Date of service: 2025  Primary Provider: Panfilo Solano  Referring Provider: Nestor Caballero    PRESENTING INFORMATION   Reason for consultation:  A consultation in the [facility] was requested for Rachel, a 34 year old female, for evaluation of The encounter diagnosis was Closed compression fracture of thoracic vertebra, sequela.     Rachel was unaccompanied to this visit    History is obtained from Patient and electronic health record. I met with Rachel at the request of Dr. Nestor Caballero to obtain a personal and family history, discuss possible genetic contributions to her symptoms, and to obtain informed consent for genetic testing if indicated.      ASSESSMENT & PLAN  Rachel is a 34 year old female with a history of vertebral compression fractures, scoliosis, reported joint hypermobility, high myopia, and increased intraocular pressure. Family history is significant for high myopia/increased eye pressure in her mother. Her father and brother have joint hypermobility/history of fractures. Her father has hearing loss and osteoarthritis as well.     Hypermobility, unexplained vertebral compression fracture, high myopia, and scoliosis may be indicative of a genetic condition. Differential diagnoses include osteogenesis imperfecta (although not associated with high myopia) and Stickler syndrome (although not primarily associated with fractures). Hypophosphatasia is considered less likely due to normal alkaline phosphatase levels, but testing will still include " this gene. Vascular Nicol-Danlos syndrome and Marfan syndrome are not strongly suspected based on the patient's history and family history, but Rachel's preference would be to included these genes in the genetic testing panel for comprehensive evaluation as these are highly actionable conditions. Rachel provided informed verbal consent for genetic testing.    A molecular diagnosis can significantly change Rachel's care, and is therefore medically necessary information. For example, patients with osteogenesis imperfecta require bone density monitoring, consideration of bisphosphonate medication, activity restrictions due to fracture risk, evaluation for hearing loss and management over time, and routine dental care due to increased risk for decay and structural changes.  Patients with hypophosphatasia should not take bisphosphonates, and enzyme replacement therapy is instead considered. If an aortopathy-associated condition is identified, screening for aneurysms and dissections would be initiated on a routine basis. A genetic diagnosis can also inform the recurrence risk for relatives.    blood sample will be collected and sent to the [facility] for custom NGS panel  PA will be attempted. We will contact Rachel if the cost is estimate to be over $300  After testing is initiated, results will be returned by phone in 4 weeks. Follow-up dependent on results    Contact information was provided should any questions arise in the future.       HPI:  Rachel Fried, age 34,  she/they pronouns, has a history of vertebral compression fractures that occurred prior to age 32, without any identifiable traumatic event such as a car accident or significant injury. The fractures were described as unexpected and occurred during routine daily activities.  Aside from the vertebral fractures, Rachel has not experienced other bone fractures. DXA scan was within normal limits. She was prescribed  "abaloparatide. She was referred by endocrinology to assess for OI and related disorders.      She was diagnosed with scoliosis in childhood, with a significant curve that did not require bracing or surgery. No other skeletal issues or bone deformities were reported.  There is a history of hypermobility, with current symptoms most pronounced in the neck and fingertips, and milder involvement of the knees and elbows. Rachel noted that hypermobility was more severe during adolescence, with episodes of hip subluxation, knee instability requiring braces, and evaluation by an orthopedic doctor. No history of full joint dislocations was reported. No known aneurysms, organ rupture, blue sclerae, hearing loss. No birth defect or developmental delays. She is 5'9\".    Dental history includes removal of wisdom teeth at age 14 for space, but no removal of permanent teeth. Four cavities have occurred, but no history of excessive dental problems, thin enamel, or frequent chipping.     Rachel has a diagnosis of rheumatoid arthritis. She has a longstanding history of back pain, which is believed to be primarily due to muscle weakness following a three-month period without rheumatoid arthritis medication (insurance denial). During this period, Rachel was largely sedentary, which is thought to have contributed to loss of muscle tone and subsequent back pain.     Rachel has high myopia with a stable prescription of -6 and -6.5 since age 14. Eye pressure is currently being monitored, but no interventions have been required. No history of vision surgery, retinal or lens problems, or hearing loss. Occasional dry eyes are managed with artificial tears. Ophthalmology notes are unavailable for review.    No history of lung collapse.      A treadmill cardiac test was performed at age 15 due to episodes of syncope during tennis. This was normal. Asthma was diagnosed later on.      Rachel has a diagnosis of ADHD, anxiety, and depression. "     Patient Active Problem List   Diagnosis     Rheumatoid arthritis of multiple sites with negative rheumatoid factor (H)     Cyclic citrullinated peptide (CCP) antibody positive     Long-term use of Plaquenil     NSAID long-term use     Medical cannabis use     Chronic bilateral low back pain, unspecified whether sciatica present     Closed compression fracture of thoracic vertebra, sequela     Neck pain     Depression, unspecified depression type     Anxiety     Hypermobility polyarthralgia syndrome     Bulging lumbar disc     Pre-diabetes     Class 2 severe obesity due to excess calories with serious comorbidity and body mass index (BMI) of 37.0 to 37.9 in adult (H)     EDS (Nicol-Danlos syndrome)     Hypermobility polarthralgia syndrome     Hyperhidrosis     Chronic joint pain     Hypermobile Nicol-Danlos syndrome       Pertinent studies/abnormal test results:   Factor V Leiden- negative    Imaging results:   Impression:   1. Chronic anterior wedge-shaped compression deformity of T6 and T8.  Schmorl's deformity at the T7 inferior at T9 superior endplate.  2. Central disc protrusion at L5-S1. No significant spinal canal or  neuroforamina stenosis at any level.    No results found for this or any previous visit (from the past 744 hours).  No results found for any visits on 07/29/25.  No results found for this or any previous visit (from the past 8760 hours).    Past Medical History:  Past Medical History:   Diagnosis Date     Allergic dermatitis      Anxiety      Depression      Pityriasis lichenoides      Rheumatoid arthritis (H)        Past Surgical History:  No past surgical history on file.     FAMILY HISTORY  A three generation pedigree was obtained today and scanned into the EMR.     Children:  - No biological children.     Siblings:  - One younger full brother (3 years younger) diagnosed with hypermobile Nicol-Danlos syndrome (hEDS), ADHD, anxiety, depression, and long COVID with migraine headaches. He  "had an unexpected foot fracture and has painful hip and knee joints, but no rheumatoid arthritis.     Maternal relatives:  - Mother: High eye pressure, severe allergies, strong myopia (prescription ~-5.5).  - Mother's brother: Factor V Leiden, colon cancer (diagnosed around age 48, in remission), heart surgery (details unclear), multiple fractures (some related to high-risk activities, some minor trauma), lives adventurously.  - Aunt: History of alcoholism (recovered >20 years), no other health issues.  - Female cousin (Camille): Factor V Leiden, suspected hypermobility (not formally diagnosed)  - Male cousin who is 6'5\"  - Maternal grandmother: Dementia, both hips replaced in late 60s  - Maternal grandfather:  (complications of alcoholism)     Paternal relatives:  - Father: Osteoarthritis (diagnosed at 55, mainly spine), scoliosis (no bracing/surgery), suspected hEDS (not tested), hearing loss (progressive since 50), thumb and finger fractures (sports-related)  - Two paternal aunts: Both had knee surgeries; one had torn ACL and is a marathon runner, the other had knee surgery for cartilage damage (details unclear).  - Cousin (paternal aunt's daughter): Torn ACL (plays college soccer)  - Paternal grandfather: Heart attack (related to lifestyle), back pain (no known fractures), living.  - Paternal grandmother: Dementia, back fractures at age 70 after motor vehicle accident      Pertinent negatives: No family history of multiple unexplained fractures, no early-onset hearing loss, no lens or retinal problems, no other relatives with early glaucoma or cataracts, no other relatives with high myopia, no aortic aneurysm, no organ rupture, no sudden unexplained death, no collapsed lung, no low bone density prior to menopause, no blue sclerae, no pneumothorax. Consanguinity is denied.    DISCUSSION  Genetics  Today we reviewed that our genetic material or DNA is responsible for how our bodies grow and develop. It " can be thought of as an instruction manual. This instruction manual is made up of chapters called genes. Our genes are inherited on structures called chromosomes, of which we have 23 pairs for a total of 46. For each chromosome pair, one copy is inherited from the mother and one is inherited from the father. The chromosome pairs are numbered from 1 to 22, and the 23rd pair of chromsomes is called the sex chromsomes. These determine if we are a male or female.     Changes in the chromosomes or in the DNA sequence of a gene can cause the signs and symptoms of a genetic condition because the instructions it is providing to the body have been altered. This can be a small spelling error in the gene, a large duplicated piece of information, or a large missing piece of information.     Frequent or Unexplained Fractures  There are multiple genetic conditions which can cause an increased risk of fractures. The most common is Osteogenesis Imperfecta (OI) and Hypophosphatasia (HPP).    Osteogenesis imperfecta is characterized by fractures with minimal or absent trauma, variable dentinogenesis imperfecta (DI), and, often progressive hearing loss (usually beginning in teen/adult years). The most common type of OI is caused by variants in the COL1A1 and COL1A2 genes. The clinical features of COL1A1/2-OI represent a continuum ranging from  lethality to individuals with severe skeletal deformities, mobility impairments, and very short stature to nearly asymptomatic individuals with a mild predisposition to fractures, normal dentition, normal stature, and normal life span. Fractures can occur in any bone but are most common in the extremities.     HPP can cause low bone density, unexplained fractures, and low ALP. Rachel does not have a low ALP, but we will include this gene nonetheless due to it's high actionability (Strensiq therapy).    Finally, we discussed Sticker syndrome due to Rachel's hypermobility, high myopia,  and increased intraocular pressure. This condition is not associated with increased fracture risk to my knowledge, but would be important to identify due to it's increased risk of hearing loss and osteoarthritis.    We will create a custom panel for Rachel. The potential results were discussed including a positive, negative, or variant of uncertain significance.     One possibility is a change(s) could be seen in Rachel and this change(s) is known to cause similar symptoms to the symptoms aRchel has experienced.  This is considered a positive result.  A positive result may provide more information on appropriate clinical management for Rachel and may provide information on additional potential health risks associated with Rachel's diagnosis.  A positive result can also have implications for the health and reproductive risks of other relatives.  It is also possible that no change(s) that are likely to explain Rachel's symptoms are found.  This is considered a negative result.  A negative result would not completely rule out a possible genetic cause for Rachel's symptoms.  Not all changes in our genes cause disease.  Sometimes, it can be difficult for the laboratory to determine whether or not a change that is found contributes to the patient's symptoms.  If the meaning of a particular gene change is unknown, the lab classifies the result as a variant of unknown significance (VUS). Follow-up testing of relatives may be beneficial in clarifying the meaning of this result.    Management and surveillance of Rachel will depend on the genetic test results. It can also help predict the recurrence risk for Rachel and other family members to have another child with similar healthcare needs.    Prior Authorization and Initiating Testing  A sample will be collected for genetic testing and held for insurance prior authorization. Prior authorization will be attempted, which should be returned in approximately 2 to 4 weeks. If the  cost of testing is <$300, testing will be initiated automatically. If the cost of testing is >$300, testing will be held until the family has been contacted. If they do not reply, testing will not proceed.       Lab results may be automatically released via [employer].  Department protocol is to hold genetic testing results until we have reviewed them. We will then contact the patient directly to disclose the results and ensure they receive a copy of the report. This protocol was reviewed and patient is in agreement to hold the results for genetics review and direct contact.       Mary Tyson [facility]  Genetic Counselor   [facility]   Phone: [phone]         73 minutes spent on the date of the encounter in chart review, patient visit, test coordination/review, documentation, and/or discussion with other providers about the issues documented above         This note was written with the assistance of voice recognition software and may contain occasional typographic errors. Please contact our office if you identify errors requiring correction. Nabla consent obtained today.      For MDL Prior Authorization  Panel: ALPL, ANO5, B3GAT3, BMP1, COL1A1, COL1A2, COL3A1, RAX55G4, CYA86R6, COL2A1, COL9A1, COL9A2, COL9A3, DLUH5G0, CRTAP, FBN1, FKBP10, IFITM5, LOXL3, LRP2, LRP5, P3H1, P4HB, PLOD2, PLS3, PPIB, SEC24D, SERPINF1, SERPINH1, SP7, SPARC, TAPT1, NEEF62I, WNT1.     Medical necessity: A molecular diagnosis can significantly change Rachel's care, and is therefore medically necessary information. For example, patients with osteogenesis imperfecta require bone density monitoring, consideration of bisphosphonate medication, activity restrictions due to fracture risk, evaluation for hearing loss and management over time, and routine dental care due to increased risk for decay and structural changes.  Patients with hypophosphatasia should not take bisphosphonates, and enzyme replacement  therapy is instead considered. If an aortopathy-associated condition is identified, screening for aneurysms and dissections would be initiated on a routine basis. A genetic diagnosis can also inform the recurrence risk for relatives.    Please do not hesitate to contact me if you have any questions/concerns.     Sincerely,       Mary Tyson, RADHA

## 2025-07-29 NOTE — PROGRESS NOTES
"Name:  Rachel Fried \"Rachel\"  :   1990  MRN:   [id number]  Date of service: 2025  Primary Provider: Panfilo Solano  Referring Provider: Nestor Caballero    PRESENTING INFORMATION   Reason for consultation:  A consultation in the [facility] was requested for Rachel, a 34 year old female, for evaluation of The encounter diagnosis was Closed compression fracture of thoracic vertebra, sequela.     Rachel was unaccompanied to this visit    History is obtained from Patient and electronic health record. I met with Rachel at the request of Dr. Nestor Caballero to obtain a personal and family history, discuss possible genetic contributions to her symptoms, and to obtain informed consent for genetic testing if indicated.      ASSESSMENT & PLAN  Rachel is a 34 year old female with a history of vertebral compression fractures, scoliosis, reported joint hypermobility, high myopia, and increased intraocular pressure. Family history is significant for high myopia/increased eye pressure in her mother. Her father and brother have joint hypermobility/history of fractures. Her father has hearing loss and osteoarthritis as well.     Hypermobility, unexplained vertebral compression fracture, high myopia, and scoliosis may be indicative of a genetic condition. Differential diagnoses include osteogenesis imperfecta (although not associated with high myopia) and Stickler syndrome (although not primarily associated with fractures). Hypophosphatasia is considered less likely due to normal alkaline phosphatase levels, but testing will still include this gene. Vascular Nicol-Danlos syndrome and Marfan syndrome are not strongly suspected based on the patient's history and family history, but Rachel's preference would be to included these genes in the genetic testing panel for comprehensive evaluation as these are highly actionable conditions. Rachel provided informed verbal consent for genetic " testing.    A molecular diagnosis can significantly change Rachel's care, and is therefore medically necessary information. For example, patients with osteogenesis imperfecta require bone density monitoring, consideration of bisphosphonate medication, activity restrictions due to fracture risk, evaluation for hearing loss and management over time, and routine dental care due to increased risk for decay and structural changes.  Patients with hypophosphatasia should not take bisphosphonates, and enzyme replacement therapy is instead considered. If an aortopathy-associated condition is identified, screening for aneurysms and dissections would be initiated on a routine basis. A genetic diagnosis can also inform the recurrence risk for relatives.    blood sample will be collected and sent to the [facility] for custom NGS panel  PA will be attempted. We will contact Rachel if the cost is estimate to be over $300  After testing is initiated, results will be returned by phone in 4 weeks. Follow-up dependent on results    Contact information was provided should any questions arise in the future.       HPI:  Rachel Fried, age 34,  she/they pronouns, has a history of vertebral compression fractures that occurred prior to age 32, without any identifiable traumatic event such as a car accident or significant injury. The fractures were described as unexpected and occurred during routine daily activities.  Aside from the vertebral fractures, Rachel has not experienced other bone fractures. DXA scan was within normal limits. She was prescribed abaloparatide. She was referred by endocrinology to assess for OI and related disorders.      She was diagnosed with scoliosis in childhood, with a significant curve that did not require bracing or surgery. No other skeletal issues or bone deformities were reported.  There is a history of hypermobility, with current symptoms most pronounced in the neck  "and fingertips, and milder involvement of the knees and elbows. Rachel noted that hypermobility was more severe during adolescence, with episodes of hip subluxation, knee instability requiring braces, and evaluation by an orthopedic doctor. No history of full joint dislocations was reported. No known aneurysms, organ rupture, blue sclerae, hearing loss. No birth defect or developmental delays. She is 5'9\".    Dental history includes removal of wisdom teeth at age 14 for space, but no removal of permanent teeth. Four cavities have occurred, but no history of excessive dental problems, thin enamel, or frequent chipping.     Rachel has a diagnosis of rheumatoid arthritis. She has a longstanding history of back pain, which is believed to be primarily due to muscle weakness following a three-month period without rheumatoid arthritis medication (insurance denial). During this period, Rachel was largely sedentary, which is thought to have contributed to loss of muscle tone and subsequent back pain.     Rachel has high myopia with a stable prescription of -6 and -6.5 since age 14. Eye pressure is currently being monitored, but no interventions have been required. No history of vision surgery, retinal or lens problems, or hearing loss. Occasional dry eyes are managed with artificial tears. Ophthalmology notes are unavailable for review.    No history of lung collapse.      A treadmill cardiac test was performed at age 15 due to episodes of syncope during tennis. This was normal. Asthma was diagnosed later on.      Rachel has a diagnosis of ADHD, anxiety, and depression.     Patient Active Problem List   Diagnosis    Rheumatoid arthritis of multiple sites with negative rheumatoid factor (H)    Cyclic citrullinated peptide (CCP) antibody positive    Long-term use of Plaquenil    NSAID long-term use    Medical cannabis use    Chronic bilateral low back pain, unspecified whether sciatica present    Closed compression fracture of " thoracic vertebra, sequela    Neck pain    Depression, unspecified depression type    Anxiety    Hypermobility polyarthralgia syndrome    Bulging lumbar disc    Pre-diabetes    Class 2 severe obesity due to excess calories with serious comorbidity and body mass index (BMI) of 37.0 to 37.9 in adult (H)    EDS (Nicol-Danlos syndrome)    Hypermobility polarthralgia syndrome    Hyperhidrosis    Chronic joint pain    Hypermobile Nicol-Danlos syndrome       Pertinent studies/abnormal test results:   Factor V Leiden- negative    Imaging results:   Impression:   1. Chronic anterior wedge-shaped compression deformity of T6 and T8.  Schmorl's deformity at the T7 inferior at T9 superior endplate.  2. Central disc protrusion at L5-S1. No significant spinal canal or  neuroforamina stenosis at any level.    No results found for this or any previous visit (from the past 744 hours).  No results found for any visits on 07/29/25.  No results found for this or any previous visit (from the past 8760 hours).    Past Medical History:  Past Medical History:   Diagnosis Date    Allergic dermatitis     Anxiety     Depression     Pityriasis lichenoides     Rheumatoid arthritis (H)        Past Surgical History:  No past surgical history on file.     FAMILY HISTORY  A three generation pedigree was obtained today and scanned into the EMR.     Children:  - No biological children.     Siblings:  - One younger full brother (3 years younger) diagnosed with hypermobile Nicol-Danlos syndrome (hEDS), ADHD, anxiety, depression, and long COVID with migraine headaches. He had an unexpected foot fracture and has painful hip and knee joints, but no rheumatoid arthritis.     Maternal relatives:  - Mother: High eye pressure, severe allergies, strong myopia (prescription ~-5.5).  - Mother's brother: Factor V Leiden, colon cancer (diagnosed around age 48, in remission), heart surgery (details unclear), multiple fractures (some related to high-risk  "activities, some minor trauma), lives adventurously.  - Aunt: History of alcoholism (recovered >20 years), no other health issues.  - Female cousin (Camille): Factor V Leiden, suspected hypermobility (not formally diagnosed)  - Male cousin who is 6'5\"  - Maternal grandmother: Dementia, both hips replaced in late 60s  - Maternal grandfather:  (complications of alcoholism)     Paternal relatives:  - Father: Osteoarthritis (diagnosed at 55, mainly spine), scoliosis (no bracing/surgery), suspected hEDS (not tested), hearing loss (progressive since 50), thumb and finger fractures (sports-related)  - Two paternal aunts: Both had knee surgeries; one had torn ACL and is a marathon runner, the other had knee surgery for cartilage damage (details unclear).  - Cousin (paternal aunt's daughter): Torn ACL (plays college soccer)  - Paternal grandfather: Heart attack (related to lifestyle), back pain (no known fractures), living.  - Paternal grandmother: Dementia, back fractures at age 70 after motor vehicle accident      Pertinent negatives: No family history of multiple unexplained fractures, no early-onset hearing loss, no lens or retinal problems, no other relatives with early glaucoma or cataracts, no other relatives with high myopia, no aortic aneurysm, no organ rupture, no sudden unexplained death, no collapsed lung, no low bone density prior to menopause, no blue sclerae, no pneumothorax. Consanguinity is denied.    DISCUSSION  Genetics  Today we reviewed that our genetic material or DNA is responsible for how our bodies grow and develop. It can be thought of as an instruction manual. This instruction manual is made up of chapters called genes. Our genes are inherited on structures called chromosomes, of which we have 23 pairs for a total of 46. For each chromosome pair, one copy is inherited from the mother and one is inherited from the father. The chromosome pairs are numbered from 1 to 22, and the 23rd pair of " chromsomes is called the sex chromsomes. These determine if we are a male or female.     Changes in the chromosomes or in the DNA sequence of a gene can cause the signs and symptoms of a genetic condition because the instructions it is providing to the body have been altered. This can be a small spelling error in the gene, a large duplicated piece of information, or a large missing piece of information.     Frequent or Unexplained Fractures  There are multiple genetic conditions which can cause an increased risk of fractures. The most common is Osteogenesis Imperfecta (OI) and Hypophosphatasia (HPP).    Osteogenesis imperfecta is characterized by fractures with minimal or absent trauma, variable dentinogenesis imperfecta (DI), and, often progressive hearing loss (usually beginning in teen/adult years). The most common type of OI is caused by variants in the COL1A1 and COL1A2 genes. The clinical features of COL1A1/2-OI represent a continuum ranging from  lethality to individuals with severe skeletal deformities, mobility impairments, and very short stature to nearly asymptomatic individuals with a mild predisposition to fractures, normal dentition, normal stature, and normal life span. Fractures can occur in any bone but are most common in the extremities.     HPP can cause low bone density, unexplained fractures, and low ALP. Rachel does not have a low ALP, but we will include this gene nonetheless due to it's high actionability (Strensiq therapy).    Finally, we discussed Sticker syndrome due to Rachel's hypermobility, high myopia, and increased intraocular pressure. This condition is not associated with increased fracture risk to my knowledge, but would be important to identify due to it's increased risk of hearing loss and osteoarthritis.    We will create a custom panel for Rachel. The potential results were discussed including a positive, negative, or variant of uncertain significance.     One  possibility is a change(s) could be seen in Rachel and this change(s) is known to cause similar symptoms to the symptoms Rachel has experienced.  This is considered a positive result.  A positive result may provide more information on appropriate clinical management for Rachel and may provide information on additional potential health risks associated with Rachel's diagnosis.  A positive result can also have implications for the health and reproductive risks of other relatives.  It is also possible that no change(s) that are likely to explain Rachel's symptoms are found.  This is considered a negative result.  A negative result would not completely rule out a possible genetic cause for Rachel's symptoms.  Not all changes in our genes cause disease.  Sometimes, it can be difficult for the laboratory to determine whether or not a change that is found contributes to the patient's symptoms.  If the meaning of a particular gene change is unknown, the lab classifies the result as a variant of unknown significance (VUS). Follow-up testing of relatives may be beneficial in clarifying the meaning of this result.    Management and surveillance of Rachel will depend on the genetic test results. It can also help predict the recurrence risk for Rachel and other family members to have another child with similar healthcare needs.    Prior Authorization and Initiating Testing  A sample will be collected for genetic testing and held for insurance prior authorization. Prior authorization will be attempted, which should be returned in approximately 2 to 4 weeks. If the cost of testing is <$300, testing will be initiated automatically. If the cost of testing is >$300, testing will be held until the family has been contacted. If they do not reply, testing will not proceed.       Lab results may be automatically released via [employer].  Department protocol is to hold genetic testing results until we have reviewed them. We will then contact  the patient directly to disclose the results and ensure they receive a copy of the report. This protocol was reviewed and patient is in agreement to hold the results for genetics review and direct contact.       Mary Tyson [facility]  Genetic Counselor   [facility]   Phone: [phone]         73 minutes spent on the date of the encounter in chart review, patient visit, test coordination/review, documentation, and/or discussion with other providers about the issues documented above         This note was written with the assistance of voice recognition software and may contain occasional typographic errors. Please contact our office if you identify errors requiring correction. Nabla consent obtained today.      For SAV Prior Authorization  Panel: ALPL, ANO5, B3GAT3, BMP1, COL1A1, COL1A2, COL3A1, CUR25Y1, [id number], COL2A1, COL9A1, COL9A2, COL9A3, NXAG8L5, CRTAP, FBN1, FKBP10, IFITM5, LOXL3, LRP2, LRP5, P3H1, P4HB, PLOD2, PLS3, PPIB, SEC24D, SERPINF1, SERPINH1, SP7, SPARC, TAPT1, [id number], WNT1.     Medical necessity: A molecular diagnosis can significantly change Rachel's care, and is therefore medically necessary information. For example, patients with osteogenesis imperfecta require bone density monitoring, consideration of bisphosphonate medication, activity restrictions due to fracture risk, evaluation for hearing loss and management over time, and routine dental care due to increased risk for decay and structural changes.  Patients with hypophosphatasia should not take bisphosphonates, and enzyme replacement therapy is instead considered. If an aortopathy-associated condition is identified, screening for aneurysms and dissections would be initiated on a routine basis. A genetic diagnosis can also inform the recurrence risk for relatives.

## 2025-07-31 ENCOUNTER — DOCUMENTATION ONLY (OUTPATIENT)
Dept: LAB | Facility: CLINIC | Age: 35
End: 2025-07-31
Payer: COMMERCIAL

## 2025-08-18 ENCOUNTER — LAB (OUTPATIENT)
Dept: LAB | Facility: CLINIC | Age: 35
End: 2025-08-18
Payer: COMMERCIAL

## 2025-08-18 DIAGNOSIS — M35.7 HYPERMOBILITY SYNDROME: ICD-10-CM

## 2025-08-18 DIAGNOSIS — M41.9 SCOLIOSIS: ICD-10-CM

## 2025-08-18 DIAGNOSIS — S22.000S CLOSED COMPRESSION FRACTURE OF THORACIC VERTEBRA, SEQUELA: Primary | ICD-10-CM

## 2025-08-18 DIAGNOSIS — H40.059 RAISED INTRAOCULAR PRESSURE, UNSPECIFIED LATERALITY: ICD-10-CM

## 2025-08-21 ENCOUNTER — TELEPHONE (OUTPATIENT)
Dept: RHEUMATOLOGY | Facility: CLINIC | Age: 35
End: 2025-08-21
Payer: COMMERCIAL

## 2025-08-26 ENCOUNTER — THERAPY VISIT (OUTPATIENT)
Dept: PHYSICAL THERAPY | Facility: CLINIC | Age: 35
End: 2025-08-26
Payer: COMMERCIAL

## 2025-08-26 DIAGNOSIS — M25.50 CHRONIC JOINT PAIN: ICD-10-CM

## 2025-08-26 DIAGNOSIS — Q79.62 HYPERMOBILE EHLERS-DANLOS SYNDROME: ICD-10-CM

## 2025-08-26 DIAGNOSIS — M35.7 HYPERMOBILITY SYNDROME: ICD-10-CM

## 2025-08-26 DIAGNOSIS — G89.29 CHRONIC JOINT PAIN: ICD-10-CM

## 2025-08-26 DIAGNOSIS — M06.09 RHEUMATOID ARTHRITIS OF MULTIPLE SITES WITH NEGATIVE RHEUMATOID FACTOR (H): Primary | ICD-10-CM

## 2025-08-26 PROCEDURE — 97110 THERAPEUTIC EXERCISES: CPT | Mod: GP | Performed by: PHYSICAL THERAPIST

## 2025-08-26 PROCEDURE — 97530 THERAPEUTIC ACTIVITIES: CPT | Mod: GP | Performed by: PHYSICAL THERAPIST

## 2025-08-26 PROCEDURE — 97112 NEUROMUSCULAR REEDUCATION: CPT | Mod: GP | Performed by: PHYSICAL THERAPIST
